# Patient Record
Sex: FEMALE | Race: WHITE | NOT HISPANIC OR LATINO | Employment: OTHER | URBAN - METROPOLITAN AREA
[De-identification: names, ages, dates, MRNs, and addresses within clinical notes are randomized per-mention and may not be internally consistent; named-entity substitution may affect disease eponyms.]

---

## 2017-01-18 ENCOUNTER — MEDICAL CORRESPONDENCE (OUTPATIENT)
Dept: FAMILY MEDICINE | Facility: CLINIC | Age: 77
End: 2017-01-18

## 2017-01-26 ENCOUNTER — TELEPHONE (OUTPATIENT)
Dept: FAMILY MEDICINE | Facility: CLINIC | Age: 77
End: 2017-01-26

## 2017-01-26 NOTE — TELEPHONE ENCOUNTER
Continued from below--Per Dr Baltazar WIGGINS for letter stating it is recommended patient have PCA care for 2 x a week. Mailed to daughter Kay [address] [place] [address].

## 2017-01-26 NOTE — TELEPHONE ENCOUNTER
Daughter Kay called and asked if Dr Ledesma could do a letter stating patient needs PCA services.  The [facility] wants to discontinue them.  Daughter is her PCA.  She goes twice a week for 6-8 hours daily and helps her mother with bath, meals,  shopping and any other personal care.

## 2017-01-26 NOTE — Clinical Note
[facility] [address] [place] [address] [phone]      January 26, 2017      Yoanna Phillips  [address] [facility] [address]              To Whom it May Concern:         It is recommended that this patient have PCA services.    At this time she has a PCA that comes twice a week for   6-8 hours a day. Patient needs help with personal care-bathing,  meals, shopping and overall safety review. Dx: Focal epilepsy.    Memory impaired. Difficulties with high risk decisions.            Sincerely,                  Marin Ledesma M.D.

## 2017-01-30 DIAGNOSIS — Z76.0 ENCOUNTER FOR MEDICATION REFILL: Primary | ICD-10-CM

## 2017-01-30 RX ORDER — LOSARTAN POTASSIUM 100 MG/1
TABLET ORAL
Qty: 30 TABLET | Refills: 5 | Status: SHIPPED | OUTPATIENT
Start: 2017-01-30 | End: 2017-03-17

## 2017-02-02 ENCOUNTER — TRANSFERRED RECORDS (OUTPATIENT)
Dept: FAMILY MEDICINE | Facility: CLINIC | Age: 77
End: 2017-02-02

## 2017-03-06 ENCOUNTER — CARE COORDINATION (OUTPATIENT)
Dept: CASE MANAGEMENT | Facility: CLINIC | Age: 77
End: 2017-03-06

## 2017-03-06 ENCOUNTER — OFFICE VISIT (OUTPATIENT)
Dept: FAMILY MEDICINE | Facility: CLINIC | Age: 77
End: 2017-03-06

## 2017-03-06 VITALS
SYSTOLIC BLOOD PRESSURE: 120 MMHG | BODY MASS INDEX: 29.76 KG/M2 | HEART RATE: 69 BPM | WEIGHT: 168 LBS | OXYGEN SATURATION: 97 % | DIASTOLIC BLOOD PRESSURE: 80 MMHG

## 2017-03-06 DIAGNOSIS — M16.11 PRIMARY OSTEOARTHRITIS OF RIGHT HIP: ICD-10-CM

## 2017-03-06 DIAGNOSIS — I10 ESSENTIAL HYPERTENSION, BENIGN: ICD-10-CM

## 2017-03-06 DIAGNOSIS — J44.9 CHRONIC OBSTRUCTIVE PULMONARY DISEASE, UNSPECIFIED COPD TYPE (H): ICD-10-CM

## 2017-03-06 DIAGNOSIS — Z01.818 PREOP GENERAL PHYSICAL EXAM: Primary | ICD-10-CM

## 2017-03-06 DIAGNOSIS — R41.3 POOR SHORT TERM MEMORY: ICD-10-CM

## 2017-03-06 DIAGNOSIS — G40.909 SEIZURE DISORDER (H): ICD-10-CM

## 2017-03-06 LAB
% GRANULOCYTES: 43.8 % (ref 42.2–75.2)
HCT VFR BLD AUTO: 38.1 % (ref 35–46)
HEMOGLOBIN: 12.9 G/DL (ref 11.8–15.5)
LYMPHOCYTES NFR BLD AUTO: 48.5 % (ref 20.5–51.1)
MCH RBC QN AUTO: 33.1 PG (ref 27–31)
MCHC RBC AUTO-ENTMCNC: 33.8 G/DL (ref 33–37)
MCV RBC AUTO: 97.8 FL (ref 80–100)
MONOCYTES NFR BLD AUTO: 7.7 % (ref 1.7–9.3)
PLATELET # BLD AUTO: 311 K/UL (ref 140–450)
RBC # BLD AUTO: 3.89 X10/CMM (ref 3.7–5.2)
WBC # BLD AUTO: 7.6 X10/CMM (ref 3.8–11)

## 2017-03-06 PROCEDURE — 85025 COMPLETE CBC W/AUTO DIFF WBC: CPT | Performed by: FAMILY MEDICINE

## 2017-03-06 PROCEDURE — 36415 COLL VENOUS BLD VENIPUNCTURE: CPT | Performed by: FAMILY MEDICINE

## 2017-03-06 PROCEDURE — 93000 ELECTROCARDIOGRAM COMPLETE: CPT | Performed by: FAMILY MEDICINE

## 2017-03-06 PROCEDURE — 99215 OFFICE O/P EST HI 40 MIN: CPT | Performed by: FAMILY MEDICINE

## 2017-03-06 PROCEDURE — 84132 ASSAY OF SERUM POTASSIUM: CPT | Mod: 90 | Performed by: FAMILY MEDICINE

## 2017-03-06 NOTE — MR AVS SNAPSHOT
After Visit Summary   3/6/2017    Yoanna Phillips    MRN: [id number]           Patient Information     Date Of Birth          1940        Visit Information        Provider Department      3/6/2017 2:00 PM Marin Ledesma MD [facility]        Today's Diagnoses     Preop general physical exam    -  1    Essential hypertension, benign        Poor short term memory        Seizure disorder (H)        Primary osteoarthritis of right hip        Chronic obstructive pulmonary disease, unspecified COPD type (H)          Care Instructions      Before Your Surgery      Call your surgeon if there is any change in your health. This includes signs of a cold or flu (such as a sore throat, runny nose, cough, rash or fever).    Do not smoke, drink alcohol or take over the counter medicine (unless your surgeon or primary care doctor tells you to) for the 24 hours before and after surgery.    If you take prescribed drugs: Follow your doctor s orders about which medicines to take and which to stop until after surgery.    Eating and drinking prior to surgery: follow the instructions from your surgeon    Take a shower or bath the night before surgery. Use the soap your surgeon gave you to gently clean your skin. If you do not have soap from your surgeon, use your regular soap. Do not shave or scrub the surgery site.  Wear clean pajamas and have clean sheets on your bed.         Follow-ups after your visit        Your next 10 appointments already scheduled     Mar 20, 2017   Procedure with Mingo Fernando MD   [facility] (--)    [address]  [facility] [id number] [phone]              Who to contact     If you have questions or need follow up information about today's clinic visit or your schedule please contact [facility] directly at [phone].  Normal or non-critical lab and imaging results will be communicated to you by Alfonso  letter or phone within 4 business days after the clinic has received the results. If you do not hear from us within 7 days, please contact the clinic through Tead[employer] or phone. If you have a critical or abnormal lab result, we will notify you by phone as soon as possible.  Submit refill requests through [employer]s or call your pharmacy and they will forward the refill request to us. Please allow 3 business days for your refill to be completed.          Additional Information About Your Visit        [employer] Information     [employer] gives you secure access to your electronic health record. If you see a primary care provider, you can also send messages to your care team and make appointments. If you have questions, please call your primary care clinic.  If you do not have a primary care provider, please call [phone] and they will assist you.        Care EveryWhere ID     This is your Care EveryWhere ID. This could be used by other organizations to access your [place] medical records  [id number]        Your Vitals Were     Pulse Pulse Oximetry BMI (Body Mass Index)             69 97% 29.76 kg/m2          Blood Pressure from Last 3 Encounters:   03/06/17 120/80   06/16/16 96/62   06/01/16 123/71    Weight from Last 3 Encounters:   03/06/17 76.2 kg (168 lb)   06/16/16 69.9 kg (154 lb)   06/01/16 73 kg (161 lb)              We Performed the Following     CBC with Diff/Plt ([facility])     EKG 12-lead complete w/read - Clinics     Potassium  Serum ([facility])        Primary Care Provider Office Phone # Fax #    Marin Ledesma [phone]       [facility] [address]  [facility] [address]        Thank you!     Thank you for choosing [facility]  for your care. Our goal is always to provide you with excellent care. Hearing back from our patients is one way we can continue to improve our services. Please take a few minutes to complete the written survey that you may receive in  the mail after your visit with us. Thank you!             Your Updated Medication List - Protect others around you: Learn how to safely use, store and throw away your medicines at www.disposemymeds.org.          This list is accurate as of: 3/6/17 11:59 PM.  Always use your most recent med list.                   Brand Name Dispense Instructions for use    acetaminophen 325 MG tablet    TYLENOL    100 tablet    Take 2 tablets (650 mg) by mouth every 4 hours as needed for mild pain       alendronate 70 MG tablet    FOSAMAX    12 tablet    Take 1 tablet (70 mg) by mouth every 7 days Take with over 8 ounces water and stay upright for at least 30 minutes after dose.  Take at least 60 minutes before breakfast       CALCIUM 500 + D 500-200 MG-IU Tabs     34    1 po qd       CARBATROL 300 MG 12 hr capsule   Generic drug:  carBAMazepine     60    1 CAPSULE TWICE DAILY       CENTRUM Tabs     34    1 po qd       cyanocobalamin 1000 MCG/ML injection    VITAMIN B12    1 mL    INJECT 1 ML AS DIRECTED EVERY 30 DAYS.       folic acid 1 MG tablet    FOLVITE    30 Tab    1 TABLET DAILY       furosemide 20 MG tablet    LASIX    60 tablet    TAKE ONE TABLET BY MOUTH TWO TIMES A DAY       gabapentin 600 MG tablet    NEURONTIN     Take 1,200 mg by mouth 3 times daily. 1200mg TID verified with pharmacy       losartan 100 MG tablet    COZAAR    30 tablet    Take 1 tablet by mouth daily       potassium chloride SA 20 MEQ CR tablet    potassium chloride    60 tablet    TAKE ONE TABLET BY MOUTH TWO TIMES A DAY       SPIRIVA HANDIHALER 18 MCG capsule   Generic drug:  tiotropium     30 capsule    INHALE 1 CAPSULE (18 MCG) BY INHALATION ROUTE ONCE DAILY       thiamine 100 MG tablet      Take 100 mg by mouth daily.       VITAMIN C PO      Take 1,000 mg by mouth daily
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
## 2017-03-06 NOTE — LETTER
[facility]  [address] [place]  [address]  Phone: [phone]    March 6, 2017    Yoanna Phillips                                                                                                                                [address] [facility] [address]   Member ID: [id number]      Reason for Appeal: Belief that patient did not accurately describe ADL/IADL's due to cognitive impairment and lack of insight into general functioning. Reccommend that family be present during interview to help create accurate representation of capabilities and need for assistance.     Cognitive impairment- SLUMS 15/30 signifying dementia/ severe impairment    Tinetti Gait and Balance- 23/28 moderate risk of falls     Diagnosis include:   Advance Care Planning      Hip joint replacement status     Status post total replacement of right hip     Primary osteoarthritis of hip     Dislocation of internal right hip prosthesis, subsequent encounter     Physical deconditioning     Anemia due to blood loss, acute     Dyspnea     Dislocated hip (H)     Essential hypertension, benign     B-complex deficiency     CARDIOVASCULAR SCREENING; LDL GOAL LESS THAN 130     Poor short term memory     Shoulder joint replacement status     Cognitive impairment     DJD (degenerative joint disease)     Health Care Home     Seizure disorder (H)           Sincerely,    Marin Ledesma M.D.

## 2017-03-07 ENCOUNTER — TELEPHONE (OUTPATIENT)
Dept: FAMILY MEDICINE | Facility: CLINIC | Age: 77
End: 2017-03-07

## 2017-03-07 LAB — POTASSIUM SERPL-SCNC: 4.5 MMOL/L (ref 3.5–5.2)

## 2017-03-07 RX ORDER — GABAPENTIN 600 MG/1
1200 TABLET ORAL 2 TIMES DAILY
Qty: 120 TABLET | Refills: 0 | COMMUNITY
Start: 2017-03-07 | End: 2017-03-17

## 2017-03-07 NOTE — PROGRESS NOTES
Clinic Care Coordination Contact  OUTREACH    Referral Information:  Referral Source: PCP  Reason for Contact: Concerns about decrease in hours for PCA  Care Conference: No     Universal Utilization:   ED Visits in last year: 3  Hospital visits in last year: 2  Last PCP appointment: 03/06/17  Missed Appointments: 2  Concerns: Frequent Hospitalizations related to orthopedics/falls  Multiple Providers or Specialists: Ortho    Clinical Concerns:  Current Medical Concerns:   Patient Active Problem List   Diagnosis     Essential hypertension, benign     B-complex deficiency     CARDIOVASCULAR SCREENING; LDL GOAL LESS THAN 130     Poor short term memory     Advance Care Planning     Shoulder joint replacement status     Cognitive impairment     DJD (degenerative joint disease)     Health Care Home     Seizure disorder (H)     Hip joint replacement status     Status post total replacement of right hip     Primary osteoarthritis of hip     Dislocation of internal right hip prosthesis, subsequent encounter     Physical deconditioning     Anemia due to blood loss, acute     Dyspnea     Dislocated hip (H)       Current Behavioral Concerns: Memory Loss, Lack of insight into care needs    Education Provided to patient: Care Coordination   Clinical Pathway: None    Medication Management:  Patient understands and is adherent, Family assists     Functional Status:  Mobility Status: Independent  Equipment Currently Used at Home: walker, rolling  Transportation: Family provides     Psychosocial:  Current living arrangement:: I live alone  Financial/Insurance: [facility], No concerns     Resources and Interventions:  Current Resources: PCA; Housekeeping/[employer]  PAS Number:  (NA)  Senior Linkage Line Referral Placed:  (NA)  Advanced Care Plans/Directives on file:: Yes  Referrals Placed:  (NA)    Patient/Caregiver understanding: PCP asked that the EMMA CC meet with pt and pt's grand daughter to discuss concerns about a decrease in  "pt's PCA hours and the appeal process. Pt's grand daughter reports that she believes that the patient did not accurately depict the amount of care needs she has due to lack of insight into her deficits stating, \"my grandma thinks she is more independent then she really is.\" Pt plans to have surgery on 3/20 discussed discharge planning in the TCU and that a county assessment may be able to be redone while in the facility as well since they will have information from PT and OT. Discussed Care Coordination role and provided materials about health care home.   Frequency of Care Coordination: as needed  Upcoming appointment:  (NA)     Plan: EMMA CC will discuss needs with PCP and send the letter for the appeal process and remain available if needed.    Dalia Wood, [facility]  Clinic Care Coordinator  [facility]  [phone]        "
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
## 2017-03-17 RX ORDER — MULTIVIT WITH MINERALS/LUTEIN
1 TABLET ORAL DAILY
COMMUNITY
End: 2020-02-16

## 2017-03-17 RX ORDER — TIOTROPIUM BROMIDE 18 UG/1
18 CAPSULE ORAL; RESPIRATORY (INHALATION) DAILY
COMMUNITY
End: 2024-03-06

## 2017-03-19 ENCOUNTER — ANESTHESIA EVENT (OUTPATIENT)
Dept: SURGERY | Facility: CLINIC | Age: 77
DRG: 470 | End: 2017-03-19
Payer: COMMERCIAL

## 2017-03-20 ENCOUNTER — ANESTHESIA (OUTPATIENT)
Dept: SURGERY | Facility: CLINIC | Age: 77
DRG: 470 | End: 2017-03-20
Payer: COMMERCIAL

## 2017-03-20 ENCOUNTER — HOSPITAL ENCOUNTER (INPATIENT)
Facility: CLINIC | Age: 77
LOS: 3 days | Discharge: NURSING, REHAB OR LONG-TERM CARE | DRG: 470 | End: 2017-03-23
Attending: ORTHOPAEDIC SURGERY | Admitting: ORTHOPAEDIC SURGERY
Payer: COMMERCIAL

## 2017-03-20 ENCOUNTER — APPOINTMENT (OUTPATIENT)
Dept: GENERAL RADIOLOGY | Facility: CLINIC | Age: 77
DRG: 470 | End: 2017-03-20
Attending: ORTHOPAEDIC SURGERY
Payer: COMMERCIAL

## 2017-03-20 ENCOUNTER — APPOINTMENT (OUTPATIENT)
Dept: PHYSICAL THERAPY | Facility: CLINIC | Age: 77
DRG: 470 | End: 2017-03-20
Attending: ORTHOPAEDIC SURGERY
Payer: COMMERCIAL

## 2017-03-20 ENCOUNTER — SURGERY (OUTPATIENT)
Age: 77
End: 2017-03-20

## 2017-03-20 DIAGNOSIS — Z96.649 HIP JOINT REPLACEMENT STATUS: Primary | ICD-10-CM

## 2017-03-20 LAB
ABO + RH BLD: NORMAL
ABO + RH BLD: NORMAL
BLD GP AB SCN SERPL QL: NORMAL
BLOOD BANK CMNT PATIENT-IMP: NORMAL
CREAT SERPL-MCNC: 0.6 MG/DL (ref 0.52–1.04)
GFR SERPL CREATININE-BSD FRML MDRD: NORMAL ML/MIN/1.7M2
PLATELET # BLD AUTO: 292 10E9/L (ref 150–450)
POTASSIUM SERPL-SCNC: 4.3 MMOL/L (ref 3.4–5.3)
SPECIMEN EXP DATE BLD: NORMAL

## 2017-03-20 PROCEDURE — 40000169 ZZH STATISTIC PRE-PROCEDURE ASSESSMENT I: Performed by: ORTHOPAEDIC SURGERY

## 2017-03-20 PROCEDURE — 25800025 ZZH RX 258: Performed by: ANESTHESIOLOGY

## 2017-03-20 PROCEDURE — 25000128 H RX IP 250 OP 636: Performed by: ANESTHESIOLOGY

## 2017-03-20 PROCEDURE — 12000007 ZZH R&B INTERMEDIATE

## 2017-03-20 PROCEDURE — 37000009 ZZH ANESTHESIA TECHNICAL FEE, EACH ADDTL 15 MIN: Performed by: ORTHOPAEDIC SURGERY

## 2017-03-20 PROCEDURE — 82565 ASSAY OF CREATININE: CPT | Performed by: ORTHOPAEDIC SURGERY

## 2017-03-20 PROCEDURE — 36415 COLL VENOUS BLD VENIPUNCTURE: CPT | Performed by: ORTHOPAEDIC SURGERY

## 2017-03-20 PROCEDURE — 25000128 H RX IP 250 OP 636: Performed by: ORTHOPAEDIC SURGERY

## 2017-03-20 PROCEDURE — 25800025 ZZH RX 258: Performed by: ORTHOPAEDIC SURGERY

## 2017-03-20 PROCEDURE — 71000012 ZZH RECOVERY PHASE 1 LEVEL 1 FIRST HR: Performed by: ORTHOPAEDIC SURGERY

## 2017-03-20 PROCEDURE — 85049 AUTOMATED PLATELET COUNT: CPT | Performed by: ORTHOPAEDIC SURGERY

## 2017-03-20 PROCEDURE — 86850 RBC ANTIBODY SCREEN: CPT | Performed by: ANESTHESIOLOGY

## 2017-03-20 PROCEDURE — 36000063 ZZH SURGERY LEVEL 4 EA 15 ADDTL MIN: Performed by: ORTHOPAEDIC SURGERY

## 2017-03-20 PROCEDURE — 40000193 ZZH STATISTIC PT WARD VISIT: Performed by: PHYSICAL THERAPIST

## 2017-03-20 PROCEDURE — 25000128 H RX IP 250 OP 636

## 2017-03-20 PROCEDURE — 97530 THERAPEUTIC ACTIVITIES: CPT | Mod: GP | Performed by: PHYSICAL THERAPIST

## 2017-03-20 PROCEDURE — 97162 PT EVAL MOD COMPLEX 30 MIN: CPT | Mod: GP | Performed by: PHYSICAL THERAPIST

## 2017-03-20 PROCEDURE — 97110 THERAPEUTIC EXERCISES: CPT | Mod: GP | Performed by: PHYSICAL THERAPIST

## 2017-03-20 PROCEDURE — 86901 BLOOD TYPING SEROLOGIC RH(D): CPT | Performed by: ANESTHESIOLOGY

## 2017-03-20 PROCEDURE — 71000013 ZZH RECOVERY PHASE 1 LEVEL 1 EA ADDTL HR: Performed by: ORTHOPAEDIC SURGERY

## 2017-03-20 PROCEDURE — 25000125 ZZHC RX 250: Performed by: ANESTHESIOLOGY

## 2017-03-20 PROCEDURE — 25000132 ZZH RX MED GY IP 250 OP 250 PS 637: Performed by: ORTHOPAEDIC SURGERY

## 2017-03-20 PROCEDURE — 36000093 ZZH SURGERY LEVEL 4 1ST 30 MIN: Performed by: ORTHOPAEDIC SURGERY

## 2017-03-20 PROCEDURE — 84132 ASSAY OF SERUM POTASSIUM: CPT | Performed by: ANESTHESIOLOGY

## 2017-03-20 PROCEDURE — 36415 COLL VENOUS BLD VENIPUNCTURE: CPT | Performed by: ANESTHESIOLOGY

## 2017-03-20 PROCEDURE — C1776 JOINT DEVICE (IMPLANTABLE): HCPCS | Performed by: ORTHOPAEDIC SURGERY

## 2017-03-20 PROCEDURE — 27210794 ZZH OR GENERAL SUPPLY STERILE: Performed by: ORTHOPAEDIC SURGERY

## 2017-03-20 PROCEDURE — 27210995 ZZH RX 272: Performed by: ORTHOPAEDIC SURGERY

## 2017-03-20 PROCEDURE — 86900 BLOOD TYPING SEROLOGIC ABO: CPT | Performed by: ANESTHESIOLOGY

## 2017-03-20 PROCEDURE — 0SRB02A REPLACEMENT OF LEFT HIP JOINT WITH METAL ON POLYETHYLENE SYNTHETIC SUBSTITUTE, UNCEMENTED, OPEN APPROACH: ICD-10-PCS | Performed by: ORTHOPAEDIC SURGERY

## 2017-03-20 PROCEDURE — 25000128 H RX IP 250 OP 636: Performed by: NURSE ANESTHETIST, CERTIFIED REGISTERED

## 2017-03-20 PROCEDURE — 37000008 ZZH ANESTHESIA TECHNICAL FEE, 1ST 30 MIN: Performed by: ORTHOPAEDIC SURGERY

## 2017-03-20 PROCEDURE — 25000125 ZZHC RX 250: Performed by: NURSE ANESTHETIST, CERTIFIED REGISTERED

## 2017-03-20 PROCEDURE — 40000940 XR PELVIS AND HIP PORTABLE LEFT 1 VIEW

## 2017-03-20 PROCEDURE — 25000125 ZZHC RX 250: Performed by: ORTHOPAEDIC SURGERY

## 2017-03-20 PROCEDURE — 25000566 ZZH SEVOFLURANE, EA 15 MIN: Performed by: ORTHOPAEDIC SURGERY

## 2017-03-20 PROCEDURE — C1713 ANCHOR/SCREW BN/BN,TIS/BN: HCPCS | Performed by: ORTHOPAEDIC SURGERY

## 2017-03-20 DEVICE — IMP SCR ZIM 6.5X25MM ACET CUP SELF TAP 00-6250-065-25: Type: IMPLANTABLE DEVICE | Site: HIP | Status: FUNCTIONAL

## 2017-03-20 DEVICE — IMPLANTABLE DEVICE: Type: IMPLANTABLE DEVICE | Site: HIP | Status: FUNCTIONAL

## 2017-03-20 DEVICE — IMP SHELL ACETABULUM ZIM 52MM 00-6200-052-22: Type: IMPLANTABLE DEVICE | Site: HIP | Status: FUNCTIONAL

## 2017-03-20 DEVICE — IMP LINER ACETABULUM ZIM XLPE 50X36MM 00-6305-050-36: Type: IMPLANTABLE DEVICE | Site: HIP | Status: FUNCTIONAL

## 2017-03-20 DEVICE — IMP HEAD FEMORAL BIOM MOD 36MM +3MM 11-363663: Type: IMPLANTABLE DEVICE | Site: HIP | Status: FUNCTIONAL

## 2017-03-20 RX ORDER — ONDANSETRON 2 MG/ML
INJECTION INTRAMUSCULAR; INTRAVENOUS PRN
Status: DISCONTINUED | OUTPATIENT
Start: 2017-03-20 | End: 2017-03-20

## 2017-03-20 RX ORDER — ONDANSETRON 4 MG/1
4 TABLET, ORALLY DISINTEGRATING ORAL EVERY 30 MIN PRN
Status: DISCONTINUED | OUTPATIENT
Start: 2017-03-20 | End: 2017-03-20 | Stop reason: HOSPADM

## 2017-03-20 RX ORDER — HYDROMORPHONE HYDROCHLORIDE 1 MG/ML
.3-.5 INJECTION, SOLUTION INTRAMUSCULAR; INTRAVENOUS; SUBCUTANEOUS EVERY 5 MIN PRN
Status: DISCONTINUED | OUTPATIENT
Start: 2017-03-20 | End: 2017-03-20 | Stop reason: HOSPADM

## 2017-03-20 RX ORDER — CEFAZOLIN SODIUM 1 G/3ML
1 INJECTION, POWDER, FOR SOLUTION INTRAMUSCULAR; INTRAVENOUS SEE ADMIN INSTRUCTIONS
Status: DISCONTINUED | OUTPATIENT
Start: 2017-03-20 | End: 2017-03-20 | Stop reason: HOSPADM

## 2017-03-20 RX ORDER — FENTANYL CITRATE 50 UG/ML
25-50 INJECTION, SOLUTION INTRAMUSCULAR; INTRAVENOUS
Status: DISCONTINUED | OUTPATIENT
Start: 2017-03-20 | End: 2017-03-20 | Stop reason: HOSPADM

## 2017-03-20 RX ORDER — PROPOFOL 10 MG/ML
INJECTION, EMULSION INTRAVENOUS PRN
Status: DISCONTINUED | OUTPATIENT
Start: 2017-03-20 | End: 2017-03-20

## 2017-03-20 RX ORDER — METOCLOPRAMIDE 5 MG/1
5 TABLET ORAL EVERY 6 HOURS PRN
Status: DISCONTINUED | OUTPATIENT
Start: 2017-03-20 | End: 2017-03-23 | Stop reason: HOSPADM

## 2017-03-20 RX ORDER — ONDANSETRON 4 MG/1
4 TABLET, ORALLY DISINTEGRATING ORAL EVERY 6 HOURS PRN
Status: DISCONTINUED | OUTPATIENT
Start: 2017-03-20 | End: 2017-03-23 | Stop reason: HOSPADM

## 2017-03-20 RX ORDER — SODIUM CHLORIDE, SODIUM LACTATE, POTASSIUM CHLORIDE, CALCIUM CHLORIDE 600; 310; 30; 20 MG/100ML; MG/100ML; MG/100ML; MG/100ML
INJECTION, SOLUTION INTRAVENOUS CONTINUOUS
Status: DISCONTINUED | OUTPATIENT
Start: 2017-03-20 | End: 2017-03-20 | Stop reason: HOSPADM

## 2017-03-20 RX ORDER — GLYCOPYRROLATE 0.2 MG/ML
INJECTION, SOLUTION INTRAMUSCULAR; INTRAVENOUS PRN
Status: DISCONTINUED | OUTPATIENT
Start: 2017-03-20 | End: 2017-03-20

## 2017-03-20 RX ORDER — ONDANSETRON 2 MG/ML
4 INJECTION INTRAMUSCULAR; INTRAVENOUS EVERY 6 HOURS PRN
Status: DISCONTINUED | OUTPATIENT
Start: 2017-03-20 | End: 2017-03-23 | Stop reason: HOSPADM

## 2017-03-20 RX ORDER — HYDROMORPHONE HYDROCHLORIDE 1 MG/ML
INJECTION, SOLUTION INTRAMUSCULAR; INTRAVENOUS; SUBCUTANEOUS
Status: COMPLETED
Start: 2017-03-20 | End: 2017-03-20

## 2017-03-20 RX ORDER — NALOXONE HYDROCHLORIDE 0.4 MG/ML
.1-.4 INJECTION, SOLUTION INTRAMUSCULAR; INTRAVENOUS; SUBCUTANEOUS
Status: DISCONTINUED | OUTPATIENT
Start: 2017-03-20 | End: 2017-03-23 | Stop reason: HOSPADM

## 2017-03-20 RX ORDER — HYDROMORPHONE HYDROCHLORIDE 2 MG/1
2 TABLET ORAL EVERY 4 HOURS PRN
Status: DISCONTINUED | OUTPATIENT
Start: 2017-03-20 | End: 2017-03-23 | Stop reason: HOSPADM

## 2017-03-20 RX ORDER — ACETAMINOPHEN 325 MG/1
975 TABLET ORAL EVERY 8 HOURS
Status: DISCONTINUED | OUTPATIENT
Start: 2017-03-20 | End: 2017-03-23 | Stop reason: HOSPADM

## 2017-03-20 RX ORDER — TIOTROPIUM BROMIDE 18 UG/1
18 CAPSULE ORAL; RESPIRATORY (INHALATION) DAILY
Status: DISCONTINUED | OUTPATIENT
Start: 2017-03-21 | End: 2017-03-23 | Stop reason: HOSPADM

## 2017-03-20 RX ORDER — NEOSTIGMINE METHYLSULFATE 1 MG/ML
VIAL (ML) INJECTION PRN
Status: DISCONTINUED | OUTPATIENT
Start: 2017-03-20 | End: 2017-03-20

## 2017-03-20 RX ORDER — ACETAMINOPHEN 500 MG
1000 TABLET ORAL ONCE
Status: COMPLETED | OUTPATIENT
Start: 2017-03-20 | End: 2017-03-20

## 2017-03-20 RX ORDER — ONDANSETRON 2 MG/ML
4 INJECTION INTRAMUSCULAR; INTRAVENOUS EVERY 30 MIN PRN
Status: DISCONTINUED | OUTPATIENT
Start: 2017-03-20 | End: 2017-03-20 | Stop reason: HOSPADM

## 2017-03-20 RX ORDER — PROCHLORPERAZINE MALEATE 5 MG
5 TABLET ORAL EVERY 6 HOURS PRN
Status: DISCONTINUED | OUTPATIENT
Start: 2017-03-20 | End: 2017-03-23 | Stop reason: HOSPADM

## 2017-03-20 RX ORDER — METOCLOPRAMIDE HYDROCHLORIDE 5 MG/ML
5 INJECTION INTRAMUSCULAR; INTRAVENOUS EVERY 6 HOURS PRN
Status: DISCONTINUED | OUTPATIENT
Start: 2017-03-20 | End: 2017-03-23 | Stop reason: HOSPADM

## 2017-03-20 RX ORDER — AMOXICILLIN 250 MG
1-2 CAPSULE ORAL 2 TIMES DAILY
Status: DISCONTINUED | OUTPATIENT
Start: 2017-03-20 | End: 2017-03-23 | Stop reason: HOSPADM

## 2017-03-20 RX ORDER — CEFAZOLIN SODIUM 2 G/100ML
2 INJECTION, SOLUTION INTRAVENOUS
Status: COMPLETED | OUTPATIENT
Start: 2017-03-20 | End: 2017-03-20

## 2017-03-20 RX ORDER — LIDOCAINE HYDROCHLORIDE 20 MG/ML
INJECTION, SOLUTION INFILTRATION; PERINEURAL PRN
Status: DISCONTINUED | OUTPATIENT
Start: 2017-03-20 | End: 2017-03-20

## 2017-03-20 RX ORDER — POTASSIUM CHLORIDE 750 MG/1
10 TABLET, EXTENDED RELEASE ORAL 2 TIMES DAILY
Status: DISCONTINUED | OUTPATIENT
Start: 2017-03-20 | End: 2017-03-23 | Stop reason: HOSPADM

## 2017-03-20 RX ORDER — ACETAMINOPHEN 325 MG/1
650 TABLET ORAL EVERY 4 HOURS PRN
Status: DISCONTINUED | OUTPATIENT
Start: 2017-03-23 | End: 2017-03-23 | Stop reason: HOSPADM

## 2017-03-20 RX ORDER — HYDROMORPHONE HYDROCHLORIDE 1 MG/ML
.3-.5 INJECTION, SOLUTION INTRAMUSCULAR; INTRAVENOUS; SUBCUTANEOUS
Status: DISCONTINUED | OUTPATIENT
Start: 2017-03-20 | End: 2017-03-23 | Stop reason: HOSPADM

## 2017-03-20 RX ORDER — CYANOCOBALAMIN 1000 UG/ML
1000 INJECTION, SOLUTION INTRAMUSCULAR; SUBCUTANEOUS
COMMUNITY
End: 2022-04-25

## 2017-03-20 RX ORDER — LOSARTAN POTASSIUM 100 MG/1
100 TABLET ORAL DAILY
Status: DISCONTINUED | OUTPATIENT
Start: 2017-03-20 | End: 2017-03-23 | Stop reason: HOSPADM

## 2017-03-20 RX ORDER — GABAPENTIN 600 MG/1
1200 TABLET ORAL 3 TIMES DAILY
Status: DISCONTINUED | OUTPATIENT
Start: 2017-03-20 | End: 2017-03-23 | Stop reason: HOSPADM

## 2017-03-20 RX ORDER — LIDOCAINE 40 MG/G
CREAM TOPICAL
Status: DISCONTINUED | OUTPATIENT
Start: 2017-03-20 | End: 2017-03-23 | Stop reason: HOSPADM

## 2017-03-20 RX ORDER — DEXTROSE MONOHYDRATE, SODIUM CHLORIDE, AND POTASSIUM CHLORIDE 50; 1.49; 4.5 G/1000ML; G/1000ML; G/1000ML
INJECTION, SOLUTION INTRAVENOUS CONTINUOUS
Status: DISCONTINUED | OUTPATIENT
Start: 2017-03-20 | End: 2017-03-21 | Stop reason: CLARIF

## 2017-03-20 RX ORDER — CEFAZOLIN SODIUM 1 G/3ML
1 INJECTION, POWDER, FOR SOLUTION INTRAMUSCULAR; INTRAVENOUS EVERY 8 HOURS
Status: COMPLETED | OUTPATIENT
Start: 2017-03-20 | End: 2017-03-21

## 2017-03-20 RX ORDER — FENTANYL CITRATE 50 UG/ML
INJECTION, SOLUTION INTRAMUSCULAR; INTRAVENOUS PRN
Status: DISCONTINUED | OUTPATIENT
Start: 2017-03-20 | End: 2017-03-20

## 2017-03-20 RX ADMIN — ONDANSETRON 4 MG: 2 INJECTION INTRAMUSCULAR; INTRAVENOUS at 08:50

## 2017-03-20 RX ADMIN — HYDROMORPHONE HYDROCHLORIDE 0.5 MG: 1 INJECTION, SOLUTION INTRAMUSCULAR; INTRAVENOUS; SUBCUTANEOUS at 11:57

## 2017-03-20 RX ADMIN — ROCURONIUM BROMIDE 30 MG: 10 INJECTION INTRAVENOUS at 07:38

## 2017-03-20 RX ADMIN — ACETAMINOPHEN 975 MG: 325 TABLET, FILM COATED ORAL at 22:34

## 2017-03-20 RX ADMIN — FENTANYL CITRATE 50 MCG: 50 INJECTION, SOLUTION INTRAMUSCULAR; INTRAVENOUS at 09:27

## 2017-03-20 RX ADMIN — FENTANYL CITRATE 50 MCG: 50 INJECTION, SOLUTION INTRAMUSCULAR; INTRAVENOUS at 09:44

## 2017-03-20 RX ADMIN — PROPOFOL 30 MG: 10 INJECTION, EMULSION INTRAVENOUS at 08:27

## 2017-03-20 RX ADMIN — GABAPENTIN 1200 MG: 600 TABLET, FILM COATED ORAL at 16:35

## 2017-03-20 RX ADMIN — HYDROMORPHONE HYDROCHLORIDE 0.5 MG: 1 INJECTION, SOLUTION INTRAMUSCULAR; INTRAVENOUS; SUBCUTANEOUS at 09:18

## 2017-03-20 RX ADMIN — WATER 1000 ML: 100 IRRIGANT IRRIGATION at 08:35

## 2017-03-20 RX ADMIN — GLYCOPYRROLATE 0.4 MG: 0.2 INJECTION, SOLUTION INTRAMUSCULAR; INTRAVENOUS at 08:42

## 2017-03-20 RX ADMIN — LIDOCAINE HYDROCHLORIDE 30 MG: 20 INJECTION, SOLUTION INFILTRATION; PERINEURAL at 07:38

## 2017-03-20 RX ADMIN — POTASSIUM CHLORIDE, DEXTROSE MONOHYDRATE AND SODIUM CHLORIDE: 150; 5; 450 INJECTION, SOLUTION INTRAVENOUS at 20:16

## 2017-03-20 RX ADMIN — SODIUM CHLORIDE, POTASSIUM CHLORIDE, SODIUM LACTATE AND CALCIUM CHLORIDE: 600; 310; 30; 20 INJECTION, SOLUTION INTRAVENOUS at 07:28

## 2017-03-20 RX ADMIN — CARBAMAZEPINE 300 MG: 100 TABLET, EXTENDED RELEASE ORAL at 20:19

## 2017-03-20 RX ADMIN — HYDROMORPHONE HYDROCHLORIDE 0.3 MG: 1 INJECTION, SOLUTION INTRAMUSCULAR; INTRAVENOUS; SUBCUTANEOUS at 10:12

## 2017-03-20 RX ADMIN — GABAPENTIN 1200 MG: 600 TABLET, FILM COATED ORAL at 22:35

## 2017-03-20 RX ADMIN — TRANEXAMIC ACID 1 G: 100 INJECTION, SOLUTION INTRAVENOUS at 07:45

## 2017-03-20 RX ADMIN — FENTANYL CITRATE 50 MCG: 50 INJECTION, SOLUTION INTRAMUSCULAR; INTRAVENOUS at 07:28

## 2017-03-20 RX ADMIN — SODIUM CHLORIDE, POTASSIUM CHLORIDE, SODIUM LACTATE AND CALCIUM CHLORIDE: 600; 310; 30; 20 INJECTION, SOLUTION INTRAVENOUS at 06:58

## 2017-03-20 RX ADMIN — POTASSIUM CHLORIDE 10 MEQ: 750 TABLET, EXTENDED RELEASE ORAL at 20:19

## 2017-03-20 RX ADMIN — HYDROMORPHONE HYDROCHLORIDE 0.3 MG: 1 INJECTION, SOLUTION INTRAMUSCULAR; INTRAVENOUS; SUBCUTANEOUS at 16:35

## 2017-03-20 RX ADMIN — SENNOSIDES AND DOCUSATE SODIUM 1 TABLET: 8.6; 5 TABLET ORAL at 20:19

## 2017-03-20 RX ADMIN — POTASSIUM CHLORIDE, DEXTROSE MONOHYDRATE AND SODIUM CHLORIDE: 150; 5; 450 INJECTION, SOLUTION INTRAVENOUS at 11:35

## 2017-03-20 RX ADMIN — CEFAZOLIN SODIUM 1 G: 1 INJECTION, POWDER, FOR SOLUTION INTRAMUSCULAR; INTRAVENOUS at 16:37

## 2017-03-20 RX ADMIN — FENTANYL CITRATE 50 MCG: 50 INJECTION, SOLUTION INTRAMUSCULAR; INTRAVENOUS at 07:38

## 2017-03-20 RX ADMIN — FENTANYL CITRATE 50 MCG: 50 INJECTION, SOLUTION INTRAMUSCULAR; INTRAVENOUS at 09:06

## 2017-03-20 RX ADMIN — NEOSTIGMINE METHYLSULFATE 2 MG: 1 INJECTION INTRAMUSCULAR; INTRAVENOUS; SUBCUTANEOUS at 08:42

## 2017-03-20 RX ADMIN — PROPOFOL 90 MG: 10 INJECTION, EMULSION INTRAVENOUS at 07:38

## 2017-03-20 RX ADMIN — Medication 1 LOZENGE: at 22:35

## 2017-03-20 RX ADMIN — ACETAMINOPHEN 975 MG: 325 TABLET, FILM COATED ORAL at 14:16

## 2017-03-20 RX ADMIN — GENTAMICIN SULFATE 1000 ML: 40 INJECTION, SOLUTION INTRAMUSCULAR; INTRAVENOUS at 08:08

## 2017-03-20 RX ADMIN — FENTANYL CITRATE 50 MCG: 50 INJECTION, SOLUTION INTRAMUSCULAR; INTRAVENOUS at 08:04

## 2017-03-20 RX ADMIN — HYDROMORPHONE HYDROCHLORIDE 0.5 MG: 1 INJECTION, SOLUTION INTRAMUSCULAR; INTRAVENOUS; SUBCUTANEOUS at 14:16

## 2017-03-20 RX ADMIN — CEFAZOLIN SODIUM 2 G: 2 INJECTION, SOLUTION INTRAVENOUS at 07:50

## 2017-03-20 RX ADMIN — FENTANYL CITRATE 50 MCG: 50 INJECTION, SOLUTION INTRAMUSCULAR; INTRAVENOUS at 09:02

## 2017-03-20 RX ADMIN — ACETAMINOPHEN 500 MG: 500 TABLET ORAL at 06:52

## 2017-03-20 ASSESSMENT — COPD QUESTIONNAIRES: COPD: 1

## 2017-03-20 ASSESSMENT — LIFESTYLE VARIABLES: TOBACCO_USE: 1

## 2017-03-20 ASSESSMENT — ENCOUNTER SYMPTOMS: SEIZURES: 1

## 2017-03-20 NOTE — IP AVS SNAPSHOT
"[place] [address] [facility]: [phone]                                              INTERAGENCY TRANSFER FORM - PHYSICIAN ORDERS   3/20/2017                    Hospital Admission Date: 3/20/2017  JOHN BEAN   : 1940  Sex: Female        Attending Provider: Mingo Fernando MD     Allergies:  Aspirin    Infection:  None   Service:  ORTHOPEDICS    Ht:  1.6 m (5' 3\")   Wt:  77.1 kg (170 lb)   Admission Wt:  77.1 kg (170 lb)    BMI:  30.11 kg/m 2   BSA:  1.85 m 2            Patient PCP Information     Provider PCP Type    Marin Ledesma MD General      ED Clinical Impression     Diagnosis Description Comment Added By Time Added    Hip joint replacement status [Z96.649] Hip joint replacement status [Z96.649]  Mingo Fernando MD 3/22/2017  9:02 AM      Hospital Problems as of 3/23/2017              Priority Class Noted POA    Hip joint replacement status Medium  2016 Yes      Non-Hospital Problems as of 3/23/2017              Priority Class Noted    Essential hypertension, benign   2005    B-complex deficiency   2006    CARDIOVASCULAR SCREENING; LDL GOAL LESS THAN 130   10/31/2010    Poor short term memory   2011    Advance Care Planning   3/6/2012    Shoulder joint replacement status   2012    Cognitive impairment   7/3/2012    DJD (degenerative joint disease)   7/3/2012    Health Care Home   2014    Seizure disorder (H)   2016    Status post total replacement of right hip Medium  2016    Primary osteoarthritis of hip Medium  5/3/2016    Dislocation of internal right hip prosthesis, subsequent encounter Medium  5/3/2016    Physical deconditioning Medium  5/3/2016    Anemia due to blood loss, acute Medium  5/3/2016    Dyspnea Medium  5/3/2016    Dislocated hip (H) Medium  2016      Code Status History     Date Active Date Inactive Code Status Order ID Comments User Context    3/22/2017  9:05 AM  Full Code [id number]  " Mingo Fernando MD Outpatient    3/20/2017 11:33 AM 3/22/2017  9:05 AM Full Code [id number]  Mingo Fernando MD Inpatient    5/16/2016  1:10 PM 3/20/2017 11:33 AM Full Code [id number]  Mingo Fernando MD Outpatient    5/14/2016 10:49 AM 5/16/2016  1:10 PM Full Code [id number]  Mingo Fernando MD Inpatient    5/12/2016  3:09 AM 5/14/2016 10:49 AM Full Code [id number]  Pablo Palomo MD Inpatient    4/13/2016 10:24 AM 5/12/2016  3:09 AM Full Code [id number]  Mingo Fernando MD Outpatient    4/11/2016  2:10 PM 4/13/2016 10:24 AM Full Code [id number]  Mingo Fernando MD Inpatient    6/28/2012  1:40 PM 4/11/2016  2:10 PM Full Code [id number]  Mingo Fernando MD Outpatient    6/25/2012  7:15 PM 6/28/2012  1:40 PM Full Code [id number]  Saloni Kerr, RN Inpatient    3/7/2012  9:34 PM 3/9/2012  7:01 PM Full Code [id number]  Sandra Beltran, GENNARO Inpatient    3/6/2012  2:51 PM 3/7/2012  9:34 PM Full Code [id number]  Hernandez Noyola DO Outpatient    3/3/2012  6:10 PM 3/6/2012  2:51 PM Full Code [id number]  Tisha Cantu, RN Inpatient         Medication Review      START taking        Dose / Directions Comments    enoxaparin 40 MG/0.4ML injection   Commonly known as:  LOVENOX   Used for:  Hip joint replacement status        Dose:  40 mg   Inject 0.4 mLs (40 mg) Subcutaneous every 24 hours for 10 days   Quantity:  4 mL   Refills:  0        senna-docusate 8.6-50 MG per tablet   Commonly known as:  SENOKOT-S;PERICOLACE   Used for:  Hip joint replacement status        Dose:  1-2 tablet   Take 1-2 tablets by mouth 2 times daily   Quantity:  100 tablet   Refills:  0          CONTINUE these medications which have NOT CHANGED        Dose / Directions Comments    CARBAMAZEPINE ER PO        Dose:  300 mg   Take 300 mg by mouth 2 times daily   Refills:  0        CENTRUM SILVER per tablet        Dose:  1 tablet   Take 1 tablet by mouth daily    Refills:  0        cyanocobalamin 1000 MCG/ML injection   Commonly known as:  VITAMIN B12        Dose:  1 mL   Inject 1 mL into the muscle every 30 days   Refills:  0        FOLIC ACID PO        Dose:  1 mg   Take 1 mg by mouth daily   Refills:  0        GABAPENTIN PO        Dose:  1200 mg   Take 1,200 mg by mouth 3 times daily (Takes 2 x 600mg tablet = 1200mg dose)   Refills:  0        K-DUR PO        Dose:  10 mEq   Take 10 mEq by mouth 2 times daily (takes 0.5 x 20mEq tablet = 10mEq dose) Patient taking differently than prescribed; Prescribed as 20mEQ twice daily   Refills:  0        LOSARTAN POTASSIUM PO        Dose:  100 mg   Take 100 mg by mouth daily   Refills:  0        tiotropium 18 MCG capsule   Commonly known as:  SPIRIVA        Dose:  18 mcg   Inhale 18 mcg into the lungs daily   Refills:  0        TYLENOL PO        Dose:  325-650 mg   Take 325-650 mg by mouth every 6 hours as needed for mild pain or fever   Refills:  0        VITAMIN B-1 PO        Dose:  100 mg   Take 100 mg by mouth daily   Refills:  0                  Further instructions from your care team       [facility] have been [facility] to [facility]  Phone Number is [phone]  Fax Number is [phone]    Summary of Visit     Reason for your hospital stay       Left hip replacement             After Care     Activity - Up with nursing assistance           Advance Diet as Tolerated       Follow this diet upon discharge: Orders Placed This Encounter      Advance Diet as Tolerated: Regular Diet Adult       General info for SNF       Length of Stay Estimate: Short Term Care: Estimated # of Days <30  Condition at Discharge: Improving  Level of care:skilled   Rehabilitation Potential: Excellent  Admission H&P remains valid and up-to-date: Yes  Recent Chemotherapy: N/A  Use Nursing Home Standing Orders: Yes       Mantoux instructions       Give two-step Mantoux (PPD) Per Facility Policy Yes       Weight bearing status       Full  weight bearing       Wound care (specify)       Site:   Left hip   Instructions:  Daily dressing change             Referrals     Occupational Therapy Adult Consult       Evaluate and treat as clinically indicated.    Reason:  Hip replacement       Physical Therapy Adult Consult       Evaluate and treat as clinically indicated.    Reason:  Hip replacement             Follow-Up Appointment Instructions     Future Labs/Procedures    Follow Up and recommended labs and tests     Comments:    Follow up with me in 2 weeks.  No follow up labs or test are needed.      Follow-Up Appointment Instructions     Follow Up and recommended labs and tests       Follow up with me in 2 weeks.  No follow up labs or test are needed.             Statement of Approval     Ordered          03/22/17 0906  I have reviewed and agree with all the recommendations and orders detailed in this document.  EFFECTIVE NOW     Approved and electronically signed by:  Mingo Fernando MD

## 2017-03-20 NOTE — IP AVS SNAPSHOT
[place] [address] [facility]: [phone]            Medication Administration Report for Yoanna Phillips as of 03/23/17 1101   Legend:    Given Hold Not Given Due Canceled Entry Other Actions    Time Time (Time) Time  Time-Action       Inactive    Active    Linked        Medications 03/17/17 03/18/17 03/19/17 03/20/17 03/21/17 03/22/17 03/23/17    acetaminophen (TYLENOL) tablet 650 mg  Dose: 650 mg Freq: EVERY 4 HOURS PRN Route: PO  PRN Reason: other  PRN Comment: surgical pain  Start: 03/23/17 0000   Admin Instructions: May give first dose 4 hours after last dose scheduled of acetaminophen.  Maximum acetaminophen dose from all sources = 75 mg/kg/day not to exceed 4 grams/day.               acetaminophen (TYLENOL) tablet 975 mg  Dose: 975 mg Freq: EVERY 8 HOURS Route: PO  Start: 03/20/17 1400   End: 03/23/17 1359   Admin Instructions: Do not use if patient has an active opioid/acetaminophen analgesic order for pain  Maximum acetaminophen dose from all sources = 75 mg/kg/day not to exceed 4 grams/day.        1416 (975 mg)-Given       2234 (975 mg)-Given        0659 (975 mg)-Given       (1400)-Not Given       2134 (975 mg)-Given        0600 (975 mg)-Given       1548 (975 mg)-Given       2141 (975 mg)-Given        0554 (975 mg)-Given       1359-Med Discontinued       benzocaine-menthol (CHLORASEPTIC) 6-10 MG lozenge 1-2 lozenge  Dose: 1-2 lozenge Freq: EVERY 1 HOUR PRN Route: BU  PRN Reason: sore throat  PRN Comment: sore throat without fever  Start: 03/20/17 1133       2235 (1 lozenge)-Given              carBAMazepine (TEGretol XR) 12 hr tablet 300 mg  Dose: 300 mg Freq: 2 TIMES DAILY Route: PO  Start: 03/20/17 2100       2019 (300 mg)-Given        0950 (300 mg)-Given       2040 (300 mg)-Given        0855 (300 mg)-Given       2141 (300 mg)-Given        1028 (300 mg)-Given       [ ] 2100           enoxaparin (LOVENOX) injection 40 mg  Dose: 40 mg Freq: EVERY 24 HOURS Route: SC  Start:  "03/21/17 0800   Admin Instructions: Check to make sure start date/time is 12-24 hours post op unless documented complication, AND no sooner than 22 hours post op if spinal anesthesia used.   Continue until discharge to home. HOLD if platelet count falls below 50% of baseline or less than 100,000/ L and notify provider.         0950 (40 mg)-Given        0855 (40 mg)-Given        1029 (40 mg)-Given           gabapentin (NEURONTIN) tablet 1,200 mg  Dose: 1,200 mg Freq: 3 TIMES DAILY Route: PO  Start: 03/20/17 1600       1635 (1,200 mg)-Given       2235 (1,200 mg)-Given        0950 (1,200 mg)-Given       1552 (1,200 mg)-Given       2134 (1,200 mg)-Given        0855 (1,200 mg)-Given       1548 (1,200 mg)-Given       2142 (1,200 mg)-Given        1025 (1,200 mg)-Given       [ ] 1600       [ ] 2200           HYDROmorphone (DILAUDID) tablet 2 mg  Dose: 2 mg Freq: EVERY 4 HOURS PRN Route: PO  PRN Reason: moderate to severe pain  Start: 03/20/17 1133   Admin Instructions: Hold while on PCA or with regular IV opioid dosing.  IF CrCl is UNKNOWN start at lowest end of dosing range.         1038 (2 mg)-Given             HYDROmorphone (PF) (DILAUDID) injection 0.3-0.5 mg  Dose: 0.3-0.5 mg Freq: EVERY 2 HOURS PRN Route: IV  PRN Reason: severe pain  PRN Comment: or if patient unable to take PO  Start: 03/20/17 1133   Admin Instructions: Hold while on PCA.        1157 (0.5 mg)-Given       1416 (0.5 mg)-Given       1635 (0.3 mg)-Given        0104 (0.5 mg)-Given             lidocaine (LMX4) cream  Freq: EVERY 1 HOUR PRN Route: Top  PRN Reason: pain  PRN Comment: with VAD insertion or accessing implanted port.  Start: 03/20/17 1133   Admin Instructions: Do NOT give if patient has a history of allergy to any local anesthetic or any \"cody\" product.   Apply 30 minutes prior to VAD insertion or port access.  MAX Dose:  2.5 g (  of 5 g tube)               lidocaine 1 % 1 mL  Dose: 1 mL Freq: EVERY 1 HOUR PRN Route: OTHER  PRN Comment: mild " "pain with VAD insertion or accessing implanted port  Start: 03/20/17 1133   Admin Instructions: Do NOT give if patient has a history of allergy to any local anesthetic or any \"cody\" product. MAX dose 1 mL subcutaneous OR intradermal in divided doses.               losartan (COZAAR) tablet 100 mg  Dose: 100 mg Freq: DAILY Route: PO  Start: 03/20/17 1145       (1310)-Not Given [C]        0951 (100 mg)-Given        0855 (100 mg)-Given        1026 (100 mg)-Given [C]           metoclopramide (REGLAN) tablet 5 mg  Dose: 5 mg Freq: EVERY 6 HOURS PRN Route: PO  PRN Reasons: nausea,vomiting  Start: 03/20/17 1133   Admin Instructions: This is Step 3 of nausea and vomiting management.  Give if nausea not resolved 15 minutes after giving prochlorperazine (COMPAZINE).  If nausea not resolved in 15-30 minutes, Notify provider.              Or  metoclopramide (REGLAN) injection 5 mg  Dose: 5 mg Freq: EVERY 6 HOURS PRN Route: IV  PRN Reasons: nausea,vomiting  Start: 03/20/17 1133   Admin Instructions: This is Step 3 of nausea and vomiting management.  Give if nausea not resolved 15 minutes after giving prochlorperazine (COMPAZINE).  If nausea not resolved in 15-30 minutes, Notify provider.  Irritant.               naloxone (NARCAN) injection 0.1-0.4 mg  Dose: 0.1-0.4 mg Freq: EVERY 2 MIN PRN Route: IV  PRN Reason: opioid reversal  Start: 03/20/17 1133   Admin Instructions: For respiratory rate LESS than or EQUAL to 8.  Partial reversal dose:  0.1 mg titrated q 2 minutes for Analgesia Side Effects Monitoring Sedation Level of 3 (frequently drowsy, arousable, drifts to sleep during conversation).Full reversal dose:  0.4 mg bolus for Analgesia Side Effects Monitoring Sedation Level of 4 (somnolent, minimal or no response to stimulation).               ondansetron (ZOFRAN-ODT) ODT tab 4 mg  Dose: 4 mg Freq: EVERY 6 HOURS PRN Route: PO  PRN Reason: nausea  Start: 03/20/17 1133   Admin Instructions: This is Step 1 of nausea and vomiting " management.  If nausea not resolved in 15 minutes, go to Step 2 prochlorperazine (COMPAZINE). Do not push through foil backing. Peel back foil and gently remove. Place on tongue immediately. Administration with liquid unnecessary              Or  ondansetron (ZOFRAN) injection 4 mg  Dose: 4 mg Freq: EVERY 6 HOURS PRN Route: IV  PRN Reasons: nausea,vomiting  Start: 03/20/17 1133   Admin Instructions: This is Step 1 of nausea and vomiting management.  If nausea not resolved in 15 minutes, go to Step 2 prochlorperazine (COMPAZINE).  Irritant.               potassium chloride SA (K-DUR/KLOR-CON M) CR tablet 10 mEq  Dose: 10 mEq Freq: 2 TIMES DAILY Route: PO  Start: 03/20/17 2100   Admin Instructions: DO NOT CRUSH.        2019 (10 mEq)-Given        0951 (10 mEq)-Given       2040 (10 mEq)-Given        0855 (10 mEq)-Given       2142 (10 mEq)-Given        1024 (10 mEq)-Given       [ ] 2100           prochlorperazine (COMPAZINE) injection 5 mg  Dose: 5 mg Freq: EVERY 6 HOURS PRN Route: IV  PRN Reasons: nausea,vomiting  Start: 03/20/17 1133   Admin Instructions: This is Step 2 of nausea and vomiting management.   If nausea not resolved in 15 minutes, give metoclopramide (REGLAN) if ordered (step 3 of nausea and vomiting management)              Or  prochlorperazine (COMPAZINE) tablet 5 mg  Dose: 5 mg Freq: EVERY 6 HOURS PRN Route: PO  PRN Reasons: nausea,vomiting  Start: 03/20/17 1133   Admin Instructions: This is Step 2 of nausea and vomiting management.   If nausea not resolved in 15 minutes, give metoclopramide (REGLAN) if ordered (step 3 of nausea and vomiting management)               senna-docusate (SENOKOT-S;PERICOLACE) 8.6-50 MG per tablet 1-2 tablet  Dose: 1-2 tablet Freq: 2 TIMES DAILY Route: PO  Start: 03/20/17 1145   Admin Instructions: Start with 1 tablet PO BID, If no bowel movement in 24 hours, increase to 2 tablets PO BID.  Hold for loose stools.        (1310)-Not Given [C]       2019 (1 tablet)-Given         0951 (2 tablet)-Given       2040 (2 tablet)-Given        0855 (2 tablet)-Given       2142 (2 tablet)-Given        1026 (1 tablet)-Given       [ ] 2100           sodium chloride (PF) 0.9% PF flush 3 mL  Dose: 3 mL Freq: EVERY 8 HOURS Route: IK  Start: 03/20/17 1400   Admin Instructions: And Q1H PRN, to lock peripheral IV dormant line.        (1310)-Not Given       (2235)-Not Given        (0659)-Not Given       (1400)-Not Given       2134 (3 mL)-Given        0625 (3 mL)-Given       1548 (3 mL)-Given       2145 (3 mL)-Given        0555 (3 mL)-Given       [ ] 1400       [ ] 2200           sodium chloride (PF) 0.9% PF flush 3 mL  Dose: 3 mL Freq: EVERY 1 HOUR PRN Route: IK  PRN Reason: line flush  PRN Comment: for peripheral IV flush post IV meds  Start: 03/20/17 1133              tiotropium (SPIRIVA) capsule 18 mcg  Dose: 18 mcg Freq: DAILY Route: IN  Start: 03/21/17 0900   Admin Instructions: USE PATIENT'S OWN SUPPLY<br>Use only ONE capsule. To ensure the full dose is administered, TWO inhalations should be performed at a rate sufficient to hear or feel the capsule vibrate.         (0900)-Not Given        (0856)-Not Given        (1044)-Not Given [C]          Completed Medications  Medications 03/17/17 03/18/17 03/19/17 03/20/17 03/21/17 03/22/17 03/23/17         Dose: 1 g Freq: EVERY 8 HOURS Route: IV  Indications of Use: SURGICAL PROPHYLAXIS  Start: 03/20/17 1600   End: 03/21/17 0135   Admin Instructions: First post-op dose due 8 hours after intra-op dose, see eMAR.        1637 (1 g)-New Bag        0105 (1 g)-New Bag            Discontinued Medications  Medications 03/17/17 03/18/17 03/19/17 03/20/17 03/21/17 03/22/17 03/23/17         Freq: PRN  Start: 03/20/17 0808   End: 03/20/17 1120       0808 (1,000 mL)-Given [C]       1120-Med Discontinued            Rate: 125 mL/hr Freq: CONTINUOUS Route: IV  Start: 03/20/17 1145   End: 03/21/17 1509   Admin Instructions: Change to saline lock when PO well tolerated.         1135 ( )-New Bag       2016 ( )-New Bag        0658 ( )-New Bag       0951 ( )-Rate/Dose Verify       1509-Med Discontinued           Dose: 25-50 mcg Freq: EVERY 2 MIN PRN Route: IV  PRN Reason: other  PRN Comment: acute pain  Start: 03/20/17 0915   End: 03/20/17 1120   Admin Instructions: MAX cumulative dose = 250 mcg.    Use Fentanyl initially, as a short acting agent for acute pain control.  If insufficient, or a longer acting agent is needed, begin Morphine or Hydromorphone if ordered.        0927 (50 mcg)-Given       0944 (50 mcg)-Given       1120-Med Discontinued            Dose: 0.3-0.5 mg Freq: EVERY 5 MIN PRN Route: IV  PRN Reason: other  PRN Comment: acute pain.  May administer if Respiratory Rate is greater than 10  Start: 03/20/17 0915   End: 03/20/17 1120   Admin Instructions: If fentanyl is also ordered, use HYDROmorphone if pain control insufficient with fentanyl or a longer acting agent is needed.   Max cumulative dose = 2 mg        0918 (0.5 mg)-Given       1012 (0.3 mg)-Given       1120-Med Discontinued            Rate: 100 mL/hr Freq: CONTINUOUS Route: IV  Start: 03/20/17 0930   End: 03/20/17 1120   Admin Instructions: Continue until IV catheter is weaned               1120-Med Discontinued            Dose: 4 mg Freq: EVERY 30 MIN PRN Route: PO  PRN Reason: nausea  Start: 03/20/17 0915   End: 03/20/17 1120   Admin Instructions: MAX total dose = 8 mg, including OR dosing. If not resolved in 15 minutes, then go to step 2 (Prochlorperazine if ordered).        1120-Med Discontinued         Or    Dose: 4 mg Freq: EVERY 30 MIN PRN Route: IV  PRN Reason: nausea  Start: 03/20/17 0915   End: 03/20/17 1120   Admin Instructions: MAX total dose = 8 mg, including OR dosing. If not resolved in 15 minutes, then go to step 2 (Prochlorperazine if ordered).  Irritant.        1120-Med Discontinued            Dose: 5 mg Freq: EVERY 6 HOURS PRN Route: IV  PRN Reasons: nausea,vomiting  Start: 03/20/17 0915   End:  03/20/17 1120   Admin Instructions: This is Step 2 of the nausea and vomiting protocol.   If nausea not resolved in 15 minutes, give Metoclopramide if ordered (step 3 of nausea and vomiting protocol)          1120-Med Discontinued            Freq: PRN  Start: 03/20/17 0835   End: 03/20/17 1120       0835 (1,000 mL)-Given [C]       1120-Med Discontinued       Medications 03/17/17 03/18/17 03/19/17 03/20/17 03/21/17 03/22/17 03/23/17

## 2017-03-20 NOTE — IP AVS SNAPSHOT
[address] [facility]    [address] [place] [address]    Phone:  [phone]                                       After Visit Summary   3/20/2017    Yoanna Phillips    MRN: [id number]           After Visit Summary Signature Page     I have received my discharge instructions, and my questions have been answered. I have discussed any challenges I see with this plan with the nurse or doctor.    ..........................................................................................................................................  Patient/Patient Representative Signature      ..........................................................................................................................................  Patient Representative Print Name and Relationship to Patient    ..................................................               ................................................  Date                                            Time    ..........................................................................................................................................  Reviewed by Signature/Title    ...................................................              ..............................................  Date                                                            Time

## 2017-03-20 NOTE — IP AVS SNAPSHOT
"` `     [place] [address] [facility]: [phone]                                              INTERAGENCY TRANSFER FORM - NURSING   3/20/2017                    Hospital Admission Date: 3/20/2017  JOHN BEAN   : 1940  Sex: Female        Attending Provider: Mingo Fernando MD     Allergies:  Aspirin    Infection:  None   Service:  ORTHOPEDICS    Ht:  1.6 m (5' 3\")   Wt:  77.1 kg (170 lb)   Admission Wt:  77.1 kg (170 lb)    BMI:  30.11 kg/m 2   BSA:  1.85 m 2            Patient PCP Information     Provider PCP Type    Marin Ledesma MD General      Current Code Status     Date Active Code Status Order ID Comments User Context       Prior      Code Status History     Date Active Date Inactive Code Status Order ID Comments User Context    3/22/2017  9:05 AM  Full Code [id number]  Mingo Fernando MD Outpatient    3/20/2017 11:33 AM 3/22/2017  9:05 AM Full Code [id number]  Mingo Fernando MD Inpatient    2016  1:10 PM 3/20/2017 11:33 AM Full Code [id number]  Mingo Fernando MD Outpatient    2016 10:49 AM 2016  1:10 PM Full Code [id number]  Mingo Fernando MD Inpatient    2016  3:09 AM 2016 10:49 AM Full Code [id number]  Pablo Palomo MD Inpatient    2016 10:24 AM 2016  3:09 AM Full Code [id number]  Mingo Fernando MD Outpatient    2016  2:10 PM 2016 10:24 AM Full Code [id number]  Mingo Fernando MD Inpatient    2012  1:40 PM 2016  2:10 PM Full Code [id number]  Mingo Fernando MD Outpatient    2012  7:15 PM 2012  1:40 PM Full Code [id number]  Saloni Kerr RN Inpatient    3/7/2012  9:34 PM 3/9/2012  7:01 PM Full Code [id number]  Sandra Beltran, GENNARO Inpatient    3/6/2012  2:51 PM 3/7/2012  9:34 PM Full Code [id number]  Hernandez Noyola,  Outpatient    3/3/2012  6:10 PM 3/6/2012  2:51 PM Full Code [id number]  Alondra, " Tisha LOVING RN Inpatient      Advance Directives        Does patient have a scanned Advance Directive/ACP document in EPIC?           No        Hospital Problems as of 3/23/2017              Priority Class Noted POA    Hip joint replacement status Medium  4/11/2016 Yes      Non-Hospital Problems as of 3/23/2017              Priority Class Noted    Essential hypertension, benign   2/1/2005    B-complex deficiency   4/11/2006    CARDIOVASCULAR SCREENING; LDL GOAL LESS THAN 130   10/31/2010    Poor short term memory   5/20/2011    Advance Care Planning   3/6/2012    Shoulder joint replacement status   6/25/2012    Cognitive impairment   7/3/2012    DJD (degenerative joint disease)   7/3/2012    Health Care Home   2/25/2014    Seizure disorder (H)   4/5/2016    Status post total replacement of right hip Medium  4/14/2016    Primary osteoarthritis of hip Medium  5/3/2016    Dislocation of internal right hip prosthesis, subsequent encounter Medium  5/3/2016    Physical deconditioning Medium  5/3/2016    Anemia due to blood loss, acute Medium  5/3/2016    Dyspnea Medium  5/3/2016    Dislocated hip (H) Medium  5/12/2016      Immunizations     Name Date      Influenza (High Dose) 3 valent vaccine 11/14/16     Influenza (High Dose) 3 valent vaccine 08/27/15     Pneumococcal (PCV 13) 11/10/09     Pneumococcal 23 valent 03/05/12     Pneumococcal 23 valent 03/20/97     Pneumococcal 23 valent 05/04/89     TD (ADULT, 7+) 06/18/07     TD (ADULT, 7+) 03/20/97          END      ASSESSMENT     Discharge Profile Flowsheet     EXPECTED DISCHARGE     COMMUNICATION ASSESSMENT      Expected Discharge Date  03/23/17 ([facility] at 1100) 03/22/17 1449   Patient's communication style  spoken language (English or Bilingual) 05/12/16 0540    DISCHARGE NEEDS ASSESSMENT     FINAL RESOURCES      Concerns To Be Addressed  discharge planning concerns 03/21/17 1138   Resources List  Transitional Care 03/21/17 1522    Patient/family  "verbalizes understanding of discharge plan recommendations?  Yes 03/23/17 0944   Other Resources  Housekeeping/[employer] 03/09/17 1551    Medical Team notified of plan?  yes 03/23/17 0944   Transitional Care  -- ([facility]) 03/21/17 1522    Equipment Currently Used at Home  walker, rolling 03/20/17 1625   PAS Number  -- (NA) 03/09/17 1551    Transportation Available  van, wheelchair accessible ([facility] at [facility]) 03/22/17 1449   Senior Linkage Line Referral Placed  -- (NA) 03/09/17 1551    # of Referrals Placed by CTS  Transportation 03/22/17 1449   F/U Appointment Brochure Provided  -- (NA) 03/09/17 1551    Equipment Used at Home  bath bench;grab bar;other (see comments) (Per pt. she has cane/walker but does not use) 06/26/12 1048   Referrals Placed  Transportation 03/22/17 1449    ASSESSMENT OF FUNCTIONAL STATUS     SKIN      Assesssment of Functional Status  Needs placement in a SNF/TCF for rehabilitation 03/21/17 1138   Inspection  Full 03/22/17 2139    GASTROINTESTINAL (ADULT,PEDIATRIC,OB)     Procedural focused assessment (identify areas inspected)   -- (operative, back, buttocks, coccyx) 03/20/17 0906    GI WDL  WDL 03/23/17 0158   Skin WDL  ex 03/23/17 0158    All Quadrants Bowel Sounds  audible and normoactive 03/22/17 2139   Skin Integrity  incision(s) 03/23/17 0158    Last Bowel Movement  03/22/17 (small BM) 03/22/17 1655   SAFETY      GI Signs/Symptoms  constipation 03/20/17 0647   Safety WDL  WDL 03/23/17 0158    Passing flatus  yes 03/22/17 2139   Safety Factors  other (see comments) (pt confused ) 03/22/17 0710                 Assessment WDL (Within Defined Limits) Definitions           Safety WDL     Effective: 09/28/15    Row Information: <b>WDL Definition:</b> Bed in low position, wheels locked; call light in reach; upper side rails up x 2; ID band on<br> <font color=\"gray\"><i>Item=AS safety wdl>>List=AS safety wdl>>Version=F14</i></font>      Skin WDL     Effective: " "09/28/15    Row Information: <b>WDL Definition:</b> Warm; dry; intact; elastic; without discoloration; pressure points without redness<br> <font color=\"gray\"><i>Item=AS skin wdl>>List=AS skin wdl>>Version=F14</i></font>      Vitals     Vital Signs Flowsheet     VITAL SIGNS     Pain Descriptors  Aching 03/21/17 0212    Temp  98.7  F (37.1  C) 03/23/17 0851   Pain Intervention(s)  Medication (See eMAR) 03/23/17 0656    Temp src  Oral 03/23/17 0851   Response to Interventions  Absence of nonverbal indicators of pain 03/23/17 0656    Resp  18 03/23/17 0851   ANALGESIA SIDE EFFECTS MONITORING      Pulse  64 03/20/17 0623   Side Effects Monitoring: Respiratory Quality  R 03/23/17 0656    Heart Rate  77 03/23/17 0851   Side Effects Monitoring: Respiratory Depth  N 03/23/17 0656    Pulse/Heart Rate Source  Monitor 03/23/17 0435   Side Effects Monitoring: Sedation Level  S 03/23/17 0656    BP  111/61 03/23/17 0851   HEIGHT AND WEIGHT      BP Location  Left arm 03/23/17 0539   Height  1.6 m (5' 3\") 03/20/17 0623    Patient Position  Lying 03/20/17 0623   Weight  77.1 kg (170 lb) 03/20/17 0623    OXYGEN THERAPY     BSA (Calculated - sq m)  1.85 03/20/17 0623    SpO2  94 % 03/23/17 0851   BMI (Calculated)  30.18 03/20/17 0623    O2 Device  None (Room air) 03/23/17 0851   POSITIONING      Oxygen Delivery  2 LPM 03/21/17 1410   Body Position  supine, head elevated 03/23/17 0557    PAIN/COMFORT     Head of Bed (HOB)  HOB at 20-30 degrees 03/23/17 0557    Patient Currently in Pain  sleeping: patient not able to self report 03/23/17 0656   ECG      Preferred Pain Scale  number (Numeric Rating Pain Scale) 03/23/17 0656   ECG Rhythm  Normal sinus rhythm 03/20/17 1019    0-10 Pain Scale  5 03/23/17 0555   DAILY CARE      Word Pain Scale  4 03/20/17 1019   Activity Type  ambulated in room;ambulated to bathroom 03/23/17 0557    Pain Location  Hip 03/23/17 0656   Activity Level of Assistance  assistance, 1 person 03/23/17 0557    Pain " Orientation  Left 03/23/17 0656   Activity Assistive Device  gait belt;walker 03/23/17 0557            Patient Lines/Drains/Airways Status    Active LINES/DRAINS/AIRWAYS     Name: Placement date: Placement time: Site: Days: Last dressing change:    Peripheral IV 03/20/17 Left Hand 03/20/17   0726   Hand   3     Incision/Surgical Site 06/25/12 Left Shoulder 06/25/12   1654    1731     Incision/Surgical Site 04/11/16 Right Hip 04/11/16   1104    345     Incision/Surgical Site 03/20/17 Left Hip 03/20/17   0839    3             Patient Lines/Drains/Airways Status    Active PICC/CVC     None            Intake/Output Detail Report     Date Intake     Output     Net    Shift P.O. I.V. IV Piggyback Total Urine Drains Blood Total       Noc 03/21/17 2300 - 03/22/17 0659 -- -- -- -- -- -- -- -- 0    Day 03/22/17 0700 - 03/22/17 1459 240 -- -- 240 -- -- -- -- 240    Yudelka 03/22/17 1500 - 03/22/17 2259 420 -- -- 420 -- -- -- -- 420    Noc 03/22/17 2300 - 03/23/17 0659 120 -- -- 120 300 -- -- 300 -180    Day 03/23/17 0700 - 03/23/17 1459 -- -- -- -- -- -- -- -- 0      Last Void/BM       Most Recent Value    Urine Occurrence 1 at 03/23/2017 1100    Stool Occurrence 1 at 03/23/2017 1100      Case Management/Discharge Planning     Case Management/Discharge Planning Flowsheet     REFERRAL INFORMATION     Patient Personal Strengths  expressive of needs;motivated;positive attitude;strong support system 03/21/17 1138    Did the Initial Social Work Assessment result in a Social Work Case?  Yes 03/21/17 1138   Sources Of Support  adult child(garrett);friend(s);other family members 03/21/17 1138    Admission Type  inpatient 03/21/17 1138   Reaction To Health Status  accepting 03/21/17 1138    Arrived From  home or self-care 03/21/17 1138   Understanding Of Condition And Treatment  adequate understanding of medical condition;adequate understanding of treatment 03/21/17 1138    Referral Source  interdisciplinary rounds 03/21/17 1138   EXPECTED  DISCHARGE      # of Referrals Placed by CTS  Transportation 03/22/17 1449   Expected Discharge Date  03/23/17 ([facility] at [facility]) 03/22/17 1449    Reason For Consult  discharge planning 03/21/17 1138   ASSESSMENT/CONCERNS TO BE ADDRESSED      Record Reviewed  history and physical;medical record 03/21/17 1138   Concerns To Be Addressed  discharge planning concerns 03/21/17 1138     Assigned to Case  DANIELLE Denny 03/23/17 0944   DISCHARGE PLANNING      Primary Care Clinic Name  [facility] 05/12/16 0834   Patient/family verbalizes understanding of discharge plan recommendations?  Yes 03/23/17 0944    Primary Care MD Name  Marin Ledesma MD 03/21/17 1138   Medical Team notified of plan?  yes 03/23/17 0944    LIVING ENVIRONMENT     Transportation Available  van, wheelchair accessible ([facility] at [facility]) 03/22/17 1449    Lives With  alone 03/21/17 1138   Equipment Used at Home  bath bench;grab bar;other (see comments) (Per pt. she has cane/walker but does not use) 06/26/12 1048    Living Arrangements  house 03/21/17 1138   FINAL NOTE      Provides Primary Care For  no one 03/21/17 1138   Final Note  D/C to [facility] [place] on 3-23-17 via w/c at 1100 03/23/17 0944    Quality Of Family Relationships  supportive;involved 03/21/17 1138   FINAL RESOURCES      Able to Return to Prior Living Arrangements  no 03/21/17 1138   Equipment Currently Used at Home  walker, rolling 03/20/17 1625    HOME SAFETY     Resources List  Transitional Care 03/21/17 1522    Patient Feels Safe Living in Home?  yes 03/21/17 1138   Other Resources  Housekeeping/[employer] 03/09/17 1551    ASSESSMENT OF FAMILY/SOCIAL SUPPORT     Transitional Care  -- ([facility]) 03/21/17 1522    Marital Status   03/21/17 1138   PAS Number  -- (NA) 03/09/17 1551    Who is your support system?  Children 03/21/17 1138   Senior Linkage Line Referral Placed  -- (NA) 03/09/17 1551    Description of  Support System  Supportive;Involved 03/21/17 1138   F/U Appointment Brochure Provided  -- (NA) 03/09/17 1551    Support Assessment  Adequate family and caregiver support;Adequate social supports 03/21/17 1138   Referrals Placed  Transportation 03/22/17 1449    Quality of Family Relationships  supportive;involved 03/21/17 1138   ABUSE RISK SCREEN      ASSESSMENT OF FUNCTIONAL STATUS     QUESTION TO PATIENT:  Has a member of your family or a partner(now or in the past) intimidated, hurt, manipulated, or controlled you in any way?  no 03/20/17 0618    Assesssment of Functional Status  Needs placement in a SNF/TCF for rehabilitation 03/21/17 1138   QUESTION TO PATIENT: Do you feel safe going back to the place where you are living?  yes 03/20/17 0618    EMPLOYMENT     OBSERVATION: Is there reason to believe there has been maltreatment of a vulnerable adult (ie. Physical/Sexual/Emotional abuse, self neglect, lack of adequate food, shelter, medical care, or financial exploitation)?  no 03/20/17 0618    Do you work full or part-time?  no 03/21/17 1138   (R) MENTAL HEALTH SUICIDE RISK      COPING/STRESS     Are you depressed or being treated for depression?  No 03/20/17 0706    Major Change/Loss/Stressor  hospitalization;surgery/procedure 03/21/17 1138

## 2017-03-20 NOTE — PROGRESS NOTES
" 03/20/17 1538   Quick Adds   Type of Visit Initial PT Evaluation       Present no   Living Environment   Lives With alone   Living Arrangements house   Home Accessibility stairs to enter home;tub/shower is not walk in   Number of Stairs to Enter Home 10   Number of Stairs Within Home (has full flight to basement and upstairs but doesn't amb.)   Transportation Available van, wheelchair accessible   Self-Care   Dominant Hand right   Usual Activity Tolerance fair   Current Activity Tolerance poor   Regular Exercise no   Equipment Currently Used at Home walker, rolling   Functional Level Prior   Ambulation 0-->independent   Transferring 0-->independent   Toileting 0-->independent   Bathing 0-->independent  (sponge bathes)   Dressing 0-->independent   Eating 0-->independent   Communication 0-->understands/communicates without difficulty   Swallowing 0-->swallows foods/liquids without difficulty   Cognition 1 - attention or memory deficits   Fall history within last six months no   Which of the above functional risks had a recent onset or change? ambulation;transferring;toileting;bathing;dressing   Prior Functional Level Comment daughter has been assisting with laundry, taking things upstairs, and taking patient to the store.  Patient reports she has been making her own \"TV dinners and ice cream\"   General Information   Onset of Illness/Injury or Date of Surgery - Date 03/20/17   Referring Physician Mingo Fernando   Patient/Family Goals Statement Pt. did not state   Pertinent History of Current Problem (include personal factors and/or comorbidities that impact the POC) Left MARIELLA 3/20/17. 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    Precautions/Limitations fall precautions;left hip precautions;other (see comments);oxygen therapy device and L/min  (post. to post./lateral hip " precautions)   Weight-Bearing Status - LLE weight-bearing as tolerated   Heart Disease Risk Factors Age;High blood pressure   General Observations Pt. is groggy and c/o sore throat   General Info Comments Amb. with assist   Cognitive Status Examination   Orientation person  (April 1st, give wrong name of hospital)   Level of Consciousness confused;lethargic/somnolent   Follows Commands and Answers Questions 75% of the time;able to follow single-step instructions   Personal Safety and Judgment impaired;at risk behaviors demonstrated   Memory impaired  (per medical chart)   Cognitive Comment Pt. was groggy and delayed response to questions noted. Did not get full history of home environment due to this.   Pain Assessment   Patient Currently in Pain Yes, see Vital Sign flowsheet   Integumentary/Edema   Integumentary/Edema Comments left hip with gauze and tape   Posture    Posture Forward head position;Protracted shoulders;Kyphosis   Range of Motion (ROM)   ROM Comment Right hip with functional ROM. Left hip limited to approx. 30 degrees flex., full knee ext., ankles WFLs.   Strength   Strength Comments Left LE: assist required for heelslides, indep. SAQs.   Bed Mobility   Bed Mobility Comments MOD A 2 supine<>sit   Transfer Skills   Transfer Comments Sit<>stand with MOD A 2 using FWW, WBAT left.   Gait   Gait Comments Pt. took 2 small steps up towards HOB using FWW, WBAT left.   Balance   Balance Comments Good seated and standing balance using FWW.   Sensory Examination   Sensory Perception no deficits were identified   Coordination   Coordination no deficits were identified   Muscle Tone   Muscle Tone no deficits were identified   Modality Interventions   Planned Modality Interventions Cryotherapy   General Therapy Interventions   Planned Therapy Interventions bed mobility training;gait training;ROM;strengthening;transfer training   Clinical Impression   Criteria for Skilled Therapeutic Intervention yes, treatment  "indicated   PT Diagnosis Impaired mobility, painful mobility, decreased strength and ROM left LE   Influenced by the following impairments cognition, left MARIELLA   Functional limitations due to impairments Assist required with all mobility, falls risk, assist for LE ex.   Clinical Presentation Stable/Uncomplicated   Clinical Presentation Rationale Pt. lives alone, lives indep. in home environment with assist of daughter for IADLs, baseline short term memory loss, stairs to enter house, assist for bed mobility, transfers and gt.   Clinical Decision Making (Complexity) Moderate complexity   Therapy Frequency` 2 times/day   Predicted Duration of Therapy Intervention (days/wks) 3-4 days   Anticipated Discharge Disposition Transitional Care Facility   Risk & Benefits of therapy have been explained Yes   Patient, Family & other staff in agreement with plan of care Yes   Clinical Impression Comments Pt. will benefit from skilled PT to promote indep. with mobility and ADLs during hospital stay.   [facility] [employer]PAC TM \"6 Clicks\"   2016, Trustees of [facility], under license to [employer].  All rights reserved.   6 Clicks Short Forms Basic Mobility Inpatient Short Form   [facility] AM-PAC  \"6 Clicks\" V.2 Basic Mobility Inpatient Short Form   1. Turning from your back to your side while in a flat bed without using bedrails? 2 - A Lot   2. Moving from lying on your back to sitting on the side of a flat bed without using bedrails? 2 - A Lot   3. Moving to and from a bed to a chair (including a wheelchair)? 2 - A Lot   4. Standing up from a chair using your arms (e.g., wheelchair, or bedside chair)? 2 - A Lot   5. To walk in hospital room? 2 - A Lot   6. Climbing 3-5 steps with a railing? 1 - Total   Basic Mobility Raw Score (Score out of 24.Lower scores equate to lower levels of function) 11   Total Evaluation Time   Total Evaluation Time (Minutes) 25     "

## 2017-03-20 NOTE — IP AVS SNAPSHOT
"          [place] [address] [facility]: [phone]                                              INTERAGENCY TRANSFER FORM - LAB / IMAGING / EKG / EMG RESULTS   3/20/2017                    Hospital Admission Date: 3/20/2017  JOHN BEAN   : 1940  Sex: Female        Attending Provider: Mingo Fernando MD     Allergies:  Aspirin    Infection:  None   Service:  ORTHOPEDICS    Ht:  1.6 m (5' 3\")   Wt:  77.1 kg (170 lb)   Admission Wt:  77.1 kg (170 lb)    BMI:  30.11 kg/m 2   BSA:  1.85 m 2            Patient PCP Information     Provider PCP Type    Marin Ledesma MD General         Lab Results - 3 Days      Glucose [[id number]] (Abnormal)  Resulted: 17 0749, Result status: Final result    Ordering provider: Mingo Fernando MD  17 0000 Resulting lab: [facility]    Specimen Information    Type Source Collected On   Blood  17 0658          Components       Value Reference Range Flag Lab   Glucose 108 70 - 99 mg/dL H FrStHsLb            Hemoglobin [[id number]] (Abnormal)  Resulted: 17 0712, Result status: Final result    Ordering provider: Mingo Fernando MD  17 0000 Resulting lab: [facility]    Specimen Information    Type Source Collected On   Blood  17 0658          Components       Value Reference Range Flag Lab   Hemoglobin 11.4 11.7 - 15.7 g/dL L FrStHsLb            Glucose [[id number]] (Abnormal)  Resulted: 17 0702, Result status: Final result    Ordering provider: Mingo Fernando MD  17 0635 Resulting lab: [facility]    Specimen Information    Type Source Collected On     17 0635          Components       Value Reference Range Flag Lab   Glucose 102 70 - 99 mg/dL H FrStHsLb            Hemoglobin [[id number]]  Resulted: 17 0650, Result status: Final result    Ordering provider: Mingo Fernando MD  17 0000 Resulting " lab: [facility]    Specimen Information    Type Source Collected On   Blood  03/21/17 0635          Components       Value Reference Range Flag Lab   Hemoglobin 12.1 11.7 - 15.7 g/dL  FrStHsLb            Creatinine [[id number]]  Resulted: 03/20/17 1352, Result status: Final result    Ordering provider: Mingo Fernando MD  03/20/17 1133 Resulting lab: [facility]    Specimen Information    Type Source Collected On   Blood  03/20/17 1258          Components       Value Reference Range Flag Lab   Creatinine 0.60 0.52 - 1.04 mg/dL  FrStHsLb   GFR Estimate -- >60 mL/min/1.7m2  FrStHsLb   Result:         >90  Non  GFR Calc     GFR Estimate If Black -- >60 mL/min/1.7m2  FrStHsLb   Result:         >90   GFR Calc              Platelet count [[id number]]  Resulted: 03/20/17 1333, Result status: Final result    Ordering provider: Mingo Fernando MD  03/20/17 1133 Resulting lab: [facility]    Specimen Information    Type Source Collected On   Blood  03/20/17 1258          Components       Value Reference Range Flag Lab   Platelet Count 292 150 - 450 10e9/L  FrStHsLb            Potassium [[id number]]  Resulted: 03/20/17 0723, Result status: Final result    Ordering provider: Van Barrow MD  03/20/17 0616 Resulting lab: [facility]    Specimen Information    Type Source Collected On   Blood  03/20/17 0640          Components       Value Reference Range Flag Lab   Potassium 4.3 3.4 - 5.3 mmol/L  FrStHsLb            Testing Performed By     Lab - Abbreviation Name Director Address Valid Date Range    14 - FrStHsLb [facility] Unknown [address] [place] [address] 05/08/15 1057 - Present            Unresulted Labs (24h ago through future)    Start       Ordered    03/23/17 0600  Platelet count  (enoxaparin (LOVENOX) (Weight  kg with CrCl greater than 30 mL/min is prechecked))   EVERY THREE DAYS,   Routine     Comments:  Repeat every 3 days while on VTE prophylaxis. If no result is listed, this lab has not been done the past 365 days. LATEST LAB RESULT: Platelet Count       Date                     Value                 03/06/2017               311 K/uL              05/02/2016               492 10^9/L (A)   ----------      03/20/17 1133    03/23/17 0600  Creatinine  EVERY THREE DAYS,   Routine     Comments:  Last Lab Result: Creatinine (mg/dL)       Date                     Value                 05/24/2016               0.64             ----------    03/20/17 1252    Unscheduled  Hemoglobin  CONDITIONAL X 2,   Routine     Comments:  Release on POD 1 and POD 2 if the morning Hgb is less than 8.0    03/20/17 1133         Imaging Results - 3 Days      XR Pelvis w Hip Port Left 1 View [[id number]]  Resulted: 03/20/17 1031, Result status: Final result    Ordering provider: Mingo Fernando MD  03/20/17 0916 Resulted by: Milind Coleman MD    Performed: 03/20/17 0940 - 03/20/17 1026 Resulting lab: RADIOLOGY RESULTS    Narrative:       PORTABLE ONE VIEW PELVIS AND ONE VIEW LEFT HIP 3/20/2017 10:26 AM     HISTORY: Postoperative evaluation of the left hip.    COMPARISON: 5/14/2016    FINDINGS: There are interval surgical changes of a left total hip  arthroplasty. The hardware is intact with no fracture or other  complication seen. Lateral skin staples are seen. A soft tissue drain  is present. No other abnormality is demonstrated.      Impression:       IMPRESSION: Unremarkable postoperative appearance of the left hip.    MILIND COLEMAN MD      Testing Performed By     Lab - Abbreviation Name Director Address Valid Date Range    104 - Rad Rslts RADIOLOGY RESULTS Unknown Unknown 02/16/05 1553 - Present            Encounter-Level Documents:     There are no encounter-level documents.      Order-Level Documents:     There are no order-level documents.

## 2017-03-20 NOTE — PROGRESS NOTES
Admission medication history interview status for the 3/20/2017  admission is complete. See EPIC admission navigator for prior to admission medications     Medication history source reliability:Good    Medication history interview source(s):Patient    Medication history resources (including written lists, pill bottles, clinic record):Mail    Primary pharmacy.[facility]    Additional medication history information not noted on PTA med list :  Patient brought home med: Spiriva    Time spent in this activity: 45  minutes    Prior to Admission medications    Medication Sig Last Dose Taking? Auth Provider   Acetaminophen (TYLENOL PO) Take 325-650 mg by mouth every 6 hours as needed for mild pain or fever 3/19/2017 at prn Yes Reported, Patient   CARBAMAZEPINE ER PO Take 300 mg by mouth 2 times daily 3/20/2017 at 0500 Yes Reported, Patient   cyanocobalamin (VITAMIN B12) 1000 MCG/ML injection Inject 1 mL into the muscle every 30 days more than 2 weeks Yes Reported, Patient   GABAPENTIN PO Take 1,200 mg by mouth 3 times daily (Takes 2 x 600mg tablet = 1200mg dose) 3/20/2017 at 0500 Yes Reported, Patient   Potassium Chloride Zonia CR (K-DUR PO) Take 10 mEq by mouth 2 times daily (takes 0.5 x 20mEq tablet = 10mEq dose)  Patient taking differently than prescribed; Prescribed as 20mEQ twice daily 3/19/2017 at pm Yes Reported, Patient   tiotropium (SPIRIVA) 18 MCG capsule Inhale 18 mcg into the lungs daily 3/20/2017 at 0500 Yes Reported, Patient   FOLIC ACID PO Take 1 mg by mouth daily 3/19/2017 at am Yes Reported, Patient   Thiamine HCl (VITAMIN B-1 PO) Take 100 mg by mouth daily 3/19/2017 at am Yes Reported, Patient   LOSARTAN POTASSIUM PO Take 100 mg by mouth daily 3/19/2017 at am Yes Reported, Patient   Multiple Vitamins-Minerals (CENTRUM SILVER) per tablet Take 1 tablet by mouth daily 3/19/2017 at am Yes Reported, Patient

## 2017-03-20 NOTE — PLAN OF CARE
Problem: Goal Outcome Summary  Goal: Goal Outcome Summary  Outcome: Therapy, progress toward functional goals is gradual  Surgeon Discharge Plan: Did not note discharge plan in medical chart     Current Functional Status: Amb. 5 steps up towards HOB with FWW, WBAT left; transfers and bed mobility with MOD A 2. Pt. Lethargic and has difficulty giving home environment history. Sat down abruptly post. Amb. With left LE elevated slightly in front of her. O2 sats stable on 2LNC with one episode of O2 sat dropping from mid-90's to 85% post. Sitting for 1.5 min. Within 30 secs. Of cues for deep breaths, able to increase to 95%.     Barriers to Plan/Home: Pt. Lives alone, pt. Has known short term memory deficits, pt. Is a falls risk, pt. Had right hip dislocation with revision one month following her right MARIELLA in 2016, pt. Sat down abruptly onto bed with stand>sit this afternoon.

## 2017-03-20 NOTE — PLAN OF CARE
Problem: Goal Outcome Summary  Goal: Goal Outcome Summary  Outcome: No Change  Pt arrived from PACU around 1130, IV infusing and VSS on 2L O2. Bae is patent with adequate output. Dressing is C/D/I and CMS intact. Hemovac is patent. Pain treated with IV dilaudid and scheduled tylenol. Pt had general anesthesia with 200cc EBL. First PT is at 1530 today. Pt is progressing well per plan of care.

## 2017-03-20 NOTE — PLAN OF CARE
Problem: Goal Outcome Summary  Goal: Goal Outcome Summary  Outcome: No Change  Pt lethargic this shift; falls asleep during conversation. Pt still c/o pain 7/10. Repositioned on right side and ice applied. Pt encouraged PO intake. IV infusing.
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
## 2017-03-20 NOTE — IP AVS SNAPSHOT
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 and her daughter are aware that patient will need to go to Rehab prior to returning home.  Per the MD note today, \"Discharge plan: Skilled Nursing Facility.\"  Patient and her daughter expressed interest in [facility].  SW made a referral to the facility via [facility] the [facility] to have them assess patient for a possible admission for Thursday March 23.  SW will follow up regarding transportation once the discharge plan has been finalized.       PLAN  Financial costs for the patient includes: Discussed TCU coverage under insurance.  Patient given options and choices for discharge: TCU facility list offered.  Patient/family is agreeable to the plan?  Yes  Patient Goals and Preferences: TCU at discharge.  Patient anticipates discharging to:  TCU " at discharge.    DANIELLE Denny  [SB1.1]    EMMA  D: SW following for discharge planning needs.  EMMA received a message from Joaquina in Admissions at [facility] stating that they will have a bed available for patient on Thursday.  I: EMMA updated patient and her daughter, Kay, on the above and will follow up tomorrow regarding transportation to get patient to Rehab on Thursday.  EMMA spoke to Joaquina in Admissions at [facility] to confirm the bed and will update her on the transport time once it has been arranged.  A: Patient is alert and oriented X3.  P: EMMA will continue to follow and assist with finalizing the discharge plan as appropriate.    DANIELLE Denny  [SB1.2]       Revision History        User Key Date/Time User Provider Type Action    > SB1.2 3/21/2017  3:25 PM Sheryl Reeves LSW  Addend     SB1.1 3/21/2017 11:50 AM Sheryl Reeves LSW  Sign            Progress Notes by Mingo Fernando MD at 3/21/2017  7:16 AM     Author:  Mingo Fernando MD Service:  Orthopedics Author Type:  Physician    Filed:  3/21/2017  7:16 AM Date of Service:  3/21/2017  7:16 AM Note Created:  3/21/2017  7:16 AM    Status:  Signed :  Mingo Fernando MD (Physician)         Yoanna Phillips  3/21/2017  POD #1    Doing well.  Pain well-controlled.  Temperatures:  Current - Temp: 100  F (37.8  C); Max - Temp  Av.6  F (36.4  C)  Min: 96.8  F (36  C)  Max: 100  F (37.8  C)  Pulse range: No Data Recorded  Blood pressure range: Systolic (24hrs), Av , Min:115 , Max:156   ; Diastolic (24hrs), Av, Min:68, Max:93    CMS: intazct  Labs:  Hemoglobin   Date Value Ref Range Status   2017 12.1 11.7 - 15.7 g/dL Final   ]  X-rays look great.    PLAN:  Continue physical therapy  Discharge plan: Skilled Nursing Facility[ES1.1]     Revision History        User Key Date/Time User Provider Type Action    > ES1.1  "3/21/2017  7:16 AM Mingo Fernando MD Physician Sign            Progress Notes by Shanae Kerr PT at 3/20/2017  4:44 PM     Author:  Shanae Kerr PT Service:  Physical Medicine and Rehabilitation Author Type:  Physical Therapist    Filed:  3/20/2017  4:44 PM Date of Service:  3/20/2017  4:44 PM Note Created:  3/20/2017  4:44 PM    Status:  Signed :  Shanae Kerr PT (Physical Therapist)          03/20/17 1538   Quick Adds   Type of Visit Initial PT Evaluation       Present no   Living Environment   Lives With alone   Living Arrangements house   Home Accessibility stairs to enter home;tub/shower is not walk in   Number of Stairs to Enter Home 10   Number of Stairs Within Home (has full flight to basement and upstairs but doesn't amb.)   Transportation Available van, wheelchair accessible   Self-Care   Dominant Hand right   Usual Activity Tolerance fair   Current Activity Tolerance poor   Regular Exercise no   Equipment Currently Used at Home walker, rolling   Functional Level Prior   Ambulation 0-->independent   Transferring 0-->independent   Toileting 0-->independent   Bathing 0-->independent  (sponge bathes)   Dressing 0-->independent   Eating 0-->independent   Communication 0-->understands/communicates without difficulty   Swallowing 0-->swallows foods/liquids without difficulty   Cognition 1 - attention or memory deficits   Fall history within last six months no   Which of the above functional risks had a recent onset or change? ambulation;transferring;toileting;bathing;dressing   Prior Functional Level Comment daughter has been assisting with laundry, taking things upstairs, and taking patient to the store.  Patient reports she has been making her own \"TV dinners and ice cream\"   General Information   Onset of Illness/Injury or Date of Surgery - Date 03/20/17   Referring Physician Mingo Fernando   Patient/Family Goals Statement Pt. did not state   Pertinent History " of Current Problem (include personal factors and/or comorbidities that impact the POC) Left MARIELLA 3/20/17. 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    Precautions/Limitations fall precautions;left hip precautions;other (see comments);oxygen therapy device and L/min  (post. to post./lateral hip precautions)   Weight-Bearing Status - LLE weight-bearing as tolerated   Heart Disease Risk Factors Age;High blood pressure   General Observations Pt. is groggy and c/o sore throat   General Info Comments Amb. with assist   Cognitive Status Examination   Orientation person  (April 1st, give wrong name of hospital)   Level of Consciousness confused;lethargic/somnolent   Follows Commands and Answers Questions 75% of the time;able to follow single-step instructions   Personal Safety and Judgment impaired;at risk behaviors demonstrated   Memory impaired  (per medical chart)   Cognitive Comment Pt. was groggy and delayed response to questions noted. Did not get full history of home environment due to this.   Pain Assessment   Patient Currently in Pain Yes, see Vital Sign flowsheet   Integumentary/Edema   Integumentary/Edema Comments left hip with gauze and tape   Posture    Posture Forward head position;Protracted shoulders;Kyphosis   Range of Motion (ROM)   ROM Comment Right hip with functional ROM. Left hip limited to approx. 30 degrees flex., full knee ext., ankles WFLs.   Strength   Strength Comments Left LE: assist required for heelslides, indep. SAQs.   Bed Mobility   Bed Mobility Comments MOD A 2 supine<>sit   Transfer Skills   Transfer Comments Sit<>stand with MOD A 2 using FWW, WBAT left.   Gait   Gait Comments Pt. took 2 small steps up towards HOB using FWW, WBAT left.   Balance   Balance Comments Good seated and standing balance using FWW.   Sensory Examination   Sensory Perception no deficits were  "identified   Coordination   Coordination no deficits were identified   Muscle Tone   Muscle Tone no deficits were identified   Modality Interventions   Planned Modality Interventions Cryotherapy   General Therapy Interventions   Planned Therapy Interventions bed mobility training;gait training;ROM;strengthening;transfer training   Clinical Impression   Criteria for Skilled Therapeutic Intervention yes, treatment indicated   PT Diagnosis Impaired mobility, painful mobility, decreased strength and ROM left LE   Influenced by the following impairments cognition, left MARIELLA   Functional limitations due to impairments Assist required with all mobility, falls risk, assist for LE ex.   Clinical Presentation Stable/Uncomplicated   Clinical Presentation Rationale Pt. lives alone, lives indep. in home environment with assist of daughter for IADLs, baseline short term memory loss, stairs to enter house, assist for bed mobility, transfers and gt.   Clinical Decision Making (Complexity) Moderate complexity   Therapy Frequency` 2 times/day   Predicted Duration of Therapy Intervention (days/wks) 3-4 days   Anticipated Discharge Disposition Transitional Care Facility   Risk & Benefits of therapy have been explained Yes   Patient, Family & other staff in agreement with plan of care Yes   Clinical Impression Comments Pt. will benefit from skilled PT to promote indep. with mobility and ADLs during hospital stay.   [facility] [employer] TM \"6 Clicks\"   2016, Trustees of [facility], under license to [employer].  All rights reserved.   6 Clicks Short Forms Basic Mobility Inpatient Short Form   [facility] [employer]  \"6 Clicks\" V.2 Basic Mobility Inpatient Short Form   1. Turning from your back to your side while in a flat bed without using bedrails? 2 - A Lot   2. Moving from lying on your back to sitting on the side of a flat bed without using bedrails? 2 - A Lot   3. Moving to and from a bed to a chair (including a " wheelchair)? 2 - A Lot   4. Standing up from a chair using your arms (e.g., wheelchair, or bedside chair)? 2 - A Lot   5. To walk in hospital room? 2 - A Lot   6. Climbing 3-5 steps with a railing? 1 - Total   Basic Mobility Raw Score (Score out of 24.Lower scores equate to lower levels of function) 11   Total Evaluation Time   Total Evaluation Time (Minutes) 25[AE1.1]        Revision History        User Key Date/Time User Provider Type Action    > AE1.1 3/20/2017  4:44 PM Shanae Kerr, PT Physical Therapist Sign            Progress Notes by Coleen Rothman RN at 3/20/2017 11:02 AM     Author:  Coleen Rothman RN Service:  Surgery Author Type:  Registered Nurse    Filed:  3/20/2017 11:02 AM Date of Service:  3/20/2017 11:02 AM Note Created:  3/20/2017 11:02 AM    Status:  Signed :  Coleen Rothman RN (Registered Nurse)         Glasses returned to pt[ES1.1]        Revision History        User Key Date/Time User Provider Type Action    > ES1.1 3/20/2017 11:02 AM Coleen Rothman, RN Registered Nurse Sign            Progress Notes by Alecia Ruby at 3/20/2017  5:57 AM     Author:  Alecia Ruby Service:  (none) Author Type:  (none)    Filed:  3/20/2017  5:58 AM Date of Service:  3/20/2017  5:57 AM Note Created:  3/20/2017  5:57 AM    Status:  Signed :  Alecia Ruby         Admission medication history interview status for the 3/20/2017  admission is complete. See EPIC admission navigator for prior to admission medications     Medication history source reliability:Good    Medication history interview source(s):Patient    Medication history resources (including written lists, pill bottles, clinic record):Mail    Primary pharmacy[facility]    Additional medication history information not noted on PTA med list :  Patient brought home med: Spiriva    Time spent in this activity: 45  minutes    Prior to Admission medications    Medication Sig Last Dose Taking? Auth  Provider   Acetaminophen (TYLENOL PO) Take 325-650 mg by mouth every 6 hours as needed for mild pain or fever 3/19/2017 at prn Yes Reported, Patient   CARBAMAZEPINE ER PO Take 300 mg by mouth 2 times daily 3/20/2017 at 0500 Yes Reported, Patient   cyanocobalamin (VITAMIN B12) 1000 MCG/ML injection Inject 1 mL into the muscle every 30 days more than 2 weeks Yes Reported, Patient   GABAPENTIN PO Take 1,200 mg by mouth 3 times daily (Takes 2 x 600mg tablet = 1200mg dose) 3/20/2017 at 0500 Yes Reported, Patient   Potassium Chloride Zonia CR (K-DUR PO) Take 10 mEq by mouth 2 times daily (takes 0.5 x 20mEq tablet = 10mEq dose)  Patient taking differently than prescribed; Prescribed as 20mEQ twice daily 3/19/2017 at pm Yes Reported, Patient   tiotropium (SPIRIVA) 18 MCG capsule Inhale 18 mcg into the lungs daily 3/20/2017 at 0500 Yes Reported, Patient   FOLIC ACID PO Take 1 mg by mouth daily 3/19/2017 at am Yes Reported, Patient   Thiamine HCl (VITAMIN B-1 PO) Take 100 mg by mouth daily 3/19/2017 at am Yes Reported, Patient   LOSARTAN POTASSIUM PO Take 100 mg by mouth daily 3/19/2017 at am Yes Reported, Patient   Multiple Vitamins-Minerals (CENTRUM SILVER) per tablet Take 1 tablet by mouth daily 3/19/2017 at am Yes Reported, Patient[DS1.1]            Revision History        User Key Date/Time User Provider Type Action    > DS1.1 3/20/2017  5:58 AM Alecia Ruby (none) Sign                  Procedure Notes     No notes of this type exist for this encounter.         Progress Notes - Therapies (Notes from 03/19/17 through 03/22/17)      Progress Notes by Shanae Kerr, PT at 3/20/2017  4:44 PM     Author:  Shanae Kerr, PT Service:  Physical Medicine and Rehabilitation Author Type:  Physical Therapist    Filed:  3/20/2017  4:44 PM Date of Service:  3/20/2017  4:44 PM Note Created:  3/20/2017  4:44 PM    Status:  Signed :  Shanae Kerr PT (Physical Therapist)          03/20/17 1538   Quick Adds   Type of Visit  "Initial PT Evaluation       Present no   Living Environment   Lives With alone   Living Arrangements house   Home Accessibility stairs to enter home;tub/shower is not walk in   Number of Stairs to Enter Home 10   Number of Stairs Within Home (has full flight to basement and upstairs but doesn't amb.)   Transportation Available van, wheelchair accessible   Self-Care   Dominant Hand right   Usual Activity Tolerance fair   Current Activity Tolerance poor   Regular Exercise no   Equipment Currently Used at Home walker, rolling   Functional Level Prior   Ambulation 0-->independent   Transferring 0-->independent   Toileting 0-->independent   Bathing 0-->independent  (sponge bathes)   Dressing 0-->independent   Eating 0-->independent   Communication 0-->understands/communicates without difficulty   Swallowing 0-->swallows foods/liquids without difficulty   Cognition 1 - attention or memory deficits   Fall history within last six months no   Which of the above functional risks had a recent onset or change? ambulation;transferring;toileting;bathing;dressing   Prior Functional Level Comment daughter has been assisting with laundry, taking things upstairs, and taking patient to the store.  Patient reports she has been making her own \"TV dinners and ice cream\"   General Information   Onset of Illness/Injury or Date of Surgery - Date 03/20/17   Referring Physician Mingo Fernando   Patient/Family Goals Statement Pt. did not state   Pertinent History of Current Problem (include personal factors and/or comorbidities that impact the POC) Left MARIELLA 3/20/17. 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    Precautions/Limitations fall precautions;left hip precautions;other (see comments);oxygen therapy device and L/min  (post. to post./lateral hip precautions)   Weight-Bearing Status - LLE " weight-bearing as tolerated   Heart Disease Risk Factors Age;High blood pressure   General Observations Pt. is groggy and c/o sore throat   General Info Comments Amb. with assist   Cognitive Status Examination   Orientation person  (April 1st, give wrong name of hospital)   Level of Consciousness confused;lethargic/somnolent   Follows Commands and Answers Questions 75% of the time;able to follow single-step instructions   Personal Safety and Judgment impaired;at risk behaviors demonstrated   Memory impaired  (per medical chart)   Cognitive Comment Pt. was groggy and delayed response to questions noted. Did not get full history of home environment due to this.   Pain Assessment   Patient Currently in Pain Yes, see Vital Sign flowsheet   Integumentary/Edema   Integumentary/Edema Comments left hip with gauze and tape   Posture    Posture Forward head position;Protracted shoulders;Kyphosis   Range of Motion (ROM)   ROM Comment Right hip with functional ROM. Left hip limited to approx. 30 degrees flex., full knee ext., ankles WFLs.   Strength   Strength Comments Left LE: assist required for heelslides, indep. SAQs.   Bed Mobility   Bed Mobility Comments MOD A 2 supine<>sit   Transfer Skills   Transfer Comments Sit<>stand with MOD A 2 using FWW, WBAT left.   Gait   Gait Comments Pt. took 2 small steps up towards HOB using FWW, WBAT left.   Balance   Balance Comments Good seated and standing balance using FWW.   Sensory Examination   Sensory Perception no deficits were identified   Coordination   Coordination no deficits were identified   Muscle Tone   Muscle Tone no deficits were identified   Modality Interventions   Planned Modality Interventions Cryotherapy   General Therapy Interventions   Planned Therapy Interventions bed mobility training;gait training;ROM;strengthening;transfer training   Clinical Impression   Criteria for Skilled Therapeutic Intervention yes, treatment indicated   PT Diagnosis Impaired mobility,  "painful mobility, decreased strength and ROM left LE   Influenced by the following impairments cognition, left MARIELLA   Functional limitations due to impairments Assist required with all mobility, falls risk, assist for LE ex.   Clinical Presentation Stable/Uncomplicated   Clinical Presentation Rationale Pt. lives alone, lives indep. in home environment with assist of daughter for IADLs, baseline short term memory loss, stairs to enter house, assist for bed mobility, transfers and gt.   Clinical Decision Making (Complexity) Moderate complexity   Therapy Frequency` 2 times/day   Predicted Duration of Therapy Intervention (days/wks) 3-4 days   Anticipated Discharge Disposition Transitional Care Facility   Risk & Benefits of therapy have been explained Yes   Patient, Family & other staff in agreement with plan of care Yes   Clinical Impression Comments Pt. will benefit from skilled PT to promote indep. with mobility and ADLs during hospital stay.   [facility] [employer] TM \"6 Clicks\"   2016, Trustees of [facility], under license to [employer].  All rights reserved.   6 Clicks Short Forms Basic Mobility Inpatient Short Form   [facility] [employer]  \"6 Clicks\" V.2 Basic Mobility Inpatient Short Form   1. Turning from your back to your side while in a flat bed without using bedrails? 2 - A Lot   2. Moving from lying on your back to sitting on the side of a flat bed without using bedrails? 2 - A Lot   3. Moving to and from a bed to a chair (including a wheelchair)? 2 - A Lot   4. Standing up from a chair using your arms (e.g., wheelchair, or bedside chair)? 2 - A Lot   5. To walk in hospital room? 2 - A Lot   6. Climbing 3-5 steps with a railing? 1 - Total   Basic Mobility Raw Score (Score out of 24.Lower scores equate to lower levels of function) 11   Total Evaluation Time   Total Evaluation Time (Minutes) 25[AE1.1]        Revision History        User Key Date/Time User Provider Type Action    > AE1.1 " 3/20/2017  4:44 PM Shanae Kerr, PT Physical Therapist Sign            Progress Notes by Alecia Ruby at 3/20/2017  5:57 AM     Author:  Alecia Ruby Service:  (none) Author Type:  (none)    Filed:  3/20/2017  5:58 AM Date of Service:  3/20/2017  5:57 AM Note Created:  3/20/2017  5:57 AM    Status:  Signed :  Alecia Ruby         Admission medication history interview status for the 3/20/2017  admission is complete. See EPIC admission navigator for prior to admission medications     Medication history source reliability:Good    Medication history interview source(s):Patient    Medication history resources (including written lists, pill bottles, clinic record):Mail    Primary pharmacy[facility]    Additional medication history information not noted on PTA med list :  Patient brought home med: Spiriva    Time spent in this activity: 45  minutes    Prior to Admission medications    Medication Sig Last Dose Taking? Auth Provider   Acetaminophen (TYLENOL PO) Take 325-650 mg by mouth every 6 hours as needed for mild pain or fever 3/19/2017 at prn Yes Reported, Patient   CARBAMAZEPINE ER PO Take 300 mg by mouth 2 times daily 3/20/2017 at 0500 Yes Reported, Patient   cyanocobalamin (VITAMIN B12) 1000 MCG/ML injection Inject 1 mL into the muscle every 30 days more than 2 weeks Yes Reported, Patient   GABAPENTIN PO Take 1,200 mg by mouth 3 times daily (Takes 2 x 600mg tablet = 1200mg dose) 3/20/2017 at 0500 Yes Reported, Patient   Potassium Chloride Zonia CR (K-DUR PO) Take 10 mEq by mouth 2 times daily (takes 0.5 x 20mEq tablet = 10mEq dose)  Patient taking differently than prescribed; Prescribed as 20mEQ twice daily 3/19/2017 at pm Yes Reported, Patient   tiotropium (SPIRIVA) 18 MCG capsule Inhale 18 mcg into the lungs daily 3/20/2017 at 0500 Yes Reported, Patient   FOLIC ACID PO Take 1 mg by mouth daily 3/19/2017 at am Yes Reported, Patient   Thiamine HCl (VITAMIN B-1 PO) Take 100 mg by mouth daily  3/19/2017 at am Yes Reported, Patient   LOSARTAN POTASSIUM PO Take 100 mg by mouth daily 3/19/2017 at am Yes Reported, Patient   Multiple Vitamins-Minerals (CENTRUM SILVER) per tablet Take 1 tablet by mouth daily 3/19/2017 at am Yes Reported, Patient[DS1.1]            Revision History        User Key Date/Time User Provider Type Action    > DS1.1 3/20/2017  5:58 AM Alecia Ruby (none) Sign

## 2017-03-20 NOTE — OP NOTE
Yoanna Phillips    3/20/2017    PRE-OP DIAGNOSIS: end stage arthritis left hip    POST-OP DIAGNOSIS:  Same    PROCEDURE:  left total hip replacement    ANESTHESIA:  General    SURGEON: Francisco Fernando Jr., M.D.    ASSISTANT: ISIS Ledesma    DDESCRIPTION OF PROCEDURE:  The patient was brought to the operating room. Prophylactic IV antibiotics were started. General anesthesia was induced. A dowell catheter was placed. The patient was placed in the right lateral decubitus position. A pelvic positioner and axillary roll were utilized. The down extremities were carefully padded. The left lower extremity, buttock, groin, and flank were prepped and draped in customary fashion.    A posterior approach to the hip was chosen, and the standard skin incision was made. Dissection was carried down to the fascia, which was divided in line with the skin incision. The gluteus javier was divided in line with its fibers. The bursa was divided. The Charnley retractor was placed. The short external rotators were divided from bone, and the posterior capsule was excised. The femoral head was dislocated and amputated at an appropriate level and inclination. Anterior capsulotomy was performed, and an anterior cobra retractor was placed. The acetabular notch and labrum were debrided.    Serial spherical concentric reaming was performed up to 51 mm. A size 51 mm Richie Trilogy acetabular shell was impacted into place in appropriate orientation. Three 25 mm by 6.5 mm screws were placed. A trial liner was placed.     Attention was turned to the femur. The superior lateral femoral neck was removed with the box chisel. Serial reaming with the Bi[employer] echo-bimetric reamers was performed up to size 11. Broaching was performed up to size 11 reduced profile. An excellent fit was obtained. Based on trial reductions, the standard offset, reduce profile, size 11 echo-bimetric stem was impacted into place.    Based on trial reductions, a +3 femoral 36 mm  diameter head and standard size 52 liner with a 36 mm internal diameter were chosen.  After antibiotic irrigation, these were impacted  into place. Final reduction was performed. At 90 degrees flexion, the hip would internally rotate 70 degrees. It was stable in extension and external rotation. Leg lengths appeared equal.    The wound was copiously irrigated with antibiotic solution. Two medium hemovacs were brought out via separate stab incisions. The fascia was closed with #0 vicryl. The subcutaneous tissues were closed with #2-0 vicryl. The skin was re-approximated with skin clips. A bulky sterile dressing and abduction pillow were applied. The patient was taken to the PACU in satisfactory position. Final counts were correct.    All CMS PQRI guidelines will be followed. Antibiotics will be discontinued within 24 hours. Lovenox will be utilized for anticoagulation.

## 2017-03-20 NOTE — BRIEF OP NOTE
[facility] Brief Operative Note    Pre-operative diagnosis: DJD LEFT HIP   Post-operative diagnosis osteoarthritis left hip      Procedure: Procedure(s):  LEFT TOTAL HIP ARTHROPLASTY - Wound Class: I-Clean   Surgeon(s): Surgeon(s) and Role:     * Mingo Fernando MD - Primary     * Chalino Ledesma Jr. - Assisting   Estimated blood loss: 200 mL    Specimens: * No specimens in log *   Findings: Please see the full operative report.

## 2017-03-20 NOTE — ANESTHESIA CARE TRANSFER NOTE
Patient: Yoanna Phillips    Procedure(s):  LEFT TOTAL HIP ARTHROPLASTY - Wound Class: I-Clean    Diagnosis: DJD LEFT HIP  Diagnosis Additional Information: No value filed.    Anesthesia Type:   General, ETT     Note:  Airway :Face Mask  Patient transferred to:PACU  Comments: Suctioned, spont resp ,lifts head  > 5 sec. Extubated with immediate exchange, to PACU, report to RN.      Vitals: (Last set prior to Anesthesia Care Transfer)    CRNA VITALS  3/20/2017 0836 - 3/20/2017 0910      3/20/2017             Pulse: 93    SpO2: 99 %    Resp Rate (set): 10    EKG: Sinus bradycardia                Electronically Signed By: TANIYA Coleman CRNA  March 20, 2017  9:10 AM

## 2017-03-20 NOTE — ANESTHESIA POSTPROCEDURE EVALUATION
Patient: Yoanna Phillips    Procedure(s):  LEFT TOTAL HIP ARTHROPLASTY - Wound Class: I-Clean    Diagnosis:DJD LEFT HIP  Diagnosis Additional Information: No value filed.    Anesthesia Type:  General, ETT    Note:  Anesthesia Post Evaluation    Patient location during evaluation: PACU  Patient participation: Able to fully participate in evaluation  Level of consciousness: awake  Pain management: adequate  Airway patency: patent  Cardiovascular status: acceptable  Respiratory status: acceptable  Hydration status: acceptable  PONV: controlled     Anesthetic complications: None          Last vitals:  Vitals:    03/20/17 1623 03/20/17 1635 03/20/17 1700   BP: 138/85     Pulse:      Resp: 15 16    Temp: 36.1  C (97  F)     SpO2: 98%  99%         Electronically Signed By: Doreen Hanna MD  March 20, 2017  6:35 PM

## 2017-03-21 ENCOUNTER — APPOINTMENT (OUTPATIENT)
Dept: PHYSICAL THERAPY | Facility: CLINIC | Age: 77
DRG: 470 | End: 2017-03-21
Attending: ORTHOPAEDIC SURGERY
Payer: COMMERCIAL

## 2017-03-21 LAB
GLUCOSE SERPL-MCNC: 102 MG/DL (ref 70–99)
HGB BLD-MCNC: 12.1 G/DL (ref 11.7–15.7)

## 2017-03-21 PROCEDURE — 97530 THERAPEUTIC ACTIVITIES: CPT | Mod: GP | Performed by: PHYSICAL THERAPY ASSISTANT

## 2017-03-21 PROCEDURE — 85018 HEMOGLOBIN: CPT | Performed by: ORTHOPAEDIC SURGERY

## 2017-03-21 PROCEDURE — 97110 THERAPEUTIC EXERCISES: CPT | Mod: GP | Performed by: PHYSICAL THERAPY ASSISTANT

## 2017-03-21 PROCEDURE — 12000007 ZZH R&B INTERMEDIATE

## 2017-03-21 PROCEDURE — 40000193 ZZH STATISTIC PT WARD VISIT: Performed by: PHYSICAL THERAPY ASSISTANT

## 2017-03-21 PROCEDURE — 25800025 ZZH RX 258: Performed by: ORTHOPAEDIC SURGERY

## 2017-03-21 PROCEDURE — 36415 COLL VENOUS BLD VENIPUNCTURE: CPT | Performed by: ORTHOPAEDIC SURGERY

## 2017-03-21 PROCEDURE — 25000128 H RX IP 250 OP 636: Performed by: ORTHOPAEDIC SURGERY

## 2017-03-21 PROCEDURE — 25000132 ZZH RX MED GY IP 250 OP 250 PS 637: Performed by: ORTHOPAEDIC SURGERY

## 2017-03-21 PROCEDURE — 82947 ASSAY GLUCOSE BLOOD QUANT: CPT | Performed by: ORTHOPAEDIC SURGERY

## 2017-03-21 PROCEDURE — 97116 GAIT TRAINING THERAPY: CPT | Mod: GP | Performed by: PHYSICAL THERAPY ASSISTANT

## 2017-03-21 RX ADMIN — CARBAMAZEPINE 300 MG: 100 TABLET, EXTENDED RELEASE ORAL at 20:40

## 2017-03-21 RX ADMIN — HYDROMORPHONE HYDROCHLORIDE 0.5 MG: 1 INJECTION, SOLUTION INTRAMUSCULAR; INTRAVENOUS; SUBCUTANEOUS at 01:04

## 2017-03-21 RX ADMIN — POTASSIUM CHLORIDE, DEXTROSE MONOHYDRATE AND SODIUM CHLORIDE: 150; 5; 450 INJECTION, SOLUTION INTRAVENOUS at 06:58

## 2017-03-21 RX ADMIN — SENNOSIDES AND DOCUSATE SODIUM 2 TABLET: 8.6; 5 TABLET ORAL at 09:51

## 2017-03-21 RX ADMIN — POTASSIUM CHLORIDE 10 MEQ: 750 TABLET, EXTENDED RELEASE ORAL at 20:40

## 2017-03-21 RX ADMIN — HYDROMORPHONE HYDROCHLORIDE 2 MG: 2 TABLET ORAL at 10:38

## 2017-03-21 RX ADMIN — CEFAZOLIN SODIUM 1 G: 1 INJECTION, POWDER, FOR SOLUTION INTRAMUSCULAR; INTRAVENOUS at 01:05

## 2017-03-21 RX ADMIN — GABAPENTIN 1200 MG: 600 TABLET, FILM COATED ORAL at 09:50

## 2017-03-21 RX ADMIN — POTASSIUM CHLORIDE 10 MEQ: 750 TABLET, EXTENDED RELEASE ORAL at 09:51

## 2017-03-21 RX ADMIN — LOSARTAN POTASSIUM 100 MG: 100 TABLET, FILM COATED ORAL at 09:51

## 2017-03-21 RX ADMIN — ACETAMINOPHEN 975 MG: 325 TABLET, FILM COATED ORAL at 21:34

## 2017-03-21 RX ADMIN — CARBAMAZEPINE 300 MG: 100 TABLET, EXTENDED RELEASE ORAL at 09:50

## 2017-03-21 RX ADMIN — GABAPENTIN 1200 MG: 600 TABLET, FILM COATED ORAL at 15:52

## 2017-03-21 RX ADMIN — GABAPENTIN 1200 MG: 600 TABLET, FILM COATED ORAL at 21:34

## 2017-03-21 RX ADMIN — SENNOSIDES AND DOCUSATE SODIUM 2 TABLET: 8.6; 5 TABLET ORAL at 20:40

## 2017-03-21 RX ADMIN — ENOXAPARIN SODIUM 40 MG: 40 INJECTION SUBCUTANEOUS at 09:50

## 2017-03-21 RX ADMIN — ACETAMINOPHEN 975 MG: 325 TABLET, FILM COATED ORAL at 06:59

## 2017-03-21 NOTE — PLAN OF CARE
Problem: Goal Outcome Summary  Goal: Goal Outcome Summary  Outcome: No Change  Pt more confused this shift, pt continues to need reorientation. Dressing reinforced because she was picking it off. Up to BSC with assist of 1-2.

## 2017-03-21 NOTE — PLAN OF CARE
Problem: Goal Outcome Summary  Goal: Goal Outcome Summary  Outcome: No Change  Pt is A & O with some slight confusion. VSS. IV infusing, 2L O2. Dressing is C/D/I and CMS intact. Hemovac in place and intact. Pain controlled by IV dilaudid and scheduled Tylenol. Bae catheter Dc 'd and is due to void. Will continue to monitor.

## 2017-03-21 NOTE — PLAN OF CARE
Problem: Goal Outcome Summary  Goal: Goal Outcome Summary  Surgeon Discharge Plan: SNF     Current Functional Status: Pt agreeable to bed exercises. Pt lethargic and has slurred speech, can understand at times, but difficult. Pt agreed that she would participate in OOB activity with afternoon.      Barriers to Plan/Home: Pt. Lives alone, pt. Has known short term memory deficits, pt. Is a falls risk, pt. Had right hip dislocation with revision one month following her right MARIELLA in 2016

## 2017-03-21 NOTE — PROGRESS NOTES
"Care Transition Initial Assessment - EMMA  Reason For Consult: discharge planning  Met with: Patient and daughter, Kay.  Active Problems:    Hip joint replacement status         DATA  Lives With: alone  Living Arrangements: house  Description of Support System: Supportive, Involved  Who is your support system?: Children  Support Assessment: Adequate family and caregiver support, Adequate social supports.   Identified issues/concerns regarding health management:   Patient feels that they have adequate support @ home?  Yes           Quality Of Family Relationships: supportive, involved  Transportation Available: TBD      ASSESSMENT  Cognitive Status:  Awake, alert and oriented X3.  Concerns to be addressed:     Per automatic referral, EMMA met with patient and her daughter, Kay, to discuss discharge planning needs.  Patient is a 76-year-old female who was admitted to the hospital on 3-20-17 for an elective left MARIELLA.  Prior to hospitalization, patient was living alone in her own home where she was managing well.  Patient and her daughter are aware that patient will need to go to Rehab prior to returning home.  Per the MD note today, \"Discharge plan: Skilled Nursing Facility.\"  Patient and her daughter expressed interest in [facility].  SW made a referral to the facility via [facility] the [facility] to have them assess patient for a possible admission for Thursday March 23.  EMMA will follow up regarding transportation once the discharge plan has been finalized.       PLAN  Financial costs for the patient includes: Discussed TCU coverage under insurance.  Patient given options and choices for discharge: TCU facility list offered.  Patient/family is agreeable to the plan?  Yes  Patient Goals and Preferences: TCU at discharge.  Patient anticipates discharging to:  TCU at discharge.    DANIELLE Denny      EMMA  D: EMMA following for discharge planning needs.  EMMA received a message from Joaquina in " Admissions at [facility] stating that they will have a bed available for patient on Thursday.  I: SW updated patient and her daughter, Kay, on the above and will follow up tomorrow regarding transportation to get patient to Rehab on Thursday.  EMMA spoke to Joaquina in Admissions at [facility] to confirm the bed and will update her on the transport time once it has been arranged.  A: Patient is alert and oriented X3.  P: SW will continue to follow and assist with finalizing the discharge plan as appropriate.    DANIELLE Denny

## 2017-03-21 NOTE — PROGRESS NOTES
Yoanna Phillips  3/21/2017  POD #1    Doing well.  Pain well-controlled.  Temperatures:  Current - Temp: 100  F (37.8  C); Max - Temp  Av.6  F (36.4  C)  Min: 96.8  F (36  C)  Max: 100  F (37.8  C)  Pulse range: No Data Recorded  Blood pressure range: Systolic (24hrs), Av , Min:115 , Max:156   ; Diastolic (24hrs), Av, Min:68, Max:93    CMS: intazct  Labs:  Hemoglobin   Date Value Ref Range Status   2017 12.1 11.7 - 15.7 g/dL Final   ]  X-rays look great.    PLAN:  Continue physical therapy  Discharge plan: Skilled Nursing Facility

## 2017-03-21 NOTE — PROGRESS NOTES
Up with one -two. Steady when up. Voiding on commode. CMS apears intact. Denies numbness and/or tingling. Did sit in wheelchair x 90 minutes for lunch. Pulled hemovac out, picking at dressing. Sterile dressing on, hemovac out. Did have dilaudid 2 mg orally x 1- more confused with med. Taking fluids well. SL. Monitor.

## 2017-03-22 ENCOUNTER — APPOINTMENT (OUTPATIENT)
Dept: PHYSICAL THERAPY | Facility: CLINIC | Age: 77
DRG: 470 | End: 2017-03-22
Attending: ORTHOPAEDIC SURGERY
Payer: COMMERCIAL

## 2017-03-22 LAB
GLUCOSE SERPL-MCNC: 108 MG/DL (ref 70–99)
HGB BLD-MCNC: 11.4 G/DL (ref 11.7–15.7)

## 2017-03-22 PROCEDURE — 82947 ASSAY GLUCOSE BLOOD QUANT: CPT | Performed by: ORTHOPAEDIC SURGERY

## 2017-03-22 PROCEDURE — 40000894 ZZH STATISTIC OT IP EVAL DEFER

## 2017-03-22 PROCEDURE — 97530 THERAPEUTIC ACTIVITIES: CPT | Mod: GP | Performed by: PHYSICAL THERAPY ASSISTANT

## 2017-03-22 PROCEDURE — 36415 COLL VENOUS BLD VENIPUNCTURE: CPT | Performed by: ORTHOPAEDIC SURGERY

## 2017-03-22 PROCEDURE — 85018 HEMOGLOBIN: CPT | Performed by: ORTHOPAEDIC SURGERY

## 2017-03-22 PROCEDURE — 12000007 ZZH R&B INTERMEDIATE

## 2017-03-22 PROCEDURE — 25000128 H RX IP 250 OP 636: Performed by: ORTHOPAEDIC SURGERY

## 2017-03-22 PROCEDURE — 40000193 ZZH STATISTIC PT WARD VISIT: Performed by: PHYSICAL THERAPY ASSISTANT

## 2017-03-22 PROCEDURE — 25000132 ZZH RX MED GY IP 250 OP 250 PS 637: Performed by: ORTHOPAEDIC SURGERY

## 2017-03-22 PROCEDURE — 97110 THERAPEUTIC EXERCISES: CPT | Mod: GP | Performed by: PHYSICAL THERAPY ASSISTANT

## 2017-03-22 RX ORDER — AMOXICILLIN 250 MG
1-2 CAPSULE ORAL 2 TIMES DAILY
Qty: 100 TABLET | Refills: 0 | Status: SHIPPED | OUTPATIENT
Start: 2017-03-22 | End: 2017-04-18

## 2017-03-22 RX ADMIN — GABAPENTIN 1200 MG: 600 TABLET, FILM COATED ORAL at 08:55

## 2017-03-22 RX ADMIN — CARBAMAZEPINE 300 MG: 100 TABLET, EXTENDED RELEASE ORAL at 21:41

## 2017-03-22 RX ADMIN — GABAPENTIN 1200 MG: 600 TABLET, FILM COATED ORAL at 15:48

## 2017-03-22 RX ADMIN — CARBAMAZEPINE 300 MG: 100 TABLET, EXTENDED RELEASE ORAL at 08:55

## 2017-03-22 RX ADMIN — ACETAMINOPHEN 975 MG: 325 TABLET, FILM COATED ORAL at 15:48

## 2017-03-22 RX ADMIN — ENOXAPARIN SODIUM 40 MG: 40 INJECTION SUBCUTANEOUS at 08:55

## 2017-03-22 RX ADMIN — GABAPENTIN 1200 MG: 600 TABLET, FILM COATED ORAL at 21:42

## 2017-03-22 RX ADMIN — LOSARTAN POTASSIUM 100 MG: 100 TABLET, FILM COATED ORAL at 08:55

## 2017-03-22 RX ADMIN — POTASSIUM CHLORIDE 10 MEQ: 750 TABLET, EXTENDED RELEASE ORAL at 21:42

## 2017-03-22 RX ADMIN — SENNOSIDES AND DOCUSATE SODIUM 2 TABLET: 8.6; 5 TABLET ORAL at 08:55

## 2017-03-22 RX ADMIN — POTASSIUM CHLORIDE 10 MEQ: 750 TABLET, EXTENDED RELEASE ORAL at 08:55

## 2017-03-22 RX ADMIN — ACETAMINOPHEN 975 MG: 325 TABLET, FILM COATED ORAL at 06:00

## 2017-03-22 RX ADMIN — SENNOSIDES AND DOCUSATE SODIUM 2 TABLET: 8.6; 5 TABLET ORAL at 21:42

## 2017-03-22 RX ADMIN — ACETAMINOPHEN 975 MG: 325 TABLET, FILM COATED ORAL at 21:41

## 2017-03-22 NOTE — PROGRESS NOTES
Yoanna GARCÍA Husamfan  3/22/2017  POD #2    Doing well.  Pain well-controlled.  Tolerating physical therapy and rehabilitation well.  Temperatures:  Current - Temp: 97.9  F (36.6  C); Max - Temp  Av.3  F (36.8  C)  Min: 97.9  F (36.6  C)  Max: 99  F (37.2  C)  Pulse range: No Data Recorded  Blood pressure range: Systolic (24hrs), Av , Min:108 , Max:154   ; Diastolic (24hrs), Av, Min:62, Max:88    CMS: intact  Labs:  Hemoglobin   Date Value Ref Range Status   2017 11.4 (L) 11.7 - 15.7 g/dL Final   ]      PLAN:   Continue physical therapy  Discharge plan: Skilled Nursing Facility tomorrow

## 2017-03-22 NOTE — PLAN OF CARE
Problem: Goal Outcome Summary  Goal: Goal Outcome Summary  OT: Orders rec'd. Noted patient discharging to TCU tomorrow. OT deferring eval to TCU. Patient in agreement.

## 2017-03-22 NOTE — PROGRESS NOTES
EMMA  D: SW following for discharge planning needs.  Patients daughter requested that SW arrange a w/c ride for discharge and is aware that the cost of transportation will be billed to the patients Medical Assistance.  I: EMMA spoke to Sheryl at [facility] and scheduled transportation for tomorrow at 1100.  EMMA faxed the facesheet to [facility].  EMMA spoke to Joaquina in Admissions at [facility] to update her on the transport time for tomorrow.  A: Patient is alert and oriented with some confusion noted.  P: SW will continue to follow and assist with finalizing the discharge plan as appropriate.    DANIELLE Denny      PAS-RR    D: Per DHS regulation, EMMA completed and submitted PAS-RR to [place] [employer] Direct Connect via the Senior LinkAge Line.  PAS-RR confirmation # is : [id number].    I: EMMA spoke with patient and family and they are aware a PAS-RR has been submitted.  EMMA reviewed with patient and family that they may be contacted for a follow up appointment within 10 days of hospital discharge if their SNF stay is < 30 days.  Contact information for [facility] LinkAge Line was also provided.    A: Patient and family verbalized understanding.    P: Further questions may be directed to [facility] LinkAge Line at #[phone], option #4 for PAS-RR staff.    DANIELLE Denny

## 2017-03-22 NOTE — DISCHARGE SUMMARY
Discharge Summary    Yoanna Phillips MRN# [id number]   YOB: 1940 Age: 76 year old     Date of Admission:  3/20/2017  Date of Discharge:  3/23/2017  Admitting Physician:  Mingo Fernando MD  Discharge Physician:  Mingo Fernando MD     Primary Provider: Marin Ledesma          Admission Diagnoses:   Osteoarthritis left hip          Discharge Diagnosis:   Same           Surgical Procedure:   left hip replacement           Secondary Diagnosis:     Patient Active Problem List   Diagnosis     Essential hypertension, benign     B-complex deficiency     CARDIOVASCULAR SCREENING; LDL GOAL LESS THAN 130     Poor short term memory     Advance Care Planning     Shoulder joint replacement status     Cognitive impairment     DJD (degenerative joint disease)     Health Care Home     Seizure disorder (H)     Hip joint replacement status     Status post total replacement of right hip     Primary osteoarthritis of hip     Dislocation of internal right hip prosthesis, subsequent encounter     Physical deconditioning     Anemia due to blood loss, acute     Dyspnea     Dislocated hip (H)              Discharge Disposition:   Discharged to rehabilitation facility           Medications Prior to Admission:     Prescriptions Prior to Admission   Medication Sig Dispense Refill Last Dose     Acetaminophen (TYLENOL PO) Take 325-650 mg by mouth every 6 hours as needed for mild pain or fever   3/19/2017 at prn     CARBAMAZEPINE ER PO Take 300 mg by mouth 2 times daily   3/20/2017 at 0500     cyanocobalamin (VITAMIN B12) 1000 MCG/ML injection Inject 1 mL into the muscle every 30 days   more than 2 weeks     GABAPENTIN PO Take 1,200 mg by mouth 3 times daily (Takes 2 x 600mg tablet = 1200mg dose)   3/20/2017 at 0500     Potassium Chloride Zonia CR (K-DUR PO) Take 10 mEq by mouth 2 times daily (takes 0.5 x 20mEq tablet = 10mEq dose)  Patient taking differently than prescribed; Prescribed as 20mEQ twice  daily   3/19/2017 at pm     tiotropium (SPIRIVA) 18 MCG capsule Inhale 18 mcg into the lungs daily   3/20/2017 at 0500     FOLIC ACID PO Take 1 mg by mouth daily   3/19/2017 at am     Thiamine HCl (VITAMIN B-1 PO) Take 100 mg by mouth daily   3/19/2017 at am     LOSARTAN POTASSIUM PO Take 100 mg by mouth daily   3/19/2017 at am     Multiple Vitamins-Minerals (CENTRUM SILVER) per tablet Take 1 tablet by mouth daily   3/19/2017 at am             Discharge Medications:     Current Discharge Medication List      START taking these medications    Details   enoxaparin (LOVENOX) 40 MG/0.4ML injection Inject 0.4 mLs (40 mg) Subcutaneous every 24 hours for 10 days  Qty: 4 mL, Refills: 0    Associated Diagnoses: Hip joint replacement status      senna-docusate (SENOKOT-S;PERICOLACE) 8.6-50 MG per tablet Take 1-2 tablets by mouth 2 times daily  Qty: 100 tablet, Refills: 0    Associated Diagnoses: Hip joint replacement status         CONTINUE these medications which have NOT CHANGED    Details   Acetaminophen (TYLENOL PO) Take 325-650 mg by mouth every 6 hours as needed for mild pain or fever      CARBAMAZEPINE ER PO Take 300 mg by mouth 2 times daily      cyanocobalamin (VITAMIN B12) 1000 MCG/ML injection Inject 1 mL into the muscle every 30 days      GABAPENTIN PO Take 1,200 mg by mouth 3 times daily (Takes 2 x 600mg tablet = 1200mg dose)      Potassium Chloride Zonia CR (K-DUR PO) Take 10 mEq by mouth 2 times daily (takes 0.5 x 20mEq tablet = 10mEq dose)  Patient taking differently than prescribed; Prescribed as 20mEQ twice daily      tiotropium (SPIRIVA) 18 MCG capsule Inhale 18 mcg into the lungs daily      FOLIC ACID PO Take 1 mg by mouth daily      Thiamine HCl (VITAMIN B-1 PO) Take 100 mg by mouth daily      LOSARTAN POTASSIUM PO Take 100 mg by mouth daily      Multiple Vitamins-Minerals (CENTRUM SILVER) per tablet Take 1 tablet by mouth daily                   Consultations:   No consultations were requested during this  admission           Hospital Course:   The patient was admitted after the surgical procedure. The patient underwent an uneventfulleft hip replacement. Postoperatively, anticoagulation  with Lovenox was started. No transfusion was required. The patient will be discharged to a rehabilitation facility/transitional care unit. Home medications have been reconciled. Tylenol was prescribed for pain. Lovenox  will be prescribed.             Pending Results:   None           Discharge Instructions and Follow-Up:        Discharge activity: use a walker   Discharge follow-up: Follow up with me in 2 weeks   Outpatient therapy: None    Home Care agency: None    Supplies and equipment: None        Wound care: dry dressing daily and as needed   Other instructions: None

## 2017-03-22 NOTE — PLAN OF CARE
Problem: Goal Outcome Summary  Goal: Goal Outcome Summary  Outcome: Improving  Pt is A & O with some confusion. VSS. CMS intact. Dressing d/c/I. Pain managed by scheduled Tylenol. Up with AX1. Voiding adequately in the bathroom. Will continue to monitor.

## 2017-03-22 NOTE — PLAN OF CARE
Problem: Goal Outcome Summary  Goal: Goal Outcome Summary  Surgeon Discharge Plan: SNF     Current Functional Status: Pt still confused, but agreeable to session. Pt completed supine bed exercises. Pt completed med mobility with Kezia. Pt completed sit <> stand from bed to WW with CGA. Pt ambulated from bed to wheel chair with WW and CGA. Pt in wheel at end of session, with chair alarm on.     Barriers to Plan/Home: Pt. Lives alone, pt. Has known short term memory deficits, pt. Is a falls risk, pt. Had right hip dislocation with revision one month following her right MARIELLA in 2016

## 2017-03-22 NOTE — PLAN OF CARE
Problem: Goal Outcome Summary  Goal: Goal Outcome Summary  Outcome: Improving  Pt confused this shift, oriented to self only. Up with 1 to BR. Dressing changed today. Taking tylenol with good pain relief. Plan TCU tomorrow.

## 2017-03-23 ENCOUNTER — APPOINTMENT (OUTPATIENT)
Dept: PHYSICAL THERAPY | Facility: CLINIC | Age: 77
DRG: 470 | End: 2017-03-23
Attending: ORTHOPAEDIC SURGERY
Payer: COMMERCIAL

## 2017-03-23 VITALS
BODY MASS INDEX: 30.12 KG/M2 | OXYGEN SATURATION: 94 % | WEIGHT: 170 LBS | TEMPERATURE: 98.7 F | RESPIRATION RATE: 18 BRPM | HEART RATE: 64 BPM | DIASTOLIC BLOOD PRESSURE: 61 MMHG | HEIGHT: 63 IN | SYSTOLIC BLOOD PRESSURE: 111 MMHG

## 2017-03-23 LAB
CREAT SERPL-MCNC: 0.64 MG/DL (ref 0.52–1.04)
GFR SERPL CREATININE-BSD FRML MDRD: 90 ML/MIN/1.7M2
PLATELET # BLD AUTO: 257 10E9/L (ref 150–450)

## 2017-03-23 PROCEDURE — 85049 AUTOMATED PLATELET COUNT: CPT | Performed by: ORTHOPAEDIC SURGERY

## 2017-03-23 PROCEDURE — 97110 THERAPEUTIC EXERCISES: CPT | Mod: GP | Performed by: PHYSICAL THERAPY ASSISTANT

## 2017-03-23 PROCEDURE — 36415 COLL VENOUS BLD VENIPUNCTURE: CPT | Performed by: ORTHOPAEDIC SURGERY

## 2017-03-23 PROCEDURE — 82565 ASSAY OF CREATININE: CPT | Performed by: ORTHOPAEDIC SURGERY

## 2017-03-23 PROCEDURE — 25000132 ZZH RX MED GY IP 250 OP 250 PS 637: Performed by: ORTHOPAEDIC SURGERY

## 2017-03-23 PROCEDURE — 40000193 ZZH STATISTIC PT WARD VISIT: Performed by: PHYSICAL THERAPY ASSISTANT

## 2017-03-23 PROCEDURE — 25000128 H RX IP 250 OP 636: Performed by: ORTHOPAEDIC SURGERY

## 2017-03-23 RX ADMIN — POTASSIUM CHLORIDE 10 MEQ: 750 TABLET, EXTENDED RELEASE ORAL at 10:24

## 2017-03-23 RX ADMIN — LOSARTAN POTASSIUM 100 MG: 100 TABLET, FILM COATED ORAL at 10:26

## 2017-03-23 RX ADMIN — ACETAMINOPHEN 975 MG: 325 TABLET, FILM COATED ORAL at 05:54

## 2017-03-23 RX ADMIN — ENOXAPARIN SODIUM 40 MG: 40 INJECTION SUBCUTANEOUS at 10:29

## 2017-03-23 RX ADMIN — CARBAMAZEPINE 300 MG: 100 TABLET, EXTENDED RELEASE ORAL at 10:28

## 2017-03-23 RX ADMIN — SENNOSIDES AND DOCUSATE SODIUM 1 TABLET: 8.6; 5 TABLET ORAL at 10:26

## 2017-03-23 RX ADMIN — GABAPENTIN 1200 MG: 600 TABLET, FILM COATED ORAL at 10:25

## 2017-03-23 NOTE — PROGRESS NOTES
Yoanna Phillips  3/23/2017  POD #3    Doing well.  Pain well-controlled.  Tolerating physical therapy and rehabilitation well.  Temperatures:  Current - Temp: 98.7  F (37.1  C); Max - Temp  Av.7  F (37.1  C)  Min: 97.8  F (36.6  C)  Max: 100  F (37.8  C)  Pulse range: No Data Recorded  Blood pressure range: Systolic (24hrs), Av , Min:97 , Max:127   ; Diastolic (24hrs), Av, Min:50, Max:77    CMS: intact   Labs:none    PLAN:  Continue physical therapy  Discharge plan: Skilled Nursing Facility today

## 2017-03-23 NOTE — PLAN OF CARE
Problem: Goal Outcome Summary  Goal: Goal Outcome Summary  Surgeon Discharge Plan: SNF     Current Functional Status: Pt sleeping upon arrival. Pt not enthused about session, but agreed to bed exercises. Pt stated she is leaving today, but does not remember her children visiting her yesterday.      Barriers to Plan/Home: None for SNF. Pt. Lives alone, pt. Has known short term memory deficits, pt. Is a falls risk.     Pt discharge today 3/23 to SNF. Pt goals not met.

## 2017-03-23 NOTE — PLAN OF CARE
Problem: Goal Outcome Summary  Goal: Goal Outcome Summary  Outcome: Adequate for Discharge Date Met:  03/23/17  Pt discharged to TCU.  Orders received, paperwork sent.

## 2017-03-23 NOTE — PROGRESS NOTES
EMMA  D: Per MD order, patient okay to discharge today to Rehab.  I: EMMA faxed the discharge orders and confirmed that transportation is arranged for today at 1100.  EMMA spoke to Joaquina in Admissions at [facility] to update her that patient is ready for discharge today and the time of transportation.  Patients nurse updated patients daughter, Kay, on the discharge plan for today.  A: Patient is alert and oriented with some intermittent confusion noted.  Patients daughter is aware and in agreement with the plan for patient to discharge today to Rehab.  P: Patient to discharge today to [facility].  EMMA will be available to assist as needed.    Sheryl Reeves, DANIELLE

## 2017-03-23 NOTE — PLAN OF CARE
Problem: Goal Outcome Summary  Goal: Goal Outcome Summary  Pt confused this shift, oriented to self only. VSS on RA. Up with A1 and walker to BSC. Pain managed with Tylenol. CMS intact. Dressing CDI. Voiding adequately. Large BM x1. Plan for TCU today.

## 2017-03-23 NOTE — DISCHARGE INSTRUCTIONS
You have been discharged to [facility]  Phone Number is [phone]  Fax Number is [phone]

## 2017-04-06 ENCOUNTER — TRANSFERRED RECORDS (OUTPATIENT)
Dept: FAMILY MEDICINE | Facility: CLINIC | Age: 77
End: 2017-04-06

## 2017-04-13 PROCEDURE — G0180 MD CERTIFICATION HHA PATIENT: HCPCS | Performed by: FAMILY MEDICINE

## 2017-04-18 ENCOUNTER — OFFICE VISIT (OUTPATIENT)
Dept: FAMILY MEDICINE | Facility: CLINIC | Age: 77
End: 2017-04-18

## 2017-04-18 VITALS
BODY MASS INDEX: 29.05 KG/M2 | HEART RATE: 65 BPM | OXYGEN SATURATION: 97 % | WEIGHT: 164 LBS | SYSTOLIC BLOOD PRESSURE: 118 MMHG | DIASTOLIC BLOOD PRESSURE: 70 MMHG

## 2017-04-18 DIAGNOSIS — R53.81 PHYSICAL DECONDITIONING: Primary | ICD-10-CM

## 2017-04-18 DIAGNOSIS — R41.3 POOR SHORT TERM MEMORY: ICD-10-CM

## 2017-04-18 DIAGNOSIS — Z96.649 HIP JOINT REPLACEMENT STATUS: ICD-10-CM

## 2017-04-18 PROCEDURE — 99495 TRANSJ CARE MGMT MOD F2F 14D: CPT | Performed by: FAMILY MEDICINE

## 2017-04-18 NOTE — MR AVS SNAPSHOT
After Visit Summary   4/18/2017    Yoanna Phillips    MRN: [id number]           Patient Information     Date Of Birth          1940        Visit Information        Provider Department      4/18/2017 2:45 PM Marin Ledesma MD [facility]        Today's Diagnoses     Physical deconditioning    -  1    Poor short term memory        Hip joint replacement status           Follow-ups after your visit        Who to contact     If you have questions or need follow up information about today's clinic visit or your schedule please contact [facility] directly at [phone].  Normal or non-critical lab and imaging results will be communicated to you by [email or web address], letter or phone within 4 business days after the clinic has received the results. If you do not hear from us within 7 days, please contact the clinic through [email or web address] or phone. If you have a critical or abnormal lab result, we will notify you by phone as soon as possible.  Submit refill requests through [employer] or call your pharmacy and they will forward the refill request to us. Please allow 3 business days for your refill to be completed.          Additional Information About Your Visit        MyChart Information     [employer] gives you secure access to your electronic health record. If you see a primary care provider, you can also send messages to your care team and make appointments. If you have questions, please call your primary care clinic.  If you do not have a primary care provider, please call [phone] and they will assist you.        Care EveryWhere ID     This is your Care EveryWhere ID. This could be used by other organizations to access your [facility] medical records  [id number]        Your Vitals Were     Pulse Pulse Oximetry BMI (Body Mass Index)             65 97% 29.05 kg/m2          Blood Pressure from Last 3 Encounters:   04/18/17 118/70   03/23/17 111/61   03/06/17 120/80    Weight from Last 3  Encounters:   04/18/17 74.4 kg (164 lb)   03/20/17 77.1 kg (170 lb)   03/06/17 76.2 kg (168 lb)              Today, you had the following     No orders found for display       Primary Care Provider Office Phone # Fax #    Marin Ledesma [phone]       [facility] [address]  [facility] [address]        Thank you!     Thank you for choosing [facility]  for your care. Our goal is always to provide you with excellent care. Hearing back from our patients is one way we can continue to improve our services. Please take a few minutes to complete the written survey that you may receive in the mail after your visit with us. Thank you!             Your Updated Medication List - Protect others around you: Learn how to safely use, store and throw away your medicines at www.disposemymeds.org.          This list is accurate as of: 4/18/17  3:29 PM.  Always use your most recent med list.                   Brand Name Dispense Instructions for use    CARBAMAZEPINE ER PO      Take 300 mg by mouth 2 times daily       CENTRUM SILVER per tablet      Take 1 tablet by mouth daily       cyanocobalamin 1000 MCG/ML injection    VITAMIN B12     Inject 1 mL into the muscle every 30 days       FOLIC ACID PO      Take 1 mg by mouth daily       GABAPENTIN PO      Take 1,200 mg by mouth 3 times daily (Takes 2 x 600mg tablet = 1200mg dose)       K-DUR PO      Take 10 mEq by mouth 2 times daily (takes 0.5 x 20mEq tablet = 10mEq dose) Patient taking differently than prescribed; Prescribed as 20mEQ twice daily       LOSARTAN POTASSIUM PO      Take 100 mg by mouth daily       tiotropium 18 MCG capsule    SPIRIVA     Inhale 18 mcg into the lungs daily       TYLENOL PO      Take 325-650 mg by mouth every 6 hours as needed for mild pain or fever       VITAMIN B-1 PO      Take 100 mg by mouth daily

## 2017-04-18 NOTE — PROGRESS NOTES
SUBJECTIVE:                                                      Patient presents for Hospital Followup Visit:    Hospital:  [facility]    And then to [facility]   Date of Admission & Date of Discharge: 3/20/17-3/23/17 and then 3/23/17-4/12/17  Reason(s) for Admission: Left hip replacement             Problems taking medications regularly:  None, but taking too many        Medication changes since discharge: None       Problems adhering to non-medication therapy:  None    Summary of hospitalization:  [facility] discharge summary reviewed  Diagnostic Tests/Treatments reviewed.  Follow up needed: none  Other Healthcare Providers Involved in Patient s Care:         None  Update since discharge: improved.     ROS:  Constitutional, neuro, ENT, endocrine, pulmonary, cardiac, gastrointestinal, genitourinary, musculoskeletal, integument and psychiatric systems are negative, except as otherwise noted.    OBJECTIVE:                                                      APPEARANCE: = Relaxed and in no distress  Conj/Eyelids = noninjected and lids and lashes are without inflammation  PERRLA/Irises = Pupils Round Reactive to Light and Irisis without inflammation  Neck = No anterior or posterior adenopathy appreciated.  Thyroid = Not enlarged and no masses felt  Resp effort = Calm regular breathing  Breath Sounds = Good air movement with no rales or rhonchi in any lung fields  Heart Rate, Rythym, & sounds (no Murm)  = Regular rate and rythym with no S3, S4, or murmer appreciated.  Carotid Art's = Pulses full and equal and no bruits appreciated  Abdomen = Soft, nontender, no masses, & bowel sounds in all quadrants  Liver/Spleen = Normal span and no splenomegaly noted  Digits and Nails = FROM in all finger joints, no nail dystrophy  Ext (edema) = No pretibial edema noted or elsewhere  Musculsktl =  Strength and ROM of major joints are within normal limits  SKIN = absent significant rashes or lesions    Recent/Remote Memory = Alert and Oriented x 3  Mood/Affect = Cooperative and interested        ASSESSMENT/PLAN:                                                      There are no diagnoses linked to this encounter.     Post Discharge Medication Reconciliation: discharge medications reconciled and changed, per note/orders (see AVS).patientIssues to address: No issues identified.  Plan of care communicated with patient      Type of Medical Decision Making Face-to-Face Visit within 7 Days Face-to-Face Visit within 14 days   Moderate Complexity [id number]   High Complexity [id number]       Assessment/Plan:  Yoanna was seen today for hospital f/u and recheck medication.    Diagnoses and all orders for this visit:    Physical deconditioning    Poor short term memory    Hip joint replacement status    >25 min spent with patient, greater than 50% spent on discussion/education/planning, etc. About The primary encounter diagnosis was Physical deconditioning. Diagnoses of Poor short term memory and Hip joint replacement status were also pertinent to this visit.      Marin Ledesma MD  [facility]  [phone]

## 2017-04-27 ENCOUNTER — MEDICAL CORRESPONDENCE (OUTPATIENT)
Dept: FAMILY MEDICINE | Facility: CLINIC | Age: 77
End: 2017-04-27

## 2017-05-04 ENCOUNTER — TRANSFERRED RECORDS (OUTPATIENT)
Dept: FAMILY MEDICINE | Facility: CLINIC | Age: 77
End: 2017-05-04

## 2017-05-18 ENCOUNTER — MEDICAL CORRESPONDENCE (OUTPATIENT)
Dept: FAMILY MEDICINE | Facility: CLINIC | Age: 77
End: 2017-05-18

## 2017-05-22 ENCOUNTER — APPOINTMENT (OUTPATIENT)
Dept: CT IMAGING | Facility: CLINIC | Age: 77
DRG: 560 | End: 2017-05-22
Attending: EMERGENCY MEDICINE
Payer: COMMERCIAL

## 2017-05-22 ENCOUNTER — ANESTHESIA EVENT (OUTPATIENT)
Dept: SURGERY | Facility: CLINIC | Age: 77
DRG: 560 | End: 2017-05-22
Payer: COMMERCIAL

## 2017-05-22 ENCOUNTER — ANESTHESIA (OUTPATIENT)
Dept: SURGERY | Facility: CLINIC | Age: 77
DRG: 560 | End: 2017-05-22
Payer: COMMERCIAL

## 2017-05-22 ENCOUNTER — APPOINTMENT (OUTPATIENT)
Dept: GENERAL RADIOLOGY | Facility: CLINIC | Age: 77
DRG: 560 | End: 2017-05-22
Attending: EMERGENCY MEDICINE
Payer: COMMERCIAL

## 2017-05-22 ENCOUNTER — APPOINTMENT (OUTPATIENT)
Dept: GENERAL RADIOLOGY | Facility: CLINIC | Age: 77
DRG: 560 | End: 2017-05-22
Attending: ORTHOPAEDIC SURGERY
Payer: COMMERCIAL

## 2017-05-22 ENCOUNTER — HOSPITAL ENCOUNTER (INPATIENT)
Facility: CLINIC | Age: 77
LOS: 3 days | Discharge: NURSING, REHAB OR LONG-TERM CARE | DRG: 560 | End: 2017-05-25
Attending: EMERGENCY MEDICINE | Admitting: INTERNAL MEDICINE
Payer: COMMERCIAL

## 2017-05-22 DIAGNOSIS — S73.005A DISLOCATED HIP, LEFT, INITIAL ENCOUNTER (H): ICD-10-CM
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
## 2017-05-22 DIAGNOSIS — T79.6XXA TRAUMATIC RHABDOMYOLYSIS, INITIAL ENCOUNTER (H): ICD-10-CM

## 2017-05-22 LAB
ABO + RH BLD: NORMAL
ABO + RH BLD: NORMAL
ALBUMIN SERPL-MCNC: 3.3 G/DL (ref 3.4–5)
ALBUMIN UR-MCNC: 30 MG/DL
ALP SERPL-CCNC: 126 U/L (ref 40–150)
ALT SERPL W P-5'-P-CCNC: 61 U/L (ref 0–50)
ANION GAP SERPL CALCULATED.3IONS-SCNC: 9 MMOL/L (ref 3–14)
APPEARANCE UR: CLEAR
AST SERPL W P-5'-P-CCNC: 125 U/L (ref 0–45)
BASOPHILS # BLD AUTO: 0 10E9/L (ref 0–0.2)
BASOPHILS NFR BLD AUTO: 0.1 %
BILIRUB SERPL-MCNC: 0.6 MG/DL (ref 0.2–1.3)
BILIRUB UR QL STRIP: NEGATIVE
BLD GP AB SCN SERPL QL: NORMAL
BLOOD BANK CMNT PATIENT-IMP: NORMAL
BUN SERPL-MCNC: 30 MG/DL (ref 7–30)
CALCIUM SERPL-MCNC: 10.1 MG/DL (ref 8.5–10.1)
CHLORIDE SERPL-SCNC: 99 MMOL/L (ref 94–109)
CK SERPL-CCNC: 2680 U/L (ref 30–225)
CO2 SERPL-SCNC: 29 MMOL/L (ref 20–32)
COLOR UR AUTO: YELLOW
CREAT SERPL-MCNC: 0.69 MG/DL (ref 0.52–1.04)
DIFFERENTIAL METHOD BLD: ABNORMAL
EOSINOPHIL # BLD AUTO: 0 10E9/L (ref 0–0.7)
EOSINOPHIL NFR BLD AUTO: 0 %
ERYTHROCYTE [DISTWIDTH] IN BLOOD BY AUTOMATED COUNT: 13.5 % (ref 10–15)
GFR SERPL CREATININE-BSD FRML MDRD: 82 ML/MIN/1.7M2
GLUCOSE SERPL-MCNC: 123 MG/DL (ref 70–99)
GLUCOSE UR STRIP-MCNC: NEGATIVE MG/DL
HCT VFR BLD AUTO: 46.7 % (ref 35–47)
HEMOCCULT STL QL: NEGATIVE
HGB BLD-MCNC: 16 G/DL (ref 11.7–15.7)
HGB UR QL STRIP: NEGATIVE
IMM GRANULOCYTES # BLD: 0.1 10E9/L (ref 0–0.4)
IMM GRANULOCYTES NFR BLD: 0.4 %
INTERPRETATION ECG - MUSE: NORMAL
KETONES UR STRIP-MCNC: 10 MG/DL
LACTATE BLD-SCNC: 1.7 MMOL/L (ref 0.7–2.1)
LEUKOCYTE ESTERASE UR QL STRIP: NEGATIVE
LYMPHOCYTES # BLD AUTO: 1.5 10E9/L (ref 0.8–5.3)
LYMPHOCYTES NFR BLD AUTO: 8.8 %
MCH RBC QN AUTO: 33.5 PG (ref 26.5–33)
MCHC RBC AUTO-ENTMCNC: 34.3 G/DL (ref 31.5–36.5)
MCV RBC AUTO: 98 FL (ref 78–100)
MONOCYTES # BLD AUTO: 1.2 10E9/L (ref 0–1.3)
MONOCYTES NFR BLD AUTO: 7.2 %
MUCOUS THREADS #/AREA URNS LPF: PRESENT /LPF
NEUTROPHILS # BLD AUTO: 14 10E9/L (ref 1.6–8.3)
NEUTROPHILS NFR BLD AUTO: 83.5 %
NITRATE UR QL: NEGATIVE
PH UR STRIP: 6.5 PH (ref 5–7)
PLATELET # BLD AUTO: 333 10E9/L (ref 150–450)
POTASSIUM SERPL-SCNC: 4 MMOL/L (ref 3.4–5.3)
PROT SERPL-MCNC: 7.9 G/DL (ref 6.8–8.8)
RBC # BLD AUTO: 4.77 10E12/L (ref 3.8–5.2)
RBC #/AREA URNS AUTO: 3 /HPF (ref 0–2)
SODIUM SERPL-SCNC: 137 MMOL/L (ref 133–144)
SP GR UR STRIP: 1.02 (ref 1–1.03)
SPECIMEN EXP DATE BLD: NORMAL
TROPONIN I SERPL-MCNC: 0.02 UG/L (ref 0–0.04)
URN SPEC COLLECT METH UR: ABNORMAL
UROBILINOGEN UR STRIP-MCNC: NORMAL MG/DL (ref 0–2)
WBC # BLD AUTO: 16.7 10E9/L (ref 4–11)
WBC #/AREA URNS AUTO: <1 /HPF (ref 0–2)

## 2017-05-22 PROCEDURE — 25000132 ZZH RX MED GY IP 250 OP 250 PS 637: Performed by: PHYSICIAN ASSISTANT

## 2017-05-22 PROCEDURE — 40000275 ZZH STATISTIC RCP TIME EA 10 MIN

## 2017-05-22 PROCEDURE — 99152 MOD SED SAME PHYS/QHP 5/>YRS: CPT

## 2017-05-22 PROCEDURE — 96374 THER/PROPH/DIAG INJ IV PUSH: CPT | Mod: 59

## 2017-05-22 PROCEDURE — 25000125 ZZHC RX 250: Performed by: PHYSICIAN ASSISTANT

## 2017-05-22 PROCEDURE — 99223 1ST HOSP IP/OBS HIGH 75: CPT | Mod: AI | Performed by: PHYSICIAN ASSISTANT

## 2017-05-22 PROCEDURE — 12000007 ZZH R&B INTERMEDIATE

## 2017-05-22 PROCEDURE — 25000128 H RX IP 250 OP 636: Performed by: EMERGENCY MEDICINE

## 2017-05-22 PROCEDURE — 71010 XR CHEST 1 VW: CPT

## 2017-05-22 PROCEDURE — 82272 OCCULT BLD FECES 1-3 TESTS: CPT | Performed by: EMERGENCY MEDICINE

## 2017-05-22 PROCEDURE — 84484 ASSAY OF TROPONIN QUANT: CPT | Performed by: EMERGENCY MEDICINE

## 2017-05-22 PROCEDURE — 37000009 ZZH ANESTHESIA TECHNICAL FEE, EACH ADDTL 15 MIN: Performed by: ORTHOPAEDIC SURGERY

## 2017-05-22 PROCEDURE — 36415 COLL VENOUS BLD VENIPUNCTURE: CPT | Performed by: EMERGENCY MEDICINE

## 2017-05-22 PROCEDURE — 25000125 ZZHC RX 250

## 2017-05-22 PROCEDURE — 73502 X-RAY EXAM HIP UNI 2-3 VIEWS: CPT

## 2017-05-22 PROCEDURE — 82550 ASSAY OF CK (CPK): CPT | Performed by: EMERGENCY MEDICINE

## 2017-05-22 PROCEDURE — 99291 CRITICAL CARE FIRST HOUR: CPT | Mod: 25

## 2017-05-22 PROCEDURE — 25000125 ZZHC RX 250: Performed by: NURSE ANESTHETIST, CERTIFIED REGISTERED

## 2017-05-22 PROCEDURE — 36000054 ZZH SURGERY LEVEL 2 W FLUORO 1ST 30 MIN: Performed by: ORTHOPAEDIC SURGERY

## 2017-05-22 PROCEDURE — 81001 URINALYSIS AUTO W/SCOPE: CPT | Performed by: EMERGENCY MEDICINE

## 2017-05-22 PROCEDURE — 51702 INSERT TEMP BLADDER CATH: CPT

## 2017-05-22 PROCEDURE — 80053 COMPREHEN METABOLIC PANEL: CPT | Performed by: EMERGENCY MEDICINE

## 2017-05-22 PROCEDURE — 86850 RBC ANTIBODY SCREEN: CPT | Performed by: EMERGENCY MEDICINE

## 2017-05-22 PROCEDURE — 36000052 ZZH SURGERY LEVEL 2 EA 15 ADDTL MIN: Performed by: ORTHOPAEDIC SURGERY

## 2017-05-22 PROCEDURE — 86900 BLOOD TYPING SEROLOGIC ABO: CPT | Performed by: EMERGENCY MEDICINE

## 2017-05-22 PROCEDURE — 40000986 XR HIP PORT LT 1 VW

## 2017-05-22 PROCEDURE — 37000008 ZZH ANESTHESIA TECHNICAL FEE, 1ST 30 MIN: Performed by: ORTHOPAEDIC SURGERY

## 2017-05-22 PROCEDURE — 93005 ELECTROCARDIOGRAM TRACING: CPT

## 2017-05-22 PROCEDURE — 86901 BLOOD TYPING SEROLOGIC RH(D): CPT | Performed by: EMERGENCY MEDICINE

## 2017-05-22 PROCEDURE — 25000128 H RX IP 250 OP 636: Performed by: PHYSICIAN ASSISTANT

## 2017-05-22 PROCEDURE — 27250 TREAT HIP DISLOCATION: CPT | Mod: LT

## 2017-05-22 PROCEDURE — 74177 CT ABD & PELVIS W/CONTRAST: CPT

## 2017-05-22 PROCEDURE — 70450 CT HEAD/BRAIN W/O DYE: CPT

## 2017-05-22 PROCEDURE — 99153 MOD SED SAME PHYS/QHP EA: CPT

## 2017-05-22 PROCEDURE — 40000169 ZZH STATISTIC PRE-PROCEDURE ASSESSMENT I: Performed by: ORTHOPAEDIC SURGERY

## 2017-05-22 PROCEDURE — 40000985 XR HIP PORT LT 1 VW

## 2017-05-22 PROCEDURE — 83605 ASSAY OF LACTIC ACID: CPT | Performed by: EMERGENCY MEDICINE

## 2017-05-22 PROCEDURE — 25000566 ZZH SEVOFLURANE, EA 15 MIN: Performed by: ORTHOPAEDIC SURGERY

## 2017-05-22 PROCEDURE — 25000125 ZZHC RX 250: Performed by: EMERGENCY MEDICINE

## 2017-05-22 PROCEDURE — 0SWBXJZ REVISION OF SYNTHETIC SUBSTITUTE IN LEFT HIP JOINT, EXTERNAL APPROACH: ICD-10-PCS | Performed by: ORTHOPAEDIC SURGERY

## 2017-05-22 PROCEDURE — 96361 HYDRATE IV INFUSION ADD-ON: CPT

## 2017-05-22 PROCEDURE — 25000128 H RX IP 250 OP 636: Performed by: NURSE ANESTHETIST, CERTIFIED REGISTERED

## 2017-05-22 PROCEDURE — 71000012 ZZH RECOVERY PHASE 1 LEVEL 1 FIRST HR: Performed by: ORTHOPAEDIC SURGERY

## 2017-05-22 PROCEDURE — 85025 COMPLETE CBC W/AUTO DIFF WBC: CPT | Performed by: EMERGENCY MEDICINE

## 2017-05-22 RX ORDER — HYDROMORPHONE HYDROCHLORIDE 1 MG/ML
.3-.5 INJECTION, SOLUTION INTRAMUSCULAR; INTRAVENOUS; SUBCUTANEOUS
Status: DISCONTINUED | OUTPATIENT
Start: 2017-05-22 | End: 2017-05-25 | Stop reason: HOSPADM

## 2017-05-22 RX ORDER — SODIUM CHLORIDE 9 MG/ML
INJECTION, SOLUTION INTRAVENOUS CONTINUOUS
Status: DISCONTINUED | OUTPATIENT
Start: 2017-05-22 | End: 2017-05-25

## 2017-05-22 RX ORDER — ACETAMINOPHEN 325 MG/1
975 TABLET ORAL EVERY 8 HOURS
Status: COMPLETED | OUTPATIENT
Start: 2017-05-22 | End: 2017-05-25

## 2017-05-22 RX ORDER — AMOXICILLIN 250 MG
1-2 CAPSULE ORAL 2 TIMES DAILY PRN
Status: DISCONTINUED | OUTPATIENT
Start: 2017-05-22 | End: 2017-05-25 | Stop reason: HOSPADM

## 2017-05-22 RX ORDER — KETAMINE HYDROCHLORIDE 10 MG/ML
INJECTION, SOLUTION INTRAMUSCULAR; INTRAVENOUS
Status: DISCONTINUED
Start: 2017-05-22 | End: 2017-05-22 | Stop reason: HOSPADM

## 2017-05-22 RX ORDER — LIDOCAINE 40 MG/G
CREAM TOPICAL
Status: DISCONTINUED | OUTPATIENT
Start: 2017-05-22 | End: 2017-05-25 | Stop reason: HOSPADM

## 2017-05-22 RX ORDER — FOLIC ACID 1 MG/1
1 TABLET ORAL DAILY
Status: DISCONTINUED | OUTPATIENT
Start: 2017-05-23 | End: 2017-05-25 | Stop reason: HOSPADM

## 2017-05-22 RX ORDER — PROCHLORPERAZINE 25 MG
12.5 SUPPOSITORY, RECTAL RECTAL EVERY 12 HOURS PRN
Status: DISCONTINUED | OUTPATIENT
Start: 2017-05-22 | End: 2017-05-25 | Stop reason: HOSPADM

## 2017-05-22 RX ORDER — OXYCODONE HYDROCHLORIDE 5 MG/1
5-10 TABLET ORAL EVERY 4 HOURS PRN
Status: DISCONTINUED | OUTPATIENT
Start: 2017-05-22 | End: 2017-05-25 | Stop reason: HOSPADM

## 2017-05-22 RX ORDER — IOPAMIDOL 755 MG/ML
82 INJECTION, SOLUTION INTRAVASCULAR ONCE
Status: COMPLETED | OUTPATIENT
Start: 2017-05-22 | End: 2017-05-22

## 2017-05-22 RX ORDER — ONDANSETRON 2 MG/ML
4 INJECTION INTRAMUSCULAR; INTRAVENOUS EVERY 6 HOURS PRN
Status: DISCONTINUED | OUTPATIENT
Start: 2017-05-22 | End: 2017-05-22

## 2017-05-22 RX ORDER — PROCHLORPERAZINE MALEATE 5 MG
5 TABLET ORAL EVERY 6 HOURS PRN
Status: DISCONTINUED | OUTPATIENT
Start: 2017-05-22 | End: 2017-05-25 | Stop reason: HOSPADM

## 2017-05-22 RX ORDER — TIOTROPIUM BROMIDE 18 UG/1
18 CAPSULE ORAL; RESPIRATORY (INHALATION) DAILY
Status: DISCONTINUED | OUTPATIENT
Start: 2017-05-23 | End: 2017-05-22

## 2017-05-22 RX ORDER — HYDRALAZINE HYDROCHLORIDE 20 MG/ML
10 INJECTION INTRAMUSCULAR; INTRAVENOUS EVERY 4 HOURS PRN
Status: DISCONTINUED | OUTPATIENT
Start: 2017-05-22 | End: 2017-05-25 | Stop reason: HOSPADM

## 2017-05-22 RX ORDER — LIDOCAINE HYDROCHLORIDE 20 MG/ML
INJECTION, SOLUTION INFILTRATION; PERINEURAL PRN
Status: DISCONTINUED | OUTPATIENT
Start: 2017-05-22 | End: 2017-05-22

## 2017-05-22 RX ORDER — LANOLIN ALCOHOL/MO/W.PET/CERES
100 CREAM (GRAM) TOPICAL DAILY
Status: DISCONTINUED | OUTPATIENT
Start: 2017-05-23 | End: 2017-05-25 | Stop reason: HOSPADM

## 2017-05-22 RX ORDER — POLYETHYLENE GLYCOL 3350 17 G/17G
17 POWDER, FOR SOLUTION ORAL DAILY PRN
Status: DISCONTINUED | OUTPATIENT
Start: 2017-05-22 | End: 2017-05-25 | Stop reason: HOSPADM

## 2017-05-22 RX ORDER — NALOXONE HYDROCHLORIDE 0.4 MG/ML
.1-.4 INJECTION, SOLUTION INTRAMUSCULAR; INTRAVENOUS; SUBCUTANEOUS
Status: DISCONTINUED | OUTPATIENT
Start: 2017-05-22 | End: 2017-05-25 | Stop reason: HOSPADM

## 2017-05-22 RX ORDER — GLYCOPYRROLATE 0.2 MG/ML
INJECTION, SOLUTION INTRAMUSCULAR; INTRAVENOUS PRN
Status: DISCONTINUED | OUTPATIENT
Start: 2017-05-22 | End: 2017-05-22

## 2017-05-22 RX ORDER — GABAPENTIN 600 MG/1
1200 TABLET ORAL 3 TIMES DAILY
Status: DISCONTINUED | OUTPATIENT
Start: 2017-05-22 | End: 2017-05-25 | Stop reason: HOSPADM

## 2017-05-22 RX ORDER — ONDANSETRON 4 MG/1
4 TABLET, ORALLY DISINTEGRATING ORAL EVERY 6 HOURS PRN
Status: DISCONTINUED | OUTPATIENT
Start: 2017-05-22 | End: 2017-05-22

## 2017-05-22 RX ORDER — MORPHINE SULFATE 4 MG/ML
4 INJECTION, SOLUTION INTRAMUSCULAR; INTRAVENOUS ONCE
Status: COMPLETED | OUTPATIENT
Start: 2017-05-22 | End: 2017-05-22

## 2017-05-22 RX ORDER — ONDANSETRON 2 MG/ML
4 INJECTION INTRAMUSCULAR; INTRAVENOUS EVERY 6 HOURS PRN
Status: DISCONTINUED | OUTPATIENT
Start: 2017-05-22 | End: 2017-05-25 | Stop reason: HOSPADM

## 2017-05-22 RX ORDER — NALOXONE HYDROCHLORIDE 0.4 MG/ML
.1-.4 INJECTION, SOLUTION INTRAMUSCULAR; INTRAVENOUS; SUBCUTANEOUS
Status: DISCONTINUED | OUTPATIENT
Start: 2017-05-22 | End: 2017-05-22

## 2017-05-22 RX ORDER — KETAMINE HCL IN 0.9 % NACL 20 MG/2 ML
80 SYRINGE (ML) INTRAVENOUS ONCE
Status: COMPLETED | OUTPATIENT
Start: 2017-05-22 | End: 2017-05-22

## 2017-05-22 RX ORDER — BISACODYL 10 MG
10 SUPPOSITORY, RECTAL RECTAL DAILY PRN
Status: DISCONTINUED | OUTPATIENT
Start: 2017-05-22 | End: 2017-05-25 | Stop reason: HOSPADM

## 2017-05-22 RX ORDER — NEOSTIGMINE METHYLSULFATE 1 MG/ML
VIAL (ML) INJECTION PRN
Status: DISCONTINUED | OUTPATIENT
Start: 2017-05-22 | End: 2017-05-22

## 2017-05-22 RX ORDER — LIDOCAINE 40 MG/G
CREAM TOPICAL
Status: DISCONTINUED | OUTPATIENT
Start: 2017-05-22 | End: 2017-05-22

## 2017-05-22 RX ORDER — PROPOFOL 10 MG/ML
INJECTION, EMULSION INTRAVENOUS
Status: COMPLETED
Start: 2017-05-22 | End: 2017-05-22

## 2017-05-22 RX ORDER — SODIUM CHLORIDE, SODIUM LACTATE, POTASSIUM CHLORIDE, CALCIUM CHLORIDE 600; 310; 30; 20 MG/100ML; MG/100ML; MG/100ML; MG/100ML
INJECTION, SOLUTION INTRAVENOUS CONTINUOUS PRN
Status: DISCONTINUED | OUTPATIENT
Start: 2017-05-22 | End: 2017-05-22

## 2017-05-22 RX ORDER — PROPOFOL 10 MG/ML
80 INJECTION, EMULSION INTRAVENOUS ONCE
Status: COMPLETED | OUTPATIENT
Start: 2017-05-22 | End: 2017-05-22

## 2017-05-22 RX ORDER — ONDANSETRON 2 MG/ML
INJECTION INTRAMUSCULAR; INTRAVENOUS PRN
Status: DISCONTINUED | OUTPATIENT
Start: 2017-05-22 | End: 2017-05-22

## 2017-05-22 RX ORDER — FENTANYL CITRATE 50 UG/ML
INJECTION, SOLUTION INTRAMUSCULAR; INTRAVENOUS PRN
Status: DISCONTINUED | OUTPATIENT
Start: 2017-05-22 | End: 2017-05-22

## 2017-05-22 RX ORDER — ONDANSETRON 4 MG/1
4 TABLET, ORALLY DISINTEGRATING ORAL EVERY 6 HOURS PRN
Status: DISCONTINUED | OUTPATIENT
Start: 2017-05-22 | End: 2017-05-25 | Stop reason: HOSPADM

## 2017-05-22 RX ORDER — PROPOFOL 10 MG/ML
INJECTION, EMULSION INTRAVENOUS PRN
Status: DISCONTINUED | OUTPATIENT
Start: 2017-05-22 | End: 2017-05-22

## 2017-05-22 RX ORDER — ACETAMINOPHEN 325 MG/1
650 TABLET ORAL EVERY 4 HOURS PRN
Status: DISCONTINUED | OUTPATIENT
Start: 2017-05-25 | End: 2017-05-25 | Stop reason: HOSPADM

## 2017-05-22 RX ADMIN — PHENYLEPHRINE HYDROCHLORIDE 100 MCG: 10 INJECTION, SOLUTION INTRAMUSCULAR; INTRAVENOUS; SUBCUTANEOUS at 17:21

## 2017-05-22 RX ADMIN — LIDOCAINE HYDROCHLORIDE 80 MG: 20 INJECTION, SOLUTION INFILTRATION; PERINEURAL at 17:21

## 2017-05-22 RX ADMIN — IOPAMIDOL 82 ML: 755 INJECTION, SOLUTION INTRAVENOUS at 11:02

## 2017-05-22 RX ADMIN — PROPOFOL 80 MG: 10 INJECTION, EMULSION INTRAVENOUS at 13:33

## 2017-05-22 RX ADMIN — ONDANSETRON 4 MG: 2 INJECTION INTRAMUSCULAR; INTRAVENOUS at 17:28

## 2017-05-22 RX ADMIN — SODIUM CHLORIDE 65 ML: 9 INJECTION, SOLUTION INTRAVENOUS at 11:03

## 2017-05-22 RX ADMIN — ROCURONIUM BROMIDE 10 MG: 10 INJECTION INTRAVENOUS at 17:32

## 2017-05-22 RX ADMIN — SODIUM CHLORIDE 500 ML: 9 INJECTION, SOLUTION INTRAVENOUS at 10:07

## 2017-05-22 RX ADMIN — CARBAMAZEPINE 300 MG: 100 TABLET, EXTENDED RELEASE ORAL at 22:26

## 2017-05-22 RX ADMIN — GLYCOPYRROLATE 0.5 MG: 0.2 INJECTION, SOLUTION INTRAMUSCULAR; INTRAVENOUS at 17:40

## 2017-05-22 RX ADMIN — PROPOFOL 140 MG: 10 INJECTION, EMULSION INTRAVENOUS at 17:21

## 2017-05-22 RX ADMIN — KETAMINE HYDROCHLORIDE 80 MG: 10 INJECTION, SOLUTION INTRAMUSCULAR; INTRAVENOUS at 13:33

## 2017-05-22 RX ADMIN — SODIUM CHLORIDE, POTASSIUM CHLORIDE, SODIUM LACTATE AND CALCIUM CHLORIDE: 600; 310; 30; 20 INJECTION, SOLUTION INTRAVENOUS at 17:18

## 2017-05-22 RX ADMIN — FENTANYL CITRATE 50 MCG: 50 INJECTION, SOLUTION INTRAMUSCULAR; INTRAVENOUS at 17:21

## 2017-05-22 RX ADMIN — SODIUM CHLORIDE 500 ML: 9 INJECTION, SOLUTION INTRAVENOUS at 20:25

## 2017-05-22 RX ADMIN — Medication 80 MG: at 13:33

## 2017-05-22 RX ADMIN — PHENYLEPHRINE HYDROCHLORIDE 200 MCG: 10 INJECTION, SOLUTION INTRAMUSCULAR; INTRAVENOUS; SUBCUTANEOUS at 17:28

## 2017-05-22 RX ADMIN — ACETAMINOPHEN 975 MG: 325 TABLET, FILM COATED ORAL at 22:27

## 2017-05-22 RX ADMIN — NEOSTIGMINE METHYLSULFATE 3 MG: 1 INJECTION INTRAMUSCULAR; INTRAVENOUS; SUBCUTANEOUS at 17:40

## 2017-05-22 RX ADMIN — GABAPENTIN 1200 MG: 600 TABLET, FILM COATED ORAL at 22:27

## 2017-05-22 RX ADMIN — PHENYLEPHRINE HYDROCHLORIDE 200 MCG: 10 INJECTION, SOLUTION INTRAMUSCULAR; INTRAVENOUS; SUBCUTANEOUS at 17:26

## 2017-05-22 RX ADMIN — MORPHINE SULFATE 4 MG: 4 INJECTION, SOLUTION INTRAMUSCULAR; INTRAVENOUS at 14:27

## 2017-05-22 RX ADMIN — PANTOPRAZOLE SODIUM 40 MG: 40 INJECTION, POWDER, FOR SOLUTION INTRAVENOUS at 20:26

## 2017-05-22 ASSESSMENT — COPD QUESTIONNAIRES
CAT_SEVERITY: MILD
COPD: 1

## 2017-05-22 ASSESSMENT — ENCOUNTER SYMPTOMS: SEIZURES: 1

## 2017-05-22 ASSESSMENT — LIFESTYLE VARIABLES: TOBACCO_USE: 1
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
## 2017-05-22 NOTE — BRIEF OP NOTE
[facility] Brief Operative Note    Pre-operative diagnosis: Dislocated Left Hip   Post-operative diagnosis Left hip dislocation   Procedure: Procedure(s):  CLOSED REDUCTION LEFT HIP - Wound Class: I-Clean   Surgeon(s): Surgeon(s) and Role:     * Augustus Haskins MD - Primary     * Desiree Ledesma PA-C - Assisting   Estimated blood loss: * No values recorded between 5/22/2017  5:24 PM and 5/22/2017  5:45 PM *    Specimens: * No specimens in log *   Findings: See operative note

## 2017-05-22 NOTE — IP AVS SNAPSHOT
` [place] [address] [facility]: [phone]            Medication Administration Report for Yoanna Phillips as of 05/25/17 1711   Legend:    Given Hold Not Given Due Canceled Entry Other Actions    Time Time (Time) Time  Time-Action       Inactive    Active    Linked        Medications 05/19/17 05/20/17 05/21/17 05/22/17 05/23/17 05/24/17 05/25/17    benzocaine-menthol (CHLORASEPTIC) 6-10 MG lozenge 1-2 lozenge  Dose: 1-2 lozenge Freq: EVERY 1 HOUR PRN Route: BU  PRN Reason: sore throat  PRN Comment: sore throat without fever  Start: 05/22/17 1937              bisacodyl (DULCOLAX) Suppository 10 mg  Dose: 10 mg Freq: DAILY PRN Route: RE  PRN Reason: constipation  Start: 05/22/17 1938   Admin Instructions: Hold for loose stools.  This is the third step of a three step constipation treatment protocol.               carBAMazepine (TEGretol XR) 12 hr tablet 300 mg  Dose: 300 mg Freq: 2 TIMES DAILY Route: PO  Start: 05/22/17 2100       2226 (300 mg)-Given        1050 (300 mg)-Given [C]       2116 (300 mg)-Given        0913 (300 mg)-Given       2218 (300 mg)-Given        0857 (300 mg)-Given       [ ] 2100           folic acid (FOLVITE) tablet 1 mg  Dose: 1 mg Freq: DAILY Route: PO  Start: 05/23/17 0900        1050 (1 mg)-Given [C]        0913 (1 mg)-Given        0857 (1 mg)-Given           gabapentin (NEURONTIN) tablet 1,200 mg  Dose: 1,200 mg Freq: 3 TIMES DAILY Route: PO  Start: 05/22/17 2200       2227 (1,200 mg)-Given        1049 (1,200 mg)-Given [C]       1656 (1,200 mg)-Given       2116 (1,200 mg)-Given        0913 (1,200 mg)-Given       1842 (1,200 mg)-Given        0014 (1,200 mg)-Given       0857 (1,200 mg)-Given       1653 (1,200 mg)-Given       [ ] 2300           hydrALAZINE (APRESOLINE) injection 10 mg  Dose: 10 mg Freq: EVERY 4 HOURS PRN Route: IV  PRN Reason: high blood pressure  PRN Comment: give for SBP > 180  Start: 05/22/17 1938              HYDROmorphone (PF) (DILAUDID) injection  "0.3-0.5 mg  Dose: 0.3-0.5 mg Freq: EVERY 2 HOURS PRN Route: IV  PRN Reason: severe pain  PRN Comment: or if patient unable to take PO  Start: 05/22/17 1937   Admin Instructions: Hold while on PCA.               lidocaine (LMX4) cream  Freq: EVERY 1 HOUR PRN Route: Top  PRN Reason: pain  PRN Comment: with VAD insertion or accessing implanted port.  Start: 05/22/17 1938   Admin Instructions: Do NOT give if patient has a history of allergy to any local anesthetic or any \"cody\" product.   Apply 30 minutes prior to VAD insertion or port access.  MAX Dose:  2.5 g (  of 5 g tube)               lidocaine 1 % 1 mL  Dose: 1 mL Freq: EVERY 1 HOUR PRN Route: OTHER  PRN Comment: mild pain with VAD insertion or accessing implanted port  Start: 05/22/17 1938   Admin Instructions: Do NOT give if patient has a history of allergy to any local anesthetic or any \"cody\" product. MAX dose 1 mL subcutaneous OR intradermal in divided doses.               losartan (COZAAR) tablet 100 mg  Dose: 100 mg Freq: DAILY Route: PO  Start: 05/25/17 1130          1337 (100 mg)-Given           naloxone (NARCAN) injection 0.1-0.4 mg  Dose: 0.1-0.4 mg Freq: EVERY 2 MIN PRN Route: IV  PRN Reason: opioid reversal  Start: 05/22/17 1938   Admin Instructions: For respiratory rate LESS than or EQUAL to 8.  Partial reversal dose:  0.1 mg titrated q 2 minutes for Analgesia Side Effects Monitoring Sedation Level of 3 (frequently drowsy, arousable, drifts to sleep during conversation).Full reversal dose:  0.4 mg bolus for Analgesia Side Effects Monitoring Sedation Level of 4 (somnolent, minimal or no response to stimulation).               ondansetron (ZOFRAN-ODT) ODT tab 4 mg  Dose: 4 mg Freq: EVERY 6 HOURS PRN Route: PO  PRN Reason: nausea  Start: 05/22/17 1938   Admin Instructions: This is Step 1 of nausea and vomiting management.  If nausea not resolved in 15 minutes, go to Step 2 prochlorperazine (COMPAZINE). Do not push through foil backing. Peel back foil " and gently remove. Place on tongue immediately. Administration with liquid unnecessary              Or  ondansetron (ZOFRAN) injection 4 mg  Dose: 4 mg Freq: EVERY 6 HOURS PRN Route: IV  PRN Reasons: nausea,vomiting  Start: 05/22/17 1938   Admin Instructions: This is Step 1 of nausea and vomiting management.  If nausea not resolved in 15 minutes, go to Step 2 prochlorperazine (COMPAZINE).  Irritant.               oxyCODONE (ROXICODONE) IR tablet 5-10 mg  Dose: 5-10 mg Freq: EVERY 4 HOURS PRN Route: PO  PRN Reason: moderate to severe pain  Start: 05/22/17 1937   Admin Instructions: Hold while on PCA or with regular IV opioid dosing.  IF CrCl UNKNOWN start at lowest end of dosing range.               pantoprazole (PROTONIX) EC tablet 40 mg  Dose: 40 mg Freq: EVERY MORNING Route: PO  Start: 05/24/17 0900   Admin Instructions: DO NOT CRUSH.    Route changed IV to PO per pharmacy protocol          0913 (40 mg)-Given        0857 (40 mg)-Given           polyethylene glycol (MIRALAX/GLYCOLAX) Packet 17 g  Dose: 17 g Freq: DAILY PRN Route: PO  PRN Reason: constipation  Start: 05/22/17 1938   Admin Instructions: Give in 8oz of  water, juice, or soda. Hold for loose stools.  This is the second step of a three step constipation treatment protocol.  1 Packet = 17 grams. Mixed prescribed dose in 8 ounces of water. Follow with 8 oz. of water.               prochlorperazine (COMPAZINE) injection 5 mg  Dose: 5 mg Freq: EVERY 6 HOURS PRN Route: IV  PRN Reasons: nausea,vomiting  Start: 05/22/17 1938   Admin Instructions: This is Step 2 of nausea and vomiting management.   If nausea not resolved in 15 minutes, give metoclopramide (REGLAN) if ordered (step 3 of nausea and vomiting management)              Or  prochlorperazine (COMPAZINE) tablet 5 mg  Dose: 5 mg Freq: EVERY 6 HOURS PRN Route: PO  PRN Reason: vomiting  Start: 05/22/17 1938   Admin Instructions: This is Step 2 of nausea and vomiting management.   If nausea not resolved in  15 minutes, give metoclopramide (REGLAN) if ordered (step 3 of nausea and vomiting management)              Or  prochlorperazine (COMPAZINE) Suppository 12.5 mg  Dose: 12.5 mg Freq: EVERY 12 HOURS PRN Route: RE  PRN Reasons: nausea,vomiting  Start: 05/22/17 1938   Admin Instructions: This is Step 2 of nausea and vomiting management.   If nausea not resolved in 15 minutes, give metoclopramide (REGLAN) if ordered (step 3 of nausea and vomiting management)               senna-docusate (SENOKOT-S;PERICOLACE) 8.6-50 MG per tablet 1-2 tablet  Dose: 1-2 tablet Freq: 2 TIMES DAILY PRN Route: PO  PRN Comment: constipation   Start: 05/22/17 1938   Admin Instructions: If no bowel movement in 24 hours, increase to 2 tablets PO BID.  Hold for loose stools.   This is the first step of a three step constipation treatment protocol.               sodium chloride (PF) 0.9% PF flush 3 mL  Dose: 3 mL Freq: EVERY 8 HOURS Route: IK  Start: 05/22/17 2200   Admin Instructions: And Q1H PRN, to lock peripheral IV dormant line.        2232 (3 mL)-Given        0649 (3 mL)-Given       1403 (3 mL)-Given       (2117)-Not Given        (0734)-Not Given       (1532)-Not Given       2217 (3 mL)-Given        (0532)-Not Given       (1646)-Not Given       [ ] 2200           sodium chloride (PF) 0.9% PF flush 3 mL  Dose: 3 mL Freq: EVERY 1 HOUR PRN Route: IK  PRN Reason: line flush  PRN Comment: for peripheral IV flush post IV meds  Start: 05/22/17 1938              thiamine tablet 100 mg  Dose: 100 mg Freq: DAILY Route: PO  Start: 05/23/17 0900        1049 (100 mg)-Given [C]        0913 (100 mg)-Given        0858 (100 mg)-Given           umeclidinium (INCRUSE ELLIPTA) 62.5 MCG/INH oral inhaler 1 puff  Dose: 1 puff Freq: DAILY Route: IN  Start: 05/23/17 0900   Admin Instructions: Therapeutic interchange for Spiriva.         1100 (1 puff)-Given        0913 (1 puff)-Given        0901 (1 puff)-Given          Future Medications  Medications 05/19/17 05/20/17  05/21/17 05/22/17 05/23/17 05/24/17 05/25/17       acetaminophen (TYLENOL) tablet 650 mg  Dose: 650 mg Freq: EVERY 4 HOURS PRN Route: PO  PRN Reason: other  PRN Comment: surgical pain  Start: 05/25/17 2200   Admin Instructions: May give first dose 4 hours after last scheduled dose of acetaminophen.  Maximum acetaminophen dose from all sources = 75 mg/kg/day not to exceed 4 grams/day.              Completed Medications  Medications 05/19/17 05/20/17 05/21/17 05/22/17 05/23/17 05/24/17 05/25/17         Dose: 500 mL Freq: ONCE Route: IV  Last Dose: 500 mL (05/22/17 2025)  Start: 05/22/17 1945   End: 05/22/17 2125 2025 (500 mL)-New Bag                Dose: 975 mg Freq: EVERY 8 HOURS Route: PO  Start: 05/22/17 2200   End: 05/25/17 1334   Admin Instructions: Do not use if patient has an active opioid/acetaminophen analgesic order for pain  Maximum acetaminophen dose from all sources = 75 mg/kg/day not to exceed 4 grams/day.        2227 (975 mg)-Given        0647 (975 mg)-Given       1401 (975 mg)-Given       2116 (975 mg)-Given        0637 (975 mg)-Given       1453 (975 mg)-Given       2219 (975 mg)-Given        0629 (975 mg)-Given       1334 (975 mg)-Given             Dose: 20 mg Freq: ONCE Route: IV  Start: 05/23/17 2300   End: 05/23/17 2323        2323 (20 mg)-Given            Discontinued Medications  Medications 05/19/17 05/20/17 05/21/17 05/22/17 05/23/17 05/24/17 05/25/17         Rate: 75 mL/hr Freq: CONTINUOUS Route: IV  Start: 05/22/17 1945   End: 05/25/17 1128 2126 ( )-Restarted        0210 ( )-New Bag       0747 ( )-New Bag       0900 ( )-Rate/Dose Verify       1401 ( )-New Bag       2123 ( )-New Bag        0400 ( )-New Bag       1132 ( )-Rate/Dose Change        0014 ( )-New Bag       1128-Med Discontinued         Freq: EVERY 1 HOUR PRN Route: Top  PRN Reason: pain  PRN Comment: with VAD insertion or accessing implanted port.  Start: 05/22/17 1937   End: 05/22/17 2012   Admin Instructions: Do NOT  "give if patient has a history of allergy to any local anesthetic or any \"cody\" product.   Apply 30 minutes prior to VAD insertion or port access.  MAX Dose:  2.5 g (  of 5 g tube)        2012-Med Discontinued            Dose: 1 mL Freq: EVERY 1 HOUR PRN Route: OTHER  PRN Comment: mild pain with VAD insertion or accessing implanted port  Start: 05/22/17 1937   End: 05/22/17 2012   Admin Instructions: Do NOT give if patient has a history of allergy to any local anesthetic or any \"cody\" product. MAX dose 1 mL subcutaneous OR intradermal in divided doses.        2012-Med Discontinued            Dose: 0.1-0.4 mg Freq: EVERY 2 MIN PRN Route: IV  PRN Reason: opioid reversal  Start: 05/22/17 1937   End: 05/22/17 2012   Admin Instructions: For respiratory rate LESS than or EQUAL to 8.  Partial reversal dose:  0.1 mg titrated q 2 minutes for Analgesia Side Effects Monitoring Sedation Level of 3 (frequently drowsy, arousable, drifts to sleep during conversation).Full reversal dose:  0.4 mg bolus for Analgesia Side Effects Monitoring Sedation Level of 4 (somnolent, minimal or no response to stimulation).        2012-Med Discontinued            Freq: CONTINUOUS Route: AS INSTRUCTE  Start: 05/22/17 1715   End: 05/22/17 1751              1751-Med Discontinued            Dose: 4 mg Freq: EVERY 6 HOURS PRN Route: PO  PRN Reason: nausea  Start: 05/22/17 1937   End: 05/22/17 2012   Admin Instructions: This is Step 1 of nausea and vomiting management.  If nausea not resolved in 15 minutes, go to Step 2 prochlorperazine (COMPAZINE). Do not push through foil backing. Peel back foil and gently remove. Place on tongue immediately. Administration with liquid unnecessary        2012-Med Discontinued         Or    Dose: 4 mg Freq: EVERY 6 HOURS PRN Route: IV  PRN Reasons: nausea,vomiting  Start: 05/22/17 1937   End: 05/22/17 2012   Admin Instructions: This is Step 1 of nausea and vomiting management.  If nausea not resolved in 15 minutes, " go to Step 2 prochlorperazine (COMPAZINE).  Irritant.        2012-Med Discontinued            Dose: 40 mg Freq: EVERY MORNING BEFORE BREAKFAST Route: IV  Start: 05/22/17 1945   End: 05/23/17 1012   Admin Instructions: Reconstitute vial with 10mLs Saline and administer IV Push  Irritant.        2026 (40 mg)-Given        0644 (40 mg)-Given       1012-Med Discontinued           Dose: 3 mL Freq: EVERY 8 HOURS Route: IK  Start: 05/22/17 1945   End: 05/22/17 2012   Admin Instructions: And Q1H PRN, to lock peripheral IV dormant line.               2012-Med Discontinued            Dose: 3 mL Freq: EVERY 1 HOUR PRN Route: IK  PRN Reason: line flush  PRN Comment: for peripheral IV flush post IV meds  Start: 05/22/17 1937   End: 05/22/17 2012 2012-Med Discontinued            Dose: 18 mcg Freq: DAILY Route: IN  Start: 05/23/17 0900   End: 05/22/17 1948   Admin Instructions: Use only ONE capsule. To ensure the full dose is administered, TWO inhalations should be performed at a rate sufficient to hear or feel the capsule vibrate.        1948-Med Discontinued       Medications 05/19/17 05/20/17 05/21/17 05/22/17 05/23/17 05/24/17 05/25/17

## 2017-05-22 NOTE — H&P
DATE OF SERVICE:   05/22/2017      PRIMARY CARE PHYSICIAN: Marin Ledesma MD.      CHIEF COMPLAINT:  Fall.      HISTORY OBTAINED:  Primarily from the granddaughter as the patient is in a great deal of pain and has some baseline memory trouble.      HISTORY OF PRESENT ILLNESS:  Yoanna Phillips is a 76-year-old female with a past medical history significant for TBI with seizure disorder, cognitive impairment, hypertension, history of pernicious anemia, and recent left total hip arthroplasty in 03/2017 who presented to the Emergency Department via EMS after being found down in her home.  The patient's granddaughter reports that the patient was last heard from by her family 2 days ago on Saturday 05/20.  She did not answer her phone for the remainder of the weekend so her granddaughter went to check on her this morning.  She found the patient lying on the floor wedged between her bed and her nightstand.  The granddaughter notes that she seemed to be covered in dark-colored blood and the granddaughter witnessed her vomit with dark bloody appearing emesis.  There are several family members present at bedside who report that patient has been seemingly in her usual state of health since her total hip arthroplasty in March that she has had a mild persistent cough since that time.  They also report that the patient has a history of falls at home.      The patient was evaluated in the Emergency Department by Dr. Figueroa.  X-rays obtained of her pelvis and left hip which showed that the left hip was dislocated without evidence of fracture.  CT was obtained of the patient's head which was negative for acute pathology.  CT was also obtained of the abdomen and pelvis which did show a mildly dilated loop of small mid bowel without any convincing evidence for small-bowel obstruction.  Laboratory evaluation was remarkable for leukocytosis of 16.7, hemoglobin of 16, ALT of 61, AST of 125, and an elevated CK of 2680.  Urinalysis and  "chest x-ray were unremarkable for evidence of infection.  Attempts were made to reduce the patient's hip in the Emergency Department without success.  Hospitalist Service was requested for admission for reduction in the operating room this evening.  The patient is presently evaluated in the Emergency Department by me.  She continues to report significant pain in her left hip.  At the time of my visit, she just received a dose of IV morphine.  She tells me that she does remember falling and reports that she felt \"out a sorts\" prior to her fall.  She is unable to characterize this further.  She does not specifically remember any chest pain, palpitations, shortness of breath or dizziness prior to her fall.  She does have history of seizures, but says it has been several years since she last had an episode.  Family reports that she has poor p.o. intake at baseline.  The patient tells me she has had diarrhea for the past couple of days and that there maybe has been a little blood in it but cannot characterize this further.  She is not sure how many episodes of emesis she had, does not recall when this began.  Her main complaint at the time of my exam is left hip pain.  She otherwise denies any chest pain, shortness of breath, abdominal pain, nausea, visual changes, palpitations.  She denies any recent urinary symptoms.      REVIEW OF SYSTEMS:  A 10-point review of systems was conducted and is negative aside from the information in the HPI; this is somewhat limited by the patient's mental status.      PAST MEDICAL HISTORY:   1.  Cognitive impairment.  Per discussion with family, at baseline patient has poor short-term memory and can be rather repetitive.  She typically would be able to tell you the date and is oriented to herself and family.   2.  Seizure disorder.  This occurred following a traumatic brain injury in 1965.  Per patient and family, it has been many years since she last had an episode.   3.  Hypertension. "   4.  Recurrent right hip dislocation, status post revision surgery in 05/2016.   5.  History of pernicious anemia.   6.  Possible COPD.  I do note that patient is on Spiriva.  Family is not sure whether she carries this diagnosis.   7.  History of traumatic brain injury.      PAST SURGICAL HISTORY:   1.  Splenectomy.   2.  Craniotomy.   3.  Cholecystectomy.   4.  Left total shoulder arthroplasty.   5.  Right total hip arthroplasty with subsequent revision in 2016.   6.  Left total hip arthroplasty.   7.  Appendectomy.      ALLERGIES:  Aspirin, reaction unknown.      PRIOR TO ADMISSION MEDICATIONS:    Prescriptions Prior to Admission   Medication Sig Dispense Refill Last Dose     Acetaminophen (TYLENOL PO) Take 325-650 mg by mouth every 6 hours as needed for mild pain or fever   Past Week at Unknown time     CARBAMAZEPINE ER PO Take 300 mg by mouth 2 times daily   Past Week at Unknown time     cyanocobalamin (VITAMIN B12) 1000 MCG/ML injection Inject 1 mL into the muscle every 30 days   Past Week at Unknown time     GABAPENTIN PO Take 1,200 mg by mouth 3 times daily (Takes 2 x 600mg tablet = 1200mg dose)   Past Week at Unknown time     Potassium Chloride Zonia CR (K-DUR PO) Take 10 mEq by mouth 2 times daily    Past Week at Unknown time     tiotropium (SPIRIVA) 18 MCG capsule Inhale 18 mcg into the lungs daily   Past Week at Unknown time     FOLIC ACID PO Take 1 mg by mouth daily   Past Week at Unknown time     Thiamine HCl (VITAMIN B-1 PO) Take 100 mg by mouth daily   Past Week at Unknown time     LOSARTAN POTASSIUM PO Take 100 mg by mouth daily   Past Week at Unknown time     Multiple Vitamins-Minerals (CENTRUM SILVER) per tablet Take 1 tablet by mouth daily   Past Week at Unknown time          SOCIAL HISTORY:  No history of significant alcohol use.  The patient is a former smoker who quit 35 years ago after smoking a half pack a day for 10 years.  The patient currently lives alone in a house.  She manages her own  medications.  She ambulates with a walker since her recent hip surgery in March.  Family has some concerns with her living on her own.  She is currently her own medical decision maker and does not have a distinguished power of .  She has 4 children.      FAMILY HISTORY:  Her mother had a history of Parkinson's disease, hypertension and heart disease.      LABORATORY DATA:  Sodium 137, potassium 4.0, chloride 99, CO2 of 29, BUN 30, creatinine 0.69, GFR is 82.  Total bilirubin is 0.6, alkaline phosphatase 126, ALT 61, .  CK 2680.  Troponin 0.019.  Blood sugar 123.  White blood cell count 16.7, hemoglobin 16, hematocrit 46.7, platelets 333.  Neutrophils are 14.  UA is largely  unremarkable.      IMAGING:  X-ray of the pelvis and left hip shows bilateral total hip arthroplasties with left hip dislocated, no evidence of fracture.  Chest x-ray shows left total shoulder arthroplasty with mildly enlarged cardiac silhouette with clear lungs.  CT of abdomen and pelvis shows a mildly dilated loop of mid small bowel in the pelvis on the left, otherwise no convincing evidence for small-bowel obstruction.  Superior dislocation of the left hip arthroplasty and small hiatal hernia.  CT of the head with contrast shows advanced atrophy and mild chronic white matter disease, no acute abnormality.        EKG shows sinus rhythm.  Occult blood testing, the stool is negative.  Lactic acid is 1.7.      PHYSICAL EXAMINATION:   VITAL SIGNS:  Temperature 97.8, heart rate 95, blood pressure 136/74, respiratory rate 19, oxygen saturation 96% on room air.   GENERAL:  Elderly female lying down in bed.  She is alert, cries out in pain frequently and appears generally uncomfortable.  She is able to answer most questions appropriately.   EYES:  Pupils are equal and reactive to light, EOMI.     ENT:  I do note some dried emesis around her mouth which is dark brown.  I do not see any  abrasions in the area.  She also has some dried  "blood noted around the ears bilaterally.   CARDIOVASCULAR:  Regular rate and rhythm, no murmurs appreciated.   RESPIRATORY:  Lungs are clear in anterior fields, no increased work of breathing or wheezing.   GASTROINTESTINAL:  Positive bowel sounds.  Abdomen is soft and nondistended.  She has mild diffuse tenderness.  No rebound tenderness or guarding.   MUSCULOSKELETAL:  Left lower extremity is shortened.  The patient has discomfort with any movement of the left leg.  She is able to wiggle her toes.  She moves both bilateral upper extremities spontaneously.   strength is +5/5.   NEUROLOGIC:  Cranial nerves 2-12 are grossly intact.  Speech is clear.  Tongue is midline.  Face is symmetric.  The patient is oriented to self, family, and location; does not know the date.   SKIN:  Warm and dry.  Some small areas of ecchymosis on right forearm.  I do not note any scalp abrasions.      ASSESSMENT AND PLAN:  Yoanna Phillips is a 76-year-old female with a past medical history significant for cognitive impairment, seizure disorder, history of traumatic brain injury, hypertension, chronic obstructive pulmonary disease, history of pernicious anemia and osteoarthritis who presented to [facility] via EMS after being found down at home by her granddaughter.  Workup thus far is notable for left hip dislocation and rhabdomyolysis.  She is being admitted under inpatient status as I anticipate greater than 2-midnight stay.   1.  Left hip dislocation, status post fall.  The patient was last heard from on 05/20/2017 and was found on the floor wedged between her bed and table by her granddaughter on 05/22.    The patient does state that she recalls falling and felt \"out of sorts\" prior to her fall.  She cannot elaborate on this at this time.  Possible etiologies of the fall include orthostasis as patient has historically poor p.o. intake, seizure, arrhythmia, or mechanical.  X-ray in the Emergency Department shows " dislocation of left hip without evidence of fracture.  The patient recently underwent left total hip arthroplasty 03/20/2017.  Staff was unable to reduce the patient's hip in the ED; plan is for surgical intervention.   -- Orthopedics consulted.  Plan for closed reduction under general anesthesia this evening. Appreciate their collaboration.  We will defer postop deep venous thrombosis prophylaxis and pain control to Orthopedic Service.   -- No NSAIDs.   -- We will watch the patient on telemetry for 24 hours postop to evaluate for arrhythmia.   --  She will need PT and OT ordered postop for disposition.     2.  Rhabdomyolysis.  The patient was down for unknown period of time, possibly as long as 3 days.  CK is elevated on admission at 2680.  LFTs are mildly elevated.  Remainder of CMP is unremarkable.   -- Hydrate with normal saline at 150 mL per hour.  Most recent echo in 2012 shows preserved EF.   -- Follow CK in the morning.   -- We will limit nephrotoxic medications.   3.  Possible coffeeground emesis.  The patient's granddaughter reported that she observed  the patient to be covered in what appeared to be dried blood and observed her vomiting prior to presentation to the hospital.  Emesis appeared dark brown.  Per granddaughter, the patient has been taking a lot of Tylenol and possible ibuprofen at home.  Her hemoglobin in the ER is 16, suspect this is concentrated as the patient appears dry.  She has had no further episodes of hematemesis since arrival.  She has no personal history of GI bleeding per family.   -- Follow hemoglobin q.8 hours.   -- IV Protonix daily for now.   -- If patient has further episodes of emesis concerning for GI bleed, we will consult GI at that time.   -- No NSAIDs.   4.  Leukocytosis.  White blood cell count is elevated at 16.7 on admission.  Suspect this is likely reactive.  No evidence of infection on chest x-ray or UA.  Patient is afebrile.   -- Repeat CBC in the a.m.   -- Monitor  for fever.   5.  Seizure disorder with history of traumatic brain injury.  Possible etiology of fall includes episode of seizure.  Per patient and family, she has not had an episode for several years and no recent medication changes.   -- Continue prior to admission carbamazepine.   6.  Elevated transaminases.  Liver function tests are mildly elevated on admission with ALT of 61 and AST of 125.  This is possibly secondary to rhabdomyolysis.  Family also reports a lot of Tylenol use.  CT abdomen and pelvis is negative for any abnormality.   --  Follow CK as above.   -- Repeat LFTs in the a.m.   7.  Hypertension.  We will hold prior to admission losartan for now to prevent renal injury and order p.r.n. hydralazine as needed.   8.  Chronic obstructive pulmonary disease.  Continue prior to admission Spiriva.   9.  CODE STATUS:  The patient is full code.  This was discussed with her on admission today.  Patient is her own decision maker as discussed previously.  She has no established power of  and has 4 children.     10.  Deep venous thrombosis prophylaxis:  We will defer to Orthopedics postop.   Recommend avoiding NSAIDs in light of concern for possible GI bleed.           The patient was seen and examined with Dr. Perrin who agrees with the above plan.         JOHN PERRIN MD       As dictated by QAMAR DINERO PA-C            D: 2017 15:55   T: 2017 17:45   MT: GARY      Name:     JOHN BEAN   MRN:      [id number]        Account:      [id number]   :      1940           Admitted:     [id number]      Document: [id number]       cc: Marin Ledesma MD

## 2017-05-22 NOTE — ANESTHESIA CARE TRANSFER NOTE
Patient: Yoanna Phillips    Procedure(s):  CLOSED REDUCTION LEFT HIP - Wound Class: I-Clean    Diagnosis: Dislocated Left Hip  Diagnosis Additional Information: No value filed.    Anesthesia Type:   General, Other, LMA     Note:  Airway :Face Mask  Patient transferred to:PACU  Comments: Pt exhibits spontaneous respirations, follows commands, suctioned, LMA removed, exchanging well, transferred to pacu with O2 @ 10L via mask, all monitors and alarms on, VSS, patent IV, report and transfer of care to RN.        Vitals: (Last set prior to Anesthesia Care Transfer)    CRNA VITALS  5/22/2017 1723 - 5/22/2017 1753      5/22/2017             Resp Rate (set): 10                Electronically Signed By: TANIYA Grigsby CRNA  May 22, 2017  5:53 PM

## 2017-05-22 NOTE — IP AVS SNAPSHOT
[address] [facility]    [address] [place] [address]    Phone:  [phone]                                       After Visit Summary   5/22/2017    Yoanna Phillips    MRN: [id number]           After Visit Summary Signature Page     I have received my discharge instructions, and my questions have been answered. I have discussed any challenges I see with this plan with the nurse or doctor.    ..........................................................................................................................................  Patient/Patient Representative Signature      ..........................................................................................................................................  Patient Representative Print Name and Relationship to Patient    ..................................................               ................................................  Date                                            Time    ..........................................................................................................................................  Reviewed by Signature/Title    ...................................................              ..............................................  Date                                                            Time

## 2017-05-22 NOTE — PROGRESS NOTES
Ortho  Full consult to follow    75 yo female with left hip dislocation.  S/p left MARIELLA 3/20/17.  Irreducible in ED    Plan:  - admit to hospitalist  - to OR tonight for closed reduction under general - likely around 5:00 pm  - NPO  - hold all anticoags    Augustus Haskins MD

## 2017-05-22 NOTE — PHARMACY-ADMISSION MEDICATION HISTORY
Admission medication history interview status for the 5/22/2017  admission is complete. See EPIC admission navigator for prior to admission medications     Medication history source reliability:Good    Actions taken by pharmacist (provider contacted, etc):None     Additional medication history information not noted on PTA med list :patient was found down at home and unknown how long, therefore unable to determine when last doses were taken.    Medication reconciliation/reorder completed by provider prior to medication history? No    Time spent in this activity: 15    Prior to Admission medications    Medication Sig Last Dose Taking? Auth Provider   Acetaminophen (TYLENOL PO) Take 325-650 mg by mouth every 6 hours as needed for mild pain or fever Past Week at Unknown time Yes Reported, Patient   CARBAMAZEPINE ER PO Take 300 mg by mouth 2 times daily Past Week at Unknown time Yes Reported, Patient   cyanocobalamin (VITAMIN B12) 1000 MCG/ML injection Inject 1 mL into the muscle every 30 days Past Week at Unknown time Yes Reported, Patient   GABAPENTIN PO Take 1,200 mg by mouth 3 times daily (Takes 2 x 600mg tablet = 1200mg dose) Past Week at Unknown time Yes Reported, Patient   Potassium Chloride Zonia CR (K-DUR PO) Take 10 mEq by mouth 2 times daily  Past Week at Unknown time Yes Reported, Patient   tiotropium (SPIRIVA) 18 MCG capsule Inhale 18 mcg into the lungs daily Past Week at Unknown time Yes Reported, Patient   FOLIC ACID PO Take 1 mg by mouth daily Past Week at Unknown time Yes Reported, Patient   Thiamine HCl (VITAMIN B-1 PO) Take 100 mg by mouth daily Past Week at Unknown time Yes Reported, Patient   LOSARTAN POTASSIUM PO Take 100 mg by mouth daily Past Week at Unknown time Yes Reported, Patient   Multiple Vitamins-Minerals (CENTRUM SILVER) per tablet Take 1 tablet by mouth daily Past Week at Unknown time Yes Reported, Patient

## 2017-05-22 NOTE — ED NOTES
Bed: ST  Expected date: 5/22/17  Expected time: 9:17 AM  Means of arrival: Ambulance  Comments:  [id number] found down  GIB

## 2017-05-22 NOTE — IP AVS SNAPSHOT
"          [place] [address] [facility]: [phone]                                              INTERAGENCY TRANSFER FORM - LAB / IMAGING / EKG / EMG RESULTS   2017                    Hospital Admission Date: 2017  JOHN BEAN   : 1940  Sex: Female        Attending Provider: Haylee Perrin MD     Allergies:  Aspirin    Infection:  None   Service:  HOSPITALIST    Ht:  1.6 m (5' 3\")   Wt:  74.4 kg (164 lb)   Admission Wt:  --    BMI:  29.05 kg/m 2   BSA:  1.82 m 2            Patient PCP Information     Provider PCP Type    Marin Ledesma MD General         Lab Results - 3 Days      CK total [[id number]] (Abnormal)  Resulted: 17, Result status: Final result    Ordering provider: Haylee Perrin MD  17 0000 Resulting lab: [facility]    Specimen Information    Type Source Collected On   Blood  17          Components       Value Reference Range Flag Lab   CK Total 430 30 - 225 U/L H FrStHsLb            Hepatic panel [[id number]] (Abnormal)  Resulted: 17, Result status: Final result    Ordering provider: Haylee Perrin MD  17 0000 Resulting lab: [facility]    Specimen Information    Type Source Collected On   Blood  17          Components       Value Reference Range Flag Lab   Bilirubin Direct <0.1 0.0 - 0.2 mg/dL  FrStHsLb   Bilirubin Total 0.2 0.2 - 1.3 mg/dL  FrStHsLb   Albumin 2.1 3.4 - 5.0 g/dL L FrStHsLb   Protein Total 5.1 6.8 - 8.8 g/dL L FrStHsLb   Alkaline Phosphatase 74 40 - 150 U/L  FrStHsLb   ALT 37 0 - 50 U/L  FrStHsLb   AST 43 0 - 45 U/L  FrStHsLb            Glucose [[id number]]  Resulted: 17, Result status: Final result    Ordering provider: Haylee Perrin MD  17 0023 Resulting lab: [facility]    Specimen Information    Type Source Collected On   Blood  17          Components       Value Reference Range Flag Lab   Glucose 90 70 - 99 " mg/dL  FrStHsLb            Hemoglobin [[id number]]  Resulted: 05/23/17 1705, Result status: Final result    Ordering provider: Dorita Mojica PA-C  05/23/17 1100 Resulting lab: [facility]    Specimen Information    Type Source Collected On   Blood  05/23/17 1635          Components       Value Reference Range Flag Lab   Hemoglobin 13.1 11.7 - 15.7 g/dL  FrStHsLb            Hemoglobin [[id number]] (Abnormal)  Resulted: 05/23/17 1026, Result status: Final result    Ordering provider: Dorita Mojica PA-C  05/23/17 0300 Resulting lab: [facility]    Specimen Information    Type Source Collected On   Blood  05/23/17 0955          Components       Value Reference Range Flag Lab   Hemoglobin 11.4 11.7 - 15.7 g/dL L FrStHsLb            Glucose by meter [[id number]]  Resulted: 05/23/17 0731, Result status: Final result    Ordering provider: Haylee Perrin MD  05/23/17 0726 Resulting lab: POINT OF CARE TEST, GLUCOSE    Specimen Information    Type Source Collected On     05/23/17 0726          Components       Value Reference Range Flag Lab   Glucose 92 70 - 99 mg/dL  170            Comprehensive metabolic panel [[id number]] (Abnormal)  Resulted: 05/23/17 0717, Result status: Final result    Ordering provider: Dorita Mojica PA-C  05/23/17 0001 Resulting lab: [facility]    Specimen Information    Type Source Collected On   Blood  05/23/17 0625          Components       Value Reference Range Flag Lab   Sodium 143 133 - 144 mmol/L  FrStHsLb   Potassium 3.9 3.4 - 5.3 mmol/L  FrStHsLb   Chloride 112 94 - 109 mmol/L H FrStHsLb   Carbon Dioxide 22 20 - 32 mmol/L  FrStHsLb   Anion Gap 9 3 - 14 mmol/L  FrStHsLb   Glucose 91 70 - 99 mg/dL  FrStHsLb   Urea Nitrogen 20 7 - 30 mg/dL  FrStHsLb   Creatinine 0.55 0.52 - 1.04 mg/dL  FrStHsLb   GFR Estimate -- >60 mL/min/1.7m2  FrStHsLb   Result:         >90  Non  GFR Calc     GFR Estimate If Black -- >60  mL/min/1.7m2  FrStHsLb   Result:         >90   GFR Calc     Calcium 8.3 8.5 - 10.1 mg/dL L FrStHsLb   Result:     Bilirubin Total 0.3 0.2 - 1.3 mg/dL  FrStHsLb   Albumin 2.3 3.4 - 5.0 g/dL L FrStHsLb   Protein Total 5.2 6.8 - 8.8 g/dL L FrStHsLb   Alkaline Phosphatase 78 40 - 150 U/L  FrStHsLb   ALT 38 0 - 50 U/L  FrStHsLb   AST 57 0 - 45 U/L H FrStHsLb            CK total [[id number]] (Abnormal)  Resulted: 05/23/17 0717, Result status: Final result    Ordering provider: Dorita Mojica PA-C  05/23/17 0001 Resulting lab: [facility]    Specimen Information    Type Source Collected On   Blood  05/23/17 0625          Components       Value Reference Range Flag Lab   CK Total 752 30 - 225 U/L H FrStHsLb            CBC with platelets [[id number]] (Abnormal)  Resulted: 05/23/17 0653, Result status: Final result    Ordering provider: Dorita Mojica PA-C  05/23/17 0001 Resulting lab: [facility]    Specimen Information    Type Source Collected On   Blood  05/23/17 0625          Components       Value Reference Range Flag Lab   WBC 11.5 4.0 - 11.0 10e9/L H FrStHsLb   RBC Count 3.41 3.8 - 5.2 10e12/L L FrStHsLb   Hemoglobin 11.7 11.7 - 15.7 g/dL  FrStHsLb   Hematocrit 34.9 35.0 - 47.0 % L FrStHsLb    78 - 100 fl H FrStHsLb   MCH 34.3 26.5 - 33.0 pg H FrStHsLb   MCHC 33.5 31.5 - 36.5 g/dL  FrStHsLb   RDW 13.8 10.0 - 15.0 %  FrStHsLb   Platelet Count 239 150 - 450 10e9/L  FrStHsLb            Hemoglobin [[id number]]  Resulted: 05/23/17 0104, Result status: Final result    Ordering provider: Dorita Mojica PA-C  05/22/17 1938 Resulting lab: [facility]    Specimen Information    Type Source Collected On   Blood  05/23/17 0040          Components       Value Reference Range Flag Lab   Hemoglobin 12.3 11.7 - 15.7 g/dL  FrStHsLb            Occult blood stool [[id number]]  Resulted: 05/22/17 1352, Result status: Final result    Ordering provider:  Juan Figueroa MD  05/22/17 0932 Resulting lab: [facility]    Specimen Information    Type Source Collected On   Stool Stool 05/22/17 1344          Components       Value Reference Range Flag Lab   Occult Blood Negative NEG  FrStHsLb            Lactic acid [[id number]]  Resulted: 05/22/17 1312, Result status: Final result    Ordering provider: Juan Figueroa MD  05/22/17 1032 Resulting lab: [facility]    Specimen Information    Type Source Collected On   Blood  05/22/17 1245          Components       Value Reference Range Flag Lab   Lactic Acid 1.7 0.7 - 2.1 mmol/L  FrStHsLb            CK total [[id number]] (Abnormal)  Resulted: 05/22/17 1038, Result status: Final result    Ordering provider: Juan Figueroa MD  05/22/17 0932 Resulting lab: [facility]    Specimen Information    Type Source Collected On   Blood  05/22/17 0940          Components       Value Reference Range Flag Lab   CK Total 2680 30 - 225 U/L HH FrStHsLb   Comment:  Critical Value called to and read back by  LORENA STANLEY ER 10:37 ML              Comprehensive metabolic panel [[id number]] (Abnormal)  Resulted: 05/22/17 1023, Result status: Final result    Ordering provider: Juan Figueroa MD  05/22/17 0932 Resulting lab: [facility]    Specimen Information    Type Source Collected On   Blood  05/22/17 0940          Components       Value Reference Range Flag Lab   Sodium 137 133 - 144 mmol/L  FrStHsLb   Potassium 4.0 3.4 - 5.3 mmol/L  FrStHsLb   Chloride 99 94 - 109 mmol/L  FrStHsLb   Carbon Dioxide 29 20 - 32 mmol/L  FrStHsLb   Anion Gap 9 3 - 14 mmol/L  FrStHsLb   Glucose 123 70 - 99 mg/dL H FrStHsLb   Urea Nitrogen 30 7 - 30 mg/dL  FrStHsLb   Creatinine 0.69 0.52 - 1.04 mg/dL  FrStHsLb   GFR Estimate 82 >60 mL/min/1.7m2  FrStHsLb   Comment:  Non  GFR Calc   GFR Estimate If Black -- >60 mL/min/1.7m2  FrStHsLb   Result:         >90    GFR Calc     Calcium 10.1 8.5 - 10.1 mg/dL  FrStHsLb   Result:     Bilirubin Total 0.6 0.2 - 1.3 mg/dL  FrStHsLb   Albumin 3.3 3.4 - 5.0 g/dL L FrStHsLb   Protein Total 7.9 6.8 - 8.8 g/dL  FrStHsLb   Alkaline Phosphatase 126 40 - 150 U/L  FrStHsLb   ALT 61 0 - 50 U/L H FrStHsLb    0 - 45 U/L H FrStHsLb            Troponin I (now) [[id number]]  Resulted: 05/22/17 1023, Result status: Final result    Ordering provider: Juan Figueroa MD  05/22/17 0934 Resulting lab: [facility]    Specimen Information    Type Source Collected On   Blood  05/22/17 0940          Components       Value Reference Range Flag Lab   Troponin I ES 0.019 0.000 - 0.045 ug/L  FrStHsLb   Comment:         The 99th percentile for upper reference range is 0.045 ug/L.  Troponin values   in   the range of 0.045 - 0.120 ug/L may be associated with risks of adverse   clinical events.              UA with Microscopic [[id number]] (Abnormal)  Resulted: 05/22/17 1022, Result status: Final result    Ordering provider: Juan Figueroa MD  05/22/17 0932 Resulting lab: [facility]    Specimen Information    Type Source Collected On   Urine Urine catheter 05/22/17 1008          Components       Value Reference Range Flag Lab   Color Urine Yellow   FrStHsLb   Appearance Urine Clear   FrStHsLb   Glucose Urine Negative NEG mg/dL  FrStHsLb   Bilirubin Urine Negative NEG  FrStHsLb   Ketones Urine 10 NEG mg/dL A FrStHsLb   Specific [place] Urine 1.022 1.003 - 1.035  FrStHsLb   Blood Urine Negative NEG  FrStHsLb   pH Urine 6.5 5.0 - 7.0 pH  FrStHsLb   Protein Albumin Urine 30 NEG mg/dL A FrStHsLb   Urobilinogen mg/dL Normal 0.0 - 2.0 mg/dL  FrStHsLb   Nitrite Urine Negative NEG  FrStHsLb   Leukocyte Esterase Urine Negative NEG  FrStHsLb   Source Catheterized Urine   FrStHsLb   WBC Urine <1 0 - 2 /HPF  FrStHsLb   RBC Urine 3 0 - 2 /HPF H FrStHsLb   Mucous Urine Present NEG /LPF A FrStHsLb            CBC with platelets +  differential [[id number]] (Abnormal)  Resulted: 05/22/17 1001, Result status: Final result    Ordering provider: Juan Figueroa MD  05/22/17 0932 Resulting lab: [facility]    Specimen Information    Type Source Collected On   Blood  05/22/17 0940          Components       Value Reference Range Flag Lab   WBC 16.7 4.0 - 11.0 10e9/L H FrStHsLb   RBC Count 4.77 3.8 - 5.2 10e12/L  FrStHsLb   Hemoglobin 16.0 11.7 - 15.7 g/dL H FrStHsLb   Hematocrit 46.7 35.0 - 47.0 %  FrStHsLb   MCV 98 78 - 100 fl  FrStHsLb   MCH 33.5 26.5 - 33.0 pg H FrStHsLb   MCHC 34.3 31.5 - 36.5 g/dL  FrStHsLb   RDW 13.5 10.0 - 15.0 %  FrStHsLb   Platelet Count 333 150 - 450 10e9/L  FrStHsLb   Diff Method Automated Method   FrStHsLb   % Neutrophils 83.5 %  FrStHsLb   % Lymphocytes 8.8 %  FrStHsLb   % Monocytes 7.2 %  FrStHsLb   % Eosinophils 0.0 %  FrStHsLb   % Basophils 0.1 %  FrStHsLb   % Immature Granulocytes 0.4 %  FrStHsLb   Absolute Neutrophil 14.0 1.6 - 8.3 10e9/L H FrStHsLb   Absolute Lymphocytes 1.5 0.8 - 5.3 10e9/L  FrStHsLb   Absolute Monocytes 1.2 0.0 - 1.3 10e9/L  FrStHsLb   Absolute Eosinophils 0.0 0.0 - 0.7 10e9/L  FrStHsLb   Absolute Basophils 0.0 0.0 - 0.2 10e9/L  FrStHsLb   Abs Immature Granulocytes 0.1 0 - 0.4 10e9/L  FrStHsLb            Testing Performed By     Lab - Abbreviation Name Director Address Valid Date Range    14 - FrStHsLb [facility] Unknown [address] [place] [address] 05/08/15 1057 - Present    170 - Unknown POINT OF CARE TEST, GLUCOSE Unknown Unknown 10/31/11 1114 - Present            Unresulted Labs (24h ago through future)    Start       Ordered    05/25/17 0600  CK total  AM DRAW,   Routine      05/24/17 1119         Imaging Results - 3 Days      XR Hip Port Left 1 View [[id number]]  Resulted: 05/23/17 1543, Result status: Final result    Ordering provider: Augustus Haskins MD  05/22/17 1755 Resulted by: Sergei Ortiz MD    Performed: 05/22/17 1730 - 05/22/17  1745 Resulting lab: RADIOLOGY RESULTS    Narrative:       XR HIP PORT LT 1 VW  5/22/2017 5:45 PM     HISTORY:  post reduction left hip    COMPARISON: Earlier films.    FINDINGS: 1 View. The dislocation has been reduced. There is now  normal alignment.    ADRIANNA ORTIZ MD      XR Hip Port Left 1 View [[id number]]  Resulted: 05/23/17 1543, Result status: Final result    Ordering provider: Augustus Haskins MD  05/22/17 1748 Resulted by: Sergei Ortiz MD    Performed: 05/22/17 1720 - 05/22/17 1730 Resulting lab: RADIOLOGY RESULTS    Narrative:       XR HIP PORT LT 1 VW  5/22/2017 5:30 PM     HISTORY:  closed reduction left hip.    COMPARISON: Earlier film.    FINDINGS:  There continues to be superior dislocation of the femoral  component with respect to the acetabular component.    ADRIANNA ORTIZ MD      CT Abdomen Pelvis w Contrast [[id number]]  Resulted: 05/22/17 1610, Result status: Final result    Ordering provider: Juan Figueroa MD  05/22/17 0932 Resulted by: Jonathan Beasley MD    Performed: 05/22/17 1101 - 05/22/17 1109 Resulting lab: RADIOLOGY RESULTS    Narrative:       CT ABDOMEN AND PELVIS WITH CONTRAST   5/22/2017 11:09 AM     HISTORY: Abdominal pain. Dark vomit.    TECHNIQUE:  82 mL Isovue-370 IV were administered. After contrast  administration, volumetric helical sections were acquired from the  lung bases to the ischial tuberosities. Coronal images were also  reconstructed. Radiation dose for this scan was reduced using  automated exposure control, adjustment of the mA and/or kV according  to patient size, or iterative reconstruction technique.    COMPARISON: None.     FINDINGS: Multiple images through the pelvis are degraded related to  artifact from bilateral hip arthroplasties. There is dislocation of  the left hip arthroplasty, with the femoral component displaced  superiorly. There is a mildly dilated loop of mid small bowel noted in  the pelvis just left of midline (series 2 image 50);  however, there is  no other convincing evidence for bowel obstruction. The remainder of  the visualized small bowel and colon are of normal caliber. No free  fluid is identified in the pelvis. No free intraperitoneal air.  Unremarkable appendix. Mild atherosclerotic aortoiliac calcification.  Few bilateral renal cysts, with the largest in the lower pole on the  left measuring 4.4 cm. Gallbladder is not seen and may be surgically  absent. The liver, adrenal glands, pancreas, and kidneys are otherwise  unremarkable. No hydronephrosis. 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 Small hiatal hernia. Mild scarring  and/or atelectasis at both lung bases. Mild degenerative changes in  the visualized thoracolumbar spine and within the pubic symphysis.      Impression:       IMPRESSION:   1. Mildly dilated loop of mid small bowel in the pelvis on the left,  but no other convincing evidence for small bowel obstruction.  2. There is superior dislocation of the left hip arthroplasty.    3. Small hiatal hernia.      SARAH GILMORE MD      XR Hip Port Left 1 View [[id number]]  Resulted: 05/22/17 1342, Result status: Final result    Ordering provider: Juan Figueroa MD  05/22/17 1326 Resulted by: Radha Hodgson MD    Performed: 05/22/17 1321 - 05/22/17 1331 Resulting lab: RADIOLOGY RESULTS    Narrative:       XR HIP PORT LT 1 VW 5/22/2017 1:31 PM    COMPARISON: Radiograph earlier on the same day.    HISTORY: Attempted reduction      Impression:       IMPRESSION: Persistent left total hip arthroplasty dislocation. No  fractures are seen.    RADHA HODGSON      Head CT w/o contrast [[id number]]  Resulted: 05/22/17 1121, Result status: Final result    Ordering provider: Juan Figueroa MD  05/22/17 0932 Resulted by: Marin Naranjo MD    Performed: 05/22/17 1045 - 05/22/17 1112 Resulting lab: RADIOLOGY RESULTS     Narrative:       CT HEAD W/O CONTRAST  5/22/2017 11:12 AM    HISTORY: Altered mental status.    TECHNIQUE: Scans were obtained through the head without IV contrast.   Radiation dose for this scan was reduced using automated exposure  control, adjustment of the mA and/or kV according to patient size, or  iterative reconstruction technique.    COMPARISON: 3/3/2012.    FINDINGS: There is moderately advanced generalized cerebral atrophy.  Mild chronic white matter disease likely small vessel ischemic change.   No hemorrhage, mass lesion, or focal area of acute infarction  identified. Paranasal sinuses are normal. No bony abnormality. No  appreciable interval change.      Impression:       IMPRESSION:   1. Advanced atrophy and mild chronic white matter disease.  2. Nothing acute.  3. No interval change.    CYNTHIA GONZALEZ MD      XR Chest 1 View [[id number]]  Resulted: 05/22/17 1056, Result status: Final result    Ordering provider: Juan Figueroa MD  05/22/17 1010 Resulted by: Jeffery Hodgson MD    Performed: 05/22/17 1049 - 05/22/17 1053 Resulting lab: RADIOLOGY RESULTS    Narrative:       XR CHEST 1 VW 5/22/2017 10:53 AM    COMPARISON: 3/7/2012    HISTORY: Fall      Impression:       IMPRESSION: Left total shoulder arthroplasty is seen. Mildly enlarged  cardiac silhouette again seen and unchanged. Lungs are clear. No  pleural effusion or pneumothorax.    JEFFERY HODGSON      XR Pelvis and Hip Left 1 View [[id number]]  Resulted: 05/22/17 1055, Result status: Final result    Ordering provider: Juan Figueroa MD  05/22/17 1010 Resulted by: Jeffery Hodgson MD    Performed: 05/22/17 1049 - 05/22/17 1052 Resulting lab: RADIOLOGY RESULTS    Narrative:       XR PELVIS AND HIP LEFT 1 VIEW 5/22/2017 10:52 AM    COMPARISON: 3/20/2017    HISTORY: Fall      Impression:       IMPRESSION: Bilateral total hip arthroplasties. The left is dislocated  laterally at this time. Right arthroplasty appears in  unchanged  position. No osseous fractures are seen.    RADHA HODGSON      POC US ABDOMEN LIMITED [[id number]]  Resulted: 05/22/17 0935, Result status: In process    Ordering provider: Juan Figueroa MD  05/22/17 0935 Performed: 05/22/17 0935 - 05/22/17 0935    Resulting lab: RADIANT POCUS             Testing Performed By     Lab - Abbreviation Name Director Address Valid Date Range    104 - Rad Rslts RADIOLOGY RESULTS Unknown Unknown 02/16/05 1553 - Present    1735 - RADIANT POCUS [facility] Unknown Unknown 05/12/15 0832 - Present            Encounter-Level Documents:     There are no encounter-level documents.      Order-Level Documents:     There are no order-level documents.

## 2017-05-22 NOTE — IP AVS SNAPSHOT
MRN:[id number]                      After Visit Summary   5/22/2017    Yoanna Phillips    MRN: [id number]           Thank you!     Thank you for choosing [facility] for your care. Our goal is always to provide you with excellent care. Hearing back from our patients is one way we can continue to improve our services. Please take a few minutes to complete the written survey that you may receive in the mail after you visit with us. Thank you!        Patient Information     Date Of Birth          1940        About your hospital stay     You were admitted on:  May 22, 2017 You last received care in the:  [facility] [address] [facility]    You were discharged on:  May 25, 2017       Who to Call     For medical emergencies, please call 911.  For non-urgent questions about your medical care, please call your primary care provider or clinic, [phone]  For questions related to your surgery, please call your surgery clinic        Attending Provider     Provider Specialty    Juan Figueroa MD --    Haylee Perrin MD Internal Medicine       Primary Care Provider Office Phone # Fax #    Marin Ledesma [phone]       [facility] [address] [facility] [id number]        After Care Instructions     Activity - Up with assistive device           Advance Diet as Tolerated       Follow this diet upon discharge:  Regular Diet Adult            General info for SNF       Length of Stay Estimate: Short Term Care: Estimated # of Days <30  Condition at Discharge: Stable  Level of care:skilled   Rehabilitation Potential: Good  Admission H&P remains valid and up-to-date: Yes  Recent Chemotherapy: N/A  Use Nursing Home Standing Orders: Yes            Mantoux instructions       Give two-step Mantoux (PPD) Per Facility Policy Yes            Weight bearing status       Weight bearing as tolerated left lower extremity. Use walker for ambulation assistance.  Use  hip abduction pillow while laying in bed and sleeping.   Use knee immobilizer while ambulating.  Avoid hip flexion greater than 70 degrees.  NO hip IR or ER.  Avoid hip adduction.                  Follow-up Appointments     Follow Up and recommended labs and tests       Patient to follow up with Dr. Fernando within 1 week post-op. [facility] Address is [address] [place] [address].    Please call [phone] to schedule an appointment.                  Additional Services     Occupational Therapy Adult Consult       Evaluate and treat as clinically indicated.    Reason:  Left hip dislocation            Physical Therapy Adult Consult       Evaluate and treat as clinically indicated.    Reason:  Left hip dislocation                  Future tests that were ordered for you     AntiEmbolism Stockings       Bilateral below knee length.On in the morning, off at night                  Further instructions from your care team       You have been discharged to [facility]  Phone Number is [phone]  Fax Number is [phone]    Pending Results     Date and Time Order Name Status Description    5/22/2017 0935 POC US ABDOMEN LIMITED In process             Statement of Approval     Ordered          05/25/17 1154  I have reviewed and agree with all the recommendations and orders detailed in this document.  EFFECTIVE NOW     Approved and electronically signed by:  Haylee Perrin MD             Admission Information     Date & Time Provider Department Dept. Phone    5/22/2017 Haylee Perrin MD [place] [facility] [address] [facility] [phone]      Your Vitals Were     Blood Pressure Pulse Temperature Respirations Pulse Oximetry       149/78 (BP Location: Left arm) 65 98.7  F (37.1  C) (Oral) 16 95%       [employer] Information     [employer] gives you secure access to your electronic health record. If you see a primary care provider, you can also send messages  to your care team and make appointments. If you have questions, please call your primary care clinic.  If you do not have a primary care provider, please call [phone] and they will assist you.        Care EveryWhere ID     This is your Care EveryWhere ID. This could be used by other organizations to access your [facility] medical records  [id number]           Review of your medicines      CONTINUE these medicines which may have CHANGED, or have new prescriptions. If we are uncertain of the size of tablets/capsules you have at home, strength may be listed as something that might have changed.        Dose / Directions    acetaminophen 325 MG tablet   Commonly known as:  TYLENOL   This may have changed:    - medication strength  - how much to take  - when to take this  - reasons to take this   Used for:  Dislocated hip, left, initial encounter (H)        Dose:  975 mg   Take 3 tablets (975 mg) by mouth every 8 hours   Quantity:  60 tablet   Refills:  0         CONTINUE these medicines which have NOT CHANGED        Dose / Directions    CARBAMAZEPINE ER PO        Dose:  300 mg   Take 300 mg by mouth 2 times daily   Refills:  0       CENTRUM SILVER per tablet   Notes to Patient:  Not given in hospital. Resume home schedule.        Dose:  1 tablet   Take 1 tablet by mouth daily   Refills:  0       cyanocobalamin 1000 MCG/ML injection   Commonly known as:  VITAMIN B12        Dose:  1 mL   Inject 1 mL into the muscle every 30 days   Refills:  0       FOLIC ACID PO        Dose:  1 mg   Take 1 mg by mouth daily   Refills:  0       GABAPENTIN PO        Dose:  1200 mg   Take 1,200 mg by mouth 3 times daily (Takes 2 x 600mg tablet = 1200mg dose)   Refills:  0       K-DUR PO        Dose:  10 mEq   Take 10 mEq by mouth 2 times daily   Refills:  0       LOSARTAN POTASSIUM PO   Notes to Patient:  Not given in hospital. Resume home schedule.        Dose:  100 mg   Take 100 mg by mouth daily   Refills:  0       tiotropium 18 MCG  capsule   Commonly known as:  SPIRIVA   Notes to Patient:  Not given in hospital. Resume home schedule.        Dose:  18 mcg   Inhale 18 mcg into the lungs daily   Refills:  0       VITAMIN B-1 PO        Dose:  100 mg   Take 100 mg by mouth daily   Refills:  0            Where to get your medicines      These medications were sent to [id number] - [place], [place] - [address]  [address], [place] [address]     Phone:  [phone]     acetaminophen 325 MG tablet                Protect others around you: Learn how to safely use, store and throw away your medicines at www.disposemymeds.org.             Medication List: This is a list of all your medications and when to take them. Check marks below indicate your daily home schedule. Keep this list as a reference.      Medications           Morning Afternoon Evening Bedtime As Needed    acetaminophen 325 MG tablet   Commonly known as:  TYLENOL   Take 3 tablets (975 mg) by mouth every 8 hours   Last time this was given:  975 mg on 5/25/2017  1:34 PM                                         CARBAMAZEPINE ER PO   Take 300 mg by mouth 2 times daily   Last time this was given:  300 mg on 5/25/2017  8:57 AM   Next Dose Due:  Today, 5/25 at bedtime.                                      CENTRUM SILVER per tablet   Take 1 tablet by mouth daily   Notes to Patient:  Not given in hospital. Resume home schedule.                                cyanocobalamin 1000 MCG/ML injection   Commonly known as:  VITAMIN B12   Inject 1 mL into the muscle every 30 days                                FOLIC ACID PO   Take 1 mg by mouth daily   Last time this was given:  1 mg on 5/25/2017  8:57 AM   Next Dose Due:  Tomorrow, 5/26 in the morning.                                   GABAPENTIN PO   Take 1,200 mg by mouth 3 times daily (Takes 2 x 600mg tablet = 1200mg dose)   Last time this was given:  1,200 mg on 5/25/2017  4:53 PM                                          K-DUR PO   Take 10 mEq by mouth 2 times daily   Next Dose Due:  Not given in hospital. Resume home schedule.                                LOSARTAN POTASSIUM PO   Take 100 mg by mouth daily   Last time this was given:  100 mg on 5/25/2017  1:37 PM   Notes to Patient:  Not given in hospital. Resume home schedule.                                tiotropium 18 MCG capsule   Commonly known as:  SPIRIVA   Inhale 18 mcg into the lungs daily   Notes to Patient:  Not given in hospital. Resume home schedule.                                VITAMIN B-1 PO   Take 100 mg by mouth daily   Last time this was given:  100 mg on 5/25/2017  8:58 AM   Next Dose Due:  Tomorrow, 5/26 in the morning.

## 2017-05-22 NOTE — ED NOTES
[facility]  ED Nurse Handoff Report    ED Chief complaint: Generalized Weakness (found down, possibly 3 days, confused. )      ED Diagnosis:   Final diagnoses:   None       Code Status: Full    Allergies:   Allergies   Allergen Reactions     Aspirin Unknown       Activity level - Baseline/Home:  Independent    Activity Level - Current:   Total Care     Needed?: No    Isolation: No  Infection: Not Applicable    Bariatric?: No    Vital Signs:   Vitals:    05/22/17 1015 05/22/17 1017 05/22/17 1112 05/22/17 1214   BP: (!) 128/117 (!) 148/97 143/76 (!) 148/114   Resp: 18 27 16    Temp:       TempSrc:       SpO2:  91% 96% 96%       Cardiac Rhythm: ,   Cardiac  Cardiac Rhythm: Normal sinus rhythm    Pain level:      Is this patient confused?: No    Patient Report: Initial Complaint: Pt brought in by EMS after being found down at home, family estimates the pt was on the floor for up to 3 days.  Focused Assessment: Pt alert and oriented x3. Pt abdomen distended and tender to palpation Pt complains of nausea and vomiting, bowel sounds present in all 4 quadrants.  Pt in NSR, lung sounds diminished.    Tests Performed: UA/UC, X-rays of chest and pelvis, head CT, BMP, CBC, troponin, CK total, Type and cross.  Abnormal Results: Ck total 2680, X-ray shows dislocation of left hip.   Treatments provided: Pt given 500 mls NS, Pt to go to stabe room for hip reduction.    Family Comments: Family at bedside involved in cares.    OBS brochure/video discussed/provided to patient: N/A    ED Medications:   Medications   0.9% sodium chloride BOLUS (500 mLs Intravenous New Bag 5/22/17 1007)   sodium chloride 0.9 % for CT scan flush dose 65 mL (65 mLs Intravenous Given 5/22/17 1103)   iopamidol (ISOVUE-370) solution 82 mL (82 mLs Intravenous Given 5/22/17 1102)       Drips infusing?:  No      ED NURSE PHONE NUMBER: [phone]

## 2017-05-22 NOTE — IP AVS SNAPSHOT
"` [place] [address] [facility]: [phone]                                              INTERAGENCY TRANSFER FORM - NURSING   2017                    Hospital Admission Date: 2017  JOHN BEAN   : 1940  Sex: Female        Attending Provider: Haylee Perrin MD     Allergies:  Aspirin    Infection:  None   Service:  HOSPITALIST    Ht:  1.6 m (5' 3\")   Wt:  74.4 kg (164 lb)   Admission Wt:  --    BMI:  29.05 kg/m 2   BSA:  1.82 m 2            Patient PCP Information     Provider PCP Type    Marin Ledesma MD General      Current Code Status     Date Active Code Status Order ID Comments User Context       Prior      Code Status History     Date Active Date Inactive Code Status Order ID Comments User Context    2017 11:54 AM  Full Code [id number]  Haylee Perrin MD Outpatient    2017  7:38 PM 2017 11:54 AM Full Code [id number]  Dorita Mojica PA-C Inpatient    3/22/2017  9:05 AM 2017  7:38 PM Full Code [id number]  Mingo Fernando MD Outpatient    3/20/2017 11:33 AM 3/22/2017  9:05 AM Full Code [id number]  Mingo Fernando MD Inpatient    2016  1:10 PM 3/20/2017 11:33 AM Full Code [id number]  Mingo Fernando MD Outpatient    2016 10:49 AM 2016  1:10 PM Full Code [id number]  Mingo Fernando MD Inpatient    2016  3:09 AM 2016 10:49 AM Full Code [id number]  Pablo Palomo MD Inpatient    2016 10:24 AM 2016  3:09 AM Full Code [id number]  Mingo Fernando MD Outpatient    2016  2:10 PM 2016 10:24 AM Full Code [id number]  Mingo Fernando MD Inpatient    2012  1:40 PM 2016  2:10 PM Full Code [id number]  Mingo Fernando MD Outpatient    2012  7:15 PM 2012  1:40 PM Full Code [id number]  Saloni Kerr, RN Inpatient    3/7/2012  9:34 PM 3/9/2012  7:01 PM Full Code [id number]  Sandra Beltran, GENNARO Inpatient    " 3/6/2012  2:51 PM 3/7/2012  9:34 PM Full Code [id number]  Jazmín Hernandezsyl Arzate, DO Outpatient    3/3/2012  6:10 PM 3/6/2012  2:51 PM Full Code [id number]  Tisha Cantu, RN Inpatient      Advance Directives        Does patient have a scanned Advance Directive/ACP document in EPIC?           No        Hospital Problems as of 5/25/2017              Priority Class Noted POA    Hip dislocation, left (H) Medium  5/22/2017 Yes      Non-Hospital Problems as of 5/25/2017              Priority Class Noted    Essential hypertension, benign   2/1/2005    B-complex deficiency   4/11/2006    CARDIOVASCULAR SCREENING; LDL GOAL LESS THAN 130   10/31/2010    Poor short term memory   5/20/2011    Advance Care Planning   3/6/2012    Shoulder joint replacement status   6/25/2012    Cognitive impairment   7/3/2012    DJD (degenerative joint disease)   7/3/2012    Health Care Home   2/25/2014    Seizure disorder (H)   4/5/2016    Hip joint replacement status Medium  4/11/2016    Status post total replacement of right hip Medium  4/14/2016    Primary osteoarthritis of hip Medium  5/3/2016    Dislocation of internal right hip prosthesis, subsequent encounter Medium  5/3/2016    Physical deconditioning Medium  5/3/2016    Anemia due to blood loss, acute Medium  5/3/2016    Dyspnea Medium  5/3/2016    Dislocated hip (H) Medium  5/12/2016      Immunizations     Name Date      Influenza (High Dose) 3 valent vaccine 11/14/16     Influenza (High Dose) 3 valent vaccine 08/27/15     Pneumococcal (PCV 13) 11/10/09     Pneumococcal 23 valent 03/05/12     Pneumococcal 23 valent 03/20/97     Pneumococcal 23 valent 05/04/89     TD (ADULT, 7+) 06/18/07     TD (ADULT, 7+) 03/20/97          END      ASSESSMENT     Discharge Profile Flowsheet     EXPECTED DISCHARGE     Passing flatus  yes 05/24/17 2155    Expected Discharge Date  05/25/17 ([facility] at 1730) 05/25/17 1139   COMMUNICATION ASSESSMENT      DISCHARGE NEEDS ASSESSMENT      Patient's communication style  spoken language (English or Bilingual) 05/22/17 0925    Concerns To Be Addressed  discharge planning concerns 05/23/17 1006   FINAL RESOURCES      Patient/family verbalizes understanding of discharge plan recommendations?  Yes 05/25/17 1709   Resources List  Transitional Care 05/23/17 1102    Medical Team notified of plan?  yes 05/25/17 1709   Other Resources  Housekeeping/[employer] 03/09/17 1551    Equipment Currently Used at Home  walker, rolling 05/23/17 1254   Transitional Care  -- ([facility]) 05/23/17 1102    Transportation Available  van, wheelchair accessible ([facility] at 1730) 05/25/17 1139   PAS Number  [id number] 05/24/17 1413    # of Referrals Placed by CTS  Senior Linkage Line;Transportation 05/24/17 1413   Senior Linkage Line Referral Placed  05/24/17 05/24/17 1413    Equipment Used at Home  bath bench;grab bar;other (see comments) (Per pt. she has cane/walker but does not use) 06/26/12 1048   F/U Appointment Brochure Provided  -- (NA) 03/09/17 1551    ASSESSMENT OF FUNCTIONAL STATUS     Referrals Placed  Transportation;Senior Linkage Line 05/24/17 1413    Assesssment of Functional Status  Needs placement in a SNF/TCF for rehabilitation 05/23/17 1006   SKIN      GASTROINTESTINAL (ADULT,PEDIATRIC,OB)     Inspection  Full 05/25/17 0911    GI WDL  WDL 05/25/17 0911   Skin areas NOT inspected  Buttock, left;Buttock, right 05/23/17 0202    Abdominal Appearance  obese 05/24/17 0050   Skin WDL  ex 05/25/17 0911    All Quadrants Bowel Sounds  audible and active in all quadrants 05/24/17 0050   Skin Integrity  bruise(s);drain/device(s) 05/25/17 0911    All Quadrants Palpation  firm;tender 05/22/17 1020   SAFETY      Last Bowel Movement  03/22/17 (small BM) 03/22/17 1655   Safety WDL  WDL 05/25/17 0911    GI Signs/Symptoms  abdominal pain 05/22/17 1020                      Assessment WDL (Within Defined Limits) Definitions           Safety WDL     Effective:  "09/28/15    Row Information: <b>WDL Definition:</b> Bed in low position, wheels locked; call light in reach; upper side rails up x 2; ID band on<br> <font color=\"gray\"><i>Item=AS safety wdl>>List=AS safety wdl>>Version=F14</i></font>      Skin WDL     Effective: 09/28/15    Row Information: <b>WDL Definition:</b> Warm; dry; intact; elastic; without discoloration; pressure points without redness<br> <font color=\"gray\"><i>Item=AS skin wdl>>List=AS skin wdl>>Version=F14</i></font>      Vitals     Vital Signs Flowsheet     VITAL SIGNS     Side Effects Monitoring: Sedation Level  1 05/25/17 1659    Temp  98.7  F (37.1  C) 05/25/17 0837   POSITIONING      Temp src  Oral 05/25/17 0837   Body Position  supine, head elevated 05/25/17 0911    Resp  16 05/25/17 0837   Head of Bed (HOB)  HOB at 20-30 degrees 05/25/17 0911    Pulse  65 05/25/17 0837   Chair  Upright in chair 05/23/17 1742    Heart Rate  67 05/25/17 0837   Positioning/Transfer Devices  pillows 05/25/17 0355    Pulse/Heart Rate Source  Monitor 05/25/17 0837   ECG      BP  149/78 05/25/17 0837   ECG Rhythm  Normal sinus rhythm 05/22/17 1850    BP Location  Left arm 05/25/17 0837   RESPIRATORY MONITORING      Patient Position  Lying 05/22/17 1757   Respiratory Monitoring (EtCO2)  10 mmHg 05/22/17 1345    OXYGEN THERAPY     EKG MONITORING      SpO2  95 % 05/25/17 0837   Cardiac Regularity  Regular 05/22/17 1036    O2 Device  None (Room air) 05/25/17 0837   Cardiac Rhythm  NSR 05/22/17 1036    Oxygen Delivery  2 LPM 05/23/17 0758   MARIAMA COMA SCALE      PAIN/COMFORT     Best Eye Response  4-->(E4) spontaneous 05/25/17 0911    Patient Currently in Pain  denies 05/25/17 1659   Best Motor Response  6-->(M6) obeys commands 05/25/17 0911    0-10 Pain Scale  0 05/25/17 1338   Best Verbal Response  4-->(V4) confused 05/25/17 0911    Pain Location  Hip 05/24/17 1517   Mariama Coma Scale Score  14 05/25/17 0911    Pain Orientation  Left 05/24/17 1517   DAILY CARE      Pain " Intervention(s)  Medication (See eMAR) 05/24/17 1517   Activity Type  activity adjusted per tolerance 05/25/17 0911    ANALGESIA SIDE EFFECTS MONITORING     Activity Level of Assistance  assistance, 2 people 05/25/17 0911    Side Effects Monitoring: Respiratory Quality  R 05/25/17 1659   Activity Assistive Device  gait belt;walker 05/24/17 1531    Side Effects Monitoring: Respiratory Depth  N 05/25/17 1659   Additional Documentation  Activity Device Assistance (Row) 05/23/17 1323            Patient Lines/Drains/Airways Status    Active LINES/DRAINS/AIRWAYS     Name: Placement date: Placement time: Site: Days: Last dressing change:    Urethral Catheter Temperature probe 16 fr 05/22/17   1003   Temperature probe   3     Wound 05/22/17 Right Knee Abrasion(s);Other (comment) scabbed 05/22/17   1647   Knee   3     Wound Groin       Groin                Patient Lines/Drains/Airways Status    Active PICC/CVC     None            Intake/Output Detail Report     Date Intake     Output   Net    Shift P.O. I.V. IV Piggyback Total Urine Blood Total       Day 05/24/17 0700 - 05/24/17 1459 -- -- -- -- 2150 -- 2150 -2150    Yudelka 05/24/17 1500 - 05/24/17 2259 500 1200 -- 1700 450 -- 450 1250    Noc 05/24/17 2300 - 05/25/17 0659 -- -- -- -- 975 -- 975 -975    Day 05/25/17 0700 - 05/25/17 1459 200 650 -- 850 -- -- -- 850    Yudelka 05/25/17 1500 - 05/25/17 2259 -- -- -- -- -- -- -- 0      Case Management/Discharge Planning     Case Management/Discharge Planning Flowsheet     REFERRAL INFORMATION     COPING/STRESS CAREGIVER      Did the Initial Social Work Assessment result in a Social Work Case?  Yes 05/23/17 1006   Major Change/Loss/Stressor  none 05/23/17 1006    Admission Type  inpatient 05/23/17 1006   Primary Caregiver Strengths  expressive of needs;motivated;positive attitude;successful coping history 05/23/17 1006    Arrived From  home or self-care 05/23/17 1006   Sources Of Support  friend(s);other family members 05/23/17 1006     Referral Source  interdisciplinary rounds 05/23/17 1006   Reaction To Health Status  accepting 05/23/17 1006    # of Referrals Placed by CTS  Senior Linkage Line;Transportation 05/24/17 1413   Understanding Of Condition And Treatment  adequate understanding of medical condition;adequate understanding of treatment 05/23/17 1006    Reason For Consult  discharge planning 05/23/17 1006   EXPECTED DISCHARGE      Record Reviewed  history and physical;medical record 05/23/17 1006   Expected Discharge Date  05/25/17 ([facility] at 1730) 05/25/17 1139     Assigned to Case  DANIELLE Denny   05/25/17 1139   ASSESSMENT/CONCERNS TO BE ADDRESSED      Primary Care Clinic Name  [facility] 05/12/16 0834   Concerns To Be Addressed  discharge planning concerns 05/23/17 1006    Primary Care MD Name  Marin Ledesma MD 05/23/17 1006   DISCHARGE PLANNING      LIVING ENVIRONMENT     Patient/family verbalizes understanding of discharge plan recommendations?  Yes 05/25/17 1709    Lives With  alone 05/23/17 1254   Medical Team notified of plan?  yes 05/25/17 1709    Living Arrangements  house 05/23/17 1254   Transportation Available  van, wheelchair accessible ([facility] at 1730) 05/25/17 1139    Provides Primary Care For  no one 05/23/17 1006   Equipment Used at Home  bath bench;grab bar;other (see comments) (Per pt. she has cane/walker but does not use) 06/26/12 1048    Quality Of Family Relationships  supportive;involved 05/23/17 1006   FINAL NOTE      Able to Return to Prior Living Arrangements  no 05/23/17 1006   Final Note  D/C to [facility] on 5-25-17 via w/c at 1730 05/25/17 1139    HOME SAFETY     FINAL RESOURCES      Patient Feels Safe Living in Home?  yes 05/23/17 1006   Equipment Currently Used at Home  walker, rolling 05/23/17 1254    ASSESSMENT OF FAMILY/SOCIAL SUPPORT     Resources List  Transitional Care 05/23/17 1102    Marital Status   05/23/17 1006   Other Resources   Housekeeping/[employer] 03/09/17 1551    Who is your support system?  Children;Other (specify) (grandchild) 05/23/17 1006   Transitional Care  -- ([facility]) 05/23/17 1102    Description of Support System  Supportive;Involved 05/23/17 1006   PAS Number  [id number] 05/24/17 1413    Support Assessment  Adequate family and caregiver support;Adequate social supports 05/23/17 1006   Senior Linkage Line Referral Placed  05/24/17 05/24/17 1413    Quality of Family Relationships  supportive;involved 05/23/17 1006   F/U Appointment Brochure Provided  -- (NA) 03/09/17 1551    ASSESSMENT OF FUNCTIONAL STATUS     Referrals Placed  Transportation;Senior Linkage Line 05/24/17 1413    Assesssment of Functional Status  Needs placement in a SNF/TCF for rehabilitation 05/23/17 1006   ABUSE RISK SCREEN      EMPLOYMENT     QUESTION TO PATIENT:  Has a member of your family or a partner(now or in the past) intimidated, hurt, manipulated, or controlled you in any way?  patient declined to answer or is unable to answer 05/22/17 0928    Do you work full or part-time?  no 05/23/17 1006   QUESTION TO PATIENT: Do you feel safe going back to the place where you are living?  patient declined to answer or is unable to answer 05/22/17 0928    COPING/STRESS     OBSERVATION: Is there reason to believe there has been maltreatment of a vulnerable adult (ie. Physical/Sexual/Emotional abuse, self neglect, lack of adequate food, shelter, medical care, or financial exploitation)?  no 05/22/17 0928    Major Change/Loss/Stressor  hospitalization;surgery/procedure 05/23/17 1006   HOMICIDE RISK      Patient Personal Strengths  expressive of needs;motivated;positive attitude;strong support system 05/23/17 1006   Homicidal Ideation  no 05/22/17 0928    Sources Of Support  adult child(garrett);friend(s);other family members 05/23/17 1006   (R) MENTAL HEALTH SUICIDE RISK      Reaction To Health Status  accepting 05/23/17 1006   Are you depressed  or being treated for depression?  No 05/23/17 1745    Understanding Of Condition And Treatment  partial understanding of medical condition;partial understanding of treatment 05/23/17 1007

## 2017-05-22 NOTE — IP AVS SNAPSHOT
` ` Patient Information     Patient Name Sex     Yoanna Phillips ([id number]) Female 1940       Room Bed    5528 5528-01      Patient Demographics     Address Phone E-mail Address    [address] [address] [phone] (Home) *Preferred* [email or web address]      Patient Ethnicity & Race     Ethnic Group Patient Race    American White      Emergency Contact(s)     Name Relation Home Work Mobile    Kay Phillips Daughter [phone] none none    Pili Phillips Grandchild   [phone]      Documents on File        Status Date Received Description       Documents for the Patient    Privacy Notice - [place]  05     Insurance Card  05 -    Insurance Card  05 -    Face Sheet  05     Insurance Card  07 -    Face Sheet  07     Consent for Services - [facility] Received () 11     Privacy Notice - [facility] Received () 11 PRIVACY    Insurance Card Received 11 [facility]-MEDICARE    Insurance Card Received () 11 [employer]    Patient ID Received 14 -    External Medication Information Consent Accepted () 12 [facility]-EXTERNAL    Consent for Services - Hospital/Clinic  ()      Privacy Notice - [place] Received 12     Privacy Notice - [place] Received 12     Insurance Card Received () 12 [facility]    Privacy Notice - [facility] Received 12 [facility]-PRIVACY    Consent for Services - [facility] Received () 12 [facility]-CONSENT    Consent for Services - Hospital/Clinic Received () 12     HIM ANTHONY Authorization   12 MEDICAL RELEASE TO [facility]    HIM ANTHONY Authorization   12 MEDICAL RELEASE TO HOME HEALTH CARE [facility].    Consent for Services - Hospital/Clinic   13 ABN [facility]    Consent for EHR Access  13 Copied from existing Consent for services - IP/ED collected on 2012    Consent for Services - [facility] Received () 13  [facility]-CONSENT    External Medication Information Consent Accepted 13 [facility]-EXTERNAL    Insurance Card Received () 14 [facility]-MHCP    [facility] Specified Other       Consent for Services - Hospital/Clinic  () 14 CONSENT FOR SERVICE    Consent for Services - Hospital/Clinic  () 14 CONSENT FOR SERVICE    Insurance Card Received () 14 [facility]-UCARE    Patient ID       Insurance Card Received () 03/20/15 [facility]-UCARE    Consent for Services - [facility] Received () 03/20/15 R[facility]-CONSENT    Consent for Services - Hospital/Clinic Received () 04/23/15     Insurance Card Received () 16 [facility]-UCARE    Business/Insurance/Care Coordination/Health Form - Patient   [employer]    HIM ANTHONY Authorization - File Only   MYCHART AUTHORIZATION W/PROXY ACCESS    HIM ANTHONY Authorization - File Only   MYCHART PROXY ACCESS    Business/Insurance/Care Coordination/Health Form - Patient   ARE HEALTH ASSESMENT    Consent for Services - [facility] Received () 16 [facility]-CONSENT    Advance Directives and Living Will Received 16 Health Care Directives 14    Advance Directives and Living Will Not Received  Validation of AD 14    Consent for Services/Privacy Notice - Hospital/Clinic Received 17     Consent for Services/Privacy Notice - Hospital/Clinic Received () 16     Patient ID Received 16 -    Consent for Services/Privacy Notice - Hospital/Clinic       Consent for Services - Geriatrics Received 16     Advance Directives and Living Will Received 16 POLST  2016    HIM ANTHONY Authorization - File Only   16 MEDICAL RELEASE TO HOME HEALTH CARE [facility]    HIM ANTHONY Authorization - File Only   16 MEDICAL RELEASE TO [place] [facility]    Business/Insurance/Care Coordination/Health Form - Patient   UCARE DEINAL PCA LETTER 17    Insurance Card Received 17 [facility]-UCARE    Consent to Communicate   AUTH TO TALK TO  FAMILY 3/6/17    HIM ANTHONY Authorization  03/24/17     Consent for Services - [facility] Received 04/18/17 [facility]-CONSENT    Patient ID  (Deleted)      Consent for Services - [facility] Received (Deleted) 04/18/17 [facility]-CONSENT       Documents for the Encounter    EMS/Ambulance Record  05/22/17 [facility]    CMS IM for Patient Signature       EMS/Ambulance Record  05/22/17 [facility]      Admission Information     Attending Provider Admitting Provider Admission Type Admission Date/Time    Haylee Perrin MD Omar, Ahmed A, MD Emergency 05/22/17  0924    Discharge Date Hospital Service Auth/Cert Status Service Area     Hospitalist [facility]    Unit Room/Bed Admission Status       [address] UNIT 5528/5528-01 Admission (Confirmed)       Admission     Complaint    Hip dislocation, left (H), Hip dislocation, left (H)      Hospital Account     Name Acct ID Class Status Primary Coverage    Yoanna Phillips [id number] Inpatient Open [facility]            Guarantor Account (for Hospital Account #[id number])     Name Relation to Pt Service Area Active? Acct Type    Yoanna Phillips  FCS Yes Personal/Family    Address Phone          [address] [place] [address] [phone](H)  none(O)              Coverage Information (for Hospital Account #[id number])     F/O Payor/Plan Precert #    UCARE/[employer]/ PARTNERS     Subscriber Subscriber #    Yoanna Phillips [id number]    Address Phone    [address]  [place] [address] [phone]
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
## 2017-05-22 NOTE — ANESTHESIA POSTPROCEDURE EVALUATION
Patient: Yoanna Phillips    Procedure(s):  CLOSED REDUCTION LEFT HIP - Wound Class: I-Clean    Diagnosis:Dislocated Left Hip  Diagnosis Additional Information: No value filed.    Anesthesia Type:  General, Other, LMA    Note:  Anesthesia Post Evaluation    Patient location during evaluation: PACU  Patient participation: Able to fully participate in evaluation  Level of consciousness: awake and alert  Pain management: adequate  Airway patency: patent  Cardiovascular status: acceptable  Respiratory status: acceptable  Hydration status: acceptable  PONV: none     Anesthetic complications: None          Last vitals:  Vitals:    05/22/17 1800 05/22/17 1810 05/22/17 1820   BP: 126/73 117/64 104/69   Resp: 14 16 29   Temp:      SpO2: 100% 97% 97%         Electronically Signed By: Juan Ferrell MD  May 22, 2017  6:32 PM

## 2017-05-22 NOTE — IP AVS SNAPSHOT
"    [place] [address] [facility]: [phone]                                              INTERAGENCY TRANSFER FORM - PHYSICIAN ORDERS   2017                    Hospital Admission Date: 2017  JOHN BEAN   : 1940  Sex: Female        Attending Provider: Haylee Perrin MD     Allergies:  Aspirin    Infection:  None   Service:  HOSPITALIST    Ht:  1.6 m (5' 3\")   Wt:  74.4 kg (164 lb)   Admission Wt:  --    BMI:  29.05 kg/m 2   BSA:  1.82 m 2            Patient PCP Information     Provider PCP Type    Marin Ledesma MD General      ED Clinical Impression     Diagnosis Description Comment Added By Time Added    Dislocated hip, left, initial encounter (H) [S73.005A] Dislocated hip, left, initial encounter (H) [S73.005A]  Juan Figueroa MD 2017  2:09 PM    Fall from bed, initial encounter [W06.XXXA] Fall from bed, initial encounter [W06.XXXA]  Juan Figueroa MD 2017  2:09 PM    Traumatic rhabdomyolysis, initial encounter (H) [T79.6XXA] Traumatic rhabdomyolysis, initial encounter (H) [T79.6XXA]  Juan Figueroa MD 2017  2:09 PM      Hospital Problems as of 2017              Priority Class Noted POA    Hip dislocation, left (H) Medium  2017 Yes      Non-Hospital Problems as of 2017              Priority Class Noted    Essential hypertension, benign   2005    B-complex deficiency   2006    CARDIOVASCULAR SCREENING; LDL GOAL LESS THAN 130   10/31/2010    Poor short term memory   2011    Advance Care Planning   3/6/2012    Shoulder joint replacement status   2012    Cognitive impairment   7/3/2012    DJD (degenerative joint disease)   7/3/2012    Health Care Home   2014    Seizure disorder (H)   2016    Hip joint replacement status Medium  2016    Status post total replacement of right hip Medium  2016    Primary osteoarthritis of hip Medium  5/3/2016    Dislocation of internal right hip prosthesis, " subsequent encounter Medium  5/3/2016    Physical deconditioning Medium  5/3/2016    Anemia due to blood loss, acute Medium  5/3/2016    Dyspnea Medium  5/3/2016    Dislocated hip (H) Medium  5/12/2016      Code Status History     Date Active Date Inactive Code Status Order ID Comments User Context    5/25/2017 11:54 AM  Full Code [id number]  Haylee Perrin MD Outpatient    5/22/2017  7:38 PM 5/25/2017 11:54 AM Full Code [id number]  Dorita Mojica PA-C Inpatient    3/22/2017  9:05 AM 5/22/2017  7:38 PM Full Code [id number]  Mingo Fernando MD Outpatient    3/20/2017 11:33 AM 3/22/2017  9:05 AM Full Code [id number]  Mingo Fernando MD Inpatient    5/16/2016  1:10 PM 3/20/2017 11:33 AM Full Code [id number]  Mingo Fernando MD Outpatient    5/14/2016 10:49 AM 5/16/2016  1:10 PM Full Code [id number]  Mingo Fernando MD Inpatient    5/12/2016  3:09 AM 5/14/2016 10:49 AM Full Code [id number]  Pablo Palomo MD Inpatient    4/13/2016 10:24 AM 5/12/2016  3:09 AM Full Code [id number]  Mingo Fernando MD Outpatient    4/11/2016  2:10 PM 4/13/2016 10:24 AM Full Code [id number]  Mingo Fernando MD Inpatient    6/28/2012  1:40 PM 4/11/2016  2:10 PM Full Code [id number]  Mingo Fernando MD Outpatient    6/25/2012  7:15 PM 6/28/2012  1:40 PM Full Code [id number]  Saloni Kerr, RN Inpatient    3/7/2012  9:34 PM 3/9/2012  7:01 PM Full Code [id number]  Sandra Beltran RN Inpatient    3/6/2012  2:51 PM 3/7/2012  9:34 PM Full Code [id number]  Hernandez Noyola DO Outpatient    3/3/2012  6:10 PM 3/6/2012  2:51 PM Full Code [id number]  Tisha Cantu, RN Inpatient         Medication Review      CONTINUE these medications which may have CHANGED, or have new prescriptions. If we are uncertain of the size of tablets/capsules you have at home, strength may be listed as something that might have changed.        Dose / Directions Comments     acetaminophen 325 MG tablet   Commonly known as:  TYLENOL   This may have changed:    - medication strength  - how much to take  - when to take this  - reasons to take this   Used for:  Dislocated hip, left, initial encounter (H)        Dose:  975 mg   Take 3 tablets (975 mg) by mouth every 8 hours   Quantity:  60 tablet   Refills:  0          CONTINUE these medications which have NOT CHANGED        Dose / Directions Comments    CARBAMAZEPINE ER PO        Dose:  300 mg   Take 300 mg by mouth 2 times daily   Refills:  0        CENTRUM SILVER per tablet   Notes to Patient:  Not given in hospital. Resume home schedule.        Dose:  1 tablet   Take 1 tablet by mouth daily   Refills:  0        cyanocobalamin 1000 MCG/ML injection   Commonly known as:  VITAMIN B12        Dose:  1 mL   Inject 1 mL into the muscle every 30 days   Refills:  0        FOLIC ACID PO        Dose:  1 mg   Take 1 mg by mouth daily   Refills:  0        GABAPENTIN PO        Dose:  1200 mg   Take 1,200 mg by mouth 3 times daily (Takes 2 x 600mg tablet = 1200mg dose)   Refills:  0        K-DUR PO        Dose:  10 mEq   Take 10 mEq by mouth 2 times daily   Refills:  0        LOSARTAN POTASSIUM PO   Notes to Patient:  Not given in hospital. Resume home schedule.        Dose:  100 mg   Take 100 mg by mouth daily   Refills:  0        tiotropium 18 MCG capsule   Commonly known as:  SPIRIVA   Notes to Patient:  Not given in hospital. Resume home schedule.        Dose:  18 mcg   Inhale 18 mcg into the lungs daily   Refills:  0        VITAMIN B-1 PO        Dose:  100 mg   Take 100 mg by mouth daily   Refills:  0                  Further instructions from your care team       You have been discharged to [facility]  Phone Number is [phone]  Fax Number is [phone]    After Care     Activity - Up with assistive device           Advance Diet as Tolerated       Follow this diet upon discharge:  Regular Diet Adult       General info for  SNF       Length of Stay Estimate: Short Term Care: Estimated # of Days <30  Condition at Discharge: Stable  Level of care:skilled   Rehabilitation Potential: Good  Admission H&P remains valid and up-to-date: Yes  Recent Chemotherapy: N/A  Use Nursing Home Standing Orders: Yes       Mantoux instructions       Give two-step Mantoux (PPD) Per Facility Policy Yes       Weight bearing status       Weight bearing as tolerated left lower extremity. Use walker for ambulation assistance.  Use hip abduction pillow while laying in bed and sleeping.   Use knee immobilizer while ambulating.  Avoid hip flexion greater than 70 degrees.  NO hip IR or ER.  Avoid hip adduction.             Referrals     Occupational Therapy Adult Consult       Evaluate and treat as clinically indicated.    Reason:  Left hip dislocation       Physical Therapy Adult Consult       Evaluate and treat as clinically indicated.    Reason:  Left hip dislocation             Supplies     AntiEmbolism Stockings       Bilateral below knee length.On in the morning, off at night             Follow-Up Appointment Instructions     Future Labs/Procedures    Follow Up and recommended labs and tests     Comments:    Patient to follow up with Dr. Fernando within 1 week post-op. Follow up at [facility] location. Address is [address] [place] [address].    Please call [phone] to schedule an appointment.      Follow-Up Appointment Instructions     Follow Up and recommended labs and tests       Patient to follow up with Dr. Fernando within 1 week post-op. Follow up at [facility] location. Address is [address] [place] [address].    Please call [phone] to schedule an appointment.             Statement of Approval     Ordered          05/25/17 1154  I have reviewed and agree with all the recommendations and orders detailed in this document.  EFFECTIVE NOW     Approved and electronically signed by:  Haylee Perrin  MD VALERIANO

## 2017-05-22 NOTE — IP AVS SNAPSHOT
` `     [place] [address] [facility]: [phone]                 INTERAGENCY TRANSFER FORM - NOTES (H&P, Discharge Summary, Consults, Procedures, Therapies)   2017                    Hospital Admission Date: 2017  JOHN BEAN   : 1940  Sex: Female        Patient PCP Information     Provider PCP Type    Marin Ledesma MD General         History & Physicals      H&P signed by Dorita Mojica PA-C at 2017  7:59 AM      Author:  Dorita Mojica PA-C Service:  Hospitalist Author Type:  Physician Assistant - RICKY    Filed:  2017  7:59 AM Date of Service:  2017  3:55 PM Creation Time:  2017  5:54 PM    Status:  Cosign Needed :  Dorita Mojica PA-C (Physician Assistant Nelson GARCÍA)    Cosign Required:  Yes             DATE OF SERVICE:   2017      PRIMARY CARE PHYSICIAN: Marin Ledesma MD.      CHIEF COMPLAINT:  Fall.      HISTORY OBTAINED:  Primarily from the granddaughter as the patient is in a great deal of pain and has some baseline memory trouble.      HISTORY OF PRESENT ILLNESS:  John Bean is a 76-year-old female with a past medical history significant for TBI with seizure disorder, cognitive impairment, hypertension, history of pernicious anemia, and recent left total hip arthroplasty in 2017 who presented to the Emergency Department via EMS after being found down in her home.  The patient's granddaughter reports that the patient was last heard from by her family 2 days ago on .  She did not answer her phone for the remainder of the weekend so her granddaughter went to check on her this morning.  She found the patient lying on the floor wedged between her bed and her nightstand.  The granddaughter notes that she seemed to be covered in dark-colored blood and the granddaughter witnessed her vomit with dark bloody appearing emesis.  There are several family members present at bedside who report that patient has been  "seemingly in her usual state of health since her total hip arthroplasty in March that she has had a mild persistent cough since that time.  They also report that the patient has a history of falls at home.      The patient was evaluated in the Emergency Department by Dr. Figueroa.  X-rays obtained of her pelvis and left hip which showed that the left hip was dislocated without evidence of fracture.  CT was obtained of the patient's head which was negative for acute pathology.  CT was also obtained of the abdomen and pelvis which did show a mildly dilated loop of small mid bowel without any convincing evidence for small-bowel obstruction.  Laboratory evaluation was remarkable for leukocytosis of 16.7, hemoglobin of 16, ALT of 61, AST of 125, and an elevated CK of 2680.  Urinalysis and chest x-ray were unremarkable for evidence of infection.  Attempts were made to reduce the patient's hip in the Emergency Department without success.  Hospitalist Service was requested for admission for reduction in the operating room this evening.  The patient is presently evaluated in the Emergency Department by me.  She continues to report significant pain in her left hip.  At the time of my visit, she just received a dose of IV morphine.  She tells me that she does remember falling and reports that she felt \"out a sorts\" prior to her fall.  She is unable to characterize this further.  She does not specifically remember any chest pain, palpitations, shortness of breath or dizziness prior to her fall.  She does have history of seizures, but says it has been several years since she last had an episode.  Family reports that she has poor p.o. intake at baseline.  The patient tells me she has had diarrhea for the past couple of days and that there maybe has been a little blood in it but cannot characterize this further.  She is not sure how many episodes of emesis she had, does not recall when this began.  Her main complaint at the time " of my exam is left hip pain.  She otherwise denies any chest pain, shortness of breath, abdominal pain, nausea, visual changes, palpitations.  She denies any recent urinary symptoms.      REVIEW OF SYSTEMS:  A 10-point review of systems was conducted and is negative aside from the information in the HPI; this is somewhat limited by the patient's mental status.      PAST MEDICAL HISTORY:   1.  Cognitive impairment.  Per discussion with family, at baseline patient has poor short-term memory and can be rather repetitive.  She typically would be able to tell you the date and is oriented to herself and family.   2.  Seizure disorder.  This occurred following a traumatic brain injury in 1965.  Per patient and family, it has been many years since she last had an episode.   3.  Hypertension.   4.  Recurrent right hip dislocation, status post revision surgery in 05/2016.   5.  History of pernicious anemia.   6.  Possible COPD.  I do note that patient is on Spiriva.  Family is not sure whether she carries this diagnosis.   7.  History of traumatic brain injury.      PAST SURGICAL HISTORY:   1.  Splenectomy.   2.  Craniotomy.   3.  Cholecystectomy.   4.  Left total shoulder arthroplasty.   5.  Right total hip arthroplasty with subsequent revision in 2016.   6.  Left total hip arthroplasty.   7.  Appendectomy.      ALLERGIES:  Aspirin, reaction unknown.      PRIOR TO ADMISSION MEDICATIONS:[KG1.1]    Prescriptions Prior to Admission   Medication Sig Dispense Refill Last Dose     Acetaminophen (TYLENOL PO) Take 325-650 mg by mouth every 6 hours as needed for mild pain or fever   Past Week at Unknown time     CARBAMAZEPINE ER PO Take 300 mg by mouth 2 times daily   Past Week at Unknown time     cyanocobalamin (VITAMIN B12) 1000 MCG/ML injection Inject 1 mL into the muscle every 30 days   Past Week at Unknown time     GABAPENTIN PO Take 1,200 mg by mouth 3 times daily (Takes 2 x 600mg tablet = 1200mg dose)   Past Week at Unknown  time     Potassium Chloride Zonia CR (K-DUR PO) Take 10 mEq by mouth 2 times daily    Past Week at Unknown time     tiotropium (SPIRIVA) 18 MCG capsule Inhale 18 mcg into the lungs daily   Past Week at Unknown time     FOLIC ACID PO Take 1 mg by mouth daily   Past Week at Unknown time     Thiamine HCl (VITAMIN B-1 PO) Take 100 mg by mouth daily   Past Week at Unknown time     LOSARTAN POTASSIUM PO Take 100 mg by mouth daily   Past Week at Unknown time     Multiple Vitamins-Minerals (CENTRUM SILVER) per tablet Take 1 tablet by mouth daily   Past Week at Unknown time[KG1.2]          SOCIAL HISTORY:  No history of significant alcohol use.  The patient is a former smoker who quit 35 years ago after smoking a half pack a day for 10 years.  The patient currently lives alone in a house.  She manages her own medications.  She ambulates with a walker since her recent hip surgery in March.  Family has some concerns with her living on her own.  She is currently her own medical decision maker and does not have a distinguished power of .  She has 4 children.      FAMILY HISTORY:  Her mother had a history of Parkinson's disease, hypertension and heart disease.      LABORATORY DATA:  Sodium 137, potassium 4.0, chloride 99, CO2 of 29, BUN 30, creatinine 0.69, GFR is 82.  Total bilirubin is 0.6, alkaline phosphatase 126, ALT 61, .  CK 2680.  Troponin 0.019.  Blood sugar 123.  White blood cell count 16.7, hemoglobin 16, hematocrit 46.7, platelets 333.  Neutrophils are 14.  UA is largely  unremarkable.      IMAGING:  X-ray of the pelvis and left hip shows bilateral total hip arthroplasties with left hip dislocated, no evidence of fracture.  Chest x-ray shows left total shoulder arthroplasty with mildly enlarged cardiac silhouette with clear lungs.  CT of abdomen and pelvis shows a mildly dilated loop of mid small bowel in the pelvis on the left, otherwise no convincing evidence for small-bowel obstruction.  Superior  dislocation of the left hip arthroplasty and small hiatal hernia.  CT of the head with contrast shows advanced atrophy and mild chronic white matter disease, no acute abnormality.        EKG shows sinus rhythm.  Occult blood testing, the stool is negative.  Lactic acid is 1.7.      PHYSICAL EXAMINATION:   VITAL SIGNS:  Temperature 97.8, heart rate 95, blood pressure 136/74, respiratory rate 19, oxygen saturation 96% on room air.   GENERAL:  Elderly female lying down in bed.  She is alert, cries out in pain frequently and appears generally uncomfortable.  She is able to answer most questions appropriately.   EYES:  Pupils are equal and reactive to light, EOMI.     ENT:  I do note some dried emesis around her mouth which is dark brown.  I do not see any  abrasions in the area.  She also has some dried blood noted around the ears bilaterally.   CARDIOVASCULAR:  Regular rate and rhythm, no murmurs appreciated.   RESPIRATORY:  Lungs are clear in anterior fields, no increased work of breathing or wheezing.   GASTROINTESTINAL:  Positive bowel sounds.  Abdomen is soft and nondistended.  She has mild diffuse tenderness.  No rebound tenderness or guarding.   MUSCULOSKELETAL:  Left lower extremity is shortened.  The patient has discomfort with any movement of the left leg.  She is able to wiggle her toes.  She moves both bilateral upper extremities spontaneously.   strength is +5/5.   NEUROLOGIC:  Cranial nerves 2-12 are grossly intact.  Speech is clear.  Tongue is midline.  Face is symmetric.  The patient is oriented to self, family, and location; does not know the date.   SKIN:  Warm and dry.  Some small areas of ecchymosis on right forearm.  I do not note any scalp abrasions.      ASSESSMENT AND PLAN:  Yoanna Phillips is a 76-year-old female with a past medical history significant for cognitive impairment, seizure disorder, history of traumatic brain injury, hypertension, chronic obstructive pulmonary disease, history  "of pernicious anemia and osteoarthritis who presented to [facility] via EMS after being found down at home by her granddaughter.  Workup thus far is notable for left hip dislocation and rhabdomyolysis.  She is being admitted under inpatient status as I anticipate greater than 2-midnight stay.   1.  Left hip dislocation, status post fall.  The patient was last heard from on 05/20/2017 and was found on the floor wedged between her bed and table by her granddaughter on 05/22.    The patient does state that she recalls falling and felt \"out of sorts\" prior to her fall.  She cannot elaborate on this at this time.  Possible etiologies of the fall include orthostasis as patient has historically poor p.o. intake, seizure, arrhythmia, or mechanical.  X-ray in the Emergency Department shows dislocation of left hip without evidence of fracture.  The patient recently underwent left total hip arthroplasty 03/20/2017.  Staff was unable to reduce the patient's hip in the ED; plan is for surgical intervention.   -- Orthopedics consulted.  Plan for closed reduction under general anesthesia this evening. Appreciate their collaboration.  We will defer postop deep venous thrombosis prophylaxis and pain control to Orthopedic Service.   -- No NSAIDs.   -- We will watch the patient on telemetry for 24 hours postop to evaluate for arrhythmia.   --  She will need PT and OT ordered postop for disposition.     2.  Rhabdomyolysis.  The patient was down for unknown period of time, possibly as long as 3 days.  CK is elevated on admission at 2680.  LFTs are mildly elevated.  Remainder of CMP is unremarkable.   -- Hydrate with normal saline at 150 mL per hour.  Most recent echo in 2012 shows preserved EF.   -- Follow CK in the morning.   -- We will limit nephrotoxic medications.   3.  Possible coffeeground emesis.  The patient's granddaughter reported that she observed  the patient to be covered in what appeared to be dried blood " and observed her vomiting prior to presentation to the hospital.  Emesis appeared dark brown.  Per granddaughter, the patient has been taking a lot of Tylenol and possible ibuprofen at home.  Her hemoglobin in the ER is 16, suspect this is concentrated as the patient appears dry.  She has had no further episodes of hematemesis since arrival.  She has no personal history of GI bleeding per family.   -- Follow hemoglobin q.8 hours.   -- IV Protonix daily for now.   -- If patient has further episodes of emesis concerning for GI bleed, we will consult GI at that time.   -- No NSAIDs.   4.  Leukocytosis.  White blood cell count is elevated at 16.7 on admission.  Suspect this is likely reactive.  No evidence of infection on chest x-ray or UA.  Patient is afebrile.   -- Repeat CBC in the a.m.   -- Monitor for fever.   5.  Seizure disorder with history of traumatic brain injury.  Possible etiology of fall includes episode of seizure.  Per patient and family, she has not had an episode for several years and no recent medication changes.   -- Continue prior to admission carbamazepine.   6.  Elevated transaminases.  Liver function tests are mildly elevated on admission with ALT of 61 and AST of 125.  This is possibly secondary to rhabdomyolysis.  Family also reports a lot of Tylenol use.  CT abdomen and pelvis is negative for any abnormality.   --  Follow CK as above.   -- Repeat LFTs in the a.m.   7.  Hypertension.  We will hold prior to admission losartan for now to prevent renal injury and order p.r.n. hydralazine as needed.   8.  Chronic obstructive pulmonary disease.  Continue prior to admission Spiriva.   9.  CODE STATUS:  The patient is full code.  This was discussed with her on admission today.  Patient is her own decision maker as discussed previously.  She has no established power of  and has 4 children.     10.  Deep venous thrombosis prophylaxis:  We will defer to Orthopedics postop.   Recommend avoiding  NSAIDs in light of concern for possible GI bleed.           The patient was seen and examined with Dr. Perrin who agrees with the above plan.         JOHN PERRIN MD       As dictated by DORITA MOJICA PA-C            D: 2017 15:55   T: 2017 17:45   MT: GARY      Name:     JOHN BEAN   MRN:      [id number]        Account:      [id number]   :      1940           Admitted:     [id number]      Document: [id number]       cc: Marin Ledesma MD[KG1.1]        Revision History        User Key Date/Time User Provider Type Action    > KG1.1 2017  7:59 AM Dorita Mojica PA-C Physician Assistant - RICKY Sign     KG1.2 2017  7:58 AM Dorita Mojica PA-C Physician Assistant Nelson GARCÍA      [N/A] 2017  5:54 PM Dorita Mojica PA-C Physician Assistant - RICKY Edit                  Discharge Summaries     No notes of this type exist for this encounter.      Consult Notes     No notes of this type exist for this encounter.         Progress Notes - Physician (Notes from 17 through 17)      Progress Notes by Sheryl Reeves LSW at 2017  2:13 PM     Author:  Sheryl Reeves LSW Service:  Social Work Author Type:      Filed:  2017  2:15 PM Date of Service:  2017  2:13 PM Creation Time:  2017  2:13 PM    Status:  Signed :  Sheryl Reeves LSW ()         EMMA  D: EMMA following for discharge planning needs.  EMMA met with patients family and discussed transportation to get patient to Rehab tomorrow.  I: Family requested that SW arrange a w/c ride for discharge tomorrow.  Family is aware that the cost of transportation will be billed to the patients Medical Assistance.  EMMA spoke to Rajendra at [facility] and scheduled transportation for tomorrow at 1400.  EMMA faxed the facesheet to [facility].  EMMA left a message for Joaquina in Admissions at [facility] to update her on the transport time for tomorrow.  A: Patient  is alert and oriented with some confusion noted.  P: SW will continue to follow and assist with finalizing the discharge plan as appropriate.    DANIELLE Denny      PAS-RR    D: Per DHS regulation, SW completed and submitted PAS-RR to [place] [employer] Direct Connect via the [employer] Line.  PAS-RR confirmation # is : [id number].    I: SW spoke with patients family and they are aware a PAS-RR has been submitted.  SW reviewed with patients family that they may be contacted for a follow up appointment within 10 days of hospital discharge if their SNF stay is < 30 days.  Contact information for [facility] was also provided.    A: Patients family verbalized understanding.    P: Further questions may be directed to [facility] at #[phone], option #4 for PAS-RR staff.    DANIELLE Denny  [SB1.1]         Revision History        User Key Date/Time User Provider Type Action    > SB1.1 5/24/2017  2:15 PM Sheryl Reeves LSW  Sign            Progress Notes by Haylee Perrin MD at 5/24/2017 11:08 AM     Author:  Haylee Perrin MD Service:  Hospitalist Author Type:  Physician    Filed:  5/24/2017 11:19 AM Date of Service:  5/24/2017 11:08 AM Creation Time:  5/24/2017 11:08 AM    Status:  Signed :  Haylee Perrin MD (Physician)         [facility]    Hospitalist Progress Note    Date of Service (when I saw the patient): 05/24/2017    Assessment & Plan   Yoanna Phillips is a 76-year-old female with pmhx significant for cognitive impairment, seizure disorder, history of TBI, HTN, COPD, pernicious anemia and DJD.  [facility] after being found down at home by her granddaughter. Workup in the ED revealed left hip dislocation and rhabdomyolysis.      1.   Left hip dislocation:  Following a fall (most likely Mechanical ).  ED staff was unable to reduce it in the ED. She was taken to the OR on 5/22/17 afternoon and underwent  closed reduction left hip.  Further care per ortho.  PT/OT following.  Pain is well controlled w/ current meds.    ----   Keep dowell in for another day since pt needs an assist of two for transfer.    2.  DVT prophylaxis:   SC Ds.    3.   Rhabdomyolysis:  Fell and remained on the floor for some time (exact duration is unknown).  \Admit CK level was 2680 ---> IVF ----> 752 ---> 430 U/L.  Kidney function wnl.  LFTs are back to normal range.  Continue IVF hydration for one more day but decreased rate to 75 cc/hr.      4.   Possible coffeeground emesis:   The patient's granddaughter reported that she observed the patient covered in what appeared to be dried blood and observed her vomiting prior to presentation to the hospital.  Emesis appeared dark brown. Per granddaughter, the patient has been taking a lot of Tylenol and possible ibuprofen at home.  Admit Hgb was 16.0 grams at admit (hemoconcentration) ---> 12.3 --->11.7 ---> 11.4 ---> 13.1 grams this am.  No melena or hematmesis since admit. S/c Hgb q12 hours Monitoring.       5.   Leukocytosis:   Likely stress related.  No obvious source of infection.  WBC is trending down.    6.   Seizure disorder with history of TBI:   Possible etiology of fall includes episode of seizure. Per patient and family, she has not had an episode for several years and no recent medication changes. Continue prior to admission carbamazepine.     7.   Elevated traansaminase:  Due to rhabdo.  CT abd/pe neg at admit.  LFTs are back to normal range following IVF hydration.    8.   Hypertension:  Continue to hold PTA Losartan for one more day.  Continue prn hydralazine.      9.   COPD:  Lungs are CTA.  Continue prior to admission Spiriva.     9.   CODE STATUS:   Full.      Dispo:  Probable transfer to TCU in[AO1.1] am[AO1.2]    Interval History   Has no complaints.  On regular diet.    -Data reviewed today: I reviewed all new labs and imaging results over the last 24 hours.     Physical Exam    Temp: 98.1  F (36.7  C) Temp src: Oral BP: 117/62 Pulse: 60 Heart Rate: 65 Resp: 16 SpO2: 95 % O2 Device: None (Room air)    There were no vitals filed for this visit.  Vital Signs with Ranges  Temp:  [97.4  F (36.3  C)-98.1  F (36.7  C)] 98.1  F (36.7  C)  Pulse:  [60] 60  Heart Rate:  [58-70] 65  Resp:  [16-18] 16  BP: (114-142)/(61-75) 117/62  SpO2:  [94 %-95 %] 95 %  I/O last 3 completed shifts:  In: 5682 [P.O.:740; I.V.:4942]  Out: 525 [Urine:525]    General: awake, alert, follows command appropriately, NAD.  HEENT:  NC/AT, PERRL, EOMI, neck supple, no thyromegaly, op clear, mmm.  CVS:  NL s 1 and s2, no m/r/g.  Lungs:  Decreased BS, no wheezing.   Abd:  Soft, + bs, NT, no rebound or gaurding, no fluid shift.  Ext:  No c/c. Left knee immobilized.  Lymph:  No edema.  Neuro:  Nonfocal.  Musculoskeletal: No calf tenderness to palpation.    Skin:  No rash.  Psychiatry:  Mood and affect appropriate.    Medications     NaCl 150 mL/hr at 05/24/17 0400       pantoprazole  40 mg Oral QAM     folic acid (FOLVITE) tablet 1 mg  1 mg Oral Daily     gabapentin (NEURONTIN) tablet 1,200 mg  1,200 mg Oral TID     thiamine tablet 100 mg  100 mg Oral Daily     carBAMazepine  300 mg Oral BID     sodium chloride (PF)  3 mL Intracatheter Q8H     acetaminophen  975 mg Oral Q8H     umeclidinium  1 puff Inhalation Daily       Data     Recent Labs  Lab 05/24/17  0645 05/23/17  1635 05/23/17  0955 05/23/17  0625  05/22/17  0940   WBC  --   --   --  11.5*  --  16.7*   HGB  --  13.1 11.4* 11.7  < > 16.0*   MCV  --   --   --  102*  --  98   PLT  --   --   --  239  --  333   NA  --   --   --  143  --  137   POTASSIUM  --   --   --  3.9  --  4.0   CHLORIDE  --   --   --  112*  --  99   CO2  --   --   --  22  --  29   BUN  --   --   --  20  --  30   CR  --   --   --  0.55  --  0.69   ANIONGAP  --   --   --  9  --  9   FELICE  --   --   --  8.3*  --  10.1   GLC 90  --   --  91  --  123*   ALBUMIN 2.1*  --   --  2.3*  --  3.3*   PROTTOTAL 5.1*   --   --  5.2*  --  7.9   BILITOTAL 0.2  --   --  0.3  --  0.6   ALKPHOS 74  --   --  78  --  126   ALT 37  --   --  38  --  61*   AST 43  --   --  57*  --  125*   TROPI  --   --   --   --   --  0.019   < > = values in this interval not displayed.    No results found for this or any previous visit (from the past 24 hour(s)).[AO1.1]     Revision History        User Key Date/Time User Provider Type Action    > AO1.2 5/24/2017 11:19 AM Haylee Perrin MD Physician Sign     AO1.1 5/24/2017 11:08 AM Haylee Perrin MD Physician             Progress Notes by John Pederson MD at 5/23/2017 10:52 PM     Author:  John Pederson MD Service:  Hospitalist Author Type:  Physician    Filed:  5/23/2017 10:53 PM Date of Service:  5/23/2017 10:52 PM Creation Time:  5/23/2017 10:52 PM    Status:  Signed :  John Pederson MD (Physician)         X cover    Called about low urine output.  Noted volume up and getting IVF due to rhabdo.  Will give 20 mg iv lasix x1 dose.  Has dowell catheter in.    John Pederson MD[PD1.1]     Revision History        User Key Date/Time User Provider Type Action    > PD1.1 5/23/2017 10:53 PM John Pederson MD Physician Sign            Progress Notes by Desiree Ledesma PA-C at 5/23/2017  2:54 PM     Author:  Desiree Ledesma PA-C Service:  Orthopedics Author Type:  Physician Assistant - C    Filed:  5/23/2017  3:05 PM Date of Service:  5/23/2017  2:54 PM Creation Time:  5/23/2017  2:54 PM    Status:  Signed :  Desiree Ledesma PA-C (Physician Assistant - C)         Yoanna Phillips  s/p left hip closed reduction   Date of reduction 05/22/17    S: Patient doing well this afternoon. Patient reports pain is well controlled on current pain regimen. Patient reports physical therapy is going well. Patient denies fevers, chills or night sweats.      O: @[KN1.1]Temp: 98.1  F (36.7  C) Temp src: Oral BP: 114/62 Pulse: 70 Heart Rate: 61 Resp: 16 SpO2: 95 % O2  Device: None (Room air) Oxygen Delivery: 2 LPM[KN1.2]    Left hip with healed surgical incision[KN1.3]  Moves all toes  Good cap refill in all toes  No calf tenderness bilaterally  Soft compartments  Hip abduction pillow in place (knee immobilizer used for transfers)    Labs:[KN1.1]   Hemoglobin   Date Value Ref Range Status   05/23/2017 11.4 (L) 11.7 - 15.7 g/dL Final[KN1.2]   ]    A:  POD # 1 left hip closed reduction    P:  - PT/OT, WBAT, posterior hip precautions x6 weeks (NO hip IR or ER, avoid abduction, NO hip flexion greater than 70)  - hip abduction pillow in place while laying in bed or sleeping  - knee immobilizer on for transfers and walking  - falls precautions  - SCD for DVT prophylaxis   - disp: Likely TCU in 2-3 days   - Follow up with Dr. Fernando within 1-2 weeks from discharge      Jihan Ledesma PA-C  5/23/2017[KN1.1]         Revision History        User Key Date/Time User Provider Type Action    > KN1.3 5/23/2017  3:05 PM Desiree Ledesma PA-C Physician Assistant - C Sign     KN1.2 5/23/2017  2:55 PM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 further monitoring, evaluation, and treatment.[TK1.3]     Impression & Plan    Medical Decision Making:[TK1.1]  Yoanna Phillips is a 76 year old female who presents after apparently falling out of bed. She lives independently and after not answering the phone for a couple of days, the granddaughter to check on her and found her between the bed and the nightstand. The patient is unable to provide significant history. When I asked[AS1.1],[JB1.1] the patient denied any specific complaints, however on exam it was noted that her left leg was shortened and rotated with pain with any movement of the hip. Blood work was obtained which showed elevated CK, also showed elevation of white blood cell count and hemoglobin which I think is likely secondary to dehydration and rhabdomyolysis from being down for a period of time. She was given IV fluids for this. An xray did show a left hip dislocation. She had her hip replaced in March. I did attempt to reduce this under sedation, but unfortunately both myself and Dr. Ash were unable to reduce this and felt very little movement of the hip. It is quite possible that the hip may have been dislocated for 3 days[AS1.1],[JB1.1] making it very difficult to reduce. I did obtain a CT of the abdomen and head; these did not show any signs of acute process. The patient will be admitted to the hospitalist service with plan to go the operating room to attempt a closed reduction. She has remained stable and appropriate throughout her time here in the ED. She had some episodes of emesis at home that did look somewhat dark in color; I was concerned for possibility of GI bleed; this did not show any evidence of blood and her vital signs and hemoglobin were very appropriate during her time here in the ED. She had no further emesis here in the department.[AS1.1]     Diagnosis:[TK1.1]     ICD-10-CM    1. Dislocated hip, left, initial encounter (H) S73.005A    2. Fall from bed, initial encounter W06.XXXA    3. Traumatic rhabdomyolysis, initial encounter (H) T79.6XXA[AS1.3]      Familia MCCARTY, am serving as a scribe on 5/22/2017 at 9:32 AM to personally document services performed by Juan Figueroa MD based on my observations and the provider's statements to me.     5/22/2017    EMERGENCY DEPARTMENT[TK1.1]     Juan Figueroa MD  05/22/17 1454  [JB1.2]     Revision History        User Key Date/Time User Provider Type Action    > JB1.2 5/22/2017  2:54 PM Juan Figueroa MD Physician Sign     JB1.1 5/22/2017  2:47 PM Juan Figueroa MD Physician      AS1.2 5/22/2017  2:34 PM Tyler Sharpibe Share     AS1.3 5/22/2017  2:31 PM Tyler Sharp Scribe Share     TK1.6 5/22/2017  2:17 PM Familia Sales Scribe Share     AS1.1 5/22/2017  2:17 PM Tyler Sharp Scribe      TK1.5 5/22/2017  2:12 PM Familia Sales Scribe Papi     TK1.3 5/22/2017  1:31 PM Familia Salesibmirian      TK1.2 5/22/2017  1:18 PM Familia Sales Scribe Share     TK1.4 5/22/2017 11:01 AM Familia Sales Scribe Papi     TK1.7 5/22/2017 10:41 AM Familia Salesibmirian Turpin     [N/A] 5/22/2017  9:56 AM Familia Sales Scribe Share     TK1.8 5/22/2017  9:48 AM Familia Sales Scribe Papi     TK1.1 5/22/2017  9:32 AM Familia Sales             ED Notes signed by Cinthia Ahuja-Andreina at 5/22/2017  2:52 PM      Author:  Leigha Non-Provider Service:  (none) Author Type:  (none)    Filed:  5/22/2017  2:52 PM Date of Service:  5/22/2017  2:48 PM Creation Time:  5/22/2017  2:52 PM    Status:  Signed :  Scan, Non-Provider     Scan on 5/22/2017  2:52 PM by Scan, Non-Provider : [facility]          Revision History        User Key Date/Time User Provider Type Action    > [N/A] 5/22/2017  2:52 PM Scan, Non-Provider (none) Sign            Progress Notes by Augustus Haskins MD at 5/22/2017  1:59 PM     Author:   Augustus Haskins MD Service:  Orthopedics Author Type:  Physician    Filed:  5/22/2017  2:00 PM Date of Service:  5/22/2017  1:59 PM Creation Time:  5/22/2017  1:59 PM    Status:  Signed :  Augustus Haskins MD (Physician)         Ortho  Full consult to follow    75 yo female with left hip dislocation.  S/p left MARIELLA 3/20/17.  Irreducible in ED    Plan:  - admit to hospitalist  - to OR tonight for closed reduction under general - likely around 5:00 pm  - NPO  - hold all anticoags    Augustus Haskins MD[BB1.1]     Revision History        User Key Date/Time User Provider Type Action    > BB1.1 5/22/2017  2:00 PM Augustus Haskins MD Physician Sign            ED Notes by Rebecca Sifuentes at 5/22/2017  1:46 PM     Author:  Rebecca Sifuentes Service:  (none) Author Type:  Emergency Room Technician    Filed:  5/22/2017  1:46 PM Date of Service:  5/22/2017  1:46 PM Creation Time:  5/22/2017  1:46 PM    Status:  Signed :  Rebecca Sifuentes (Emergency Room Technician)         Bed: ED22  Expected date:   Expected time:   Means of arrival:   Comments:  In Stab 3     Revision History        User Key Date/Time User Provider Type Action    > SP1.1 5/22/2017  1:46 PM Rebecca Sifuentes Emergency Room Technician Sign            ED Notes by Tatum Patel RN at 5/22/2017  1:46 PM     Author:  Tatum Patel RN Service:  (none) Author Type:  Registered Nurse    Filed:  5/22/2017  1:46 PM Date of Service:  5/22/2017  1:46 PM Creation Time:  5/22/2017  1:46 PM    Status:  Signed :  Tatum Patel RN (Registered Nurse)         REPORT TO SARAH[BM1.1]     Revision History        User Key Date/Time User Provider Type Action    > BM1.1 5/22/2017  1:46 PM Tatum Patel RN Registered Nurse Sign            ED Notes by Tatum Patel RN at 5/22/2017  1:46 PM     Author:  Tatum Patel RN Service:  (none) Author Type:  Registered Nurse    Filed:  5/22/2017  1:46 PM Date of Service:  5/22/2017  1:46 PM  Creation Time:  5/22/2017  1:46 PM    Status:  Signed :  Tatum Patel RN (Registered Nurse)         Critical Care Time (RN) Mins:46[BM1.1]     Revision History        User Key Date/Time User Provider Type Action    > BM1.1 5/22/2017  1:46 PM Tatum Patel RN Registered Nurse Sign            ED Notes by Otto Stevens RN at 5/22/2017 12:50 PM     Author:  Otto Stevens RN Service:  (none) Author Type:  Registered Nurse    Filed:  5/22/2017 12:50 PM Date of Service:  5/22/2017 12:50 PM Creation Time:  5/22/2017 12:50 PM    Status:  Signed :  Otto Stevens RN (Registered Nurse)         Bed: ST03  Expected date:   Expected time:   Means of arrival:   Comments:  Hold for 22's hip       Revision History        User Key Date/Time User Provider Type Action    > JN1.1 5/22/2017 12:50 PM Otto Stevens RN Registered Nurse Sign            ED Notes by Jonathan Chavez RN at 5/22/2017 12:38 PM     Author:  Jonathan Chavez RN Service:  (none) Author Type:  Registered Nurse    Filed:  5/22/2017 12:48 PM Date of Service:  5/22/2017 12:38 PM Creation Time:  5/22/2017 12:38 PM    Status:  Signed :  Jonathan Chavez RN (Registered Nurse)         [facility]  ED Nurse Handoff Report    ED Chief complaint: Generalized Weakness (found down, possibly 3 days, confused. )      ED Diagnosis:   Final diagnoses:   None       Code Status: Full    Allergies:   Allergies   Allergen Reactions     Aspirin Unknown       Activity level - Baseline/Home:  Independent    Activity Level - Current:   Total Care     Needed?: No    Isolation: No  Infection: Not Applicable    Bariatric?: No    Vital Signs:   Vitals:    05/22/17 1015 05/22/17 1017 05/22/17 1112 05/22/17 1214   BP: (!) 128/117 (!) 148/97 143/76 (!) 148/114   Resp: 18 27 16    Temp:       TempSrc:       SpO2:  91% 96% 96%       Cardiac Rhythm: ,   Cardiac  Cardiac Rhythm: Normal sinus rhythm    Pain level:      Is this patient confused?:  No    Patient Report: Initial Complaint: Pt brought in by EMS after being found down at home, family estimates the pt was on the floor for up to 3 days.  Focused Assessment: Pt alert and oriented x3. Pt abdomen distended and tender to palpation Pt complains of nausea and vomiting, bowel sounds present in all 4 quadrants.  Pt in NSR, lung sounds diminished.    Tests Performed: UA/UC, X-rays of chest and pelvis, head CT, BMP, CBC, troponin, CK total, Type and cross.  Abnormal Results: Ck total 2680, X-ray shows dislocation of left hip.   Treatments provided: Pt given 500 mls NS, Pt to go to sta room for hip reduction.    Family Comments: Family at bedside involved in cares.    OBS brochure/video discussed/provided to patient: N/A    ED Medications:   Medications   0.9% sodium chloride BOLUS (500 mLs Intravenous New Bag 5/22/17 1007)   sodium chloride 0.9 % for CT scan flush dose 65 mL (65 mLs Intravenous Given 5/22/17 1103)   iopamidol (ISOVUE-370) solution 82 mL (82 mLs Intravenous Given 5/22/17 1102)       Drips infusing?:  No      ED NURSE PHONE NUMBER: [id number][MC1.1]            Revision History        User Key Date/Time User Provider Type Action    > MC1.1 5/22/2017 12:48 PM Jonathan Chavez RN Registered Nurse Sign            ED Notes by Andrea Wolfe RN at 5/22/2017 10:39 AM     Author:  Andrea Wolfe RN Service:  (none) Author Type:  Registered Nurse    Filed:  5/22/2017 10:39 AM Date of Service:  5/22/2017 10:39 AM Creation Time:  5/22/2017 10:39 AM    Status:  Signed :  Andrea Wolfe RN (Registered Nurse)         Bed: ED22  Expected date:   Expected time:   Means of arrival:   Comments:  Stab 1     Revision History        User Key Date/Time User Provider Type Action    > MS1.1 5/22/2017 10:39 AM Andrea Wolfe, RN Registered Nurse Sign            ED Notes signed by Leigha Non-Provider at 5/22/2017 10:29 AM      Author:  Leigha Non-Provider Service:  (none) Author Type:   (none)    Filed:  5/22/2017 10:29 AM Date of Service:  5/22/2017 10:23 AM Creation Time:  5/22/2017 10:29 AM    Status:  Signed :  Leigha Non-Provider     Scan on 5/22/2017 10:29 AM by Leigha Non-Provider : [facility] 1          Revision History        User Key Date/Time User Provider Type Action    > [N/A] 5/22/2017 10:29 AM Leigha, Non-Provider (none) Sign            ED Notes by Otto Stevens RN at 5/22/2017  9:24 AM     Author:  Otto Stevens RN Service:  (none) Author Type:  Registered Nurse    Filed:  5/22/2017  9:24 AM Date of Service:  5/22/2017  9:24 AM Creation Time:  5/22/2017  9:24 AM    Status:  Signed :  Otto Stevens RN (Registered Nurse)         Bed: [facility]  Expected date: 5/22/17  Expected time: 9:17 AM  Means of arrival: Ambulance  Comments:  [id number] found down  GIB     Revision History        User Key Date/Time User Provider Type Action    > JN1.1 5/22/2017  9:24 AM Otto Stevens RN Registered Nurse Sign                  Procedure Notes     No notes of this type exist for this encounter.         Progress Notes - Therapies (Notes from 05/21/17 through 05/24/17)      Progress Notes by Linda Perez PT at 5/23/2017  1:04 PM     Author:  Linda Perez PT Service:  (none) Author Type:  Physical Therapist    Filed:  5/23/2017  1:04 PM Date of Service:  5/23/2017  1:04 PM Creation Time:  5/23/2017  1:04 PM    Status:  Signed :  Linda Perez PT (Physical Therapist)          05/23/17 1112   Quick Adds   Type of Visit Initial PT Evaluation   Living Environment   Lives With alone   Living Arrangements house   Number of Stairs to Enter Home 4  (B railings. )   Number of Stairs Within Home 0   Transportation Available family or friend will provide   Living Environment Comment Pt's daughter provides PCA services including laundry and grocery shopping. Pt's family is supportive.    Self-Care   Usual Activity Tolerance moderate   Current Activity Tolerance moderate   Regular  Exercise no   Equipment Currently Used at Home walker, rolling   Activity/Exercise/Self-Care Comment Pt owns FWW and shower bench.    Functional Level Prior   Ambulation 1-->assistive equipment   Transferring 1-->assistive equipment   Fall history within last six months yes   Number of times patient has fallen within last six months 1   Prior Functional Level Comment Prior to current admission, pt was completing functional mobility with use of FWW. At baseline, pt completed functional mobility independently without use of an AD.    General Information   Onset of Illness/Injury or Date of Surgery - Date 05/22/17   Referring Physician Desiree Ledesma PA-C   Patient/Family Goals Statement None stated.    Pertinent History of Current Problem (include personal factors and/or comorbidities that impact the POC) 77 y/o female POD # 1 closed reduction of L hip dislocation. PMH including TBI with seizure disorder.    Precautions/Limitations fall precautions;left hip precautions  (Posterior-lateral precautions)   Weight-Bearing Status - LLE weight-bearing as tolerated   General Observations Pt in supine upon arrival of therapist.    General Info Comments KI on at all times.    Cognitive Status Examination   Orientation person;place   Level of Consciousness alert   Follows Commands and Answers Questions 75% of the time   Personal Safety and Judgment at risk behaviors demonstrated   Pain Assessment   Patient Currently in Pain No   Integumentary/Edema   Integumentary/Edema Comments No deficits noted.    Posture    Posture Comments Noted forward head posture upon sitting EOB.    Range of Motion (ROM)   ROM Comment Limited L hip ROM secondary to pain and hip precautions, otherwise B LEs WFL.    Strength   Strength Comments Not formally assessed. Pt demonstrated at least 3/5 grossly in B LEs with functional mobility.   Bed Mobility   Bed Mobility Comments Supine-sit, modA of 2.    Transfer Skills   Transfer Comments Sit  "<> stand with FWW and Kezia of 2.    Gait   Gait Comments Pt amb 10' with FWW and CGA, KI donned.    Balance   Balance Comments Noted good sitting balance and standing balance at FWW.    Sensory Examination   Sensory Perception Comments Pt denied numbness/tingling in B LEs.    Modality Interventions   Planned Modality Interventions Cryotherapy   Planned Modality Interventions Comments PRN.    General Therapy Interventions   Planned Therapy Interventions bed mobility training;gait training;ROM;strengthening;transfer training   Clinical Impression   Criteria for Skilled Therapeutic Intervention yes, treatment indicated   PT Diagnosis Difficulty with gait.    Influenced by the following impairments Pain, LE weakness, Decreased activity tolerance   Functional limitations due to impairments Limited functional mobility requiring assist of 1-2 and use of FWW.    Clinical Presentation Stable/Uncomplicated   Clinical Presentation Rationale Based on PMH, current presentation, and social support.    Clinical Decision Making (Complexity) Low complexity   Therapy Frequency` 2 times/day   Predicted Duration of Therapy Intervention (days/wks) 3 days   Anticipated Discharge Disposition Transitional Care Facility   Risk & Benefits of therapy have been explained Yes   Patient, Family & other staff in agreement with plan of care Yes   [facility] AM-PAC  \"6 Clicks\" V.2 Basic Mobility Inpatient Short Form   1. Turning from your back to your side while in a flat bed without using bedrails? 2 - A Lot   2. Moving from lying on your back to sitting on the side of a flat bed without using bedrails? 2 - A Lot   3. Moving to and from a bed to a chair (including a wheelchair)? 3 - A Little   4. Standing up from a chair using your arms (e.g., wheelchair, or bedside chair)? 2 - A Lot   5. To walk in hospital room? 3 - A Little   6. Climbing 3-5 steps with a railing? 2 - A Lot   Basic Mobility Raw Score (Score out of 24.Lower scores equate " to lower levels of function) 14   Total Evaluation Time   Total Evaluation Time (Minutes) 10[JH1.1]        Revision History        User Key Date/Time User Provider Type Action    > JH1.1 5/23/2017  1:04 PM Linda Perez, PT Physical Therapist Sign

## 2017-05-23 ENCOUNTER — APPOINTMENT (OUTPATIENT)
Dept: PHYSICAL THERAPY | Facility: CLINIC | Age: 77
DRG: 560 | End: 2017-05-23
Attending: PHYSICIAN ASSISTANT
Payer: COMMERCIAL

## 2017-05-23 LAB
ALBUMIN SERPL-MCNC: 2.3 G/DL (ref 3.4–5)
ALP SERPL-CCNC: 78 U/L (ref 40–150)
ALT SERPL W P-5'-P-CCNC: 38 U/L (ref 0–50)
ANION GAP SERPL CALCULATED.3IONS-SCNC: 9 MMOL/L (ref 3–14)
AST SERPL W P-5'-P-CCNC: 57 U/L (ref 0–45)
BILIRUB SERPL-MCNC: 0.3 MG/DL (ref 0.2–1.3)
BUN SERPL-MCNC: 20 MG/DL (ref 7–30)
CALCIUM SERPL-MCNC: 8.3 MG/DL (ref 8.5–10.1)
CHLORIDE SERPL-SCNC: 112 MMOL/L (ref 94–109)
CK SERPL-CCNC: 752 U/L (ref 30–225)
CO2 SERPL-SCNC: 22 MMOL/L (ref 20–32)
CREAT SERPL-MCNC: 0.55 MG/DL (ref 0.52–1.04)
ERYTHROCYTE [DISTWIDTH] IN BLOOD BY AUTOMATED COUNT: 13.8 % (ref 10–15)
GFR SERPL CREATININE-BSD FRML MDRD: ABNORMAL ML/MIN/1.7M2
GLUCOSE BLDC GLUCOMTR-MCNC: 92 MG/DL (ref 70–99)
GLUCOSE SERPL-MCNC: 91 MG/DL (ref 70–99)
HCT VFR BLD AUTO: 34.9 % (ref 35–47)
HGB BLD-MCNC: 11.4 G/DL (ref 11.7–15.7)
HGB BLD-MCNC: 11.7 G/DL (ref 11.7–15.7)
HGB BLD-MCNC: 12.3 G/DL (ref 11.7–15.7)
HGB BLD-MCNC: 13.1 G/DL (ref 11.7–15.7)
MCH RBC QN AUTO: 34.3 PG (ref 26.5–33)
MCHC RBC AUTO-ENTMCNC: 33.5 G/DL (ref 31.5–36.5)
MCV RBC AUTO: 102 FL (ref 78–100)
PLATELET # BLD AUTO: 239 10E9/L (ref 150–450)
POTASSIUM SERPL-SCNC: 3.9 MMOL/L (ref 3.4–5.3)
PROT SERPL-MCNC: 5.2 G/DL (ref 6.8–8.8)
RBC # BLD AUTO: 3.41 10E12/L (ref 3.8–5.2)
SODIUM SERPL-SCNC: 143 MMOL/L (ref 133–144)
WBC # BLD AUTO: 11.5 10E9/L (ref 4–11)

## 2017-05-23 PROCEDURE — 82550 ASSAY OF CK (CPK): CPT | Performed by: PHYSICIAN ASSISTANT

## 2017-05-23 PROCEDURE — 80053 COMPREHEN METABOLIC PANEL: CPT | Performed by: PHYSICIAN ASSISTANT

## 2017-05-23 PROCEDURE — 97161 PT EVAL LOW COMPLEX 20 MIN: CPT | Mod: GP | Performed by: PHYSICAL THERAPIST

## 2017-05-23 PROCEDURE — 97116 GAIT TRAINING THERAPY: CPT | Mod: GP | Performed by: PHYSICAL THERAPIST

## 2017-05-23 PROCEDURE — 85027 COMPLETE CBC AUTOMATED: CPT | Performed by: PHYSICIAN ASSISTANT

## 2017-05-23 PROCEDURE — 99232 SBSQ HOSP IP/OBS MODERATE 35: CPT | Performed by: INTERNAL MEDICINE

## 2017-05-23 PROCEDURE — 25000132 ZZH RX MED GY IP 250 OP 250 PS 637: Performed by: PHYSICIAN ASSISTANT

## 2017-05-23 PROCEDURE — 25000125 ZZHC RX 250: Performed by: PHYSICIAN ASSISTANT

## 2017-05-23 PROCEDURE — 97530 THERAPEUTIC ACTIVITIES: CPT | Mod: GP | Performed by: PHYSICAL THERAPIST

## 2017-05-23 PROCEDURE — 25000128 H RX IP 250 OP 636: Performed by: PHYSICIAN ASSISTANT

## 2017-05-23 PROCEDURE — 85018 HEMOGLOBIN: CPT | Performed by: PHYSICIAN ASSISTANT

## 2017-05-23 PROCEDURE — 25000128 H RX IP 250 OP 636: Performed by: INTERNAL MEDICINE

## 2017-05-23 PROCEDURE — 40000193 ZZH STATISTIC PT WARD VISIT: Performed by: PHYSICAL THERAPIST

## 2017-05-23 PROCEDURE — 00000146 ZZHCL STATISTIC GLUCOSE BY METER IP

## 2017-05-23 PROCEDURE — 36415 COLL VENOUS BLD VENIPUNCTURE: CPT | Performed by: PHYSICIAN ASSISTANT

## 2017-05-23 PROCEDURE — 25000132 ZZH RX MED GY IP 250 OP 250 PS 637: Performed by: INTERNAL MEDICINE

## 2017-05-23 PROCEDURE — 12000007 ZZH R&B INTERMEDIATE

## 2017-05-23 RX ORDER — FUROSEMIDE 10 MG/ML
20 INJECTION INTRAMUSCULAR; INTRAVENOUS ONCE
Status: COMPLETED | OUTPATIENT
Start: 2017-05-23 | End: 2017-05-23

## 2017-05-23 RX ORDER — PANTOPRAZOLE SODIUM 40 MG/1
40 TABLET, DELAYED RELEASE ORAL EVERY MORNING
Status: DISCONTINUED | OUTPATIENT
Start: 2017-05-24 | End: 2017-05-25 | Stop reason: HOSPADM

## 2017-05-23 RX ADMIN — ACETAMINOPHEN 975 MG: 325 TABLET, FILM COATED ORAL at 21:16

## 2017-05-23 RX ADMIN — SODIUM CHLORIDE: 9 INJECTION, SOLUTION INTRAVENOUS at 21:23

## 2017-05-23 RX ADMIN — FOLIC ACID 1 MG: 1 TABLET ORAL at 10:50

## 2017-05-23 RX ADMIN — SODIUM CHLORIDE: 9 INJECTION, SOLUTION INTRAVENOUS at 07:47

## 2017-05-23 RX ADMIN — GABAPENTIN 1200 MG: 600 TABLET, FILM COATED ORAL at 10:49

## 2017-05-23 RX ADMIN — ACETAMINOPHEN 975 MG: 325 TABLET, FILM COATED ORAL at 14:01

## 2017-05-23 RX ADMIN — Medication 100 MG: at 10:49

## 2017-05-23 RX ADMIN — GABAPENTIN 1200 MG: 600 TABLET, FILM COATED ORAL at 21:16

## 2017-05-23 RX ADMIN — CARBAMAZEPINE 300 MG: 100 TABLET, EXTENDED RELEASE ORAL at 10:50

## 2017-05-23 RX ADMIN — PANTOPRAZOLE SODIUM 40 MG: 40 INJECTION, POWDER, FOR SOLUTION INTRAVENOUS at 06:44

## 2017-05-23 RX ADMIN — CARBAMAZEPINE 300 MG: 100 TABLET, EXTENDED RELEASE ORAL at 21:16

## 2017-05-23 RX ADMIN — GABAPENTIN 1200 MG: 600 TABLET, FILM COATED ORAL at 16:56

## 2017-05-23 RX ADMIN — ACETAMINOPHEN 975 MG: 325 TABLET, FILM COATED ORAL at 06:47

## 2017-05-23 RX ADMIN — SODIUM CHLORIDE: 9 INJECTION, SOLUTION INTRAVENOUS at 02:10

## 2017-05-23 RX ADMIN — UMECLIDINIUM 1 PUFF: 62.5 AEROSOL, POWDER ORAL at 11:00

## 2017-05-23 RX ADMIN — FUROSEMIDE 20 MG: 10 INJECTION, SOLUTION INTRAVENOUS at 23:23

## 2017-05-23 RX ADMIN — SODIUM CHLORIDE: 9 INJECTION, SOLUTION INTRAVENOUS at 14:01

## 2017-05-23 NOTE — PLAN OF CARE
Problem: Goal Outcome Summary  Goal: Goal Outcome Summary  Outcome: No Change  Pt denies pain at this time. Sleeps in between cares. Intermittently confused. Scheduled Tylenol given. Tolerated clears, assisted w/ po intake. Bae patent. Tele: NSR. Repositioned per comfort.

## 2017-05-23 NOTE — PROVIDER NOTIFICATION
Pt with low urine output (150 cc for day shift).Dr. Perrin notified via web page. Evening RN made aware and will follow up.

## 2017-05-23 NOTE — PROGRESS NOTES
05/23/17 1112   Quick Adds   Type of Visit Initial PT Evaluation   Living Environment   Lives With alone   Living Arrangements house   Number of Stairs to Enter Home 4  (B railings. )   Number of Stairs Within Home 0   Transportation Available family or friend will provide   Living Environment Comment Pt's daughter provides PCA services including laundry and grocery shopping. Pt's family is supportive.    Self-Care   Usual Activity Tolerance moderate   Current Activity Tolerance moderate   Regular Exercise no   Equipment Currently Used at Home walker, rolling   Activity/Exercise/Self-Care Comment Pt owns FWW and shower bench.    Functional Level Prior   Ambulation 1-->assistive equipment   Transferring 1-->assistive equipment   Fall history within last six months yes   Number of times patient has fallen within last six months 1   Prior Functional Level Comment Prior to current admission, pt was completing functional mobility with use of FWW. At baseline, pt completed functional mobility independently without use of an AD.    General Information   Onset of Illness/Injury or Date of Surgery - Date 05/22/17   Referring Physician Desiree Ledesma PA-C   Patient/Family Goals Statement None stated.    Pertinent History of Current Problem (include personal factors and/or comorbidities that impact the POC) 75 y/o female POD # 1 closed reduction of L hip dislocation. PMH including TBI with seizure disorder.    Precautions/Limitations fall precautions;left hip precautions  (Posterior-lateral precautions)   Weight-Bearing Status - LLE weight-bearing as tolerated   General Observations Pt in supine upon arrival of therapist.    General Info Comments KI on at all times.    Cognitive Status Examination   Orientation person;place   Level of Consciousness alert   Follows Commands and Answers Questions 75% of the time   Personal Safety and Judgment at risk behaviors demonstrated   Pain Assessment   Patient Currently in  "Pain No   Integumentary/Edema   Integumentary/Edema Comments No deficits noted.    Posture    Posture Comments Noted forward head posture upon sitting EOB.    Range of Motion (ROM)   ROM Comment Limited L hip ROM secondary to pain and hip precautions, otherwise B LEs WFL.    Strength   Strength Comments Not formally assessed. Pt demonstrated at least 3/5 grossly in B LEs with functional mobility.   Bed Mobility   Bed Mobility Comments Supine-sit, modA of 2.    Transfer Skills   Transfer Comments Sit <> stand with FWW and Kezia of 2.    Gait   Gait Comments Pt amb 10' with FWW and CGA, KI donned.    Balance   Balance Comments Noted good sitting balance and standing balance at FWW.    Sensory Examination   Sensory Perception Comments Pt denied numbness/tingling in B LEs.    Modality Interventions   Planned Modality Interventions Cryotherapy   Planned Modality Interventions Comments PRN.    General Therapy Interventions   Planned Therapy Interventions bed mobility training;gait training;ROM;strengthening;transfer training   Clinical Impression   Criteria for Skilled Therapeutic Intervention yes, treatment indicated   PT Diagnosis Difficulty with gait.    Influenced by the following impairments Pain, LE weakness, Decreased activity tolerance   Functional limitations due to impairments Limited functional mobility requiring assist of 1-2 and use of FWW.    Clinical Presentation Stable/Uncomplicated   Clinical Presentation Rationale Based on PMH, current presentation, and social support.    Clinical Decision Making (Complexity) Low complexity   Therapy Frequency` 2 times/day   Predicted Duration of Therapy Intervention (days/wks) 3 days   Anticipated Discharge Disposition Transitional Care Facility   Risk & Benefits of therapy have been explained Yes   Patient, Family & other staff in agreement with plan of care Yes   [facility] AM-PAC  \"6 Clicks\" V.2 Basic Mobility Inpatient Short Form   1. Turning from your back to " your side while in a flat bed without using bedrails? 2 - A Lot   2. Moving from lying on your back to sitting on the side of a flat bed without using bedrails? 2 - A Lot   3. Moving to and from a bed to a chair (including a wheelchair)? 3 - A Little   4. Standing up from a chair using your arms (e.g., wheelchair, or bedside chair)? 2 - A Lot   5. To walk in hospital room? 3 - A Little   6. Climbing 3-5 steps with a railing? 2 - A Lot   Basic Mobility Raw Score (Score out of 24.Lower scores equate to lower levels of function) 14   Total Evaluation Time   Total Evaluation Time (Minutes) 10

## 2017-05-23 NOTE — PLAN OF CARE
Problem: Goal Outcome Summary  Goal: Goal Outcome Summary  Outcome: No Change  Patient A&O to self and place. VSS on 2L O2. Denies pain. CMS intact. IVF infusing. Has not dangled. Slept majority of the night. Clear diet. Will continue to monitor.     Tele: ANDER

## 2017-05-23 NOTE — PROGRESS NOTES
[facility]    Hospitalist Progress Note    Date of Service (when I saw the patient): 05/23/2017    Assessment & Plan   Yoanna Phillips is a 76-year-old female with pmhx significant for cognitive impairment, seizure disorder, history of TBI, HTN, COPD, pernicious anemia and DJD.  She presented to [facility] ED via EMS after being found down at home by her granddaughter. Workup in the ED revealed left hip dislocation and rhabdomyolysis.      1.   Left hip dislocation:  Following a fall (most likely Mechanical ).  Tele past 24 hours showed NSR.  ED staff was unable to reduce it in the ED. She was taken to the OR yesterday afternoon and underwent closed reduction, left hip.  Further care per ortho.  PT/OT following.  Pain is well controlled w/ current meds.    ----   D/c tele.  ----   Keep dowell in for one more day since pt needs an assist of two for transfer.  ----   D/c tele.    2.  DVT prophylaxis:   SC Ds.    3.   Rhabdomyolysis:  Fell and remained on the floor for some time (exact duration is unknown).  \Admit CK level was 2680 ---> IVF ----> 752 U/L.  Kidney function wnl.  LFTs are trending down.  Continue IVF hydration.      4.   Possible coffeeground emesis:   The patient's granddaughter reported that she observed the patient to be covered in what appeared to be dried blood and observed her vomiting prior to presentation to the hospital.  Emesis appeared dark brown. Per granddaughter, the patient has been taking a lot of Tylenol and possible ibuprofen at home.  Admit Hgb was 16.0 grams at admit (hemoconcentration) ---> 12.3 --->11.7 ---> 11.4 grams this am.  No melena or hematmesis since admit. Monitor.  Check Hgb q12 hours.       5.   Leukocytosis:   Likely stress related.  No obvious source of infection.  WBC is trending down.    6.   Seizure disorder with history of TBI:   Possible etiology of fall includes episode of seizure. Per patient and family, she has not had an episode for several years and  no recent medication changes. Continue prior to admission carbamazepine.     7.   Elevated traansaminase:  Due to rhabdo.  CT abd/pe was neg at admit.  LFTs are trending down.  Monitor.    8.   Hypertension:  Continue to hold PTA Losartan for one more day.  Continue prn hydralazine.      9.   COPD:  Lungs are CTA.  Continue prior to admission Spiriva.     9.   CODE STATUS:   Full.      Dispo:  Probable transfer to TCU in 1-2 days.      Interval History   Has no complaints.  On regular diet.    -Data reviewed today: I reviewed all new labs and imaging results over the last 24 hours. I personally reviewed no images or EKG's today.    Physical Exam   Temp: 98.5  F (36.9  C) Temp src: Oral BP: 97/53 Pulse: 70 Heart Rate: 56 Resp: 16 SpO2: 93 % O2 Device: None (Room air) Oxygen Delivery: 2 LPM  There were no vitals filed for this visit.  Vital Signs with Ranges  Temp:  [97.6  F (36.4  C)-98.8  F (37.1  C)] 98.5  F (36.9  C)  Pulse:  [70] 70  Heart Rate:  [] 56  Resp:  [12-50] 16  BP: ()/() 97/53  SpO2:  [92 %-100 %] 93 %  I/O last 3 completed shifts:  In: 1775 [P.O.:240; I.V.:1035; IV Piggyback:500]  Out: 1000 [Urine:1000]    General: awake, alert, follows command appropriately, NAD.  HEENT:  NC/AT, PERRL, EOMI, neck supple, no thyromegaly, op clear, mmm.  CVS:  NL s 1 and s2, no m/r/g.  Lungs:  Decreased BS, no wheezing.   Abd:  Soft, + bs, NT, no rebound or gaurding, no fluid shift.  Ext:  No c/c. Left knee immobilized.  Lymph:  No edema.  Neuro:  Nonfocal.  Musculoskeletal: No calf tenderness to palpation.    Skin:  No rash.  Psychiatry:  Mood and affect appropriate.    Medications     NaCl 150 mL/hr at 05/23/17 0900       [START ON 5/24/2017] pantoprazole  40 mg Oral QAM     folic acid (FOLVITE) tablet 1 mg  1 mg Oral Daily     gabapentin (NEURONTIN) tablet 1,200 mg  1,200 mg Oral TID     thiamine tablet 100 mg  100 mg Oral Daily     carBAMazepine  300 mg Oral BID     sodium chloride (PF)  3 mL  Intracatheter Q8H     acetaminophen  975 mg Oral Q8H     umeclidinium  1 puff Inhalation Daily       Data     Recent Labs  Lab 05/23/17  0955 05/23/17  0625 05/23/17  0040 05/22/17  0940   WBC  --  11.5*  --  16.7*   HGB 11.4* 11.7 12.3 16.0*   MCV  --  102*  --  98   PLT  --  239  --  333   NA  --  143  --  137   POTASSIUM  --  3.9  --  4.0   CHLORIDE  --  112*  --  99   CO2  --  22  --  29   BUN  --  20  --  30   CR  --  0.55  --  0.69   ANIONGAP  --  9  --  9   FELICE  --  8.3*  --  10.1   GLC  --  91  --  123*   ALBUMIN  --  2.3*  --  3.3*   PROTTOTAL  --  5.2*  --  7.9   BILITOTAL  --  0.3  --  0.6   ALKPHOS  --  78  --  126   ALT  --  38  --  61*   AST  --  57*  --  125*   TROPI  --   --   --  0.019       Recent Results (from the past 24 hour(s))   XR Hip Port Left 1 View    Narrative    XR HIP PORT LT 1 VW 5/22/2017 1:31 PM    COMPARISON: Radiograph earlier on the same day.    HISTORY: Attempted reduction      Impression    IMPRESSION: Persistent left total hip arthroplasty dislocation. No  fractures are seen.    RADHA HODGSON   XR Hip Port Left 1 View    Narrative    XR HIP PORT LT 1 VW  5/22/2017 5:30 PM     HISTORY:  closed reduction left hip.    COMPARISON: Earlier film.    FINDINGS:  There continues to be superior dislocation of the femoral  component with respect to the acetabular component.   XR Hip Port Left 1 View    Narrative    XR HIP PORT LT 1 VW  5/22/2017 5:45 PM     HISTORY:  post reduction left hip    COMPARISON: Earlier films.    FINDINGS: 1 View. The dislocation has been reduced. There is now  normal alignment.

## 2017-05-23 NOTE — CONSULTS
"ORTHOPEDIC SURGERY CONSULTATION NOTE        DATE OF CONSULTATION:  05/22/2017      CHIEF COMPLAINT:  Left hip pain.      REQUESTING PHYSICIAN:  Dr. Figueroa from [facility] Emergency Department.      HISTORY OF PRESENT ILLNESS:  Yoanna Phillips is a 76-year-old female who is a patient of my partner, Dr. Fernando, who has past medical history significant for TBI with seizure disorder with no current seizure activity over the past many years.  She was found down at her home by her granddaughter.  Last they heard from the family was at least 2 days ago.  She was found lying wedged in between her bed and the nightstand.  She is covered in dark-colored blood and the granddaughter witnessed her vomit with dark bloody appearance and emesis.  She was brought to the Emergency Department where x-rays were obtained which showed a left hip dislocation after she was complaining of left hip pain.  She also had multiple CT scans of her neck and abdomen for evaluation.  She is unable to fully report what had happened, but she felt \"out of sorts.\"  There is no clear reason for her injury or fall at this time.      In the Emergency Department, attempted closed reduction but were unsuccessful.  Multiple attempts were attempted.  Due to the inability to reduce it, orthopedics was asked to bring her to the operating room to undergo closed reduction under general anesthesia.      PAST MEDICAL HISTORY:   1.  Cognitive impairment.   2.  Seizure disorder.   3.  Hypertension.   4.  Anemia.   5.  Possible COPD.     6.  Traumatic brain injury.      PAST SURGICAL HISTORY:   1.  Right total hip arthroplasty.   2.  Left total hip arthroplasty.   3.  Left total shoulder arthroplasty.   4.  Appendectomy.   5.  Cholecystectomy.   6.  Craniotomy.   7.  Splenectomy.      ALLERGIES:  Aspirin.      MEDICATIONS:  Please see H&P for details.      SOCIAL AND FAMILY HISTORY:  The patient is a former smoker but does not currently smoke.  She " ambulates with a walker since her surgery.  She is living on her own.        IMAGING:  X-rays of her left hip shows a posterior dislocation of the left hip.  No fracture seen.      PHYSICAL EXAMINATION:     EXTREMITIES:  Shows a shortened and slightly internally rotated left lower extremity.  She has good pedal and posterior tibial pulses.   NEUROLOGIC:  Sensation is intact to light touch in all nerve distributions.  EHL, FHL intact.  She has pain with palpation over the entire body, but no bruising or crepitus noted in her extremities.      IMPRESSION:  Left hip posterior hip dislocation status post total hip arthroplasty 2 months ago, failed attempts in the Emergency Department for closed reduction.      RECOMMENDATION:  At this point, I recommend bringing her to the operating room for closed reduction under general anesthesia to try a closed reduction.  If this is successful, she will be placed into a hip abduction pillow and possible knee immobilizer to help prevent further dislocation.  If unsuccessful, then will ask Dr. Fernando for his care in regards to revisions.  Risks and benefits of closed reduction were discussed with the patient and her family who elected to proceed.      I did discuss possible risks including inability to relocate the hip as well as fracturing of the components or the femur, they understand these.  Also discussed the risks of anesthesia in general.         JESSICA KELLEY MD             D: 2017 18:01   T: 2017 21:45   MT: [id number]      Name:     JOHN BEAN   MRN:      [id number]        Account:       [id number]   :      1940           Consult Date:  2017      Document: [id number]

## 2017-05-23 NOTE — PLAN OF CARE
Problem: Goal Outcome Summary  Goal: Goal Outcome Summary  PT: Orders received, evaluation completed, and treatment initiated. Patient is a 77 y/o female POD # 1 closed reduction of hip dislocation. Patient lives alone in a house with 4 stairs to enter/exit and all needs are met on the main level. Patient reports using a walker for functional mobility prior to admission.      Surgeon Discharge Plan: TCU per  note.      Current Functional Status: Patient performed supine-sit requiring modA of 2. Sit <> stand with use of FWW and Kezia of 2. Patient ambulated 150 feet with FWW and KI donned, CGA. Patient sitting up in wheelchair at end of session. KI to be donned at all times.      Barriers to Plan/Home: None indicated.

## 2017-05-23 NOTE — PROGRESS NOTES
Yoanna Phillips  s/p left hip closed reduction   Date of reduction 05/22/17    S: Patient doing well this afternoon. Patient reports pain is well controlled on current pain regimen. Patient reports physical therapy is going well. Patient denies fevers, chills or night sweats.      O: @Temp: 98.1  F (36.7  C) Temp src: Oral BP: 114/62 Pulse: 70 Heart Rate: 61 Resp: 16 SpO2: 95 % O2 Device: None (Room air) Oxygen Delivery: 2 LPM    Left hip with healed surgical incision  Moves all toes  Good cap refill in all toes  No calf tenderness bilaterally  Soft compartments  Hip abduction pillow in place (knee immobilizer used for transfers)    Labs:   Hemoglobin   Date Value Ref Range Status   05/23/2017 11.4 (L) 11.7 - 15.7 g/dL Final   ]    A:  POD # 1 left hip closed reduction    P:  - PT/OT, WBAT, posterior hip precautions x6 weeks (NO hip IR or ER, avoid abduction, NO hip flexion greater than 70)  - hip abduction pillow in place while laying in bed or sleeping  - knee immobilizer on for transfers and walking  - falls precautions  - SCD for DVT prophylaxis   - disp: Likely TCU in 2-3 days   - Follow up with Dr. Fernando within 1-2 weeks from discharge      Jihan Ledesma PA-C  5/23/2017

## 2017-05-23 NOTE — PROGRESS NOTES
Pt arrived from Recovery room, A&Ox2, sleepy but easily awakened.  Oriented to room/unit.  Denies pain at his time.  Family at bedside, but left right away.

## 2017-05-23 NOTE — PLAN OF CARE
Problem: Goal Outcome Summary  Goal: Goal Outcome Summary  Outcome: No Change  1530: Pt pleasantly confused. Calm and cooperative. VSS; able to wean off O2/ sats remain stable. Tele dc'd. CMS intact. Up with assist of 2/gait belt and a walker. Ambulated in the rey with PT. Denies pain/ scheduled tylenol given as ordered. Tolerating diet. Will continue to monitor.

## 2017-05-23 NOTE — OP NOTE
PROCEDURE/SERVICE DATE:  05/22/2017.      PREOPERATIVE DIAGNOSIS:  Left hip dislocation, status post total hip arthroplasty.      POSTOPERATIVE DIAGNOSIS:  Left hip dislocation, status post total hip arthroplasty.      PROCEDURE PERFORMED:  Close reduction, left dislocated total hip arthroplasty under general anesthesia.      STAFF SURGEON:  Augustus Haskins MD.      ASSISTANT:  Desiree Ledesma PA-C.      ANESTHESIA:  General.      COMPLICATIONS:  None.      INDICATIONS FOR PROCEDURE:  Yoanna Phillips is a 76-year-old female who dislocated her left hip 1-2 days ago.  She was found down.  Her hip but unable to be relocated in the Emergency Department.  Risks and benefits of closed reduction under general anesthesia were discussed.  The patient and her family elected to proceed.      OPERATIVE REPORT:  The patient was identified in the preoperative holding area, and left hip surgical site was marked.  The patient was brought to the operating suite and under general anesthesia.  The pelvis was then stabilized by my assistant, Desiree Ledesma, which was critical for the procedure.  With gentle longitudinal traction and manipulation of the hip, the hip was then relocated.  It was significantly hard to relocate.  It took several attempts.  Intraoperative x-ray confirmed reduction of the dislocation as well as no fractures.  The patient was placed into a hip abduction pillow and brought to the Recovery Room in stable condition.      POSTOPERATIVE PLAN:  The patient will be admitted to the hospitalist for postoperative management due to the possibility of the patient being found down.  She will likely need significant discussion of safe discharge.      In regard to postoperative care, the patient should remain in the hip abduction pillow at night.  If she is unable to stay in that, we will transition her to a knee immobilizer to prevent her from flexing her hip and internally rotate it to prevent further dislocations.  She  should follow posterior hip precautions, which include no hip flexion past 70 degrees.  No internal rotation.  No adduction past neutral.  The patient can be weightbearing as tolerated with assistive device and assistance.         JESSICA KELLEY MD             D: 2017 18:05   T: 2017 10:03   MT: [id number]      Name:     JOHN BEAN   MRN:      [id number]        Account:        [id number]   :      1940           Procedure Date: 2017      Document: [id number]

## 2017-05-23 NOTE — PROGRESS NOTES
Care Transition Initial Assessment - EMMA  Reason For Consult: discharge planning  Met with: Patient  Active Problems:    Hip dislocation, left (H)         DATA  Lives With: alone  Living Arrangements: house  Description of Support System: Supportive, Involved  Who is your support system?: Children, Other (specify) (grandchild)  Support Assessment: Adequate family and caregiver support, Adequate social supports.   Identified issues/concerns regarding health management:           Quality Of Family Relationships: supportive, involved      ASSESSMENT  Cognitive Status: Alert and oriented with some intermittent confusion noted.  Concerns to be addressed:     Per automatic referral, EMMA met with patient and spoke to patients daughterKay, to discuss discharge planning needs.  Patient is a 76-year-old female who was admitted to the hospital on 5-22-17 with a left hip dislocation.  Prior to hospitalization, patient was living alone in her own home where she was managing well.  Patients daughter is aware that patient will need to go to Rehab prior to returning home and EMMA spoke to Ike with Dr. Haskins who was in agreement with TCU referrals being started.  Patients daughter expressed interest in [facility] as patient has been there in the past.  SW made a referral to the facility via Discharge on the Double to have them assess patient for a possible admission for Thursday May 25.  EMMA will follow up regarding transportation once the discharge plan has been finalized.         PLAN  Financial costs for the patient includes: Discussed coverage for TCU under the patients insurance.  Patient given options and choices for discharge: TCU facility list offered.  Patient/family is agreeable to the plan?  Yes  Patient Goals and Preferences: TCU at discharge.  Patient anticipates discharging to:  TCU at discharge.    DANIELLE Denny      EMMA  D: EMMA following for discharge planning needs.  EMMA received a  message from Joaquina in Admissions at [facility] stating that they will have a bed available for patient on Thursday.  I: SW left a message for patients daughter, Kay, to update her on the above and to discuss transportation to get patient to Rehab on Thursday.  SW will await a return call back.  A: Patient is alert and oriented with some intermittent confusion.  P: SW will continue to follow and assist with finalizing the discharge plan as appropriate.    DANIELLE Denny

## 2017-05-24 ENCOUNTER — APPOINTMENT (OUTPATIENT)
Dept: PHYSICAL THERAPY | Facility: CLINIC | Age: 77
DRG: 560 | End: 2017-05-24
Payer: COMMERCIAL

## 2017-05-24 LAB
ALBUMIN SERPL-MCNC: 2.1 G/DL (ref 3.4–5)
ALP SERPL-CCNC: 74 U/L (ref 40–150)
ALT SERPL W P-5'-P-CCNC: 37 U/L (ref 0–50)
AST SERPL W P-5'-P-CCNC: 43 U/L (ref 0–45)
BILIRUB DIRECT SERPL-MCNC: <0.1 MG/DL (ref 0–0.2)
BILIRUB SERPL-MCNC: 0.2 MG/DL (ref 0.2–1.3)
CK SERPL-CCNC: 430 U/L (ref 30–225)
GLUCOSE SERPL-MCNC: 90 MG/DL (ref 70–99)
PROT SERPL-MCNC: 5.1 G/DL (ref 6.8–8.8)

## 2017-05-24 PROCEDURE — 97110 THERAPEUTIC EXERCISES: CPT | Mod: GP

## 2017-05-24 PROCEDURE — 97530 THERAPEUTIC ACTIVITIES: CPT | Mod: GP

## 2017-05-24 PROCEDURE — 97116 GAIT TRAINING THERAPY: CPT | Mod: GP

## 2017-05-24 PROCEDURE — 82947 ASSAY GLUCOSE BLOOD QUANT: CPT | Performed by: INTERNAL MEDICINE

## 2017-05-24 PROCEDURE — 40000894 ZZH STATISTIC OT IP EVAL DEFER

## 2017-05-24 PROCEDURE — 25000132 ZZH RX MED GY IP 250 OP 250 PS 637: Performed by: PHYSICIAN ASSISTANT

## 2017-05-24 PROCEDURE — 82550 ASSAY OF CK (CPK): CPT | Performed by: INTERNAL MEDICINE

## 2017-05-24 PROCEDURE — 80076 HEPATIC FUNCTION PANEL: CPT | Performed by: INTERNAL MEDICINE

## 2017-05-24 PROCEDURE — 25000132 ZZH RX MED GY IP 250 OP 250 PS 637: Performed by: INTERNAL MEDICINE

## 2017-05-24 PROCEDURE — 36415 COLL VENOUS BLD VENIPUNCTURE: CPT | Performed by: INTERNAL MEDICINE

## 2017-05-24 PROCEDURE — 40000193 ZZH STATISTIC PT WARD VISIT

## 2017-05-24 PROCEDURE — 12000007 ZZH R&B INTERMEDIATE

## 2017-05-24 PROCEDURE — 99232 SBSQ HOSP IP/OBS MODERATE 35: CPT | Performed by: INTERNAL MEDICINE

## 2017-05-24 PROCEDURE — 25000128 H RX IP 250 OP 636: Performed by: PHYSICIAN ASSISTANT

## 2017-05-24 RX ORDER — ACETAMINOPHEN 325 MG/1
975 TABLET ORAL EVERY 8 HOURS
Qty: 60 TABLET | Refills: 0 | Status: SHIPPED | OUTPATIENT
Start: 2017-05-24 | End: 2017-06-13 | Stop reason: DRUGHIGH

## 2017-05-24 RX ADMIN — GABAPENTIN 1200 MG: 600 TABLET, FILM COATED ORAL at 18:42

## 2017-05-24 RX ADMIN — CARBAMAZEPINE 300 MG: 100 TABLET, EXTENDED RELEASE ORAL at 09:13

## 2017-05-24 RX ADMIN — SODIUM CHLORIDE: 9 INJECTION, SOLUTION INTRAVENOUS at 04:00

## 2017-05-24 RX ADMIN — ACETAMINOPHEN 975 MG: 325 TABLET, FILM COATED ORAL at 14:53

## 2017-05-24 RX ADMIN — Medication 100 MG: at 09:13

## 2017-05-24 RX ADMIN — UMECLIDINIUM 1 PUFF: 62.5 AEROSOL, POWDER ORAL at 09:13

## 2017-05-24 RX ADMIN — FOLIC ACID 1 MG: 1 TABLET ORAL at 09:13

## 2017-05-24 RX ADMIN — GABAPENTIN 1200 MG: 600 TABLET, FILM COATED ORAL at 09:13

## 2017-05-24 RX ADMIN — ACETAMINOPHEN 975 MG: 325 TABLET, FILM COATED ORAL at 06:37

## 2017-05-24 RX ADMIN — PANTOPRAZOLE SODIUM 40 MG: 40 TABLET, DELAYED RELEASE ORAL at 09:13

## 2017-05-24 RX ADMIN — ACETAMINOPHEN 975 MG: 325 TABLET, FILM COATED ORAL at 22:19

## 2017-05-24 RX ADMIN — CARBAMAZEPINE 300 MG: 100 TABLET, EXTENDED RELEASE ORAL at 22:18

## 2017-05-24 NOTE — PROGRESS NOTES
X cover    Called about low urine output.  Noted volume up and getting IVF due to rhabdo.  Will give 20 mg iv lasix x1 dose.  Has dowell catheter in.    John Pederson MD

## 2017-05-24 NOTE — PLAN OF CARE
Problem: Goal Outcome Summary  Goal: Goal Outcome Summary  OT: Order received, chart reviewed and per chart review, pt is discharging to TCU. Will defer skilled OT to next level of care. Pt educated on OT role and deferring to TCU. OT order completed.

## 2017-05-24 NOTE — PROGRESS NOTES
EMMA  D: SW following for discharge planning needs.  EMMA met with patients family and discussed transportation to get patient to Rehab tomorrow.  I: Family requested that SW arrange a w/c ride for discharge tomorrow.  Family is aware that the cost of transportation will be billed to the patients Medical Assistance.  EMMA spoke to Rajendra at [facility] and scheduled transportation for tomorrow at 1400.  EMMA faxed the facesheet to [facility].  EMMA left a message for Joaquina in Admissions at [facility] to update her on the transport time for tomorrow.  A: Patient is alert and oriented with some confusion noted.  P: SW will continue to follow and assist with finalizing the discharge plan as appropriate.    DANIELLE Denny      PAS-RR    D: Per DHS regulation, EMMA completed and submitted PAS-RR to [place] [employer] Direct [employer] via the Senior LinkAge Line.  PAS-RR confirmation # is : [id number].    I: SW spoke with patients family and they are aware a PAS-RR has been submitted.  EMMA reviewed with patients family that they may be contacted for a follow up appointment within 10 days of hospital discharge if their SNF stay is < 30 days.  Contact information for [facility] LinkAge Line was also provided.    A: Patients family verbalized understanding.    P: Further questions may be directed to [facility] LinkAge Line at #[phone], option #4 for PAS-RR staff.    DANIELLE Denny

## 2017-05-24 NOTE — PLAN OF CARE
Problem: Goal Outcome Summary  Goal: Goal Outcome Summary  Outcome: No Change  Pt alert to self. VSS on RA. IV infusing. Bae patent. Regular diet. Up with 2, walker, gait belt. CMS intact. Denies pain. Will continue to monitor.

## 2017-05-24 NOTE — PLAN OF CARE
Problem: Goal Outcome Summary  Goal: Goal Outcome Summary  Surgeon Discharge Plan: TCU per social work note.     Current Functional Status: Pt requires mod assist x 2 for bed mobility. Pt demonstrates fair sitting balance once EOB. Pt requires min assist for sit to/from stand transfer. Pt able to ambulate up to 250' with FWW and CGA, improved gait compared to previous therapy sessions. KI donned at all times, requires frequent cues for hip precautions.     Barriers to Plan/Home: None to TCU.

## 2017-05-24 NOTE — PLAN OF CARE
Problem: Goal Outcome Summary  Goal: Goal Outcome Summary  Outcome: Improving  Pt remains Alert and confused to situation. Pt is up with A2 walker and GB. IVF. Denies pain. Bae cath, output 175 this shift on-call MD paged about low output, see orders for lasix x1. Continue to monitor.

## 2017-05-24 NOTE — PROGRESS NOTES
[facility]    Hospitalist Progress Note    Date of Service (when I saw the patient): 05/24/2017    Assessment & Plan   Yoanna Phillips is a 76-year-old female with pmhx significant for cognitive impairment, seizure disorder, history of TBI, HTN, COPD, pernicious anemia and DJD.  She presented to [facility] ED via EMS after being found down at home by her granddaughter. Workup in the ED revealed left hip dislocation and rhabdomyolysis.      1.   Left hip dislocation:  Following a fall (most likely Mechanical ).  ED staff was unable to reduce it in the ED. She was taken to the OR on 5/22/17 afternoon and underwent closed reduction left hip.  Further care per ortho.  PT/OT following.  Pain is well controlled w/ current meds.    ----   Keep dowell in for another day since pt needs an assist of two for transfer.    2.  DVT prophylaxis:   SC Ds.    3.   Rhabdomyolysis:  Fell and remained on the floor for some time (exact duration is unknown).  \Admit CK level was 2680 ---> IVF ----> 752 ---> 430 U/L.  Kidney function wnl.  LFTs are back to normal range.  Continue IVF hydration for one more day but decreased rate to 75 cc/hr.      4.   Possible coffeeground emesis:   The patient's granddaughter reported that she observed the patient covered in what appeared to be dried blood and observed her vomiting prior to presentation to the hospital.  Emesis appeared dark brown. Per granddaughter, the patient has been taking a lot of Tylenol and possible ibuprofen at home.  Admit Hgb was 16.0 grams at admit (hemoconcentration) ---> 12.3 --->11.7 ---> 11.4 ---> 13.1 grams this am.  No melena or hematmesis since admit. S/c Hgb q12 hours Monitoring.       5.   Leukocytosis:   Likely stress related.  No obvious source of infection.  WBC is trending down.    6.   Seizure disorder with history of TBI:   Possible etiology of fall includes episode of seizure. Per patient and family, she has not had an episode for several years and  no recent medication changes. Continue prior to admission carbamazepine.     7.   Elevated traansaminase:  Due to rhabdo.  CT abd/pe neg at admit.  LFTs are back to normal range following IVF hydration.    8.   Hypertension:  Continue to hold PTA Losartan for one more day.  Continue prn hydralazine.      9.   COPD:  Lungs are CTA.  Continue prior to admission Spiriva.     9.   CODE STATUS:   Full.      Dispo:  Probable transfer to TCU in am    Interval History   Has no complaints.  On regular diet.    -Data reviewed today: I reviewed all new labs and imaging results over the last 24 hours.     Physical Exam   Temp: 98.1  F (36.7  C) Temp src: Oral BP: 117/62 Pulse: 60 Heart Rate: 65 Resp: 16 SpO2: 95 % O2 Device: None (Room air)    There were no vitals filed for this visit.  Vital Signs with Ranges  Temp:  [97.4  F (36.3  C)-98.1  F (36.7  C)] 98.1  F (36.7  C)  Pulse:  [60] 60  Heart Rate:  [58-70] 65  Resp:  [16-18] 16  BP: (114-142)/(61-75) 117/62  SpO2:  [94 %-95 %] 95 %  I/O last 3 completed shifts:  In: 5682 [P.O.:740; I.V.:4942]  Out: 525 [Urine:525]    General: awake, alert, follows command appropriately, NAD.  HEENT:  NC/AT, PERRL, EOMI, neck supple, no thyromegaly, op clear, mmm.  CVS:  NL s 1 and s2, no m/r/g.  Lungs:  Decreased BS, no wheezing.   Abd:  Soft, + bs, NT, no rebound or gaurding, no fluid shift.  Ext:  No c/c. Left knee immobilized.  Lymph:  No edema.  Neuro:  Nonfocal.  Musculoskeletal: No calf tenderness to palpation.    Skin:  No rash.  Psychiatry:  Mood and affect appropriate.    Medications     NaCl 150 mL/hr at 05/24/17 0400       pantoprazole  40 mg Oral QAM     folic acid (FOLVITE) tablet 1 mg  1 mg Oral Daily     gabapentin (NEURONTIN) tablet 1,200 mg  1,200 mg Oral TID     thiamine tablet 100 mg  100 mg Oral Daily     carBAMazepine  300 mg Oral BID     sodium chloride (PF)  3 mL Intracatheter Q8H     acetaminophen  975 mg Oral Q8H     umeclidinium  1 puff Inhalation Daily       Data      Recent Labs  Lab 05/24/17  0645 05/23/17  1635 05/23/17  0955 05/23/17  0625  05/22/17  0940   WBC  --   --   --  11.5*  --  16.7*   HGB  --  13.1 11.4* 11.7  < > 16.0*   MCV  --   --   --  102*  --  98   PLT  --   --   --  239  --  333   NA  --   --   --  143  --  137   POTASSIUM  --   --   --  3.9  --  4.0   CHLORIDE  --   --   --  112*  --  99   CO2  --   --   --  22  --  29   BUN  --   --   --  20  --  30   CR  --   --   --  0.55  --  0.69   ANIONGAP  --   --   --  9  --  9   FELICE  --   --   --  8.3*  --  10.1   GLC 90  --   --  91  --  123*   ALBUMIN 2.1*  --   --  2.3*  --  3.3*   PROTTOTAL 5.1*  --   --  5.2*  --  7.9   BILITOTAL 0.2  --   --  0.3  --  0.6   ALKPHOS 74  --   --  78  --  126   ALT 37  --   --  38  --  61*   AST 43  --   --  57*  --  125*   TROPI  --   --   --   --   --  0.019   < > = values in this interval not displayed.    No results found for this or any previous visit (from the past 24 hour(s)).

## 2017-05-25 ENCOUNTER — APPOINTMENT (OUTPATIENT)
Dept: PHYSICAL THERAPY | Facility: CLINIC | Age: 77
DRG: 560 | End: 2017-05-25
Payer: COMMERCIAL

## 2017-05-25 VITALS
OXYGEN SATURATION: 95 % | SYSTOLIC BLOOD PRESSURE: 149 MMHG | TEMPERATURE: 98.7 F | DIASTOLIC BLOOD PRESSURE: 78 MMHG | RESPIRATION RATE: 16 BRPM | HEART RATE: 65 BPM

## 2017-05-25 LAB — CK SERPL-CCNC: 286 U/L (ref 30–225)

## 2017-05-25 PROCEDURE — 25000132 ZZH RX MED GY IP 250 OP 250 PS 637: Performed by: PHYSICIAN ASSISTANT

## 2017-05-25 PROCEDURE — 25000132 ZZH RX MED GY IP 250 OP 250 PS 637: Performed by: INTERNAL MEDICINE

## 2017-05-25 PROCEDURE — 99239 HOSP IP/OBS DSCHRG MGMT >30: CPT | Performed by: INTERNAL MEDICINE

## 2017-05-25 PROCEDURE — 25000128 H RX IP 250 OP 636: Performed by: INTERNAL MEDICINE

## 2017-05-25 PROCEDURE — 97116 GAIT TRAINING THERAPY: CPT | Mod: GP

## 2017-05-25 PROCEDURE — 40000193 ZZH STATISTIC PT WARD VISIT

## 2017-05-25 PROCEDURE — 82550 ASSAY OF CK (CPK): CPT | Performed by: INTERNAL MEDICINE

## 2017-05-25 PROCEDURE — 97110 THERAPEUTIC EXERCISES: CPT | Mod: GP

## 2017-05-25 PROCEDURE — 36415 COLL VENOUS BLD VENIPUNCTURE: CPT | Performed by: INTERNAL MEDICINE

## 2017-05-25 RX ORDER — LOSARTAN POTASSIUM 100 MG/1
100 TABLET ORAL DAILY
Status: DISCONTINUED | OUTPATIENT
Start: 2017-05-25 | End: 2017-05-25 | Stop reason: HOSPADM

## 2017-05-25 RX ADMIN — SODIUM CHLORIDE: 9 INJECTION, SOLUTION INTRAVENOUS at 00:14

## 2017-05-25 RX ADMIN — UMECLIDINIUM 1 PUFF: 62.5 AEROSOL, POWDER ORAL at 09:01

## 2017-05-25 RX ADMIN — GABAPENTIN 1200 MG: 600 TABLET, FILM COATED ORAL at 16:53

## 2017-05-25 RX ADMIN — GABAPENTIN 1200 MG: 600 TABLET, FILM COATED ORAL at 00:14

## 2017-05-25 RX ADMIN — ACETAMINOPHEN 975 MG: 325 TABLET, FILM COATED ORAL at 06:29

## 2017-05-25 RX ADMIN — PANTOPRAZOLE SODIUM 40 MG: 40 TABLET, DELAYED RELEASE ORAL at 08:57

## 2017-05-25 RX ADMIN — Medication 100 MG: at 08:58

## 2017-05-25 RX ADMIN — FOLIC ACID 1 MG: 1 TABLET ORAL at 08:57

## 2017-05-25 RX ADMIN — ACETAMINOPHEN 975 MG: 325 TABLET, FILM COATED ORAL at 13:34

## 2017-05-25 RX ADMIN — CARBAMAZEPINE 300 MG: 100 TABLET, EXTENDED RELEASE ORAL at 08:57

## 2017-05-25 RX ADMIN — GABAPENTIN 1200 MG: 600 TABLET, FILM COATED ORAL at 08:57

## 2017-05-25 RX ADMIN — LOSARTAN POTASSIUM 100 MG: 100 TABLET, FILM COATED ORAL at 13:37

## 2017-05-25 NOTE — PLAN OF CARE
Problem: Goal Outcome Summary  Goal: Goal Outcome Summary  Outcome: No Change  Alert to self, vs stable, denies pain, taking scheduled Tylenol.  Cms intact, KI on, dowell intact, IVF infusing.  Will continue to monitor.

## 2017-05-25 NOTE — PLAN OF CARE
Problem: Goal Outcome Summary  Goal: Goal Outcome Summary  Outcome: Adequate for Discharge Date Met:  05/25/17  Pt was A & O to self. Lungs sound clear, bowel sounds active, cms intact. Received tylenol for pain. Voiding. Up with 2 and walker. Immobilizer on. Will continue to monitor. Was discharged to TCU. AVS given to granddaughter.

## 2017-05-25 NOTE — PROGRESS NOTES
EMMA  D: Per MD order, patient okay to discharge today to Rehab.  SW was informed that the 1400 transport time will need to be pushed back as they are taking her catheter out and will see whether she is able to go to the bathroom or not.  I: EMMA spoke to Cluadette at [facility] and rescheduled transportation for today at 1730.  EMMA spoke to Joaquina in Admissions at [facility] to update her on the change in the transport time.  EMMA faxed the discharge orders and updated family on the new transport time.  A: Patient is alert and oriented.  Patient and family are aware and in agreement with the plan for patient to discharge today to Rehab.  P: Patient to discharge today to [facility].  SW will be available to assist as needed.    DANIELLE Denny

## 2017-05-25 NOTE — DISCHARGE SUMMARY
[facility]    Discharge Summary  Hospitalist    Date of Admission:  5/22/2017  Date of Discharge:  5/25/2017  Discharging Provider: Haylee Perrin MD  Date of Service (when I saw the patient): 05/25/17    Discharge Diagnoses   1)  Left hip dislocation, h/o left MAREILLA.  S/p closed reduction under general anesthesia.  2)  Rhabdomyolysis, resolved.  3)  Transaminitis due rhabdo, resolved.    History of Present Illness   Yoanna Phillips is a 76-year-old female with pmhx significant for cognitive impairment, seizure disorder, history of TBI, HTN, COPD, pernicious anemia and DJD.  She presented to [facility] ED on 5/22 via EMS after being found down at home by her granddaughter.  Workup in the ED revealed left hip dislocation and rhabdomyolysis.      Hospital Course   Yoanna Phillips was admitted on 5/22/2017.  The following problems were addressed during her hospitalization:    1.   Left hip dislocation:  Pt has a h/o left MAIRELLA and recurrent left hip dislocation.  Her current left dislocation was probably following a mechanical fall.  ED staff was unable to reduce it in the ED.  She was taken to the OR on 5/22/17 afternoon and underwent closed reduction left hip under generalized anesthesia.  Further care per ortho.  PT/OT following.  Pain is well w/ just tylenol.  She is still requiring an assist of one for transfer or ambulation.    2.   Rhabdomyolysis:  Fell and remained on the floor for some time (exact duration is unknown).  Admit CK level was 2680 ---> IVF ----> 752 ---> 430 ---> 286 U/L this morning, back to normal range.  Kidney function wnl.  LFTs are back to normal range.  d/c IVF.  Resolved.        3.   Possible coffeeground emesis:   The patient's granddaughter reported that she observed the patient covered in what appeared to be dried blood and observed her vomiting prior to presentation to the hospital.  Emesis appeared dark brown. Per granddaughter, the patient has been taking a lot of Tylenol  and possible ibuprofen at home.  Admit Hgb was 16.0 grams at admit (hemoconcentration) ---> 12.3 --->11.7 ---> 11.4 ---> 13.1 grams.  No melena, hematochezia or hematmesis since admit.       4.   Leukocytosis:   Likely stress related.  No obvious source of infection.  Resolving.  Did not check WBC past 2 days.     5.   Seizure disorder with history of TBI:   Possible etiology of fall includes episode of seizure. Per patient and family, she has not had an episode for several years and no recent medication changes.  Continue PTA carbamazepine.      6.   Elevated traansaminase:  Due to rhabdo.  CT abd/pe neg at admit.  LFTs are back to normal range following IVF hydration.     7.   Hypertension:  Continue to hold PTA Losartan for one more day.        8.   COPD:  Lungs are CTA.  Continue prior to admission Spiriva.      9.   CODE STATUS:   Full.      Haylee Perrin MD.   Hospitalist.  [phone], pager.    Code Status   Full Code       Primary Care Physician   Marin Ledesma    Physical Exam   Temp: 98.7  F (37.1  C) Temp src: Oral BP: 149/78 Pulse: 65 Heart Rate: 67 Resp: 16 SpO2: 95 % O2 Device: None (Room air)    There were no vitals filed for this visit.  Vital Signs with Ranges  Temp:  [98  F (36.7  C)-98.8  F (37.1  C)] 98.7  F (37.1  C)  Pulse:  [65] 65  Heart Rate:  [60-74] 67  Resp:  [16] 16  BP: (125-151)/(65-78) 149/78  SpO2:  [95 %-100 %] 95 %  I/O last 3 completed shifts:  In: 1700 [P.O.:500; I.V.:1200]  Out: 3575 [Urine:3575]    General: aao x 3, NAD.  HEENT:  NC/AT, PERRL, EOMI, neck supple, no thyromegaly, op clear, mmm.  CVS:  NL s 1 and s2, no m/r/g.  Lungs:  CTA B/L.   Abd:  Soft, + bs, NT, no rebound or gaurding, no fluid shift.  Ext:  No c/c.  Lymph:  No edema.  Neuro:  Nonfocal.  Musculoskeletal: No calf tenderness to palpation.    Skin:  No rash.  Psychiatry:  Mood and affect appropriate.    Discharge Disposition   Discharged to home   TCU  Condition at discharge: Stable    Consultations This  Hospital Stay   OCCUPATIONAL THERAPY ADULT IP CONSULT  PHYSICAL THERAPY ADULT IP CONSULT  ORTHOPEDICS IP CONSULT  SOCIAL WORK IP CONSULT  PHYSICAL THERAPY ADULT IP CONSULT  OCCUPATIONAL THERAPY ADULT IP CONSULT    Time Spent on this Encounter   I, Haylee Perrin, personally saw the patient today and spent greater than 30 minutes discharging this patient.    Discharge Orders     AntiEmbolism Stockings   Bilateral below knee length.On in the morning, off at night     Follow Up and recommended labs and tests   Patient to follow up with Dr. Fernando within 1 week post-op. Follow up at [facility] location. Address is [address] [place] [address].    Please call [phone] to schedule an appointment.     Weight bearing status   Weight bearing as tolerated left lower extremity. Use walker for ambulation assistance.  Use hip abduction pillow while laying in bed and sleeping.   Use knee immobilizer while ambulating.  Avoid hip flexion greater than 70 degrees.  NO hip IR or ER.  Avoid hip adduction.     Activity - Up with assistive device     General info for SNF   Length of Stay Estimate: Short Term Care: Estimated # of Days <30  Condition at Discharge: Stable  Level of care:skilled   Rehabilitation Potential: Good  Admission H&P remains valid and up-to-date: Yes  Recent Chemotherapy: N/A  Use Nursing Home Standing Orders: Yes     Mantoux instructions   Give two-step Mantoux (PPD) Per Facility Policy Yes     Full Code     Physical Therapy Adult Consult   Evaluate and treat as clinically indicated.    Reason:  Left hip dislocation     Occupational Therapy Adult Consult   Evaluate and treat as clinically indicated.    Reason:  Left hip dislocation     Advance Diet as Tolerated   Follow this diet upon discharge:  Regular Diet Adult       Discharge Medications   Current Discharge Medication List      CONTINUE these medications which have CHANGED    Details   acetaminophen (TYLENOL) 325 MG tablet Take  3 tablets (975 mg) by mouth every 8 hours  Qty: 60 tablet, Refills: 0    Associated Diagnoses: Dislocated hip, left, initial encounter (H)         CONTINUE these medications which have NOT CHANGED    Details   CARBAMAZEPINE ER PO Take 300 mg by mouth 2 times daily      cyanocobalamin (VITAMIN B12) 1000 MCG/ML injection Inject 1 mL into the muscle every 30 days      GABAPENTIN PO Take 1,200 mg by mouth 3 times daily (Takes 2 x 600mg tablet = 1200mg dose)      Potassium Chloride Zonia CR (K-DUR PO) Take 10 mEq by mouth 2 times daily       tiotropium (SPIRIVA) 18 MCG capsule Inhale 18 mcg into the lungs daily      FOLIC ACID PO Take 1 mg by mouth daily      Thiamine HCl (VITAMIN B-1 PO) Take 100 mg by mouth daily      LOSARTAN POTASSIUM PO Take 100 mg by mouth daily      Multiple Vitamins-Minerals (CENTRUM SILVER) per tablet Take 1 tablet by mouth daily           Allergies   Allergies   Allergen Reactions     Aspirin Unknown

## 2017-05-25 NOTE — PLAN OF CARE
Problem: Goal Outcome Summary  Goal: Goal Outcome Summary  Surgeon Discharge Plan: TCU this afternoon.     Current Functional Status: Mod assist for bed mobility. Min assist for sit to/from stand. Ambulated 250' with FWW and KI donned with CGA. Participated in LE strengthening exercises without complaint.     Barriers to Plan/Home: None to TCU.     Physical Therapy Discharge Summary     Reason for therapy discharge:    Discharged to transitional care facility.     Progress towards therapy goal(s). See goals on Care Plan in [facility] electronic health record for goal details.  Goals partially met.  Barriers to achieving goals:   discharge from facility.     Therapy recommendation(s):    Continued therapy is recommended.  Rationale/Recommendations:  To further increase independence and mobility.

## 2017-05-25 NOTE — DISCHARGE INSTRUCTIONS
You have been discharged to [facility]  Phone Number is [phone]  Fax Number is [phone]

## 2017-06-07 ENCOUNTER — TRANSFERRED RECORDS (OUTPATIENT)
Dept: FAMILY MEDICINE | Facility: CLINIC | Age: 77
End: 2017-06-07

## 2017-06-13 ENCOUNTER — NURSING HOME VISIT (OUTPATIENT)
Dept: GERIATRICS | Facility: CLINIC | Age: 77
End: 2017-06-13
Payer: COMMERCIAL

## 2017-06-13 VITALS
HEART RATE: 82 BPM | OXYGEN SATURATION: 95 % | BODY MASS INDEX: 29.18 KG/M2 | DIASTOLIC BLOOD PRESSURE: 64 MMHG | WEIGHT: 164.7 LBS | TEMPERATURE: 97.8 F | RESPIRATION RATE: 18 BRPM | SYSTOLIC BLOOD PRESSURE: 117 MMHG

## 2017-06-13 DIAGNOSIS — R53.81 PHYSICAL DECONDITIONING: ICD-10-CM

## 2017-06-13 DIAGNOSIS — I10 ESSENTIAL HYPERTENSION, BENIGN: ICD-10-CM

## 2017-06-13 DIAGNOSIS — J44.9 CHRONIC OBSTRUCTIVE PULMONARY DISEASE, UNSPECIFIED COPD TYPE (H): ICD-10-CM

## 2017-06-13 DIAGNOSIS — G30.1 LATE ONSET ALZHEIMER'S DISEASE WITHOUT BEHAVIORAL DISTURBANCE (H): ICD-10-CM

## 2017-06-13 DIAGNOSIS — S73.005D HIP DISLOCATION, LEFT, SUBSEQUENT ENCOUNTER: Primary | ICD-10-CM

## 2017-06-13 DIAGNOSIS — G40.909 SEIZURE DISORDER (H): ICD-10-CM

## 2017-06-13 DIAGNOSIS — F02.80 LATE ONSET ALZHEIMER'S DISEASE WITHOUT BEHAVIORAL DISTURBANCE (H): ICD-10-CM

## 2017-06-13 DIAGNOSIS — L89.622: ICD-10-CM

## 2017-06-13 PROCEDURE — 99306 1ST NF CARE HIGH MDM 50: CPT | Performed by: INTERNAL MEDICINE

## 2017-06-13 PROCEDURE — 99207 ZZC CDG-CORRECTLY CODED, REVIEWED AND AGREE: CPT | Performed by: INTERNAL MEDICINE

## 2017-06-13 RX ORDER — ZINC OXIDE 216 MG/ML
LOTION TOPICAL 2 TIMES DAILY
COMMUNITY
End: 2020-02-16
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
## 2017-07-13 NOTE — PROGRESS NOTES
[place] [facility]  Chief Complaint   Patient presents with     Annual Comprehensive Nursing Home       HPI:    Yoanna Phillips is a 77 year old  (1940), who is being seen today for an annual comprehensive visit at [facility].  HPI information obtained from: facility chart records, facility staff and patient report.      Today's concerns are:  Hip dislocation, left, sequela  -Denies pain.   -s/p closed reduction.   -Has tylenol available for pain-scheduled.     Essential hypertension, benign  Based on JNC-8 goals,  patients age of 77 year old, no presence of diabetes or CKD, and goals of care goal BP is <150/90 mm Hg. patient is stable with current plan of care and routine assessment..    Cognitive impairment  -Last BIMS 11/15  -No behaviors noted by staff.     Seizure disorder (H)  -No seizure activity since admission    Primary osteoarthritis of right hip  -Scheduled tylenol for pain.     Screening for depression  Depression screen done: PHQ-9 Given screen score and clinical assessment patient is stable without any signs of depression and no futher interventions warrented at this time.  PHQ9 was done at nursing facility on 6/18/17 ;score 0/27.      The health plan new enrollment has happened. I have reviewed the  MDS, the preventative needs,  and facility care plan. The level of care is appropriate. I have reviewed the code status/advanced directives.       ALLERGIES: Aspirin  PROBLEM LIST:  Patient Active Problem List   Diagnosis     Essential hypertension, benign     B-complex deficiency     CARDIOVASCULAR SCREENING; LDL GOAL LESS THAN 130     Poor short term memory     Advance Care Planning     Shoulder joint replacement status     Cognitive impairment     DJD (degenerative joint disease)     Health Care Home     Seizure disorder (H)     Hip joint replacement status     Status post total replacement of right hip     Primary osteoarthritis of hip     Dislocation of internal  right hip prosthesis, subsequent encounter     Physical deconditioning     Anemia due to blood loss, acute     Dyspnea     Dislocated hip (H)     Hip dislocation, left (H)     PAST MEDICAL HISTORY:  has a past medical history of Arthritis; Cognitive impairment (7/3/2012); COPD (chronic obstructive pulmonary disease) (H); Dyspnea on exertion; Epilepsy (H); Hypertension; Numbness and tingling; Pernicious anemia; Poor short term memory (5/20/2011); and Seizures (H). She also has no past medical history of PONV (postoperative nausea and vomiting).  PAST SURGICAL HISTORY:  has a past surgical history that includes NONSPECIFIC PROCEDURE (1965); Craniotomy (1965); Cholecystectomy; Splenectomy; Arthroplasty shoulder (6/25/2012); appendectomy; Arthroplasty hip (Right, 4/11/2016); Arthroplasty revision hip (Right, 5/14/2016); Arthroplasty hip (Left, 3/20/2017); and Closed reduction hip (Left, 5/22/2017).  FAMILY HISTORY: family history includes Arthritis in her mother; HEART DISEASE in her mother; Hypertension in her mother; Neurologic Disorder in her mother.  SOCIAL HISTORY:  reports that she quit smoking about 55 years ago. Her smoking use included Cigarettes. She has a 5.00 pack-year smoking history. She has never used smokeless tobacco. She reports that she drinks alcohol. She reports that she does not use illicit drugs.  IMMUNIZATIONS:  Most Recent Immunizations   Administered Date(s) Administered     Influenza (High Dose) 3 valent vaccine 11/14/2016     Pneumococcal (PCV 13) 11/10/2009     Pneumococcal 23 valent 03/05/2012     TD (ADULT, 7+) 06/18/2007     Above immunizations pulled from [facility]. [facility] and facility records also reconciled. Outstanding information sent to  to update [facility] .  Future immunizations are not needed at this point as all recommended immunizations are up to date.   MEDICATIONS:  Current Outpatient Prescriptions   Medication Sig Dispense Refill     Acetaminophen  (TYLENOL PO) Take 1,000 mg by mouth 3 times daily       NUTRITIONAL SUPPLEMENT LIQD Take by mouth 2 times daily Give 120mL       CARBAMAZEPINE ER PO Take 300 mg by mouth 2 times daily       cyanocobalamin (VITAMIN B12) 1000 MCG/ML injection Inject 1 mL into the muscle every 30 days       GABAPENTIN PO Take 1,200 mg by mouth 3 times daily (Takes 2 x 600mg tablet = 1200mg dose)       Potassium Chloride Zonia CR (K-DUR PO) Take 10 mEq by mouth 2 times daily        tiotropium (SPIRIVA) 18 MCG capsule Inhale 18 mcg into the lungs daily       FOLIC ACID PO Take 1 mg by mouth daily       Thiamine HCl (VITAMIN B-1 PO) Take 100 mg by mouth daily       LOSARTAN POTASSIUM PO Take 100 mg by mouth daily       Multiple Vitamins-Minerals (CENTRUM SILVER) per tablet Take 1 tablet by mouth daily       Medications reviewed:  Medications reconciled to facility chart and changes were made to reflect current medications as identified as above med list. Below are the changes that were made:   Medications stopped since last EPIC medication reconciliation:   There are no discontinued medications.    Medications started since last [facility] medication reconciliation:  No orders of the defined types were placed in this encounter.    Case Management:  I have reviewed the Assisted Living care plan, current immunizations and preventive care/cancer screening..Future cancer screening is not clinically indicated secondary to age/goals of care Patient's desire to return to the community is not present. Current Level of Care is appropriate.    Advance Directive Discussion:    I reviewed the current advanced directives as reflected in EPIC, the POLST and the facility chart, and verified the congruency of orders. I contacted the first party Jeanie Phillips and left a message regarding the plan of Care.  I did not due to cognitive impairment review the advance directives with the resident.     Team Discussion:  I communicated with the appropriate disciplines  involved with the Plan of Care:   Nursing:  medication administration and safety, :  . and Dietitian:  Dietary monitoring    Patient Goal:  Patient's goal is pain control and comfort.    Information reviewed:  Medications, vital signs, orders, and nursing notes.    ROS:  4 point ROS including Respiratory, CV, GI and , other than that noted in the HPI,  is negative    Exam:  There were no vitals taken for this visit.  GENERAL APPEARANCE:  Alert  ENT:  Mouth and posterior oropharynx normal, moist mucous membranes  RESP:  respiratory effort and palpation of chest normal, lungs clear to auscultation   CV:  Palpation and auscultation of heart done , regular rate and rhythm, no murmur, rub, or gallop  M/S:   Gait and station normal  Digits and nails normal  SKIN:  Inspection of skin and subcutaneous tissue baseline, Palpation of skin and subcutaneous tissue baseline  NEURO:   Cranial nerves 2-12 are normal tested and grossly at patient's baseline  PSYCH:  memory impaired , affect and mood normal     Lab/Diagnostic data:      CBC RESULTS:   Recent Labs   Lab Test  05/23/17   1635  05/23/17   0955  05/23/17   0625 05/22/17   0940   WBC   --    --   11.5*   --   16.7*   RBC   --    --   3.41*   --   4.77   HGB  13.1  11.4*  11.7   < >  16.0*   HCT   --    --   34.9*   --   46.7   MCV   --    --   102*   --   98   MCH   --    --   34.3*   --   33.5*   MCHC   --    --   33.5   --   34.3   RDW   --    --   13.8   --   13.5   PLT   --    --   239   --   333    < > = values in this interval not displayed.       Last Basic Metabolic Panel:  Recent Labs   Lab Test  05/24/17   0645  05/23/17   0625 05/22/17   0940   NA   --   143  137   POTASSIUM   --   3.9  4.0   CHLORIDE   --   112*  99   FELICE   --   8.3*  10.1   CO2   --   22  29   BUN   --   20  30   CR   --   0.55  0.69   GLC  90  91  123*       Liver Function Studies -   Recent Labs   Lab Test  05/24/17   0645  05/23/17   0625   PROTTOTAL  5.1*  5.2*   ALBUMIN   2.1*  2.3*   BILITOTAL  0.2  0.3   ALKPHOS  74  78   AST  43  57*   ALT  37  38       TSH   Date Value Ref Range Status   03/08/2012 0.44 0.4 - 5.0 mU/L Final               ASSESSMENT/PLAN  (S73.005S) Hip dislocation, left, sequela  (primary encounter diagnosis)  Comment: s/p closed reduction  Plan: scheduled acetaminophen for pain    (I10) Essential hypertension, benign  Comment: good control  Plan: continue losartan.       (R41.89) Cognitive impairment  Comment: low cognitive scores and low functional status.     Plan: Board and Care support for meds, meals, activity, safety.    Chronic, progressive. HPI/ROS difficult to obtain with cognitive impairment. Expect further functional and cognitive decline. Expect weight loss.. Monitor weight. Monitor functional status. Monitor for behavioral disturbances.    (G40.909) Seizure disorder (H)  Comment: longstanding  Plan: continue carbamazepine, monitor labs    (M16.11) Primary osteoarthritis of right hip  Comment: longstanding  Plan: Continue scheduled tylenol as ordered    (Z13.89) Screening for depression  -See above. No concerns.    Orders:  The current medical regimen is effective; continue present plan and medications.    Electronically signed by:  TANIYA Zaman CNP  [facility]

## 2017-07-14 ENCOUNTER — NURSING HOME VISIT (OUTPATIENT)
Dept: GERIATRICS | Facility: CLINIC | Age: 77
End: 2017-07-14
Payer: COMMERCIAL

## 2017-07-14 DIAGNOSIS — I10 ESSENTIAL HYPERTENSION, BENIGN: ICD-10-CM

## 2017-07-14 DIAGNOSIS — Z13.31 SCREENING FOR DEPRESSION: ICD-10-CM

## 2017-07-14 DIAGNOSIS — G40.909 SEIZURE DISORDER (H): ICD-10-CM

## 2017-07-14 DIAGNOSIS — R41.89 COGNITIVE IMPAIRMENT: ICD-10-CM

## 2017-07-14 DIAGNOSIS — M16.11 PRIMARY OSTEOARTHRITIS OF RIGHT HIP: ICD-10-CM

## 2017-07-14 DIAGNOSIS — S73.005S HIP DISLOCATION, LEFT, SEQUELA: Primary | ICD-10-CM

## 2017-07-14 PROCEDURE — 99318 ZZC ANNUAL NURSING FAC ASSESSMNT, STABLE: CPT | Performed by: NURSE PRACTITIONER

## 2017-07-14 PROCEDURE — 99207 ZZC CDG-CORRECTLY CODED, REVIEWED AND AGREE: CPT | Performed by: NURSE PRACTITIONER

## 2017-08-08 ENCOUNTER — NURSING HOME VISIT (OUTPATIENT)
Dept: GERIATRICS | Facility: CLINIC | Age: 77
End: 2017-08-08
Payer: COMMERCIAL

## 2017-08-08 VITALS
SYSTOLIC BLOOD PRESSURE: 102 MMHG | RESPIRATION RATE: 18 BRPM | BODY MASS INDEX: 28.91 KG/M2 | WEIGHT: 163.2 LBS | OXYGEN SATURATION: 95 % | HEART RATE: 59 BPM | TEMPERATURE: 97.6 F | DIASTOLIC BLOOD PRESSURE: 56 MMHG

## 2017-08-08 DIAGNOSIS — R41.89 COGNITIVE IMPAIRMENT: ICD-10-CM

## 2017-08-08 DIAGNOSIS — G40.909 SEIZURE DISORDER (H): Primary | ICD-10-CM

## 2017-08-08 DIAGNOSIS — I10 ESSENTIAL HYPERTENSION, BENIGN: ICD-10-CM

## 2017-08-08 DIAGNOSIS — S73.004D DISLOCATED HIP, RIGHT, SUBSEQUENT ENCOUNTER: ICD-10-CM

## 2017-08-08 PROCEDURE — 99207 ZZC CDG-CORRECTLY CODED, REVIEWED AND AGREE: CPT | Performed by: INTERNAL MEDICINE

## 2017-08-08 PROCEDURE — 99309 SBSQ NF CARE MODERATE MDM 30: CPT | Performed by: INTERNAL MEDICINE

## 2017-08-11 ENCOUNTER — CLINICAL UPDATE (OUTPATIENT)
Dept: PHARMACY | Facility: CLINIC | Age: 77
End: 2017-08-11

## 2017-08-15 ENCOUNTER — NURSING HOME VISIT (OUTPATIENT)
Dept: GERIATRICS | Facility: CLINIC | Age: 77
End: 2017-08-15
Payer: COMMERCIAL

## 2017-08-15 VITALS
HEART RATE: 65 BPM | WEIGHT: 166.8 LBS | TEMPERATURE: 98 F | SYSTOLIC BLOOD PRESSURE: 146 MMHG | OXYGEN SATURATION: 95 % | BODY MASS INDEX: 29.55 KG/M2 | RESPIRATION RATE: 18 BRPM | DIASTOLIC BLOOD PRESSURE: 78 MMHG

## 2017-08-15 DIAGNOSIS — R53.1 WEAKNESS: ICD-10-CM

## 2017-08-15 DIAGNOSIS — G40.909 SEIZURE DISORDER (H): ICD-10-CM

## 2017-08-15 DIAGNOSIS — I10 ESSENTIAL HYPERTENSION, BENIGN: Primary | ICD-10-CM

## 2017-08-15 DIAGNOSIS — E55.9 VITAMIN D DEFICIENCY: ICD-10-CM

## 2017-08-15 PROCEDURE — 99309 SBSQ NF CARE MODERATE MDM 30: CPT | Performed by: NURSE PRACTITIONER

## 2017-08-15 RX ORDER — NYSTATIN 100000 [USP'U]/G
POWDER TOPICAL
COMMUNITY
End: 2017-12-16 | Stop reason: DRUGHIGH

## 2017-08-15 NOTE — PROGRESS NOTES
[place] [facility]    Chief Complaint   Patient presents with     RECHECK       HPI:    Yoanna Phillips is a 77 year old  (1940), who is being seen today for an episodic care visit at [facility].  HPI information obtained from: facility chart records, facility staff and patient report.      Today's concern is:  Essential hypertension, benign  BP readings range:  146/78  102/56  101/71  128/72  120/71  148/80  150/78  155/74  130/78  -Denies CP, SOB and lightheadedness     Weakness  -Some increased weakness. No recent falls     Seizure disorder (H)  Current regimen Carbamazepine 300mg po BID.   >5 years since last seizure     ALLERGIES: Aspirin  Past Medical, Surgical, Family and Social History reviewed and updated in [facility].    Current Outpatient Prescriptions   Medication Sig Dispense Refill     nystatin (MYCOSTATIN) 690038 UNIT/GM POWD Apply to Groin topically every day and evening shift for Rash AND Apply to Groin topically as needed for Rash QD       Acetaminophen (TYLENOL PO) Take 1,000 mg by mouth 3 times daily       NUTRITIONAL SUPPLEMENT LIQD Take by mouth 2 times daily Give 120mL       CARBAMAZEPINE ER PO Take 300 mg by mouth 2 times daily       cyanocobalamin (VITAMIN B12) 1000 MCG/ML injection Inject 1 mL into the muscle every 30 days       GABAPENTIN PO Take 1,200 mg by mouth 3 times daily (Takes 2 x 600mg tablet = 1200mg dose)       Potassium Chloride Zonia CR (K-DUR PO) Take 10 mEq by mouth 2 times daily        tiotropium (SPIRIVA) 18 MCG capsule Inhale 18 mcg into the lungs daily       FOLIC ACID PO Take 1 mg by mouth daily       Thiamine HCl (VITAMIN B-1 PO) Take 100 mg by mouth daily       LOSARTAN POTASSIUM PO Take 100 mg by mouth daily       Multiple Vitamins-Minerals (CENTRUM SILVER) per tablet Take 1 tablet by mouth daily       Medications reviewed:  Medications reconciled to facility chart and changes were made to reflect current medications as identified as above  med list. Below are the changes that were made:   Medications stopped since last EPIC medication reconciliation:   There are no discontinued medications.    Medications started since last [facility] medication reconciliation:  No orders of the defined types were placed in this encounter.        REVIEW OF SYSTEMS:  4 point ROS including Respiratory, CV, GI and , other than that noted in the HPI,  is negative    Physical Exam:  /78  Pulse 65  Temp 98  F (36.7  C)  Resp 18  Wt 166 lb 12.8 oz (75.7 kg)  SpO2 95%  BMI 29.55 kg/m2  GENERAL APPEARANCE:  Alert, in no distress  ENT:  Mouth and posterior oropharynx normal, moist mucous membranes  RESP:  respiratory effort and palpation of chest normal, lungs clear to auscultation   CV:  Palpation and auscultation of heart done , regular rate and rhythm, no murmur, rub, or gallop  SKIN:  Inspection of skin and subcutaneous tissue baseline  NEURO:   Cranial nerves 2-12 are normal tested and grossly at patient's baseline  PSYCH:  memory impaired , affect and mood normal    Recent Labs:      CBC RESULTS:   Recent Labs   Lab Test  05/23/17   1635  05/23/17   0955  05/23/17   0625 05/22/17   0940   WBC   --    --   11.5*   --   16.7*   RBC   --    --   3.41*   --   4.77   HGB  13.1  11.4*  11.7   < >  16.0*   HCT   --    --   34.9*   --   46.7   MCV   --    --   102*   --   98   MCH   --    --   34.3*   --   33.5*   MCHC   --    --   33.5   --   34.3   RDW   --    --   13.8   --   13.5   PLT   --    --   239   --   333    < > = values in this interval not displayed.       Last Basic Metabolic Panel:  Recent Labs   Lab Test  05/24/17   0645  05/23/17   0625  05/22/17   0940   NA   --   143  137   POTASSIUM   --   3.9  4.0   CHLORIDE   --   112*  99   FELICE   --   8.3*  10.1   CO2   --   22  29   BUN   --   20  30   CR   --   0.55  0.69   GLC  90  91  123*       Liver Function Studies -   Recent Labs   Lab Test  05/24/17   0645  05/23/17   0625   PROTTOTAL  5.1*  5.2*    ALBUMIN  2.1*  2.3*   BILITOTAL  0.2  0.3   ALKPHOS  74  78   AST  43  57*   ALT  37  38       TSH   Date Value Ref Range Status   03/08/2012 0.44 0.4 - 5.0 mU/L Final     Assessment/Plan:  (I10) Essential hypertension, benign  (primary encounter diagnosis)  Most recent BPs have been under goal <150/90 mmHg.   -Do not want tight control in this pt with dementia, at risk for falls.  Too tight control increases risk of hypotension, dizziness, falls, tissue/cerebral hypoperfusion.    -Decrease Losartan 50 mg /day    (R53.1) Weakness  (G40.909) Seizure disorder (H)  - Per previous Epic note in 2012, appears pt on high dose Gabapentin for h/o seizures in addition to the Carbamazepine.   -Will do GDR. Will decrease gabapentin 900 mg TID    (E55.9) Vitamin D deficiency   -Will add vitamin D3 1000 units/day for weakness/fall/fracture prevention     Orders:  The current medical regimen is effective; continue present plan and medications.      Total time spent with patient visit at the skilled nursing facility was 45 min including patient visit and review of past records. Greater than 50% of total time spent with counseling and coordinating care due to complexity of care    Electronically signed by  TANIYA Zaman CNP  [facility]

## 2017-08-19 NOTE — PROGRESS NOTES
"Yoanna Phillips is a 77 year old female seen August 10, 2017 at [facility] where she has resided for 2 months (admit 6/2017) seen to follow up recurrent hip dislocations, sz and LE edema  Patient reports she has \"mini-seizures\" but has not had one recently.   Her gabapentin has been reduced from 1200 mg tid to 900 mg tid, and she is aware of that change.  No sz since the change and she is okay with further GDR.     She has had LE edema, but that has improved and she is not wearing her ACE wraps today.   Would like to d/c those unless edema recurs.     Reports she eats/sleeps well, no pain.  Has occ cough.  Is repetitive during visit.     Patient has low cognitive scores and has failed trial of living on her own, with recurrent falls and inability to seek help when she needs it.   Now meets criteria for dementia dx.     She is ambulating with her walker, WBAT wearing immobilizer with hip precautions.    Reports shoulder pain R>L.   Had left [facility]SA in 2012.         Past Medical History:   Diagnosis Date     Arthritis     osteoarthritis     Cognitive impairment 7/3/2012     COPD (chronic obstructive pulmonary disease) (H)      Dyspnea on exertion      Epilepsy (H)     minor seizures 2x daily     Hypertension      Numbness and tingling     bilateral numbness     Pernicious anemia     resolved with vitamin replacement     Poor short term memory 5/20/2011     Seizures (H)    Dementia.      SH:  Lived alone, house in [facility].   Has lived there all her life.   She has 4 children, 3 of them live locally and help out.       ROS: negative other than above.     EXAM:  Pleasant, NAD  /56  Pulse 59  Temp 97.6  F (36.4  C)  Resp 18  Wt 163 lb 3.2 oz (74 kg)  SpO2 95%  BMI 28.91 kg/m2   Neck supple without adenopathy  Lungs with decreased BS, no rales or wheeze  Heart RRR s1s2 distant  Abd soft, NT, no distention, +BS  Ext without edema, no open areas.   Neuro: repetitive, no focal findings, no " tremor  Psych: affect okay.    Lab Results   Component Value Date    WBC 11.5 05/23/2017     Lab Results   Component Value Date    HGB 13.1 05/23/2017     Lab Results   Component Value Date     05/23/2017     Lab Results   Component Value Date     05/23/2017      Last Basic Metabolic Panel:  Lab Results   Component Value Date     05/23/2017      Lab Results   Component Value Date    POTASSIUM 3.9 05/23/2017     Lab Results   Component Value Date    CHLORIDE 112 05/23/2017     Lab Results   Component Value Date    FELICE 8.3 05/23/2017     Lab Results   Component Value Date    CO2 22 05/23/2017     Lab Results   Component Value Date    BUN 20 05/23/2017     Lab Results   Component Value Date    CR 0.55 05/23/2017     Lab Results   Component Value Date    GLC 90 05/24/2017       IMP/PLAN:  (S73.005D) Hip dislocation, left, subsequent encounter   Comment: s/p closed reduction  Plan: scheduled acetaminophen for pain  Important to wear immobilizer and follow hip precautions; will need reminders to do so.   Follow up with Orthopedics as scheduled.       (L89.282) Pressure ulcer of left heel, stage II  Comment: dry and healing   Plan: nutrition, pressure relief, daily dressing    (I10) Essential hypertension, benign  Comment: low bps  Plan: per PharmD recommendation, decrease losartan to 50 mg/day    (G30.1,  F02.80) Late onset Alzheimer's disease without behavioral disturbance  Comment: low cognitive scores and low functional status.     Plan: Board and Care support for meds, meals, activity, safety.        (G40.909) Seizure disorder (H)  Comment: longstanding  Plan: continue carbamazepine, monitor labs  Doing well with GDR of gabapentin.  If she remains sz-free, at next visit would decrease further to 600 mg tid.       COPD  Comment: vague history, and no current hx  Plan: continue Spiriva  If she becomes symptomatic, would send for PFTs to clarify dx.      Francy Swartz MD

## 2017-08-22 ENCOUNTER — TRANSFERRED RECORDS (OUTPATIENT)
Dept: HEALTH INFORMATION MANAGEMENT | Facility: CLINIC | Age: 77
End: 2017-08-22

## 2017-08-22 LAB
ALBUMIN SERPL-MCNC: 3.2 G/DL (ref 3.5–5)
ALP SERPL-CCNC: 80 U/L (ref 45–120)
ALT SERPL-CCNC: 13 U/L (ref 0–45)
AST SERPL-CCNC: 15 U/L (ref 0–40)
BILIRUB SERPL-MCNC: 0.3 MG/DL (ref 0–1)
PROT SERPL-MCNC: 6.4 G/DL (ref 6–8)

## 2017-10-10 ENCOUNTER — NURSING HOME VISIT (OUTPATIENT)
Dept: GERIATRICS | Facility: CLINIC | Age: 77
End: 2017-10-10
Payer: COMMERCIAL

## 2017-10-10 VITALS
HEART RATE: 64 BPM | TEMPERATURE: 98.8 F | RESPIRATION RATE: 18 BRPM | WEIGHT: 168.2 LBS | DIASTOLIC BLOOD PRESSURE: 73 MMHG | BODY MASS INDEX: 29.8 KG/M2 | SYSTOLIC BLOOD PRESSURE: 134 MMHG | OXYGEN SATURATION: 95 %

## 2017-10-10 DIAGNOSIS — G40.909 SEIZURE DISORDER (H): ICD-10-CM

## 2017-10-10 DIAGNOSIS — M16.11 PRIMARY OSTEOARTHRITIS OF RIGHT HIP: ICD-10-CM

## 2017-10-10 DIAGNOSIS — I10 ESSENTIAL HYPERTENSION, BENIGN: Primary | ICD-10-CM

## 2017-10-10 PROCEDURE — 99309 SBSQ NF CARE MODERATE MDM 30: CPT | Performed by: NURSE PRACTITIONER

## 2017-10-10 RX ORDER — NYSTATIN 10B UNIT
POWDER (EA) MISCELLANEOUS
COMMUNITY
End: 2017-10-10

## 2017-10-10 NOTE — PROGRESS NOTES
[facility]    Chief Complaint   Patient presents with     USP Regulatory       HPI:    Yoanna Phillips is a 77 year old  (1940), who is being seen today for a federally mandated E/M visit at [facility].  HPI information obtained from: facility chart records, facility staff, patient report and [facility] chart review.     Today's concerns are:  Seizure disorder (H)  -No recent seizure activity.   -Currently taking carbamazepine and gabapentin.     Essential hypertension, benign  -Denies CP, SOB, or lightheadedness.   -Blood pressure range 114-165/63-80  -Currently taking Losartan 50 mg/day    Primary osteoarthritis of right hip  -Pain controlled on scheduled acetaminophen-Follow up with ortho as scheduled/planned    ALLERGIES: Aspirin  PAST MEDICAL HISTORY:  has a past medical history of Arthritis; Cognitive impairment (7/3/2012); COPD (chronic obstructive pulmonary disease) (H); Dyspnea on exertion; Epilepsy (H); Hypertension; Numbness and tingling; Pernicious anemia; Poor short term memory (5/20/2011); and Seizures (H). She also has no past medical history of PONV (postoperative nausea and vomiting).  PAST SURGICAL HISTORY:  has a past surgical history that includes NONSPECIFIC PROCEDURE (1965); Craniotomy (1965); Cholecystectomy; Splenectomy; Arthroplasty shoulder (6/25/2012); appendectomy; Arthroplasty hip (Right, 4/11/2016); Arthroplasty revision hip (Right, 5/14/2016); Arthroplasty hip (Left, 3/20/2017); and Closed reduction hip (Left, 5/22/2017).  FAMILY HISTORY: family history includes Arthritis in her mother; HEART DISEASE in her mother; Hypertension in her mother; Neurologic Disorder in her mother.  SOCIAL HISTORY:  reports that she quit smoking about 55 years ago. Her smoking use included Cigarettes. She has a 5.00 pack-year smoking history. She has never used smokeless tobacco. She reports that she drinks alcohol. She reports that she does not use  illicit drugs.    MEDICATIONS:  Current Outpatient Prescriptions   Medication Sig Dispense Refill     AMOXICILLIN PO Take 2 g by mouth as needed for prophylaxis QD Prior to dental procedure.       nystatin (MYCOSTATIN) 108200 UNIT/GM POWD Apply to Groin topically every day and evening shift for Rash AND Apply to Groin topically as needed for Rash QD       cholecalciferol (VITAMIN D) 1000 UNIT tablet Take 1 tablet (1,000 Units) by mouth daily 100 tablet 3     Acetaminophen (TYLENOL PO) Take 1,000 mg by mouth 2 times daily        NUTRITIONAL SUPPLEMENT LIQD Take by mouth 2 times daily Give 120mL       CARBAMAZEPINE ER PO Take 300 mg by mouth 2 times daily       cyanocobalamin (VITAMIN B12) 1000 MCG/ML injection Inject 1,000 mcg into the muscle every day shift starting on the 25th and ending on the 25th every month       GABAPENTIN PO Take 900 mg by mouth 3 times daily       Potassium Chloride Zonia CR (K-DUR PO) Take 10 mEq by mouth 2 times daily        tiotropium (SPIRIVA) 18 MCG capsule Inhale 18 mcg into the lungs daily       FOLIC ACID PO Take 1 mg by mouth daily       Thiamine HCl (VITAMIN B-1 PO) Take 100 mg by mouth daily       LOSARTAN POTASSIUM PO Take 50 mg by mouth daily       Multiple Vitamins-Minerals (CENTRUM SILVER) per tablet Take 1 tablet by mouth daily       Medications reviewed:  Medications reconciled to facility chart and changes were made to reflect current medications as identified as above med list. Below are the changes that were made:   Medications stopped since last EPIC medication reconciliation:   Medications Discontinued During This Encounter   Medication Reason     nystatin POWD Discontinued by another Health Care Provider       Medications started since last [facility] medication reconciliation:  Orders Placed This Encounter   Medications     DISCONTD: nystatin POWD     Sig: Apply to redness topically as needed for Rash QD     AMOXICILLIN PO     Sig: Take 2 g by mouth as needed for prophylaxis  QD Prior to dental procedure.     Case Management:  I have reviewed the care plan and MDS and do agree with the plan. Patient's desire to return to the community is present, but is not able due to care needs .  Information reviewed:  Medications, vital signs, orders, and nursing notes.    ROS:  4 point ROS including Respiratory, CV, GI and , other than that noted in the HPI,  is negative    Exam:  Vitals: /73  Pulse 64  Temp 98.8  F (37.1  C)  Resp 18  Wt 168 lb 3.2 oz (76.3 kg)  SpO2 95%  BMI 29.8 kg/m2  BMI= Body mass index is 29.8 kg/(m^2).  GENERAL APPEARANCE:  Alert, in no distress  ENT:  Mouth and posterior oropharynx normal, moist mucous membranes, normal hearing acuity  RESP:  respiratory effort and palpation of chest normal, lungs clear to auscultation , no respiratory distress  CV:  Palpation and auscultation of heart done , regular rate and rhythm, no murmur, rub, or gallop  M/S:   Gait and station normal  Digits and nails normal  SKIN:  Inspection of skin and subcutaneous tissue baseline, Palpation of skin and subcutaneous tissue baseline  NEURO:   Cranial nerves 2-12 are normal tested and grossly at patient's baseline  PSYCH:  oriented X 3, affect and mood normal     BP Readin/75, 129/70, 142/76            HR:  64-76    Lab/Diagnostic data:    CBC RESULTS:   Recent Labs   Lab Test  17   1635  17   0955  17   0617   0940   WBC   --    --   11.5*   --   16.7*   RBC   --    --   3.41*   --   4.77   HGB  13.1  11.4*  11.7   < >  16.0*   HCT   --    --   34.9*   --   46.7   MCV   --    --   102*   --   98   MCH   --    --   34.3*   --   33.5*   MCHC   --    --   33.5   --   34.3   RDW   --    --   13.8   --   13.5   PLT   --    --   239   --   333    < > = values in this interval not displayed.       Last Basic Metabolic Panel:  Recent Labs   Lab Test  17   0645  17   0625  17   0940   NA   --   143  137   POTASSIUM   --   3.9  4.0    CHLORIDE   --   112*  99   FELICE   --   8.3*  10.1   CO2   --   22  29   BUN   --   20  30   CR   --   0.55  0.69   GLC  90  91  123*       Liver Function Studies -   Recent Labs   Lab Test 08/22/17 05/24/17   0645   PROTTOTAL  6.4  5.1*   ALBUMIN  3.2*  2.1*   BILITOTAL  0.3  0.2   ALKPHOS  80  74   AST  15  43   ALT  13  37       TSH   Date Value Ref Range Status   03/08/2012 0.44 0.4 - 5.0 mU/L Final       ASSESSMENT/PLAN  (I10) Essential hypertension, benign  (primary encounter diagnosis)  Chronic. Blood pressure stable on current dose of losartan.   -Keep SBP> 130 mmHg and DBP > 65 mmHg (levels below these increase mortality as shown by standard studies and observations).     (G40.909) Seizure disorder (H)  Stable on current medication regimen. No changes at this time and adjustments as needed.   -Continue to monitor labs.     (M16.11) Primary osteoarthritis of right hip  -Denies pain at today's visit.   -Continue scheduled tylenol as ordered.     Orders:  The current medical regimen is effective; continue present plan and medications.    Electronically signed by:  TANIYA Cornejo CNP

## 2017-11-17 ENCOUNTER — NURSING HOME VISIT (OUTPATIENT)
Dept: GERIATRICS | Facility: CLINIC | Age: 77
End: 2017-11-17
Payer: COMMERCIAL

## 2017-11-17 VITALS
BODY MASS INDEX: 29.83 KG/M2 | OXYGEN SATURATION: 95 % | HEART RATE: 75 BPM | WEIGHT: 168.4 LBS | TEMPERATURE: 98.5 F | RESPIRATION RATE: 18 BRPM | SYSTOLIC BLOOD PRESSURE: 154 MMHG | DIASTOLIC BLOOD PRESSURE: 72 MMHG

## 2017-11-17 DIAGNOSIS — R49.0 HOARSENESS OF VOICE: Primary | ICD-10-CM

## 2017-11-17 PROCEDURE — 99308 SBSQ NF CARE LOW MDM 20: CPT | Performed by: NURSE PRACTITIONER

## 2017-11-17 NOTE — PROGRESS NOTES
[facility]    Chief Complaint   Patient presents with     Hoarse       HPI:    Yoanna Phillips is a 77 year old  (1940), who is being seen today for an episodic care visit at [facility].  HPI information obtained from: facility chart records, facility staff and patient report.    Today's concern is:  Hoarseness of voice   Complaining of soft voice and dry cough.   Onset of s/sx 1-2 days ago. Otherwise feeling ok. Has improved since yesterday. Denies fever, chills or generalized malaise. Vital signs within normal limits.      ALLERGIES: Aspirin  Past Medical, Surgical, Family and Social History reviewed and updated in [facility].    Current Outpatient Prescriptions   Medication Sig Dispense Refill     AMOXICILLIN PO Take 2 g by mouth as needed for prophylaxis QD Prior to dental procedure.       nystatin (MYCOSTATIN) 295475 UNIT/GM POWD Apply to Groin topically every day and evening shift for Rash AND Apply to Groin topically as needed for Rash QD       cholecalciferol (VITAMIN D) 1000 UNIT tablet Take 1 tablet (1,000 Units) by mouth daily 100 tablet 3     Acetaminophen (TYLENOL PO) Take 1,000 mg by mouth 2 times daily        NUTRITIONAL SUPPLEMENT LIQD Take by mouth 2 times daily Give 120mL       CARBAMAZEPINE ER PO Take 300 mg by mouth 2 times daily       cyanocobalamin (VITAMIN B12) 1000 MCG/ML injection Inject 1,000 mcg into the muscle every day shift starting on the 25th and ending on the 25th every month       GABAPENTIN PO Take 900 mg by mouth 3 times daily       Potassium Chloride Zonia CR (K-DUR PO) Take 10 mEq by mouth 2 times daily        tiotropium (SPIRIVA) 18 MCG capsule Inhale 18 mcg into the lungs daily       FOLIC ACID PO Take 1 mg by mouth daily       Thiamine HCl (VITAMIN B-1 PO) Take 100 mg by mouth daily       LOSARTAN POTASSIUM PO Take 50 mg by mouth daily       Multiple Vitamins-Minerals (CENTRUM SILVER) per tablet Take 1 tablet by mouth daily       Medications  reviewed:  Medications reconciled to facility chart and changes were made to reflect current medications as identified as above med list. Below are the changes that were made:   Medications stopped since last EPIC medication reconciliation:   There are no discontinued medications.    Medications started since last [facility] medication reconciliation:  No orders of the defined types were placed in this encounter.      REVIEW OF SYSTEMS:  4 point ROS including Respiratory, CV, GI and , other than that noted in the HPI,  is negative    Physical Exam:  /72  Pulse 75  Temp 98.5  F (36.9  C)  Resp 18  Wt 168 lb 6.4 oz (76.4 kg)  SpO2 95%  BMI 29.83 kg/m2  GENERAL APPEARANCE:  Alert, in no distress  ENT:  Mouth and posterior oropharynx normal, moist mucous membranes  RESP:  respiratory effort and palpation of chest normal, lungs clear to auscultation , no respiratory distress  CV:  Palpation and auscultation of heart done , regular rate and rhythm, no murmur, rub, or gallop  M/S:   Gait and station normal  Digits and nails normal  SKIN:  Inspection of skin and subcutaneous tissue baseline, Palpation of skin and subcutaneous tissue baseline  NEURO:   Cranial nerves 2-12 are normal tested and grossly at patient's baseline  PSYCH:  oriented X 3     BP Readin/67, 137/77, 146/80             HR:  64-76    Recent Labs:    CBC RESULTS:   Recent Labs   Lab Test  17   1635  17   0955  17   0617   0940   WBC   --    --   11.5*   --   16.7*   RBC   --    --   3.41*   --   4.77   HGB  13.1  11.4*  11.7   < >  16.0*   HCT   --    --   34.9*   --   46.7   MCV   --    --   102*   --   98   MCH   --    --   34.3*   --   33.5*   MCHC   --    --   33.5   --   34.3   RDW   --    --   13.8   --   13.5   PLT   --    --   239   --   333    < > = values in this interval not displayed.       Last Basic Metabolic Panel:  Recent Labs   Lab Test  17   0645  17   0625  17   0940   NA   --    143  137   POTASSIUM   --   3.9  4.0   CHLORIDE   --   112*  99   FELICE   --   8.3*  10.1   CO2   --   22  29   BUN   --   20  30   CR   --   0.55  0.69   GLC  90  91  123*       Liver Function Studies -   Recent Labs   Lab Test 08/22/17 05/24/17   0645   PROTTOTAL  6.4  5.1*   ALBUMIN  3.2*  2.1*   BILITOTAL  0.3  0.2   ALKPHOS  80  74   AST  15  43   ALT  13  37       TSH   Date Value Ref Range Status   03/08/2012 0.44 0.4 - 5.0 mU/L Final         Assessment/Plan:  (R49.0) Hoarseness of voice  (primary encounter diagnosis)  Improving s/sx. No new orders at this time. Will continue to monitor and notify NP with changes.     Orders:  The current medical regimen is effective; continue present plan and medications.      Total time spent with patient visit at the skilled nursing facility was 15 min including patient visit and review of past records. Greater than 50% of total time spent with counseling and coordinating care due to new onset of illness    Electronically signed by  TANIYA Cornejo CNP

## 2017-11-21 ENCOUNTER — TRANSFERRED RECORDS (OUTPATIENT)
Dept: HEALTH INFORMATION MANAGEMENT | Facility: CLINIC | Age: 77
End: 2017-11-21

## 2017-11-21 LAB
ANION GAP SERPL CALCULATED.3IONS-SCNC: 7 MMOL/L (ref 5–18)
BUN SERPL-MCNC: 12 MG/DL (ref 8–28)
CALCIUM SERPL-MCNC: 10.1 MG/DL (ref 8.5–10.5)
CHLORIDE SERPLBLD-SCNC: 100 MMOL/L (ref 98–107)
CO2 SERPL-SCNC: 25 MMOL/L (ref 22–31)
CREAT SERPL-MCNC: 0.66 MG/DL (ref 0.6–1.1)
ERYTHROCYTE [DISTWIDTH] IN BLOOD BY AUTOMATED COUNT: 12.1 % (ref 11–14.5)
GFR SERPL CREATININE-BSD FRML MDRD: >60 ML/MIN/1.73M2
GLUCOSE SERPL-MCNC: 84 MG/DL (ref 70–125)
HCT VFR BLD AUTO: 37.8 % (ref 35–47)
HEMOGLOBIN: 12.8 G/DL (ref 12–16)
MCH RBC QN AUTO: 34.7 PG (ref 27–34)
MCHC RBC AUTO-ENTMCNC: 33.9 G/DL (ref 32–36)
MCV RBC AUTO: 102 FL (ref 80–100)
PLATELET # BLD AUTO: 399 THOU/UL (ref 140–440)
POTASSIUM SERPL-SCNC: 4.4 MMOL/L (ref 3.5–5)
RBC # BLD AUTO: 3.69 MILL/UL (ref 3.8–5.4)
SODIUM SERPL-SCNC: 132 MMOL/L (ref 136–145)
WBC # BLD AUTO: 9.5 THOU/UL (ref 4–11)

## 2017-12-15 ENCOUNTER — NURSING HOME VISIT (OUTPATIENT)
Dept: GERIATRICS | Facility: CLINIC | Age: 77
End: 2017-12-15
Payer: COMMERCIAL

## 2017-12-15 DIAGNOSIS — G40.909 SEIZURE DISORDER (H): Primary | ICD-10-CM

## 2017-12-15 DIAGNOSIS — F02.80 LATE ONSET ALZHEIMER'S DISEASE WITHOUT BEHAVIORAL DISTURBANCE (H): ICD-10-CM

## 2017-12-15 DIAGNOSIS — J44.9 CHRONIC OBSTRUCTIVE PULMONARY DISEASE, UNSPECIFIED COPD TYPE (H): ICD-10-CM

## 2017-12-15 DIAGNOSIS — I10 ESSENTIAL HYPERTENSION, BENIGN: ICD-10-CM

## 2017-12-15 DIAGNOSIS — G30.1 LATE ONSET ALZHEIMER'S DISEASE WITHOUT BEHAVIORAL DISTURBANCE (H): ICD-10-CM

## 2017-12-15 PROCEDURE — 99309 SBSQ NF CARE MODERATE MDM 30: CPT | Performed by: INTERNAL MEDICINE

## 2017-12-16 VITALS
RESPIRATION RATE: 18 BRPM | DIASTOLIC BLOOD PRESSURE: 60 MMHG | HEART RATE: 62 BPM | WEIGHT: 169.4 LBS | BODY MASS INDEX: 30.01 KG/M2 | OXYGEN SATURATION: 97 % | SYSTOLIC BLOOD PRESSURE: 155 MMHG | TEMPERATURE: 98 F

## 2017-12-21 ENCOUNTER — TRANSFERRED RECORDS (OUTPATIENT)
Dept: HEALTH INFORMATION MANAGEMENT | Facility: CLINIC | Age: 77
End: 2017-12-21

## 2017-12-21 LAB
ANION GAP SERPL CALCULATED.3IONS-SCNC: 9 MMOL/L (ref 5–18)
BUN SERPL-MCNC: 9 MG/DL (ref 8–28)
CALCIUM SERPL-MCNC: 9.7 MG/DL (ref 8.5–10.5)
CHLORIDE SERPLBLD-SCNC: 103 MMOL/L (ref 98–107)
CO2 SERPL-SCNC: 23 MMOL/L (ref 22–31)
CREAT SERPL-MCNC: 0.65 MG/DL (ref 0.6–1.1)
GFR SERPL CREATININE-BSD FRML MDRD: >60 ML/MIN/1.73M2
GLUCOSE SERPL-MCNC: 84 MG/DL (ref 70–125)
POTASSIUM SERPL-SCNC: 4.7 MMOL/L (ref 3.5–5)
SODIUM SERPL-SCNC: 135 MMOL/L (ref 136–145)

## 2017-12-26 PROBLEM — G30.1 LATE ONSET ALZHEIMER'S DISEASE WITHOUT BEHAVIORAL DISTURBANCE (H): Status: ACTIVE | Noted: 2017-12-26

## 2017-12-26 PROBLEM — F02.80 LATE ONSET ALZHEIMER'S DISEASE WITHOUT BEHAVIORAL DISTURBANCE (H): Status: ACTIVE | Noted: 2017-12-26

## 2017-12-27 NOTE — PROGRESS NOTES
"Yoanna Phillips is a 77 year old female seen December 15, 2017 at [facility] where she has resided for 6 months (admit 6/2017) seen to follow up recurrent hip dislocations, sz and LE edema  Patient is seen in her room, up to chair.   Had a recent gastroenteritis that lasted a short while.  Better, \"but I'm not going to win any races\"   She is however hoping to feel well enough to visit with her son, who is arriving from out of town later today.     Patient reports she has \"mini-seizures\" but has not had one recently.   Her gabapentin has been reduced from 1200 mg tid to 900 mg tid, and she is aware of that change.  No sz since the change and she is okay with further GDR.       Reports she eats/sleeps well, no pain.  Has occ cough.  Is repetitive during visit.     Patient has low cognitive scores and has failed trial of living on her own, with recurrent falls and inability to seek help when she needs it.   Now meets criteria for dementia dx.     She is ambulating with her walker, WBAT wearing immobilizer with hip precautions.    Reports shoulder pain R>L.   Had left [facility]SA in 2012.         Past Medical History:   Diagnosis Date     Arthritis     osteoarthritis     Cognitive impairment 7/3/2012     COPD (chronic obstructive pulmonary disease) (H)      Dyspnea on exertion      Epilepsy (H)     minor seizures 2x daily     Hypertension      Numbness and tingling     bilateral numbness     Pernicious anemia     resolved with vitamin replacement     Poor short term memory 5/20/2011     Seizures (H)    Dementia.      SH:  Lived alone, house in [facility].   Has lived there all her life.   She has 4 children, 3 of them live locally and help out.       ROS: negative other than above.   BP Readings from Last 3 Encounters:   12/16/17 155/60   11/17/17 154/72   10/10/17 134/73        EXAM:  Pleasant, NAD  /60  Pulse 62  Temp 98  F (36.7  C)  Resp 18  Wt 169 lb 6.4 oz (76.8 kg)  SpO2 97%  BMI 30.01 kg/m2 "   Neck supple without adenopathy  Lungs with decreased BS, no rales or wheeze  Heart RRR s1s2 distant  Abd soft, NT, no distention, +BS  Ext with trace edema, no open areas.   Neuro: repetitive, no focal findings, no tremor  Psych: affect okay.    Last Basic Metabolic Panel:  Lab Results   Component Value Date     11/21/2017      Lab Results   Component Value Date    POTASSIUM 4.4 11/21/2017     Lab Results   Component Value Date    CHLORIDE 100 11/21/2017     Lab Results   Component Value Date    FELICE 10.1 11/21/2017     Lab Results   Component Value Date    CO2 25 11/21/2017     Lab Results   Component Value Date    BUN 12 11/21/2017     Lab Results   Component Value Date    CR 0.66 11/21/2017     Lab Results   Component Value Date    GLC 84 11/21/2017     Lab Results   Component Value Date    WBC 9.5 11/21/2017     Component Value Date    HGB 12.8 11/21/2017     Component Value Date     11/21/2017     Component Value Date     11/21/2017     Lab Results   Component Value Date    AST 15 08/22/2017     Lab Results   Component Value Date    ALT 13 08/22/2017     Lab Results   Component Value Date    BILITOTAL 0.3 08/22/2017     Lab Results   Component Value Date    ALBUMIN 3.2 08/22/2017     Lab Results   Component Value Date    PROTTOTAL 6.4 08/22/2017      Lab Results   Component Value Date    ALKPHOS 80 08/22/2017          IMP/PLAN:  (S73.005D) Hip dislocation, left, subsequent encounter   Comment: s/p closed reduction, doing well so far.   Plan: scheduled acetaminophen for pain, continue immobilizer.       (I10) Essential hypertension, benign  Comment: fair control  Plan: continue losartan 50 mg/day    (G30.1,  F02.80) Late onset Alzheimer's disease without behavioral disturbance  Comment: low cognitive scores and low functional status.     Plan: Board and Care support for meds, meals, activity, safety.        (G40.909) Seizure disorder (H)  Comment: longstanding  Plan: continue carbamazepine,  monitor labs  Doing well with GDR of gabapentin.  Decrease further to 600 mg tid.     (J44.9) Chronic obstructive pulmonary disease, unspecified COPD type (H)  Comment: vague history, and no current hx  Plan: continue Spiriva  If she becomes symptomatic, would send for PFTs to clarify dx.      Francy Swartz MD

## 2018-02-12 ENCOUNTER — TRANSFERRED RECORDS (OUTPATIENT)
Dept: HEALTH INFORMATION MANAGEMENT | Facility: CLINIC | Age: 78
End: 2018-02-12

## 2018-02-12 ENCOUNTER — NURSING HOME VISIT (OUTPATIENT)
Dept: GERIATRICS | Facility: CLINIC | Age: 78
End: 2018-02-12
Payer: COMMERCIAL

## 2018-02-12 VITALS
HEART RATE: 65 BPM | BODY MASS INDEX: 30.29 KG/M2 | DIASTOLIC BLOOD PRESSURE: 78 MMHG | SYSTOLIC BLOOD PRESSURE: 148 MMHG | RESPIRATION RATE: 18 BRPM | TEMPERATURE: 98 F | OXYGEN SATURATION: 97 % | WEIGHT: 171 LBS

## 2018-02-12 DIAGNOSIS — I10 ESSENTIAL HYPERTENSION, BENIGN: ICD-10-CM

## 2018-02-12 DIAGNOSIS — E55.9 VITAMIN D DEFICIENCY: ICD-10-CM

## 2018-02-12 DIAGNOSIS — M79.604 BILATERAL LOWER EXTREMITY PAIN: Primary | ICD-10-CM

## 2018-02-12 DIAGNOSIS — M79.605 BILATERAL LOWER EXTREMITY PAIN: Primary | ICD-10-CM

## 2018-02-12 DIAGNOSIS — R41.89 COGNITIVE IMPAIRMENT: ICD-10-CM

## 2018-02-12 PROCEDURE — 99310 SBSQ NF CARE HIGH MDM 45: CPT | Performed by: NURSE PRACTITIONER

## 2018-02-12 NOTE — PROGRESS NOTES
[facility]    Chief Complaint   Patient presents with     custodial Regulatory       HPI:    Yoanna Phillips is a 77 year old  (1940), who is being seen today for a federally mandated E/M visit at [facility].  HPI information obtained from: facility chart records, facility staff and patient report.     Today's concerns are:  Bilateral lower extremity pain  Resident complaining of bilateral LE pain. Rating 6 out of 10. Resident also stopped by to 2nd floor to see a nurse reporting an increase of pain with movement but no pain with sitting. Has PRN acetaminophen available. No increased swelling noted. Denies recent injury or fall.     Essential hypertension, benign  Per nursing facility chart, all blood pressures <150/90. Denies CP, SOB, or lightheadedness. Currently taking losartan 50 mg/day.     Vitamin D deficiency  See labs.     Cognitive impairment  Last BIMS 10/15. No recent behaviors per staff and family. Weight stable 167 +/- 5 lbs. No recent falls documented.     ALLERGIES: Aspirin  PAST MEDICAL HISTORY:  has a past medical history of Arthritis; Cognitive impairment (7/3/2012); COPD (chronic obstructive pulmonary disease) (H); Dyspnea on exertion; Epilepsy (H); Hypertension; Numbness and tingling; Pernicious anemia; Poor short term memory (5/20/2011); and Seizures (H). She also has no past medical history of PONV (postoperative nausea and vomiting).  PAST SURGICAL HISTORY:  has a past surgical history that includes NONSPECIFIC PROCEDURE (1965); Craniotomy (1965); Cholecystectomy; Splenectomy; Arthroplasty shoulder (6/25/2012); appendectomy; Arthroplasty hip (Right, 4/11/2016); Arthroplasty revision hip (Right, 5/14/2016); Arthroplasty hip (Left, 3/20/2017); and Closed reduction hip (Left, 5/22/2017).  FAMILY HISTORY: family history includes Arthritis in her mother; HEART DISEASE in her mother; Hypertension in her mother; Neurologic Disorder in her mother.  SOCIAL  HISTORY:  reports that she quit smoking about 55 years ago. Her smoking use included Cigarettes. She has a 5.00 pack-year smoking history. She has never used smokeless tobacco. She reports that she drinks alcohol. She reports that she does not use illicit drugs.    MEDICATIONS:  Current Outpatient Prescriptions   Medication Sig Dispense Refill     Nystatin POWD Apply to redness topically as needed for Rash QD AND Apply to groin topically every day and evening shift for Rash Until resolved.       AMOXICILLIN PO Take 2 g by mouth as needed for prophylaxis QD Prior to dental procedure.       cholecalciferol (VITAMIN D) 1000 UNIT tablet Take 1 tablet (1,000 Units) by mouth daily 100 tablet 3     Acetaminophen (TYLENOL PO) Take 650 mg by mouth every 6 hours as needed        NUTRITIONAL SUPPLEMENT LIQD Take by mouth 2 times daily Give 120mL       CARBAMAZEPINE ER PO Take 300 mg by mouth 2 times daily       cyanocobalamin (VITAMIN B12) 1000 MCG/ML injection Inject 1,000 mcg into the muscle every day shift starting on the 25th and ending on the 25th every month       GABAPENTIN PO Take 900 mg by mouth 3 times daily       tiotropium (SPIRIVA) 18 MCG capsule Inhale 18 mcg into the lungs daily       FOLIC ACID PO Take 1 mg by mouth daily       Thiamine HCl (VITAMIN B-1 PO) Take 100 mg by mouth daily       LOSARTAN POTASSIUM PO Take 50 mg by mouth daily       Multiple Vitamins-Minerals (CENTRUM SILVER) per tablet Take 1 tablet by mouth daily       Medications reviewed:  Medications reconciled to facility chart and changes were made to reflect current medications as identified as above med list. Below are the changes that were made:   Medications stopped since last EPIC medication reconciliation:   There are no discontinued medications.    Medications started since last [facility] medication reconciliation:  No orders of the defined types were placed in this encounter.    Case Management:  I have reviewed the care plan and MDS and do  agree with the plan. Patient's desire to return to the community is not present.  Information reviewed:  Medications, vital signs, orders, and nursing notes.    ROS:  Unobtainable secondary to cognitive impairment.  and 4 point ROS including Respiratory, CV, GI and , other than that noted in the HPI,  is negative    Exam:  Vitals: /78  Pulse 65  Temp 98  F (36.7  C)  Resp 18  Wt 171 lb (77.6 kg)  SpO2 97%  BMI 30.29 kg/m2  BMI= Body mass index is 30.29 kg/(m^2).  GENERAL APPEARANCE:  Alert, in no distress  ENT:  Mouth and posterior oropharynx normal, moist mucous membranes  RESP:  respiratory effort and palpation of chest normal, lungs clear to auscultation   CV:  Palpation and auscultation of heart done , regular rate and rhythm, no murmur, rub, or gallop, no edema  M/S:   Gait and station normal  Digits and nails normal  SKIN:  Inspection of skin and subcutaneous tissue baseline, Palpation of skin and subcutaneous tissue baseline  NEURO:   Cranial nerves 2-12 are normal tested and grossly at patient's baseline  PSYCH:  oriented X 3, memory impaired , affect and mood normal     BP Readin/76, 134/68, 125/70                 HR:  64-76    Lab/Diagnostic data:   CBC RESULTS:   Recent Labs   Lab Test 17   1635   17   0625   WBC  9.5   --    --   11.5*   RBC  3.69*   --    --   3.41*   HGB  12.8  13.1   < >  11.7   HCT  37.8   --    --   34.9*   MCV  102*   --    --   102*   MCH  34.7*   --    --   34.3*   MCHC  33.9   --    --   33.5   RDW  12.1   --    --   13.8   PLT  399   --    --   239    < > = values in this interval not displayed.       Last Basic Metabolic Panel:  Recent Labs   Lab Test 17   NA  135*  132*   POTASSIUM  4.7  4.4   CHLORIDE  103  100   FELICE  9.7  10.1   CO2  23  25   BUN  9  12   CR  0.65  0.66   GLC  84  84       Liver Function Studies -   Recent Labs   Lab Test 17   0645   PROTTOTAL  6.4  5.1*   ALBUMIN  3.2*  2.1*    BILITOTAL  0.3  0.2   ALKPHOS  80  74   AST  15  43   ALT  13  37       TSH   Date Value Ref Range Status   03/08/2012 0.44 0.4 - 5.0 mU/L Final     ASSESSMENT/PLAN  (M79.604,  M79.605) Bilateral lower extremity pain  (primary encounter diagnosis)  Acute onset of bilateral LE pain with movement. Improves with rest. Negative Homans Sign. No documentation of injury.   -Stat venous ultrasound of bilateral LE- negative   -Start Tramadol 50 mg q6 hours PRN for pain.   -Encourage rest and elevation. Monitor pain and notify NP with changes.     (I10) Essential hypertension, benign  Chronic, stable on current medication regimen. No changes at this time and adjustments as clinically indication. Keep SBP> 130 mmHg and DBP > 65 mmHg (levels below these increase mortality as shown by standard studies and observations).     (E55.9) Vitamin D deficiency  Currently taking cholecalciferol 1000 mg/day.   -Vitamin D level next lab day to ensure stability.     (R41.89) Cognitive impairment  Chronic, progressive. Expect further functional and cognitive decline. Expect weight loss. Monitor weight. Monitor functional status. Monitor for behavioral disturbances.    Orders:  See new orders above     Electronically signed by:  TANIYA Zaman CNP

## 2018-02-13 ENCOUNTER — RECORDS - HEALTHEAST (OUTPATIENT)
Dept: LAB | Facility: CLINIC | Age: 78
End: 2018-02-13

## 2018-02-13 ENCOUNTER — TRANSFERRED RECORDS (OUTPATIENT)
Dept: HEALTH INFORMATION MANAGEMENT | Facility: CLINIC | Age: 78
End: 2018-02-13

## 2018-02-13 LAB
ANION GAP SERPL CALCULATED.3IONS-SCNC: 6 MMOL/L (ref 5–18)
ANION GAP SERPL CALCULATED.3IONS-SCNC: 6 MMOL/L (ref 5–18)
BASOPHILS # BLD AUTO: 0.1 THOU/UL (ref 0–0.2)
BASOPHILS NFR BLD AUTO: 1 % (ref 0–2)
BUN SERPL-MCNC: 13 MG/DL (ref 8–28)
BUN SERPL-MCNC: 13 MG/DL (ref 8–28)
CALCIUM SERPL-MCNC: 10.2 MG/DL (ref 8.5–10.5)
CALCIUM SERPL-MCNC: 10.2 MG/DL (ref 8.5–10.5)
CARBAMAZEPINE SERPL-MCNC: 6.8 UG/ML (ref 4–12)
CHLORIDE BLD-SCNC: 100 MMOL/L (ref 98–107)
CHLORIDE SERPLBLD-SCNC: 100 MMOL/L (ref 98–107)
CO2 SERPL-SCNC: 27 MMOL/L (ref 22–31)
CO2 SERPL-SCNC: 27 MMOL/L (ref 22–31)
CREAT SERPL-MCNC: 0.68 MG/DL (ref 0.6–1.1)
CREAT SERPL-MCNC: 0.68 MG/DL (ref 0.6–1.1)
DIFFERENTIAL: ABNORMAL
EOSINOPHIL # BLD AUTO: 0.1 THOU/UL (ref 0–0.4)
EOSINOPHIL NFR BLD AUTO: 2 % (ref 0–6)
ERYTHROCYTE [DISTWIDTH] IN BLOOD BY AUTOMATED COUNT: 12.1 % (ref 11–14.5)
ERYTHROCYTE [DISTWIDTH] IN BLOOD BY AUTOMATED COUNT: 12.1 % (ref 11–14.5)
GFR SERPL CREATININE-BSD FRML MDRD: >60 ML/MIN/1.73M2
GFR SERPL CREATININE-BSD FRML MDRD: >60 ML/MIN/1.73M2
GLUCOSE BLD-MCNC: 89 MG/DL (ref 70–125)
GLUCOSE SERPL-MCNC: 89 MG/DL (ref 70–125)
HCT VFR BLD AUTO: 38.6 % (ref 35–47)
HCT VFR BLD AUTO: 38.6 % (ref 35–47)
HEMOGLOBIN: 13 G/DL (ref 12–16)
HGB BLD-MCNC: 13 G/DL (ref 12–16)
LYMPHOCYTES # BLD AUTO: 3.1 THOU/UL (ref 0.8–4.4)
LYMPHOCYTES NFR BLD AUTO: 39 % (ref 20–40)
MAGNESIUM SERPL-MCNC: 1.8 MG/DL (ref 1.8–2.6)
MCH RBC QN AUTO: 34.5 PG (ref 27–34)
MCH RBC QN AUTO: 34.5 PG (ref 27–34)
MCHC RBC AUTO-ENTMCNC: 33.7 G/DL (ref 32–36)
MCHC RBC AUTO-ENTMCNC: 33.7 G/DL (ref 32–36)
MCV RBC AUTO: 102 FL (ref 80–100)
MCV RBC AUTO: 102 FL (ref 80–100)
MONOCYTES # BLD AUTO: 0.8 THOU/UL (ref 0–0.9)
MONOCYTES NFR BLD AUTO: 10 % (ref 2–10)
NEUTROPHILS # BLD AUTO: 4 THOU/UL (ref 2–7.7)
NEUTROPHILS NFR BLD AUTO: 49 % (ref 50–70)
PLATELET # BLD AUTO: 381 THOU/UL (ref 140–440)
PLATELET # BLD AUTO: 381 THOU/UL (ref 140–440)
PMV BLD AUTO: 9.8 FL (ref 8.5–12.5)
POTASSIUM BLD-SCNC: 4.3 MMOL/L (ref 3.5–5)
POTASSIUM SERPL-SCNC: 4.3 MMOL/L (ref 3.5–5)
RBC # BLD AUTO: 3.77 MILL/UL (ref 3.8–5.4)
RBC # BLD AUTO: 3.77 MILL/UL (ref 3.8–5.4)
SODIUM SERPL-SCNC: 133 MMOL/L (ref 136–145)
SODIUM SERPL-SCNC: 133 MMOL/L (ref 136–145)
WBC # BLD AUTO: 8.1 THOU/UL (ref 4–11)
WBC: 8.1 THOU/UL (ref 4–11)

## 2018-02-19 ENCOUNTER — NURSING HOME VISIT (OUTPATIENT)
Dept: GERIATRICS | Facility: CLINIC | Age: 78
End: 2018-02-19
Payer: COMMERCIAL

## 2018-02-19 VITALS
RESPIRATION RATE: 18 BRPM | WEIGHT: 171 LBS | HEART RATE: 61 BPM | DIASTOLIC BLOOD PRESSURE: 75 MMHG | TEMPERATURE: 98.4 F | BODY MASS INDEX: 30.29 KG/M2 | SYSTOLIC BLOOD PRESSURE: 134 MMHG | OXYGEN SATURATION: 97 %

## 2018-02-19 DIAGNOSIS — M79.605 BILATERAL LOWER EXTREMITY PAIN: Primary | ICD-10-CM

## 2018-02-19 DIAGNOSIS — M79.604 BILATERAL LOWER EXTREMITY PAIN: Primary | ICD-10-CM

## 2018-02-19 PROCEDURE — 99308 SBSQ NF CARE LOW MDM 20: CPT | Performed by: NURSE PRACTITIONER

## 2018-02-19 RX ORDER — TRAMADOL HYDROCHLORIDE 50 MG/1
50 TABLET ORAL EVERY 6 HOURS PRN
COMMUNITY
End: 2018-06-18

## 2018-02-19 NOTE — PROGRESS NOTES
[place] [facility]    Chief Complaint   Patient presents with     RECHECK       HPI:    Yoanna Phillips is a 77 year old  (1940), who is being seen today for an episodic care visit at [facility]  HPI information obtained from: facility chart records, facility staff and patient report.    Today's concern is:    ICD-10-CM    1. Bilateral lower extremity pain M79.604     M79.605    Pain improved with PRN tramadol. No further complaints.       ALLERGIES: Aspirin  Past Medical, Surgical, Family and Social History reviewed and updated in [facility].    Current Outpatient Prescriptions   Medication Sig Dispense Refill     traMADol (ULTRAM) 50 MG tablet Take 50 mg by mouth every 6 hours as needed for pain       Nystatin POWD Apply to redness topically as needed for Rash QD AND Apply to groin topically every day and evening shift for Rash Until resolved.       AMOXICILLIN PO Take 2 g by mouth as needed for prophylaxis QD Prior to dental procedure.       cholecalciferol (VITAMIN D) 1000 UNIT tablet Take 1 tablet (1,000 Units) by mouth daily 100 tablet 3     Acetaminophen (TYLENOL PO) Take 650 mg by mouth every 6 hours as needed        NUTRITIONAL SUPPLEMENT LIQD Take by mouth 2 times daily Give 120mL       CARBAMAZEPINE ER PO Take 300 mg by mouth 2 times daily       cyanocobalamin (VITAMIN B12) 1000 MCG/ML injection Inject 1,000 mcg into the muscle every day shift starting on the 25th and ending on the 25th every month       GABAPENTIN PO Take 900 mg by mouth 3 times daily       tiotropium (SPIRIVA) 18 MCG capsule Inhale 18 mcg into the lungs daily       FOLIC ACID PO Take 1 mg by mouth daily       Thiamine HCl (VITAMIN B-1 PO) Take 100 mg by mouth daily       LOSARTAN POTASSIUM PO Take 50 mg by mouth daily       Multiple Vitamins-Minerals (CENTRUM SILVER) per tablet Take 1 tablet by mouth daily       Medications reviewed:  Medications reconciled to facility chart and changes were made to reflect  current medications as identified as above med list. Below are the changes that were made:   Medications stopped since last EPIC medication reconciliation:   There are no discontinued medications.    Medications started since last [facility] medication reconciliation:  Orders Placed This Encounter   Medications     traMADol (ULTRAM) 50 MG tablet     Sig: Take 50 mg by mouth every 6 hours as needed for pain     REVIEW OF SYSTEMS:  4 point ROS including Respiratory, CV, GI and , other than that noted in the HPI,  is negative    Physical Exam:  /75  Pulse 61  Temp 98.4  F (36.9  C)  Resp 18  Wt 171 lb (77.6 kg)  SpO2 97%  BMI 30.29 kg/m2  GENERAL APPEARANCE:  Alert, in no distress  RESP:  respiratory effort and palpation of chest normal, lungs clear to auscultation   CV:  Palpation and auscultation of heart done , regular rate and rhythm, no murmur, rub, or gallop  M/S:   Gait and station normal  Digits and nails normal     BP Readin/82, 128/78, 117/64           HR:  61-76    Recent Labs:   CBC RESULTS:   Recent Labs   Lab Test 18   WBC  8.1  9.5   RBC  3.77*  3.69*   HGB  13.0  12.8   HCT  38.6  37.8   MCV  102*  102*   MCH  34.5*  34.7*   MCHC  33.7  33.9   RDW  12.1  12.1   PLT  381  399       Last Basic Metabolic Panel:  Recent Labs   Lab Test 18   NA  133*  135*   POTASSIUM  4.3  4.7   CHLORIDE  100  103   FELICE  10.2  9.7   CO2  27  23   BUN  13  9   CR  0.68  0.65   GLC  89  84       Liver Function Studies -   Recent Labs   Lab Test 17   0645   PROTTOTAL  6.4  5.1*   ALBUMIN  3.2*  2.1*   BILITOTAL  0.3  0.2   ALKPHOS  80  74   AST  15  43   ALT  13  37       TSH   Date Value Ref Range Status   2012 0.44 0.4 - 5.0 mU/L Final       Assessment/Plan:  (M79.604,  M79.605) Bilateral lower extremity pain  (primary encounter diagnosis)  Bilateral pain improved with PRN tramadol. Workup neg. Continue to monitor and notify NP with changes or worsening  condition.     Orders:  The current medical regimen is effective; continue present plan and medications.      Total time spent with patient visit at the skilled nursing facility was 15 min including patient visit and review of past records. Greater than 50% of total time spent with counseling and coordinating care due to follow up leg pain    Electronically signed by  TANIYA Zaman CNP

## 2018-03-05 ENCOUNTER — CLINICAL UPDATE (OUTPATIENT)
Dept: PHARMACY | Facility: CLINIC | Age: 78
End: 2018-03-05

## 2018-03-05 NOTE — PROGRESS NOTES
This patient's medication list and chart were reviewed as part of the service provided by [facility].    Assessment/Recommendations:  1. (Pain):  Prn Tramadol was started due to bilateral lower extremity pain.  May be of benefit to schedule Tylenol vs using only prn, and this may allow reduced need for prn Tramadol.  Should monitor LFTs if tylenol scheduled due to carbamazepine and tylenol combo possibly resulting in increased risk of tylenol hepatotoxicity.  2. (Seizures):  Has been >5yrs since a seizure, and pt has tolerated GDR of Gabapentin (most recent MD note indicates dose now at 600mg tid, however Epic med list indicates 900mg tid - please reconcile).  Possible that Gabapentin was helping with bilateral lower extremity pain as well.  Carbamazepine, Gabapentin (as well as Losartan) may all contribute to low Na, and pt does have low Na level.  Consider small reduction in Carbamazepine ER to 200mg bid and monitor seizure control, Na level to see if improvement.      Jeanie Bo, Pharm.D.,[facility] [place]  Board Certified Geriatric Pharmacist  Medication Therapy Management Pharmacist  [phone]

## 2018-03-30 ENCOUNTER — NURSING HOME VISIT (OUTPATIENT)
Dept: GERIATRICS | Facility: CLINIC | Age: 78
End: 2018-03-30
Payer: COMMERCIAL

## 2018-03-30 VITALS
HEART RATE: 72 BPM | TEMPERATURE: 98.4 F | OXYGEN SATURATION: 97 % | BODY MASS INDEX: 28.96 KG/M2 | DIASTOLIC BLOOD PRESSURE: 68 MMHG | SYSTOLIC BLOOD PRESSURE: 136 MMHG | RESPIRATION RATE: 18 BRPM | WEIGHT: 163.5 LBS

## 2018-03-30 DIAGNOSIS — M79.605 BILATERAL LOWER EXTREMITY PAIN: ICD-10-CM

## 2018-03-30 DIAGNOSIS — G40.909 SEIZURE DISORDER (H): Primary | ICD-10-CM

## 2018-03-30 DIAGNOSIS — M79.604 BILATERAL LOWER EXTREMITY PAIN: ICD-10-CM

## 2018-03-30 PROCEDURE — 99308 SBSQ NF CARE LOW MDM 20: CPT | Performed by: NURSE PRACTITIONER

## 2018-03-30 NOTE — PROGRESS NOTES
[place] [facility]    Chief Complaint   Patient presents with     RECHECK       HPI:    Yoanna Phillips is a 77 year old  (1940), who is being seen today for an episodic care visit at [facility].  HPI information obtained from: facility chart records, facility staff and patient report.    Today's concern is:  Seizure disorder (H)  Has had no history of seizure activity for >5 years. Currently taking gabapentin 900 mg/tid and carbamazepine 300 mg/bid.    Bilateral lower extremity pain  Pain spontaneously improved. Has tramadol available PRN. Tramadol not utilized over previous month.       ALLERGIES: Aspirin  Past Medical, Surgical, Family and Social History reviewed and updated in EPIC.    Current Outpatient Prescriptions   Medication Sig Dispense Refill     traMADol (ULTRAM) 50 MG tablet Take 50 mg by mouth every 6 hours as needed for pain for Pain 5-10 on pain scale 0/10       Nystatin POWD Apply to redness topically as needed for Rash QD AND Apply to groin topically every day and evening shift for Rash Until resolved.       AMOXICILLIN PO Take 2 g by mouth as needed for prophylaxis QD Prior to dental procedure.       cholecalciferol (VITAMIN D) 1000 UNIT tablet Take 1 tablet (1,000 Units) by mouth daily 100 tablet 3     Acetaminophen (TYLENOL PO) Take 650 mg by mouth 3 times daily as needed for pain 1-4 on pain scale 0/10 AND Give 650 mg by mouth one time a day for Pain       NUTRITIONAL SUPPLEMENT LIQD Take by mouth 2 times daily Give 120mL       CARBAMAZEPINE ER PO Take 300 mg by mouth 2 times daily       cyanocobalamin (VITAMIN B12) 1000 MCG/ML injection Inject 1,000 mcg into the muscle every day shift starting on the 25th and ending on the 25th every month       GABAPENTIN PO Take 900 mg by mouth 3 times daily       tiotropium (SPIRIVA) 18 MCG capsule Inhale 18 mcg into the lungs daily       FOLIC ACID PO Take 1 mg by mouth daily       Thiamine HCl (VITAMIN B-1 PO) Take 100  mg by mouth daily       LOSARTAN POTASSIUM PO Take 50 mg by mouth daily       Multiple Vitamins-Minerals (CENTRUM SILVER) per tablet Take 1 tablet by mouth daily       Medications reviewed:  Medications reconciled to facility chart and changes were made to reflect current medications as identified as above med list. Below are the changes that were made:   Medications stopped since last EPIC medication reconciliation:   There are no discontinued medications.    Medications started since last [facility] medication reconciliation:  No orders of the defined types were placed in this encounter.    REVIEW OF SYSTEMS:  4 point ROS including Respiratory, CV, GI and , other than that noted in the HPI,  is negative    Physical Exam:  /68  Pulse 72  Temp 98.4  F (36.9  C)  Resp 18  Wt 163 lb 8 oz (74.2 kg)  SpO2 97%  BMI 28.96 kg/m2  GENERAL APPEARANCE:  Alert, in no distress  ENT:  Mouth and posterior oropharynx normal, moist mucous membranes  RESP:  respiratory effort and palpation of chest normal, lungs clear to auscultation , no respiratory distress  CV:  Palpation and auscultation of heart done , regular rate and rhythm, no murmur, rub, or gallop  M/S:   Gait and station normal  Digits and nails normal  SKIN:  Inspection of skin and subcutaneous tissue baseline, Palpation of skin and subcutaneous tissue baseline  NEURO:   Cranial nerves 2-12 are normal tested and grossly at patient's baseline  PSYCH:  oriented X 3     BP Readin/70, 140/72, 132/76            HR:  67-76    Recent Labs:   CBC RESULTS:   Recent Labs   Lab Test 18   WBC  8.1  9.5   RBC  3.77*  3.69*   HGB  13.0  12.8   HCT  38.6  37.8   MCV  102*  102*   MCH  34.5*  34.7*   MCHC  33.7  33.9   RDW  12.1  12.1   PLT  381  399       Last Basic Metabolic Panel:  Recent Labs   Lab Test 18   NA  133*  135*   POTASSIUM  4.3  4.7   CHLORIDE  100  103   FELICE  10.2  9.7   CO2  27  23   BUN  13  9   CR  0.68  0.65   GLC  89  84        Liver Function Studies -   Recent Labs   Lab Test 08/22/17 05/24/17   0645   PROTTOTAL  6.4  5.1*   ALBUMIN  3.2*  2.1*   BILITOTAL  0.3  0.2   ALKPHOS  80  74   AST  15  43   ALT  13  37       TSH   Date Value Ref Range Status   03/08/2012 0.44 0.4 - 5.0 mU/L Final     Assessment/Plan:  (G40.909) Seizure disorder (H)  (primary encounter diagnosis)  No recent seizure activity. Will trial GDR of Gabapentin 600 mg/tid. Continue to monitor closely and notify NP with changes.   -LFTs next lab day d/t carbamazepine and tylenol combo.   -CBC and BMP next lab day.     (M79.604,  M79.605) Bilateral lower extremity pain  Resolved spontaneously.   -Add tylenol 1000 mg/tid. Discontinue tramadol PRN if continues to be asymptomatic. Monitor and notify NP with changes.     Orders:  See above     Total time spent with patient visit at the skilled nursing facility was 15 min including patient visit and review of past records. Greater than 50% of total time spent with counseling and coordinating care due to medication monitoring    Electronically signed by  TANIYA Zaman CNP

## 2018-04-07 RX ORDER — ACETAMINOPHEN 500 MG
1000 TABLET ORAL 3 TIMES DAILY
Qty: 100 TABLET | Refills: 0
Start: 2018-04-07 | End: 2018-06-18 | Stop reason: DRUGHIGH

## 2018-04-09 ENCOUNTER — RECORDS - HEALTHEAST (OUTPATIENT)
Dept: LAB | Facility: CLINIC | Age: 78
End: 2018-04-09

## 2018-04-10 ENCOUNTER — TRANSFERRED RECORDS (OUTPATIENT)
Dept: HEALTH INFORMATION MANAGEMENT | Facility: CLINIC | Age: 78
End: 2018-04-10

## 2018-04-10 LAB
ALBUMIN SERPL-MCNC: 3.3 G/DL (ref 3.5–5)
ALBUMIN SERPL-MCNC: 3.3 G/DL (ref 3.5–5)
ALP SERPL-CCNC: 102 U/L (ref 45–120)
ALP SERPL-CCNC: 102 U/L (ref 45–120)
ALT SERPL W P-5'-P-CCNC: 12 U/L (ref 0–45)
ALT SERPL-CCNC: 12 U/L (ref 0–45)
ANION GAP SERPL CALCULATED.3IONS-SCNC: 10 MMOL/L (ref 5–18)
ANION GAP SERPL CALCULATED.3IONS-SCNC: 10 MMOL/L (ref 5–18)
AST SERPL W P-5'-P-CCNC: 21 U/L (ref 0–40)
AST SERPL-CCNC: 21 U/L (ref 0–40)
BILIRUB DIRECT SERPL-MCNC: 0.1 MG/DL
BILIRUB SERPL-MCNC: 0.3 MG/DL (ref 0–1)
BILIRUB SERPL-MCNC: 0.3 MG/DL (ref 0–1)
BILIRUBIN DIRECT: 0.1 MG/DL
BUN SERPL-MCNC: 7 MG/DL (ref 8–28)
BUN SERPL-MCNC: 7 MG/DL (ref 8–28)
CALCIUM SERPL-MCNC: 9.9 MG/DL (ref 8.5–10.5)
CALCIUM SERPL-MCNC: 9.9 MG/DL (ref 8.5–10.5)
CHLORIDE BLD-SCNC: 101 MMOL/L (ref 98–107)
CHLORIDE SERPLBLD-SCNC: 101 MMOL/L (ref 98–107)
CO2 SERPL-SCNC: 21 MMOL/L (ref 22–31)
CO2 SERPL-SCNC: 21 MMOL/L (ref 22–31)
CREAT SERPL-MCNC: 0.65 MG/DL (ref 0.6–1.1)
CREAT SERPL-MCNC: 0.65 MG/DL (ref 0.6–1.1)
ERYTHROCYTE [DISTWIDTH] IN BLOOD BY AUTOMATED COUNT: 12.6 % (ref 11–14.5)
ERYTHROCYTE [DISTWIDTH] IN BLOOD BY AUTOMATED COUNT: 12.6 % (ref 11–14.5)
GFR SERPL CREATININE-BSD FRML MDRD: >60 ML/MIN/1.73M2
GFR SERPL CREATININE-BSD FRML MDRD: >60 ML/MIN/1.73M2
GLUCOSE BLD-MCNC: 101 MG/DL (ref 70–125)
GLUCOSE SERPL-MCNC: 101 MG/DL (ref 70–125)
HCT VFR BLD AUTO: 39.7 % (ref 35–47)
HCT VFR BLD AUTO: 39.7 % (ref 35–47)
HEMOGLOBIN: 13.2 G/DL (ref 12–16)
HGB BLD-MCNC: 13.2 G/DL (ref 12–16)
MCH RBC QN AUTO: 33.1 PG (ref 27–34)
MCH RBC QN AUTO: 33.1 PG (ref 27–34)
MCHC RBC AUTO-ENTMCNC: 33.2 G/DL (ref 32–36)
MCHC RBC AUTO-ENTMCNC: 33.2 G/DL (ref 32–36)
MCV RBC AUTO: 100 FL (ref 80–100)
MCV RBC AUTO: 100 FL (ref 80–100)
PLATELET # BLD AUTO: 345 THOU/UL (ref 140–440)
PLATELET # BLD AUTO: 345 THOU/UL (ref 140–440)
PMV BLD AUTO: 9.6 FL (ref 8.5–12.5)
POTASSIUM BLD-SCNC: 4.3 MMOL/L (ref 3.5–5)
POTASSIUM SERPL-SCNC: 4.3 MMOL/L (ref 3.5–5)
PROT SERPL-MCNC: 6.8 G/DL (ref 6–8)
PROT SERPL-MCNC: 6.8 G/DL (ref 6–8)
RBC # BLD AUTO: 3.99 MILL/UL (ref 3.8–5.4)
RBC # BLD AUTO: 3.99 MILL/UL (ref 3.8–5.4)
SODIUM SERPL-SCNC: 132 MMOL/L (ref 136–145)
SODIUM SERPL-SCNC: 132 MMOL/L (ref 136–145)
WBC # BLD AUTO: 6.4 THOU/UL (ref 4–11)
WBC: 6.4 THOU/UL (ref 4–11)

## 2018-04-27 ENCOUNTER — NURSING HOME VISIT (OUTPATIENT)
Dept: GERIATRICS | Facility: CLINIC | Age: 78
End: 2018-04-27
Payer: COMMERCIAL

## 2018-04-27 VITALS
SYSTOLIC BLOOD PRESSURE: 146 MMHG | HEART RATE: 58 BPM | OXYGEN SATURATION: 97 % | RESPIRATION RATE: 18 BRPM | DIASTOLIC BLOOD PRESSURE: 77 MMHG | WEIGHT: 169.8 LBS | TEMPERATURE: 97.9 F | BODY MASS INDEX: 30.08 KG/M2

## 2018-04-27 DIAGNOSIS — G30.1 LATE ONSET ALZHEIMER'S DISEASE WITHOUT BEHAVIORAL DISTURBANCE (H): ICD-10-CM

## 2018-04-27 DIAGNOSIS — G40.909 SEIZURE DISORDER (H): Primary | ICD-10-CM

## 2018-04-27 DIAGNOSIS — S73.005D DISLOCATION OF LEFT HIP, SUBSEQUENT ENCOUNTER: ICD-10-CM

## 2018-04-27 DIAGNOSIS — F02.80 LATE ONSET ALZHEIMER'S DISEASE WITHOUT BEHAVIORAL DISTURBANCE (H): ICD-10-CM

## 2018-04-27 PROCEDURE — 99308 SBSQ NF CARE LOW MDM 20: CPT | Performed by: INTERNAL MEDICINE

## 2018-04-28 NOTE — PROGRESS NOTES
"Yoanna Phillips is a 77 year old female seen April 27, 2018 at [facility] where she has resided for one year (admit 6/2017) seen to follow up recurrent hip dislocations, sz and LE edema  Patient is seen in her room, up to chair. States she is feeling well.   Denies any current pain.     Patient reports she has \"mini-seizures\" but has not had one recently.   Her gabapentin has been reduced from 1200 mg tid to 600 mg tid.  No sz since the change    Patient has low cognitive scores and has failed trial of living on her own, with recurrent falls and inability to seek help when she needs it.   Cognitive decline started >7 years ago.   Now meets criteria for dementia dx.     She is ambulating with her walker, WBAT     Past Medical History:   Diagnosis Date     Arthritis     osteoarthritis     Cognitive impairment 7/3/2012     COPD (chronic obstructive pulmonary disease) (H)      Dyspnea on exertion      Epilepsy (H)     minor seizures 2x daily     Hypertension      Numbness and tingling     bilateral numbness     Pernicious anemia     resolved with vitamin replacement     Poor short term memory 5/20/2011     Seizures (H)    Dementia.    S/p left TSA 2012    SH:  [place] [facility]   Has lived there all her life.   She has 4 children, 3 of them live locally and help out.       ROS: negative other than above.   BP Readings from Last 3 Encounters:   04/27/18 146/77   03/30/18 136/68   02/19/18 134/75        EXAM:  Pleasant, NAD  /77  Pulse 58  Temp 97.9  F (36.6  C)  Resp 18  Wt 169 lb 12.8 oz (77 kg)  SpO2 97%  BMI 30.08 kg/m2   Neck supple without adenopathy  Lungs with decreased BS, no rales or wheeze  Heart RRR s1s2 distant  Abd soft, NT, no distention, +BS  Ext with trace edema, no open areas.   Neuro: repetitive, no focal findings, no tremor  Psych: affect okay.    Last Basic Metabolic Panel:  Lab Results   Component Value Date     04/10/2018      Lab Results   Component " Value Date    POTASSIUM 4.3 04/10/2018     Lab Results   Component Value Date    CHLORIDE 101 04/10/2018     Lab Results   Component Value Date    FELICE 9.9 04/10/2018     Lab Results   Component Value Date    CO2 21 04/10/2018     Lab Results   Component Value Date    BUN 7 04/10/2018     Lab Results   Component Value Date    CR 0.65 04/10/2018     Lab Results   Component Value Date     04/10/2018     Lab Results   Component Value Date    WBC 6.4 04/10/2018      HGB 13.2 04/10/2018       04/10/2018       04/10/2018     Lab Results   Component Value Date    AST 21 04/10/2018     Lab Results   Component Value Date    ALT 12 04/10/2018     Lab Results   Component Value Date    BILITOTAL 0.3 04/10/2018     Lab Results   Component Value Date    ALBUMIN 3.3 04/10/2018     Lab Results   Component Value Date    PROTTOTAL 6.8 04/10/2018      Lab Results   Component Value Date    ALKPHOS 102 04/10/2018          IMP/PLAN:  (S73.005D) Hip dislocation, left, subsequent encounter   Comment: s/p closed reduction, doing well so far.   Plan: scheduled acetaminophen for pain, continue immobilizer.     D/c prn tramadol as no further need.       (I10) Essential hypertension, benign  Comment: fair control  Plan: continue losartan 50 mg/day    (G30.1,  F02.80) Late onset Alzheimer's disease without behavioral disturbance  Comment: low cognitive scores and low functional status.     Plan: Board and Care support for meds, meals, activity, safety.        (G40.909) Seizure disorder (H)  Comment: longstanding  Plan: continue carbamazepine, monitor labs.   Continue GDR of gabapentin to lowest effective dose.   Reviewed PharmD note, will follow Na.         (J44.9) Chronic obstructive pulmonary disease, unspecified COPD type (H)  Comment: vague history, and no current symptoms   Plan: continue Spiriva  If she becomes symptomatic, would send for PFTs to clarify dx.      Francy Swartz MD

## 2018-06-04 ENCOUNTER — RECORDS - HEALTHEAST (OUTPATIENT)
Dept: LAB | Facility: CLINIC | Age: 78
End: 2018-06-04

## 2018-06-05 ENCOUNTER — TRANSFERRED RECORDS (OUTPATIENT)
Dept: HEALTH INFORMATION MANAGEMENT | Facility: CLINIC | Age: 78
End: 2018-06-05

## 2018-06-05 LAB
ANION GAP SERPL CALCULATED.3IONS-SCNC: 9 MMOL/L (ref 5–18)
ANION GAP SERPL CALCULATED.3IONS-SCNC: 9 MMOL/L (ref 5–18)
BUN SERPL-MCNC: 10 MG/DL (ref 8–28)
BUN SERPL-MCNC: 10 MG/DL (ref 8–28)
CALCIUM SERPL-MCNC: 10.3 MG/DL (ref 8.5–10.5)
CALCIUM SERPL-MCNC: 10.3 MG/DL (ref 8.5–10.5)
CHLORIDE BLD-SCNC: 103 MMOL/L (ref 98–107)
CHLORIDE SERPLBLD-SCNC: 103 MMOL/L (ref 98–107)
CO2 SERPL-SCNC: 23 MMOL/L (ref 22–31)
CO2 SERPL-SCNC: 23 MMOL/L (ref 22–31)
CREAT SERPL-MCNC: 0.69 MG/DL (ref 0.6–1.1)
CREAT SERPL-MCNC: 0.69 MG/DL (ref 0.6–1.1)
ERYTHROCYTE [DISTWIDTH] IN BLOOD BY AUTOMATED COUNT: 13.3 % (ref 11–14.5)
ERYTHROCYTE [DISTWIDTH] IN BLOOD BY AUTOMATED COUNT: 13.3 % (ref 11–14.5)
GFR SERPL CREATININE-BSD FRML MDRD: >60 ML/MIN/1.73M2
GFR SERPL CREATININE-BSD FRML MDRD: >60 ML/MIN/1.73M2
GLUCOSE BLD-MCNC: 73 MG/DL (ref 70–125)
GLUCOSE SERPL-MCNC: 73 MG/DL (ref 70–125)
HCT VFR BLD AUTO: 40.5 % (ref 35–47)
HCT VFR BLD AUTO: 40.5 % (ref 35–47)
HEMOGLOBIN: 13.3 G/DL (ref 12–16)
HGB BLD-MCNC: 13.3 G/DL (ref 12–16)
MCH RBC QN AUTO: 34 PG (ref 27–34)
MCH RBC QN AUTO: 34 PG (ref 27–34)
MCHC RBC AUTO-ENTMCNC: 32.8 G/DL (ref 32–36)
MCHC RBC AUTO-ENTMCNC: 32.8 G/DL (ref 32–36)
MCV RBC AUTO: 104 FL (ref 80–100)
MCV RBC AUTO: 104 FL (ref 80–100)
PLATELET # BLD AUTO: 364 THOU/UL (ref 140–440)
PLATELET # BLD AUTO: 364 THOU/UL (ref 140–440)
PMV BLD AUTO: 9.4 FL (ref 8.5–12.5)
POTASSIUM BLD-SCNC: 3.6 MMOL/L (ref 3.5–5)
POTASSIUM SERPL-SCNC: 3.6 MMOL/L (ref 3.5–5)
RBC # BLD AUTO: 3.91 MILL/UL (ref 3.8–5.4)
RBC # BLD AUTO: 3.91 MILL/UL (ref 3.8–5.4)
SODIUM SERPL-SCNC: 135 MMOL/L (ref 136–145)
SODIUM SERPL-SCNC: 135 MMOL/L (ref 136–145)
WBC # BLD AUTO: 7 THOU/UL (ref 4–11)
WBC: 7 THOU/UL (ref 4–11)

## 2018-06-18 ENCOUNTER — NURSING HOME VISIT (OUTPATIENT)
Dept: GERIATRICS | Facility: CLINIC | Age: 78
End: 2018-06-18
Payer: COMMERCIAL

## 2018-06-18 VITALS
DIASTOLIC BLOOD PRESSURE: 90 MMHG | TEMPERATURE: 98.6 F | WEIGHT: 170.3 LBS | SYSTOLIC BLOOD PRESSURE: 135 MMHG | BODY MASS INDEX: 32.15 KG/M2 | OXYGEN SATURATION: 97 % | HEART RATE: 65 BPM | HEIGHT: 61 IN | RESPIRATION RATE: 16 BRPM

## 2018-06-18 DIAGNOSIS — F02.80 LATE ONSET ALZHEIMER'S DISEASE WITHOUT BEHAVIORAL DISTURBANCE (H): ICD-10-CM

## 2018-06-18 DIAGNOSIS — I10 ESSENTIAL HYPERTENSION, BENIGN: ICD-10-CM

## 2018-06-18 DIAGNOSIS — G40.909 SEIZURE DISORDER (H): Primary | ICD-10-CM

## 2018-06-18 DIAGNOSIS — G30.1 LATE ONSET ALZHEIMER'S DISEASE WITHOUT BEHAVIORAL DISTURBANCE (H): ICD-10-CM

## 2018-06-18 DIAGNOSIS — J44.9 CHRONIC OBSTRUCTIVE PULMONARY DISEASE, UNSPECIFIED COPD TYPE (H): ICD-10-CM

## 2018-06-18 PROCEDURE — 99309 SBSQ NF CARE MODERATE MDM 30: CPT | Performed by: NURSE PRACTITIONER

## 2018-06-18 RX ORDER — ACETAMINOPHEN 500 MG
1000 TABLET ORAL 2 TIMES DAILY
COMMUNITY
End: 2019-11-01 | Stop reason: CLARIF

## 2018-06-18 NOTE — PROGRESS NOTES
[place] [facility]    Chief Complaint   Patient presents with     jail Regulatory       [place] [facility] Record Number:  [id number]    HPI:    Yoanna Phillips is a 78 year old  (1940), who is being seen today for a federally mandated E/M visit at [facility].  HPI information obtained from: facility chart records, facility staff, patient report and [facility] chart review.     Today's concerns are:  1. Seizure disorder (H)    2. Chronic obstructive pulmonary disease, unspecified COPD type (H)    3. Essential hypertension, benign    4. Late onset Alzheimer's disease without behavioral disturbance      Resident was visited today while in her room. She states it is her birthday today but unsure of how old she is. She will be going out to dinner with her family later today. She has been feeling well, denies chest pain, shortness of breath, or wheezing. She has been eating well, has an occasional loose stool, denies nausea, or abdominal pain.      BP Readin/92  137/76  140/80   HR:  63-77    ALLERGIES: Aspirin  PAST MEDICAL HISTORY:  has a past medical history of Arthritis; Cognitive impairment (7/3/2012); COPD (chronic obstructive pulmonary disease) (H); Dyspnea on exertion; Epilepsy (H); Hypertension; Numbness and tingling; Pernicious anemia; Poor short term memory (2011); and Seizures (H). She also has no past medical history of PONV (postoperative nausea and vomiting).  PAST SURGICAL HISTORY:  has a past surgical history that includes NONSPECIFIC PROCEDURE (); Craniotomy (); Cholecystectomy; Splenectomy; Arthroplasty shoulder (2012); appendectomy; Arthroplasty hip (Right, 2016); Arthroplasty revision hip (Right, 2016); Arthroplasty hip (Left, 3/20/2017); and Closed reduction hip (Left, 2017).  FAMILY HISTORY: family history includes Arthritis in her mother; HEART DISEASE in her mother; Hypertension in  her mother; Neurologic Disorder in her mother.  SOCIAL HISTORY:  reports that she quit smoking about 56 years ago. Her smoking use included Cigarettes. She has a 5.00 pack-year smoking history. She has never used smokeless tobacco. She reports that she drinks alcohol. She reports that she does not use illicit drugs.    MEDICATIONS:  Current Outpatient Prescriptions   Medication Sig Dispense Refill     acetaminophen (TYLENOL) 500 MG tablet Take 1,000 mg by mouth daily as needed for mild pain       AMOXICILLIN PO Take 2 g by mouth as needed for prophylaxis QD Prior to dental procedure.       CARBAMAZEPINE ER PO Take 300 mg by mouth 2 times daily       cholecalciferol (VITAMIN D) 1000 UNIT tablet Take 1 tablet (1,000 Units) by mouth daily 100 tablet 3     cyanocobalamin (VITAMIN B12) 1000 MCG/ML injection Inject 1,000 mcg into the muscle every day shift starting on the 25th and ending on the 25th every month       FOLIC ACID PO Take 1 mg by mouth daily       GABAPENTIN PO Take 600 mg by mouth 3 times daily       LOSARTAN POTASSIUM PO Take 50 mg by mouth daily       Multiple Vitamins-Minerals (CENTRUM SILVER) per tablet Take 1 tablet by mouth daily       NUTRITIONAL SUPPLEMENT LIQD Take by mouth 2 times daily Give 120mL       Nystatin POWD Apply to redness topically as needed for Rash QD AND Apply to groin topically every day and evening shift for Rash Until resolved.       Thiamine HCl (VITAMIN B-1 PO) Take 100 mg by mouth daily       tiotropium (SPIRIVA) 18 MCG capsule Inhale 18 mcg into the lungs daily       Medications reviewed:  Medications reconciled to facility chart and changes were made to reflect current medications as identified as above med list. Below are the changes that were made:   Medications stopped since last EPIC medication reconciliation:   Medications Discontinued During This Encounter   Medication Reason     acetaminophen (TYLENOL) 500 MG tablet Dose adjustment     traMADol (ULTRAM) 50 MG tablet  "Discontinued by another Health Care Provider       Medications started since last [facility] medication reconciliation:  Orders Placed This Encounter   Medications     acetaminophen (TYLENOL) 500 MG tablet     Sig: Take 1,000 mg by mouth daily as needed for mild pain     Case Management:  I have reviewed the care plan and MDS and do agree with the plan. Patient's desire to return to the community is not present.  Information reviewed:  Medications, vital signs, orders, and nursing notes.    ROS:  4 point ROS including Respiratory, CV, GI and , other than that noted in the HPI,  is negative    Exam:  Vitals: /90  Pulse 65  Temp 98.6  F (37  C)  Resp 16  Ht 5' 1\" (1.549 m)  Wt 170 lb 4.8 oz (77.2 kg)  SpO2 97%  BMI 32.18 kg/m2  BMI= Body mass index is 32.18 kg/(m^2).  GENERAL APPEARANCE:  Alert, in no distress, pleasant, cooperative.  EYES:  EOM, lids, pupils and irises normal, sclera clear and conjunctiva normal, no discharge or mattering on lids or lashes noted  ENT:  Mouth normal, moist mucous membranes, nose without drainage or crusting, external ears without lesions, hearing acuity inact2  NECK:  Nontender, supple, symmetrical; no adenopathy, masses or thyromegaly, trachea midline  RESP:  respiratory effort and palpation of chest normal, no chest wall tenderness, no respiratory distress, Lung sounds clear, patient is on room air  CV:  Palpation and auscultation of heart done, rate and rhythm regular, no murmur. Edema none in bilateral lower extremities.  ABDOMEN:  normal bowel sounds, soft, nontender  M/S:   Gait and station: ambulates with walker, no tenderness or swelling of the joints; able to move all extremities palpation   SKIN:  Inspection and of skin and subcutaneous tissue: skin warm, dry and intact without rashes  NEURO: cranial nerves 2-12 grossly intact, no facial asymmetry, no speech deficits and able to follow directions, moves all extremities symmetrically  PSYCH: affect and mood " normal    Lab/Diagnostic data:   CBC RESULTS:   Recent Labs   Lab Test 06/05/18 04/10/18   WBC  7.0  6.4   RBC  3.91  3.99   HGB  13.3  13.2   HCT  40.5  39.7   MCV  104*  100   MCH  34.0  33.1   MCHC  32.8  33.2   RDW  13.3  12.6   PLT  364  345       Last Basic Metabolic Panel:  Recent Labs   Lab Test 06/05/18 04/10/18   NA  135*  132*   POTASSIUM  3.6  4.3   CHLORIDE  103  101   FELICE  10.3  9.9   CO2  23  21*   BUN  10  7*   CR  0.69  0.65   GLC  73  101       Liver Function Studies -   Recent Labs   Lab Test 04/10/18 08/22/17   PROTTOTAL  6.8  6.4   ALBUMIN  3.3*  3.2*   BILITOTAL  0.3  0.3   ALKPHOS  102  80   AST  21  15   ALT  12  13       TSH   Date Value Ref Range Status   03/08/2012 0.44 0.4 - 5.0 mU/L Final         ASSESSMENT/PLAN  (G40.909) Seizure disorder (H)  (primary encounter diagnosis)  Comment: no seizure activity noted by staff. CBC and liver panel within normal limits.   Plan: continue with carbameazepine 300 mg BID and gabapentin 600 mg TID.     (J44.9) Chronic obstructive pulmonary disease, unspecified COPD type (H)  Comment: chronic, stable. No s/s of exacerbation on exam  Plan: continue with tiotropium 18 mcg daily.     (I10) Essential hypertension, benign  Comment: chronic, controlled. Systolic blood pressure generally around 130's-140's.   Plan: continue with losartan 50 mg daily. Monitor blood pressure and titrate as needed.     (G30.1,  F02.80) Late onset Alzheimer's disease without behavioral disturbance  Comment: chronic, progressive. Stable weights. Sleeping well.  BIMS 13/15, no recent CPT scores.   Plan: continue with 24/7 nursing. Monitor weights and sleeping patterns.       Electronically signed by:  TANIYA Montez CNP

## 2018-06-18 NOTE — LETTER
2018        RE: Yoanna Phillips  [address] [facility] [address]          [facility]    Chief Complaint   Patient presents with     assisted Regulatory       [facility] Record Number:  [id number]    HPI:    Yoanna Phillips is a 78 year old  (1940), who is being seen today for a federally mandated E/M visit at [facility].  HPI information obtained from: facility chart records, facility staff, patient report and [facility] chart review.     Today's concerns are:  1. Seizure disorder (H)    2. Chronic obstructive pulmonary disease, unspecified COPD type (H)    3. Essential hypertension, benign    4. Late onset Alzheimer's disease without behavioral disturbance      Resident was visited today while in her room. She states it is her birthday today but unsure of how old she is. She will be going out to dinner with her family later today. She has been feeling well, denies chest pain, shortness of breath, or wheezing. She has been eating well, has an occasional loose stool, denies nausea, or abdominal pain.      BP Readin/92  137/76  140/80   HR:  63-77    ALLERGIES: Aspirin  PAST MEDICAL HISTORY:  has a past medical history of Arthritis; Cognitive impairment (7/3/2012); COPD (chronic obstructive pulmonary disease) (H); Dyspnea on exertion; Epilepsy (H); Hypertension; Numbness and tingling; Pernicious anemia; Poor short term memory (2011); and Seizures (H). She also has no past medical history of PONV (postoperative nausea and vomiting).  PAST SURGICAL HISTORY:  has a past surgical history that includes NONSPECIFIC PROCEDURE (); Craniotomy (); Cholecystectomy; Splenectomy; Arthroplasty shoulder (2012); appendectomy; Arthroplasty hip (Right, 2016); Arthroplasty revision hip (Right, 2016); Arthroplasty hip (Left, 3/20/2017); and Closed reduction hip (Left, 2017).  FAMILY  HISTORY: family history includes Arthritis in her mother; HEART DISEASE in her mother; Hypertension in her mother; Neurologic Disorder in her mother.  SOCIAL HISTORY:  reports that she quit smoking about 56 years ago. Her smoking use included Cigarettes. She has a 5.00 pack-year smoking history. She has never used smokeless tobacco. She reports that she drinks alcohol. She reports that she does not use illicit drugs.    MEDICATIONS:  Current Outpatient Prescriptions   Medication Sig Dispense Refill     acetaminophen (TYLENOL) 500 MG tablet Take 1,000 mg by mouth daily as needed for mild pain       AMOXICILLIN PO Take 2 g by mouth as needed for prophylaxis QD Prior to dental procedure.       CARBAMAZEPINE ER PO Take 300 mg by mouth 2 times daily       cholecalciferol (VITAMIN D) 1000 UNIT tablet Take 1 tablet (1,000 Units) by mouth daily 100 tablet 3     cyanocobalamin (VITAMIN B12) 1000 MCG/ML injection Inject 1,000 mcg into the muscle every day shift starting on the 25th and ending on the 25th every month       FOLIC ACID PO Take 1 mg by mouth daily       GABAPENTIN PO Take 600 mg by mouth 3 times daily       LOSARTAN POTASSIUM PO Take 50 mg by mouth daily       Multiple Vitamins-Minerals (CENTRUM SILVER) per tablet Take 1 tablet by mouth daily       NUTRITIONAL SUPPLEMENT LIQD Take by mouth 2 times daily Give 120mL       Nystatin POWD Apply to redness topically as needed for Rash QD AND Apply to groin topically every day and evening shift for Rash Until resolved.       Thiamine HCl (VITAMIN B-1 PO) Take 100 mg by mouth daily       tiotropium (SPIRIVA) 18 MCG capsule Inhale 18 mcg into the lungs daily       Medications reviewed:  Medications reconciled to facility chart and changes were made to reflect current medications as identified as above med list. Below are the changes that were made:   Medications stopped since last EPIC medication reconciliation:   Medications Discontinued During This Encounter  "  Medication Reason     acetaminophen (TYLENOL) 500 MG tablet Dose adjustment     traMADol (ULTRAM) 50 MG tablet Discontinued by another Health Care Provider       Medications started since last EPIC medication reconciliation:  Orders Placed This Encounter   Medications     acetaminophen (TYLENOL) 500 MG tablet     Sig: Take 1,000 mg by mouth daily as needed for mild pain     Case Management:  I have reviewed the care plan and MDS and do agree with the plan. Patient's desire to return to the community is not present.  Information reviewed:  Medications, vital signs, orders, and nursing notes.    ROS:  4 point ROS including Respiratory, CV, GI and , other than that noted in the HPI,  is negative    Exam:  Vitals: /90  Pulse 65  Temp 98.6  F (37  C)  Resp 16  Ht 5' 1\" (1.549 m)  Wt 170 lb 4.8 oz (77.2 kg)  SpO2 97%  BMI 32.18 kg/m2  BMI= Body mass index is 32.18 kg/(m^2).  GENERAL APPEARANCE:  Alert, in no distress, pleasant, cooperative.  EYES:  EOM, lids, pupils and irises normal, sclera clear and conjunctiva normal, no discharge or mattering on lids or lashes noted  ENT:  Mouth normal, moist mucous membranes, nose without drainage or crusting, external ears without lesions, hearing acuity inact2  NECK:  Nontender, supple, symmetrical; no adenopathy, masses or thyromegaly, trachea midline  RESP:  respiratory effort and palpation of chest normal, no chest wall tenderness, no respiratory distress, Lung sounds clear, patient is on room air  CV:  Palpation and auscultation of heart done, rate and rhythm regular, no murmur. Edema none in bilateral lower extremities.  ABDOMEN:  normal bowel sounds, soft, nontender  M/S:   Gait and station: ambulates with walker, no tenderness or swelling of the joints; able to move all extremities palpation   SKIN:  Inspection and of skin and subcutaneous tissue: skin warm, dry and intact without rashes  NEURO: cranial nerves 2-12 grossly intact, no facial asymmetry, no " speech deficits and able to follow directions, moves all extremities symmetrically  PSYCH: affect and mood normal    Lab/Diagnostic data:   CBC RESULTS:   Recent Labs   Lab Test 06/05/18 04/10/18   WBC  7.0  6.4   RBC  3.91  3.99   HGB  13.3  13.2   HCT  40.5  39.7   MCV  104*  100   MCH  34.0  33.1   MCHC  32.8  33.2   RDW  13.3  12.6   PLT  364  345       Last Basic Metabolic Panel:  Recent Labs   Lab Test 06/05/18 04/10/18   NA  135*  132*   POTASSIUM  3.6  4.3   CHLORIDE  103  101   FELICE  10.3  9.9   CO2  23  21*   BUN  10  7*   CR  0.69  0.65   GLC  73  101       Liver Function Studies -   Recent Labs   Lab Test 04/10/18 08/22/17   PROTTOTAL  6.8  6.4   ALBUMIN  3.3*  3.2*   BILITOTAL  0.3  0.3   ALKPHOS  102  80   AST  21  15   ALT  12  13       TSH   Date Value Ref Range Status   03/08/2012 0.44 0.4 - 5.0 mU/L Final         ASSESSMENT/PLAN  (G40.909) Seizure disorder (H)  (primary encounter diagnosis)  Comment: no seizure activity noted by staff. CBC and liver panel within normal limits.   Plan: continue with carbameazepine 300 mg BID and gabapentin 600 mg TID.     (J44.9) Chronic obstructive pulmonary disease, unspecified COPD type (H)  Comment: chronic, stable. No s/s of exacerbation on exam  Plan: continue with tiotropium 18 mcg daily.     (I10) Essential hypertension, benign  Comment: chronic, controlled. Systolic blood pressure generally around 130's-140's.   Plan: continue with losartan 50 mg daily. Monitor blood pressure and titrate as needed.     (G30.1,  F02.80) Late onset Alzheimer's disease without behavioral disturbance  Comment: chronic, progressive. Stable weights. Sleeping well.  BIMS 13/15, no recent CPT scores.   Plan: continue with 24/7 nursing. Monitor weights and sleeping patterns.       Electronically signed by:  TANIYA Montez CNP      Sincerely,        TANIYA Desai CNP

## 2018-07-09 ENCOUNTER — NURSING HOME VISIT (OUTPATIENT)
Dept: GERIATRICS | Facility: CLINIC | Age: 78
End: 2018-07-09
Payer: COMMERCIAL

## 2018-07-09 VITALS
WEIGHT: 169.7 LBS | HEIGHT: 61 IN | SYSTOLIC BLOOD PRESSURE: 141 MMHG | DIASTOLIC BLOOD PRESSURE: 77 MMHG | HEART RATE: 64 BPM | RESPIRATION RATE: 20 BRPM | BODY MASS INDEX: 32.04 KG/M2 | TEMPERATURE: 97.7 F | OXYGEN SATURATION: 97 %

## 2018-07-09 DIAGNOSIS — I10 ESSENTIAL HYPERTENSION, BENIGN: ICD-10-CM

## 2018-07-09 DIAGNOSIS — Z00.00 ROUTINE GENERAL MEDICAL EXAMINATION AT A HEALTH CARE FACILITY: Primary | ICD-10-CM

## 2018-07-09 DIAGNOSIS — G40.909 SEIZURE DISORDER (H): ICD-10-CM

## 2018-07-09 DIAGNOSIS — G30.1 LATE ONSET ALZHEIMER'S DISEASE WITHOUT BEHAVIORAL DISTURBANCE (H): ICD-10-CM

## 2018-07-09 DIAGNOSIS — J44.9 CHRONIC OBSTRUCTIVE PULMONARY DISEASE, UNSPECIFIED COPD TYPE (H): ICD-10-CM

## 2018-07-09 DIAGNOSIS — F02.80 LATE ONSET ALZHEIMER'S DISEASE WITHOUT BEHAVIORAL DISTURBANCE (H): ICD-10-CM

## 2018-07-09 PROCEDURE — 99318 ZZC ANNUAL NURSING FAC ASSESSMNT, STABLE: CPT | Performed by: NURSE PRACTITIONER

## 2018-07-09 NOTE — LETTER
7/9/2018        RE: Yoanna Phillips  [address] [facility] [address]        [place] [facility]  Chief Complaint   Patient presents with     Annual Comprehensive Nursing Home       [place] Medical Record Number:  [id number]    HPI:    Yoanna Phillips is a 78 year old  (1940), who is being seen today for an annual comprehensive visit at [facility].  HPI information obtained from: facility chart records, facility staff, patient report and [facility] chart review.      Today's concerns are:  Routine general medical examination at a health care facility  Completed today.     Seizure disorder (H)  Gabapentin was titrated down to 600 mg TID from 1200 mg over the past few months. No seizures noted by staff or resident.     Chronic obstructive pulmonary disease, unspecified COPD type (H)  Denies wheezing, shortness of breath or chest tightness. Managed with daily tiotropium.    Essential hypertension, benign  Based on JNC-8 goals,  patients age of 78 year old, no presence of diabetes or CKD, and goals of care goal BP is <150/90 mm Hg. Noted patients BP is higher than goal and will adjust plan of care by increasing losartan..  BP Reading:             HR:  61-77         02 SAT:  97%-98%  142/80  158/85  156/77    Weights:  170.8 lbs  170.0 lbs  171.8 lbs    Late onset Alzheimer's disease without behavioral disturbance  Currently on [facility] and requires support for medications and meals. She is fairly independent with cares. Sleeping well. States she goes to bed around midnight and wakes at 7 a.m.       ALLERGIES: Aspirin  PROBLEM LIST:  Patient Active Problem List   Diagnosis     Essential hypertension, benign     B-complex deficiency     Advance Care Planning     Shoulder joint replacement status     Cognitive impairment     DJD (degenerative joint disease)     Health Care Home     Seizure disorder (H)     Hip joint replacement status     Status post total  replacement of right hip     Primary osteoarthritis of hip     Dislocation of internal right hip prosthesis, subsequent encounter     Physical deconditioning     Anemia due to blood loss, acute     Dyspnea     Dislocated hip (H)     Hip dislocation, left (H)     Late onset Alzheimer's disease without behavioral disturbance     PAST MEDICAL HISTORY:  has a past medical history of Arthritis; Cognitive impairment (7/3/2012); COPD (chronic obstructive pulmonary disease) (H); Dyspnea on exertion; Epilepsy (H); Hypertension; Numbness and tingling; Pernicious anemia; Poor short term memory (5/20/2011); and Seizures (H). She also has no past medical history of PONV (postoperative nausea and vomiting).  PAST SURGICAL HISTORY:  has a past surgical history that includes NONSPECIFIC PROCEDURE (1965); Craniotomy (1965); Cholecystectomy; Splenectomy; Arthroplasty shoulder (6/25/2012); appendectomy; Arthroplasty hip (Right, 4/11/2016); Arthroplasty revision hip (Right, 5/14/2016); Arthroplasty hip (Left, 3/20/2017); and Closed reduction hip (Left, 5/22/2017).  FAMILY HISTORY: family history includes Arthritis in her mother; HEART DISEASE in her mother; Hypertension in her mother; Neurologic Disorder in her mother.  SOCIAL HISTORY:  reports that she quit smoking about 56 years ago. Her smoking use included Cigarettes. She has a 5.00 pack-year smoking history. She has never used smokeless tobacco. She reports that she drinks alcohol. She reports that she does not use illicit drugs.  IMMUNIZATIONS:  Most Recent Immunizations   Administered Date(s) Administered     Influenza (High Dose) 3 valent vaccine 10/03/2017     Pneumo Conj 13-V (2010&after) 11/10/2009     Pneumococcal 23 valent 03/05/2012     TD (ADULT, 7+) 06/18/2007     Tdap (Adult) Unspecified Formulation 06/16/2017     Above immunizations pulled from [facility]. [facility] and facility records also reconciled. Outstanding information sent to  to update [facility]  EPIC.  Future immunizations needed:  yearly influenza per facility protocol  MEDICATIONS:  Current Outpatient Prescriptions   Medication Sig Dispense Refill     acetaminophen (TYLENOL) 500 MG tablet Take 1,000 mg by mouth daily as needed for mild pain       AMOXICILLIN PO Take 2 g by mouth as needed for prophylaxis QD Prior to dental procedure.       CARBAMAZEPINE ER PO Take 300 mg by mouth 2 times daily       cholecalciferol (VITAMIN D) 1000 UNIT tablet Take 1 tablet (1,000 Units) by mouth daily 100 tablet 3     cyanocobalamin (VITAMIN B12) 1000 MCG/ML injection Inject 1,000 mcg into the muscle every day shift starting on the 25th and ending on the 25th every month       FOLIC ACID PO Take 1 mg by mouth daily       GABAPENTIN PO Take 600 mg by mouth 3 times daily       LOSARTAN POTASSIUM PO Take 50 mg by mouth daily       Multiple Vitamins-Minerals (CENTRUM SILVER) per tablet Take 1 tablet by mouth daily       NUTRITIONAL SUPPLEMENT LIQD Take by mouth 2 times daily Give 120mL       Nystatin POWD Apply to redness topically as needed for Rash QD AND Apply to groin topically every day and evening shift for Rash Until resolved.       Thiamine HCl (VITAMIN B-1 PO) Take 100 mg by mouth daily       tiotropium (SPIRIVA) 18 MCG capsule Inhale 18 mcg into the lungs daily       Medications reviewed:  Medications reconciled to facility chart and changes were made to reflect current medications as identified as above med list. Below are the changes that were made:   Medications stopped since last EPIC medication reconciliation:   There are no discontinued medications.    Medications started since last [facility] medication reconciliation:  No orders of the defined types were placed in this encounter.    Case Management:  I have reviewed the facility/SNF care plan/MDS which was done 5/24/18, including the falls risk, nutrition and pain screening. I also reviewed the current immunizations, and preventive care..Future cancer screening  "is not clinically indicated secondary to age/goals of care Patient's desire to return to the community is not present. Current Level of Care is appropriate.    Advance Directive Discussion:    I reviewed the current advanced directives as reflected in EPIC, the POLST and the facility chart, and verified the congruency of orders. I contacted the first party [facility] left a message regarding the plan of Care.  I did review the advance directives with the resident.     Team Discussion:  I communicated with the appropriate disciplines involved with the Plan of Care:   Nursing      Patient Goal:  Patient's goal is pain control and comfort.    Information reviewed:  Medications, vital signs, orders, and nursing notes.    ROS:  4 point ROS including Respiratory, CV, GI and , other than that noted in the HPI,  is negative    Exam:  /77  Pulse 64  Temp 97.7  F (36.5  C)  Resp 20  Ht 5' 1\" (1.549 m)  Wt 169 lb 11.2 oz (77 kg)  SpO2 97%  BMI 32.06 kg/m2  GENERAL APPEARANCE:  Alert, in no distress, pleasant, cooperative.  EYES:  EOM, lids, pupils and irises normal, sclera clear and conjunctiva normal, no discharge or mattering on lids or lashes noted  ENT:  Mouth normal, moist mucous membranes, nose without drainage or crusting, external ears without lesions, hearing acuity inact2  NECK:  Nontender, supple, symmetrical; no adenopathy, masses or thyromegaly, trachea midline  RESP:  respiratory effort and palpation of chest normal, no chest wall tenderness, no respiratory distress, Lung sounds clear, patient is on room air  CV:  Palpation and auscultation of heart done, rate and rhythm regular, no murmur. Edema none in bilateral lower extremities.  ABDOMEN:  normal bowel sounds, soft, nontender  M/S:   Gait and station: ambulates with walker, no tenderness or swelling of the joints; able to move all extremities palpation   SKIN:  Inspection and of skin and subcutaneous tissue: skin warm, dry and intact without " [place]  NEURO: cranial nerves 2-12 grossly intact, no facial asymmetry, no speech deficits and able to follow directions, moves all extremities symmetrically  PSYCH: affect and mood normal     Lab/Diagnostic data:   CBC RESULTS:   Recent Labs   Lab Test 06/05/18 04/10/18   WBC  7.0  6.4   RBC  3.91  3.99   HGB  13.3  13.2   HCT  40.5  39.7   MCV  104*  100   MCH  34.0  33.1   MCHC  32.8  33.2   RDW  13.3  12.6   PLT  364  345       Last Basic Metabolic Panel:  Recent Labs   Lab Test 06/05/18 04/10/18   NA  135*  132*   POTASSIUM  3.6  4.3   CHLORIDE  103  101   FELICE  10.3  9.9   CO2  23  21*   BUN  10  7*   CR  0.69  0.65   GLC  73  101       Liver Function Studies -   Recent Labs   Lab Test 04/10/18 08/22/17   PROTTOTAL  6.8  6.4   ALBUMIN  3.3*  3.2*   BILITOTAL  0.3  0.3   ALKPHOS  102  80   AST  21  15   ALT  12  13       TSH   Date Value Ref Range Status   03/08/2012 0.44 0.4 - 5.0 mU/L Final     ASSESSMENT/PLAN  (Z00.00) Routine general medical examination at a health care facility  (primary encounter diagnosis)  Comment: completed today.     (G40.909) Seizure disorder (H)  Comment: chronic, stable. No seizures as we gabapentin has been titrated down. Stable LFT's and HGB.   Plan: continue with carbamazepine 300 mg BID and gabapentin 600 mg TID. Monitor CBC and LFT's q3 months.     (J44.9) Chronic obstructive pulmonary disease, unspecified COPD type (H)  Comment: chronic, stable. No wheezing on exam today.   Plan: continue with tiotropium daily.     (I10) Essential hypertension, benign  Comment/Plan: Based on JNC-8 goals,  patients age of 78 year old, no presence of diabetes or CKD, and goals of care goal BP is <150/90 mm Hg. Noted patients BP is higher than goal and will adjust plan of care by increasing losartan to 75 mg daily.       (G30.1,  F02.80) Late onset Alzheimer's disease without behavioral disturbance  Comment: chronic, progressive. Slums 15/30 in 2016 and 18/30 in 2017. Current care setting in  appropriate. Sleeping well and weights are stable.   Plan: continue with assistance with meds, meals and cares as needed.     Electronically signed by:  TANIYA Montez CNP          Sincerely,        TANIYA Desai CNP

## 2018-07-09 NOTE — PROGRESS NOTES
[facility]  Chief Complaint   Patient presents with     Annual Comprehensive Nursing Home       [place] Medical Record Number:  [id number]    HPI:    Yoanna Phillips is a 78 year old  (1940), who is being seen today for an annual comprehensive visit at [facility].  HPI information obtained from: facility chart records, facility staff, patient report and [place] [facility] Epic chart review.      Today's concerns are:  Routine general medical examination at a health care facility  Completed today.     Seizure disorder (H)  Gabapentin was titrated down to 600 mg TID from 1200 mg over the past few months. No seizures noted by staff or resident.     Chronic obstructive pulmonary disease, unspecified COPD type (H)  Denies wheezing, shortness of breath or chest tightness. Managed with daily tiotropium.    Essential hypertension, benign  Based on JNC-8 goals,  patients age of 78 year old, no presence of diabetes or CKD, and goals of care goal BP is <150/90 mm Hg. Noted patients BP is higher than goal and will adjust plan of care by increasing losartan..  BP Reading:             HR:  61-77         02 SAT:  97%-98%  142/80  158/85  156/77    Weights:  170.8 lbs  170.0 lbs  171.8 lbs    Late onset Alzheimer's disease without behavioral disturbance  Currently on [facility] and requires support for medications and meals. She is fairly independent with cares. Sleeping well. States she goes to bed around midnight and wakes at 7 a.m.       ALLERGIES: Aspirin  PROBLEM LIST:  Patient Active Problem List   Diagnosis     Essential hypertension, benign     B-complex deficiency     Advance Care Planning     Shoulder joint replacement status     Cognitive impairment     DJD (degenerative joint disease)     [facility]     Seizure disorder (H)     Hip joint replacement status     Status post total replacement of right hip     Primary osteoarthritis of hip     Dislocation of internal right hip  prosthesis, subsequent encounter     Physical deconditioning     Anemia due to blood loss, acute     Dyspnea     Dislocated hip (H)     Hip dislocation, left (H)     Late onset Alzheimer's disease without behavioral disturbance     PAST MEDICAL HISTORY:  has a past medical history of Arthritis; Cognitive impairment (7/3/2012); COPD (chronic obstructive pulmonary disease) (H); Dyspnea on exertion; Epilepsy (H); Hypertension; Numbness and tingling; Pernicious anemia; Poor short term memory (5/20/2011); and Seizures (H). She also has no past medical history of PONV (postoperative nausea and vomiting).  PAST SURGICAL HISTORY:  has a past surgical history that includes NONSPECIFIC PROCEDURE (1965); Craniotomy (1965); Cholecystectomy; Splenectomy; Arthroplasty shoulder (6/25/2012); appendectomy; Arthroplasty hip (Right, 4/11/2016); Arthroplasty revision hip (Right, 5/14/2016); Arthroplasty hip (Left, 3/20/2017); and Closed reduction hip (Left, 5/22/2017).  FAMILY HISTORY: family history includes Arthritis in her mother; HEART DISEASE in her mother; Hypertension in her mother; Neurologic Disorder in her mother.  SOCIAL HISTORY:  reports that she quit smoking about 56 years ago. Her smoking use included Cigarettes. She has a 5.00 pack-year smoking history. She has never used smokeless tobacco. She reports that she drinks alcohol. She reports that she does not use illicit drugs.  IMMUNIZATIONS:  Most Recent Immunizations   Administered Date(s) Administered     Influenza (High Dose) 3 valent vaccine 10/03/2017     Pneumo Conj 13-V (2010&after) 11/10/2009     Pneumococcal 23 valent 03/05/2012     TD (ADULT, 7+) 06/18/2007     Tdap (Adult) Unspecified Formulation 06/16/2017     Above immunizations pulled from [facility]. [facility] and facility records also reconciled. Outstanding information sent to  to update [facility].  Future immunizations needed:  yearly influenza per facility  protocol  MEDICATIONS:  Current Outpatient Prescriptions   Medication Sig Dispense Refill     acetaminophen (TYLENOL) 500 MG tablet Take 1,000 mg by mouth daily as needed for mild pain       AMOXICILLIN PO Take 2 g by mouth as needed for prophylaxis QD Prior to dental procedure.       CARBAMAZEPINE ER PO Take 300 mg by mouth 2 times daily       cholecalciferol (VITAMIN D) 1000 UNIT tablet Take 1 tablet (1,000 Units) by mouth daily 100 tablet 3     cyanocobalamin (VITAMIN B12) 1000 MCG/ML injection Inject 1,000 mcg into the muscle every day shift starting on the 25th and ending on the 25th every month       FOLIC ACID PO Take 1 mg by mouth daily       GABAPENTIN PO Take 600 mg by mouth 3 times daily       LOSARTAN POTASSIUM PO Take 50 mg by mouth daily       Multiple Vitamins-Minerals (CENTRUM SILVER) per tablet Take 1 tablet by mouth daily       NUTRITIONAL SUPPLEMENT LIQD Take by mouth 2 times daily Give 120mL       Nystatin POWD Apply to redness topically as needed for Rash QD AND Apply to groin topically every day and evening shift for Rash Until resolved.       Thiamine HCl (VITAMIN B-1 PO) Take 100 mg by mouth daily       tiotropium (SPIRIVA) 18 MCG capsule Inhale 18 mcg into the lungs daily       Medications reviewed:  Medications reconciled to facility chart and changes were made to reflect current medications as identified as above med list. Below are the changes that were made:   Medications stopped since last EPIC medication reconciliation:   There are no discontinued medications.    Medications started since last [facility] medication reconciliation:  No orders of the defined types were placed in this encounter.    Case Management:  I have reviewed the facility/SNF care plan/MDS which was done 5/24/18, including the falls risk, nutrition and pain screening. I also reviewed the current immunizations, and preventive care..Future cancer screening is not clinically indicated secondary to age/goals of care Patient's  "desire to return to the community is not present. Current Level of Care is appropriate.    Advance Directive Discussion:    I reviewed the current advanced directives as reflected in EPIC, the POLST and the facility chart, and verified the congruency of orders. I contacted the first party [facility] left a message regarding the plan of Care.  I did review the advance directives with the resident.     Team Discussion:  I communicated with the appropriate disciplines involved with the Plan of Care:   Nursing      Patient Goal:  Patient's goal is pain control and comfort.    Information reviewed:  Medications, vital signs, orders, and nursing notes.    ROS:  4 point ROS including Respiratory, CV, GI and , other than that noted in the HPI,  is negative    Exam:  /77  Pulse 64  Temp 97.7  F (36.5  C)  Resp 20  Ht 5' 1\" (1.549 m)  Wt 169 lb 11.2 oz (77 kg)  SpO2 97%  BMI 32.06 kg/m2  GENERAL APPEARANCE:  Alert, in no distress, pleasant, cooperative.  EYES:  EOM, lids, pupils and irises normal, sclera clear and conjunctiva normal, no discharge or mattering on lids or lashes noted  ENT:  Mouth normal, moist mucous membranes, nose without drainage or crusting, external ears without lesions, hearing acuity inact2  NECK:  Nontender, supple, symmetrical; no adenopathy, masses or thyromegaly, trachea midline  RESP:  respiratory effort and palpation of chest normal, no chest wall tenderness, no respiratory distress, Lung sounds clear, patient is on room air  CV:  Palpation and auscultation of heart done, rate and rhythm regular, no murmur. Edema none in bilateral lower extremities.  ABDOMEN:  normal bowel sounds, soft, nontender  M/S:   Gait and station: ambulates with walker, no tenderness or swelling of the joints; able to move all extremities palpation   SKIN:  Inspection and of skin and subcutaneous tissue: skin warm, dry and intact without rashes  NEURO: cranial nerves 2-12 grossly intact, no facial asymmetry, " no speech deficits and able to follow directions, moves all extremities symmetrically  PSYCH: affect and mood normal     Lab/Diagnostic data:   CBC RESULTS:   Recent Labs   Lab Test 06/05/18 04/10/18   WBC  7.0  6.4   RBC  3.91  3.99   HGB  13.3  13.2   HCT  40.5  39.7   MCV  104*  100   MCH  34.0  33.1   MCHC  32.8  33.2   RDW  13.3  12.6   PLT  364  345       Last Basic Metabolic Panel:  Recent Labs   Lab Test 06/05/18 04/10/18   NA  135*  132*   POTASSIUM  3.6  4.3   CHLORIDE  103  101   FELICE  10.3  9.9   CO2  23  21*   BUN  10  7*   CR  0.69  0.65   GLC  73  101       Liver Function Studies -   Recent Labs   Lab Test 04/10/18 08/22/17   PROTTOTAL  6.8  6.4   ALBUMIN  3.3*  3.2*   BILITOTAL  0.3  0.3   ALKPHOS  102  80   AST  21  15   ALT  12  13       TSH   Date Value Ref Range Status   03/08/2012 0.44 0.4 - 5.0 mU/L Final     ASSESSMENT/PLAN  (Z00.00) Routine general medical examination at a health care facility  (primary encounter diagnosis)  Comment: completed today.     (G40.909) Seizure disorder (H)  Comment: chronic, stable. No seizures as we gabapentin has been titrated down. Stable LFT's and HGB.   Plan: continue with carbamazepine 300 mg BID and gabapentin 600 mg TID. Monitor CBC and LFT's q3 months.     (J44.9) Chronic obstructive pulmonary disease, unspecified COPD type (H)  Comment: chronic, stable. No wheezing on exam today.   Plan: continue with tiotropium daily.     (I10) Essential hypertension, benign  Comment/Plan: Based on JNC-8 goals,  patients age of 78 year old, no presence of diabetes or CKD, and goals of care goal BP is <150/90 mm Hg. Noted patients BP is higher than goal and will adjust plan of care by increasing losartan to 75 mg daily.       (G30.1,  F02.80) Late onset Alzheimer's disease without behavioral disturbance  Comment: chronic, progressive. Slums 15/30 in 2016 and 18/30 in 2017. Current care setting in appropriate. Sleeping well and weights are stable.   Plan: continue with  assistance with meds, meals and cares as needed.     Electronically signed by:  TANIYA Montez CNP

## 2018-07-10 ENCOUNTER — TRANSFERRED RECORDS (OUTPATIENT)
Dept: HEALTH INFORMATION MANAGEMENT | Facility: CLINIC | Age: 78
End: 2018-07-10

## 2018-07-10 ENCOUNTER — RECORDS - HEALTHEAST (OUTPATIENT)
Dept: LAB | Facility: CLINIC | Age: 78
End: 2018-07-10

## 2018-07-10 LAB
25(OH)D3 SERPL-MCNC: 28.6 NG/ML (ref 30–80)
ERYTHROCYTE [DISTWIDTH] IN BLOOD BY AUTOMATED COUNT: 12.9 % (ref 11–14.5)
ERYTHROCYTE [DISTWIDTH] IN BLOOD BY AUTOMATED COUNT: 12.9 % (ref 11–14.5)
HBA1C MFR BLD: 5.4 % (ref 4.2–6.1)
HBA1C MFR BLD: 5.4 % (ref 4.2–6.1)
HCT VFR BLD AUTO: 42 % (ref 35–47)
HCT VFR BLD AUTO: 42 % (ref 35–47)
HEMOGLOBIN: 14.2 G/DL (ref 12–16)
HGB BLD-MCNC: 14.2 G/DL (ref 12–16)
MCH RBC QN AUTO: 34.3 PG (ref 27–34)
MCH RBC QN AUTO: 34.3 PG (ref 27–34)
MCHC RBC AUTO-ENTMCNC: 33.8 G/DL (ref 32–36)
MCHC RBC AUTO-ENTMCNC: 33.8 G/DL (ref 32–36)
MCV RBC AUTO: 101 FL (ref 80–100)
MCV RBC AUTO: 101 FL (ref 80–100)
PLATELET # BLD AUTO: 373 THOU/UL (ref 140–440)
PLATELET # BLD AUTO: 373 THOU/UL (ref 140–440)
PMV BLD AUTO: 9.3 FL (ref 8.5–12.5)
RBC # BLD AUTO: 4.14 MILL/UL (ref 3.8–5.4)
RBC # BLD AUTO: 4.14 MILL/UL (ref 3.8–5.4)
TSH SERPL DL<=0.005 MIU/L-ACNC: 1.21 UIU/ML (ref 0.3–5)
TSH SERPL-ACNC: 1.21 UIU/ML (ref 0.3–5)
WBC # BLD AUTO: 9.1 THOU/UL (ref 4–11)
WBC: 9.1 THOU/UL (ref 4–11)

## 2018-08-17 ENCOUNTER — NURSING HOME VISIT (OUTPATIENT)
Dept: GERIATRICS | Facility: CLINIC | Age: 78
End: 2018-08-17
Payer: COMMERCIAL

## 2018-08-17 VITALS
HEIGHT: 61 IN | BODY MASS INDEX: 31.79 KG/M2 | DIASTOLIC BLOOD PRESSURE: 71 MMHG | WEIGHT: 168.4 LBS | SYSTOLIC BLOOD PRESSURE: 145 MMHG

## 2018-08-17 DIAGNOSIS — I10 ESSENTIAL HYPERTENSION, BENIGN: Primary | ICD-10-CM

## 2018-08-17 DIAGNOSIS — F02.80 LATE ONSET ALZHEIMER'S DISEASE WITHOUT BEHAVIORAL DISTURBANCE (H): ICD-10-CM

## 2018-08-17 DIAGNOSIS — S73.004D DISLOCATION OF RIGHT HIP, SUBSEQUENT ENCOUNTER: ICD-10-CM

## 2018-08-17 DIAGNOSIS — G40.909 SEIZURE DISORDER (H): ICD-10-CM

## 2018-08-17 DIAGNOSIS — G30.1 LATE ONSET ALZHEIMER'S DISEASE WITHOUT BEHAVIORAL DISTURBANCE (H): ICD-10-CM

## 2018-08-17 PROCEDURE — 99309 SBSQ NF CARE MODERATE MDM 30: CPT | Performed by: INTERNAL MEDICINE

## 2018-08-17 NOTE — LETTER
"    8/17/2018        RE: Yoanna Phillips  [address] [facility] [address]        Yoanna Phillips is a 78 year old female seen August 17, 2018 at [facility] where she has resided for one year (admit 6/2017) seen to follow up recurrent hip dislocations, sz and LE edema  Patient is seen in her room, up to chair. States she is feeling well.   Denies any current pain.     Patient has past h/o seizures.  Her gabapentin has been gradually reduced from 1200 mg tid to 600 mg tid.  No sz since the change    Patient has low cognitive scores and has failed trial of living on her own, with recurrent falls and inability to seek help when she needs it.   Cognitive decline started >7 years ago.   Now meets criteria for dementia dx.     She is ambulating with her walker, WBAT     Past Medical History:   Diagnosis Date     Arthritis     osteoarthritis     Cognitive impairment 7/3/2012     COPD (chronic obstructive pulmonary disease) (H)      Dyspnea on exertion      Epilepsy (H)     minor seizures 2x daily     Hypertension      Numbness and tingling     bilateral numbness     Pernicious anemia     resolved with vitamin replacement     Poor short term memory 5/20/2011     Seizures (H)    Dementia.    S/p left TSA 2012    SH:  Lived alone, house in [place] [facility].   Has lived there all her life.   She has 4 children, 3 of them live locally and help out.       ROS: negative other than above.   BP Readings from Last 3 Encounters:   08/16/18 145/71   07/09/18 141/77   06/18/18 135/90        EXAM:  Pleasant, NAD  /71  Ht 5' 1\" (1.549 m)  Wt 168 lb 6.4 oz (76.4 kg)  BMI 31.82 kg/m2   Neck supple without adenopathy  Lungs with decreased BS, no rales or wheeze  Heart RRR s1s2 distant  Abd soft, NT, no distention, +BS  Ext with trace edema, no open areas.   Neuro: repetitive, no focal findings, no tremor  Psych: affect a little flat     Lab Results   Component Value Date    WBC 9.1 07/10/2018      HGB 14.2 " 07/10/2018       07/10/2018       07/10/2018      TSH   Date Value Ref Range Status   07/10/2018 1.21 0.30 - 5.00 uIU/mL Final      Lab Results   Component Value Date    A1C 5.4 07/10/2018          IMP/PLAN:  (S73.005D) Hip dislocation, left, subsequent encounter   Comment: s/p closed reduction one year ago, no further dislocations      Plan: prn acetaminophen        (I10) Essential hypertension, benign  Comment:   BP Readings from Last 3 Encounters:   08/16/18 145/71   07/09/18 141/77   06/18/18 135/90      Plan: continue losartan    (G30.1,  F02.80) Late onset Alzheimer's disease without behavioral disturbance  Comment: low cognitive scores and low functional status.     Plan: Board and Care support for meds, meals, activity, safety.        (G40.909) Seizure disorder (H)  Comment: longstanding, labs stable to date.     Plan: continue carbamazepine, monitor labs.   Continue GDR of gabapentin to lowest effective dose; would look to decreasing to bid as next step.     Reviewed PharmD note, will follow Na.         (J44.9) Chronic obstructive pulmonary disease, unspecified COPD type (H)  Comment: vague history, and no current symptoms   Plan: continue Percy Swartz MD        Sincerely,        Francy Swartz MD

## 2018-08-24 ENCOUNTER — TELEPHONE (OUTPATIENT)
Dept: GERIATRICS | Facility: CLINIC | Age: 78
End: 2018-08-24

## 2018-08-24 NOTE — TELEPHONE ENCOUNTER
[facility] TELEPHONE ENCOUNTER    No chief complaint on file.      Yoanna Phillips is a 78 year old  (1940),Nurse called today to report: patient had 3 falls in the last 48 hours. B/ps have been wnl and orthostatic b/ps have been wnl. No other symptoms to report. No injury.    ASSESSMENT/PLAN  1. Decrease gabapentin to 300 mg twice daily  2. Check tegratal level on Tuesday  3. Check BMP on tuesday    Violet Scott NP

## 2018-08-27 ENCOUNTER — RECORDS - HEALTHEAST (OUTPATIENT)
Dept: LAB | Facility: CLINIC | Age: 78
End: 2018-08-27

## 2018-08-28 ENCOUNTER — TRANSFERRED RECORDS (OUTPATIENT)
Dept: HEALTH INFORMATION MANAGEMENT | Facility: CLINIC | Age: 78
End: 2018-08-28

## 2018-08-28 LAB
ANION GAP SERPL CALCULATED.3IONS-SCNC: 6 MMOL/L (ref 5–18)
ANION GAP SERPL CALCULATED.3IONS-SCNC: 6 MMOL/L (ref 5–18)
BUN SERPL-MCNC: 10 MG/DL (ref 8–28)
BUN SERPL-MCNC: 10 MG/DL (ref 8–28)
CALCIUM SERPL-MCNC: 10.2 MG/DL (ref 8.5–10.5)
CALCIUM SERPL-MCNC: 10.2 MG/DL (ref 8.5–10.5)
CARBAMAZEPINE SERPL-MCNC: 7.6 UG/ML (ref 4–12)
CHLORIDE BLD-SCNC: 103 MMOL/L (ref 98–107)
CHLORIDE SERPLBLD-SCNC: 103 MMOL/L (ref 98–107)
CO2 SERPL-SCNC: 26 MMOL/L (ref 22–31)
CO2 SERPL-SCNC: 26 MMOL/L (ref 22–31)
CREAT SERPL-MCNC: 0.62 MG/DL (ref 0.6–1.1)
CREAT SERPL-MCNC: 0.62 MG/DL (ref 0.6–1.1)
GFR SERPL CREATININE-BSD FRML MDRD: >60 ML/MIN/1.73M2
GFR SERPL CREATININE-BSD FRML MDRD: >60 ML/MIN/1.73M2
GLUCOSE BLD-MCNC: 73 MG/DL (ref 70–125)
GLUCOSE SERPL-MCNC: 73 MG/DL (ref 70–125)
POTASSIUM BLD-SCNC: 4.1 MMOL/L (ref 3.5–5)
POTASSIUM SERPL-SCNC: 4.1 MMOL/L (ref 3.5–5)
SODIUM SERPL-SCNC: 135 MMOL/L (ref 136–145)
SODIUM SERPL-SCNC: 135 MMOL/L (ref 136–145)

## 2018-08-29 NOTE — PROGRESS NOTES
"Yoanna Phillips is a 78 year old female seen August 17, 2018 at [facility] where she has resided for one year (admit 6/2017) seen to follow up recurrent hip dislocations, sz and LE edema  Patient is seen in her room, up to chair. States she is feeling well.   Denies any current pain.     Patient has past h/o seizures.  Her gabapentin has been gradually reduced from 1200 mg tid to 600 mg tid.  No sz since the change    Patient has low cognitive scores and has failed trial of living on her own, with recurrent falls and inability to seek help when she needs it.   Cognitive decline started >7 years ago.   Now meets criteria for dementia dx.     She is ambulating with her walker, WBAT     Past Medical History:   Diagnosis Date     Arthritis     osteoarthritis     Cognitive impairment 7/3/2012     COPD (chronic obstructive pulmonary disease) (H)      Dyspnea on exertion      Epilepsy (H)     minor seizures 2x daily     Hypertension      Numbness and tingling     bilateral numbness     Pernicious anemia     resolved with vitamin replacement     Poor short term memory 5/20/2011     Seizures (H)    Dementia.    S/p left TSA 2012    SH:  [place] alone[facility] [place] [facility]   Has lived there all her life.   She has 4 children, 3 of them live locally and help out.       ROS: negative other than above.   BP Readings from Last 3 Encounters:   08/16/18 145/71   07/09/18 141/77   06/18/18 135/90        EXAM:  Pleasant, NAD  /71  Ht 5' 1\" (1.549 m)  Wt 168 lb 6.4 oz (76.4 kg)  BMI 31.82 kg/m2   Neck supple without adenopathy  Lungs with decreased BS, no rales or wheeze  Heart RRR s1s2 distant  Abd soft, NT, no distention, +BS  Ext with trace edema, no open areas.   Neuro: repetitive, no focal findings, no tremor  Psych: affect a little flat     Lab Results   Component Value Date    WBC 9.1 07/10/2018      HGB 14.2 07/10/2018       07/10/2018       07/10/2018      TSH   Date Value Ref Range Status "   07/10/2018 1.21 0.30 - 5.00 uIU/mL Final      Lab Results   Component Value Date    A1C 5.4 07/10/2018          IMP/PLAN:  (S73.005D) Hip dislocation, left, subsequent encounter   Comment: s/p closed reduction one year ago, no further dislocations      Plan: prn acetaminophen        (I10) Essential hypertension, benign  Comment:   BP Readings from Last 3 Encounters:   08/16/18 145/71   07/09/18 141/77   06/18/18 135/90      Plan: continue losartan    (G30.1,  F02.80) Late onset Alzheimer's disease without behavioral disturbance  Comment: low cognitive scores and low functional status.     Plan: Board and Care support for meds, meals, activity, safety.        (G40.904) Seizure disorder (H)  Comment: longstanding, labs stable to date.     Plan: continue carbamazepine, monitor labs.   Continue GDR of gabapentin to lowest effective dose; would look to decreasing to bid as next step.     Reviewed PharmD note, will follow Na.         (J44.9) Chronic obstructive pulmonary disease, unspecified COPD type (H)  Comment: vague history, and no current symptoms   Plan: continue Spiriva    Francy Swartz MD

## 2018-09-30 VITALS
BODY MASS INDEX: 31.72 KG/M2 | DIASTOLIC BLOOD PRESSURE: 80 MMHG | OXYGEN SATURATION: 97 % | WEIGHT: 168 LBS | RESPIRATION RATE: 19 BRPM | SYSTOLIC BLOOD PRESSURE: 145 MMHG | HEIGHT: 61 IN | TEMPERATURE: 97.9 F | HEART RATE: 72 BPM

## 2018-09-30 NOTE — PROGRESS NOTES
[facility]    Chief Complaint   Patient presents with     longterm Regulatory       [facility] Record Number:  [id number]    HPI:    Yoanna Phillips is a 78 year old  (1940), who is being seen today for a federally mandated E/M visit at [facility].  HPI information obtained from: facility chart records, facility staff, patient report and [facility] chart review.     Today's concerns are:  Essential hypertension, benign  Per facility chart review, blood pressure range 122-155/62-89. Currently taking losartan 75 mg/day.     Seizure disorder (H)  No recent seizure activity noted. Currently taking carbamazepine, and gabapentin. Last sodium 135 on 8/28/18.     Late onset Alzheimer's disease without behavioral disturbance  Most recent BIMS assessment 11/15. Has had one recent fall on 8/24/18. Weight stable 169#. Continues to walk independently without assistance.     Right knee pain  Resident reports that she fell yesterday and aggravated right knee. Has had on and off chronic knee pain over previous months. Currently taking acetaminophen 1000 mg/bid. Does not wish to be prescribed further medication. Using walker for ambulation and has physical therapy three times/week.     Rash  Rash noted under bilateral breasts. Reports some pain when placing undergarments on. Onset 1-2 weeks ago.     BP Reading:                       HR:  67-80  145/80  132/72  135/78    ALLERGIES: Aspirin  PAST MEDICAL HISTORY:  has a past medical history of Arthritis; Cognitive impairment (7/3/2012); COPD (chronic obstructive pulmonary disease) (H); Dyspnea on exertion; Epilepsy (H); Hypertension; Numbness and tingling; Pernicious anemia; Poor short term memory (5/20/2011); and Seizures (H). She also has no past medical history of PONV (postoperative nausea and vomiting).  PAST SURGICAL HISTORY:  has a past surgical history that includes NONSPECIFIC PROCEDURE (1965); Craniotomy (1965);  Cholecystectomy; Splenectomy; Arthroplasty shoulder (6/25/2012); appendectomy; Arthroplasty hip (Right, 4/11/2016); Arthroplasty revision hip (Right, 5/14/2016); Arthroplasty hip (Left, 3/20/2017); and Closed reduction hip (Left, 5/22/2017).  FAMILY HISTORY: family history includes Arthritis in her mother; HEART DISEASE in her mother; Hypertension in her mother; Neurologic Disorder in her mother.  SOCIAL HISTORY:  reports that she quit smoking about 56 years ago. Her smoking use included Cigarettes. She has a 5.00 pack-year smoking history. She has never used smokeless tobacco. She reports that she drinks alcohol. She reports that she does not use illicit drugs.    MEDICATIONS:  Current Outpatient Prescriptions   Medication Sig Dispense Refill     acetaminophen (TYLENOL) 500 MG tablet Take 1,000 mg by mouth 2 times daily AND give 2 tablet by mouth every 24 hours as needed for Pain 1000mg QD PRN       AMOXICILLIN PO Take 2 g by mouth as needed for prophylaxis QD Prior to dental procedure.       CARBAMAZEPINE ER PO Take 300 mg by mouth 2 times daily       cholecalciferol (VITAMIN D) 1000 UNIT tablet Take 1 tablet (1,000 Units) by mouth daily 100 tablet 3     cyanocobalamin (VITAMIN B12) 1000 MCG/ML injection Inject 1,000 mcg into the muscle every day shift starting on the 25th and ending on the 25th every month       FOLIC ACID PO Take 1 mg by mouth daily       GABAPENTIN PO Take 600 mg by mouth 2 times daily        LOSARTAN POTASSIUM PO Take 75 mg by mouth daily        Multiple Vitamins-Minerals (CENTRUM SILVER) per tablet Take 1 tablet by mouth daily       NUTRITIONAL SUPPLEMENT LIQD Take by mouth 2 times daily Give 120mL       Nystatin POWD Apply to redness topically as needed for Rash QD AND Apply to groin topically every day and evening shift for Rash Until resolved.       Thiamine HCl (VITAMIN B-1 PO) Take 100 mg by mouth daily       tiotropium (SPIRIVA) 18 MCG capsule Inhale 18 mcg into the lungs daily    "    Medications reviewed:  Medications reconciled to facility chart and changes were made to reflect current medications as identified as above med list. Below are the changes that were made:   Medications stopped since last EPIC medication reconciliation:   There are no discontinued medications.    Medications started since last [facility] medication reconciliation:  No orders of the defined types were placed in this encounter.      Case Management:  I have reviewed the care plan and MDS and do agree with the plan. Patient's desire to return to the community is not present.  Information reviewed:  Medications, vital signs, orders, and nursing notes.    ROS:  4 point ROS including Respiratory, CV, GI and , other than that noted in the HPI,  is negative    Exam:  Vitals: /80  Pulse 72  Temp 97.9  F (36.6  C)  Resp 19  Ht 5' 1\" (1.549 m)  Wt 168 lb (76.2 kg)  SpO2 97%  BMI 31.74 kg/m2  BMI= Body mass index is 31.74 kg/(m^2).  GENERAL APPEARANCE:  Alert, in no distress  RESP:  respiratory effort and palpation of chest normal, lungs clear to auscultation , no respiratory distress  CV:  Palpation and auscultation of heart done , regular rate and rhythm, no murmur, rub, or gallop, no edema  M/S:   Gait and station normal  Digits and nails normal  SKIN:  erythema noted under bilateral breasts.   NEURO:   Cranial nerves 2-12 are normal tested and grossly at patient's baseline  PSYCH:  memory impaired , affect and mood normal    Lab/Diagnostic data:   CBC RESULTS:   Recent Labs   Lab Test 07/10/18 06/05/18   WBC  9.1  7.0   RBC  4.14  3.91   HGB  14.2  13.3   HCT  42.0  40.5   MCV  101*  104*   MCH  34.3*  34.0   MCHC  33.8  32.8   RDW  12.9  13.3   PLT  373  364       Last Basic Metabolic Panel:  Recent Labs   Lab Test 08/28/18 06/05/18   NA  135*  135*   POTASSIUM  4.1  3.6   CHLORIDE  103  103   FELICE  10.2  10.3   CO2  26  23   BUN  10  10   CR  0.62  0.69   GLC  73  73       Liver Function Studies -   Recent Labs "   Lab Test 04/10/18 08/22/17   PROTTOTAL  6.8  6.4   ALBUMIN  3.3*  3.2*   BILITOTAL  0.3  0.3   ALKPHOS  102  80   AST  21  15   ALT  12  13       TSH   Date Value Ref Range Status   07/10/2018 1.21 0.30 - 5.00 uIU/mL Final   03/08/2012 0.44 0.4 - 5.0 mU/L Final       Lab Results   Component Value Date    A1C 5.4 07/10/2018     ASSESSMENT AND PLAN  (I10) Essential hypertension, benign  (primary encounter diagnosis)  Blood pressures stable and at goal on current medications. Continue lostartan as ordered without change at this time. Keep SBP> 130 mmHg and DBP > 65 mmHg (levels below these increase mortality as shown by standard studies and observations).     (G40.909) Seizure disorder (H)  No recent seizure activity noted. Recent decrease in gabapentin and tegretol 2/2 frequent falls. Family would like resident to have follow up with neurology.   --Consult for [facility]    [phone]     (G30.1,  F02.80) Late onset Alzheimer's disease without behavioral disturbance  Chronic, progressive. Nursing to continue to help with medication management, nursing and meals. Resident appears to be doing well in Board and Care.    (M25.561) Acute pain of right knee  Acute on chronic right knee pain with recent fall.   --X-ray right knee. New orders pending results. Continue acetaminophen 1000 mg/bid.     (R21) Rash  Acute rash under bilateral breasts.   --Nystatin powder BID until rash resolves.     See above for new orders. Daughter Kay present during exam and agrees with POC.          Electronically signed by:  TANIYA Zaman CNP  [facility]

## 2018-10-01 ENCOUNTER — NURSING HOME VISIT (OUTPATIENT)
Dept: GERIATRICS | Facility: CLINIC | Age: 78
End: 2018-10-01
Payer: COMMERCIAL

## 2018-10-01 DIAGNOSIS — I10 ESSENTIAL HYPERTENSION, BENIGN: Primary | ICD-10-CM

## 2018-10-01 DIAGNOSIS — M25.561 ACUTE PAIN OF RIGHT KNEE: ICD-10-CM

## 2018-10-01 DIAGNOSIS — R21 RASH: ICD-10-CM

## 2018-10-01 DIAGNOSIS — F02.80 LATE ONSET ALZHEIMER'S DISEASE WITHOUT BEHAVIORAL DISTURBANCE (H): ICD-10-CM

## 2018-10-01 DIAGNOSIS — G40.909 SEIZURE DISORDER (H): ICD-10-CM

## 2018-10-01 DIAGNOSIS — G30.1 LATE ONSET ALZHEIMER'S DISEASE WITHOUT BEHAVIORAL DISTURBANCE (H): ICD-10-CM

## 2018-10-01 PROCEDURE — 99310 SBSQ NF CARE HIGH MDM 45: CPT | Performed by: NURSE PRACTITIONER

## 2018-10-04 ENCOUNTER — RECORDS - HEALTHEAST (OUTPATIENT)
Dept: LAB | Facility: CLINIC | Age: 78
End: 2018-10-04

## 2018-10-04 ENCOUNTER — TRANSFERRED RECORDS (OUTPATIENT)
Dept: HEALTH INFORMATION MANAGEMENT | Facility: CLINIC | Age: 78
End: 2018-10-04

## 2018-10-04 LAB
ALBUMIN SERPL-MCNC: 3.7 G/DL (ref 3.5–5)
ALBUMIN SERPL-MCNC: 3.7 G/DL (ref 3.5–5)
ALP SERPL-CCNC: 113 U/L (ref 45–120)
ALP SERPL-CCNC: 113 U/L (ref 45–120)
ALT SERPL W P-5'-P-CCNC: 14 U/L (ref 0–45)
ALT SERPL-CCNC: 14 U/L (ref 0–45)
AST SERPL W P-5'-P-CCNC: 20 U/L (ref 0–40)
AST SERPL-CCNC: 20 U/L (ref 0–40)
BILIRUB DIRECT SERPL-MCNC: <0.1 MG/DL
BILIRUB SERPL-MCNC: 0.2 MG/DL (ref 0–1)
BILIRUB SERPL-MCNC: 0.2 MG/DL (ref 0–1)
BILIRUBIN DIRECT: <0.1 MG/DL
ERYTHROCYTE [DISTWIDTH] IN BLOOD BY AUTOMATED COUNT: 12.3 % (ref 11–14.5)
ERYTHROCYTE [DISTWIDTH] IN BLOOD BY AUTOMATED COUNT: 12.3 % (ref 11–14.5)
HCT VFR BLD AUTO: 41.8 % (ref 35–47)
HCT VFR BLD AUTO: 41.8 % (ref 35–47)
HEMOGLOBIN: 13.8 G/DL (ref 12–16)
HGB BLD-MCNC: 13.8 G/DL (ref 12–16)
MCH RBC QN AUTO: 33.8 PG (ref 27–34)
MCH RBC QN AUTO: 33.8 PG (ref 27–34)
MCHC RBC AUTO-ENTMCNC: 33 G/DL (ref 32–36)
MCHC RBC AUTO-ENTMCNC: 33 G/DL (ref 32–36)
MCV RBC AUTO: 103 FL (ref 80–100)
MCV RBC AUTO: 103 FL (ref 80–100)
PLATELET # BLD AUTO: 410 THOU/UL (ref 140–440)
PLATELET # BLD AUTO: 410 THOU/UL (ref 140–440)
PMV BLD AUTO: 9.5 FL (ref 8.5–12.5)
PROT SERPL-MCNC: 7.4 G/DL (ref 6–8)
PROT SERPL-MCNC: 7.4 G/DL (ref 6–8)
RBC # BLD AUTO: 4.08 MILL/UL (ref 3.8–5.4)
RBC # BLD AUTO: 4.08 MILL/UL (ref 3.8–5.4)
WBC # BLD AUTO: 9.1 THOU/UL (ref 4–11)
WBC: 9.1 THOU/UL (ref 4–11)

## 2018-10-09 ENCOUNTER — NURSING HOME VISIT (OUTPATIENT)
Dept: GERIATRICS | Facility: CLINIC | Age: 78
End: 2018-10-09
Payer: COMMERCIAL

## 2018-10-09 ENCOUNTER — NURSING HOME VISIT (OUTPATIENT)
Dept: GERIATRICS | Facility: CLINIC | Age: 78
End: 2018-10-09

## 2018-10-09 VITALS
BODY MASS INDEX: 31.87 KG/M2 | WEIGHT: 168.8 LBS | HEIGHT: 61 IN | OXYGEN SATURATION: 97 % | DIASTOLIC BLOOD PRESSURE: 76 MMHG | TEMPERATURE: 97.4 F | RESPIRATION RATE: 20 BRPM | SYSTOLIC BLOOD PRESSURE: 150 MMHG | HEART RATE: 96 BPM

## 2018-10-09 DIAGNOSIS — M25.561 CHRONIC PAIN OF RIGHT KNEE: Primary | ICD-10-CM

## 2018-10-09 DIAGNOSIS — M25.561 ACUTE PAIN OF RIGHT KNEE: Primary | ICD-10-CM

## 2018-10-09 DIAGNOSIS — G89.29 CHRONIC PAIN OF RIGHT KNEE: Primary | ICD-10-CM

## 2018-10-09 PROCEDURE — 99308 SBSQ NF CARE LOW MDM 20: CPT | Performed by: NURSE PRACTITIONER

## 2018-10-09 NOTE — PROGRESS NOTES
[facility]    Chief Complaint   Patient presents with     RECHECK     Knee Pain       [facility] Record Number:  [id number]  Place of Service where encounter took place:  [facility] ([facility] [[id number]]    HPI:    Yoanna Phillips is a 78 year old  (1940), who is being seen today for an episodic care visit.  HPI information obtained from: facility chart records, facility staff and patient report.    Today's concern is:  Acute pain of right knee   Continues to complain of acute on chronic right knee pain. Xray neg for fracture or dislocation.   -Does not wish to increase or schedule acetaminophen.   -No worsening pain but not improved.     BP Readin/76  137/96  134/66    HR:  67-96  Wt Readings from Last 4 Encounters:   10/09/18 168 lb 12.8 oz (76.6 kg)   18 168 lb (76.2 kg)   18 168 lb 6.4 oz (76.4 kg)   18 169 lb 11.2 oz (77 kg)     ALLERGIES: Aspirin  Past Medical, Surgical, Family and Social History reviewed and updated in [facility].    Current Outpatient Prescriptions   Medication Sig Dispense Refill     acetaminophen (TYLENOL) 500 MG tablet Take 1,000 mg by mouth 2 times daily AND give 2 tablet by mouth every 24 hours as needed for Pain 1000mg QD PRN       AMOXICILLIN PO Take 2 g by mouth as needed for prophylaxis QD Prior to dental procedure.       CARBAMAZEPINE ER PO Take 300 mg by mouth 2 times daily       cholecalciferol (VITAMIN D) 1000 UNIT tablet Take 1 tablet (1,000 Units) by mouth daily 100 tablet 3     cyanocobalamin (VITAMIN B12) 1000 MCG/ML injection Inject 1,000 mcg into the muscle every day shift starting on the  and ending on the  every month       FOLIC ACID PO Take 1 mg by mouth daily       GABAPENTIN PO Take 600 mg by mouth 2 times daily        LOSARTAN POTASSIUM PO Take 75 mg by mouth daily        Multiple Vitamins-Minerals (CENTRUM SILVER) per tablet Take 1 tablet by mouth daily       NUTRITIONAL SUPPLEMENT LIQD Take by  "mouth 2 times daily Give 120mL       Nystatin POWD Apply to redness topically as needed for Rash QD AND Apply to groin topically every day and evening shift for Rash Until resolved.       Thiamine HCl (VITAMIN B-1 PO) Take 100 mg by mouth daily       tiotropium (SPIRIVA) 18 MCG capsule Inhale 18 mcg into the lungs daily       Medications reviewed:  Medications reconciled to facility chart and changes were made to reflect current medications as identified as above med list. Below are the changes that were made:   Medications stopped since last EPIC medication reconciliation:   There are no discontinued medications.    Medications started since last [facility] medication reconciliation:  No orders of the defined types were placed in this encounter.        REVIEW OF SYSTEMS:  4 point ROS including Respiratory, CV, GI and , other than that noted in the HPI,  is negative    Physical Exam:  /76  Pulse 96  Temp 97.4  F (36.3  C)  Resp 20  Ht 5' 1\" (1.549 m)  Wt 168 lb 12.8 oz (76.6 kg)  SpO2 97%  BMI 31.89 kg/m2  GENERAL APPEARANCE:  Alert, in no distress  ENT:  Mouth and posterior oropharynx normal, moist mucous membranes, [place]  RESP:  respiratory effort and palpation of chest normal, lungs clear to auscultation , no respiratory distress  CV:  Palpation and auscultation of heart done , regular rate and rhythm, no murmur, rub, or gallop  M/S:   Gait and station normal  Digits and nails normal  SKIN:  Inspection of skin and subcutaneous tissue baseline, Palpation of skin and subcutaneous tissue baseline  NEURO:   Cranial nerves 2-12 are normal tested and grossly at patient's baseline  PSYCH:  oriented X 3, insight and judgement impaired, memory impaired     Recent Labs:   CBC RESULTS:   Recent Labs   Lab Test 07/10/18 06/05/18   WBC  9.1  7.0   RBC  4.14  3.91   HGB  14.2  13.3   HCT  42.0  40.5   MCV  101*  104*   MCH  34.3*  34.0   MCHC  33.8  32.8   RDW  12.9  13.3   PLT  373  364       Last Basic Metabolic " Panel:  Recent Labs   Lab Test 08/28/18 06/05/18   NA  135*  135*   POTASSIUM  4.1  3.6   CHLORIDE  103  103   FELICE  10.2  10.3   CO2  26  23   BUN  10  10   CR  0.62  0.69   GLC  73  73       Liver Function Studies -   Recent Labs   Lab Test 04/10/18 08/22/17   PROTTOTAL  6.8  6.4   ALBUMIN  3.3*  3.2*   BILITOTAL  0.3  0.3   ALKPHOS  102  80   AST  21  15   ALT  12  13       TSH   Date Value Ref Range Status   07/10/2018 1.21 0.30 - 5.00 uIU/mL Final   03/08/2012 0.44 0.4 - 5.0 mU/L Final       Lab Results   Component Value Date    A1C 5.4 07/10/2018       Assessment/Plan:  (M25.569) Knee pain  (primary encounter diagnosis)  -Not acute findings on x-ray   -[facility] to eval with possibility of injection.  -Continue acetaminophen as ordered.      Electronically signed by  TANIYA Zaman CNP
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
## 2018-10-16 ENCOUNTER — RECORDS - HEALTHEAST (OUTPATIENT)
Dept: LAB | Facility: CLINIC | Age: 78
End: 2018-10-16

## 2018-10-16 ENCOUNTER — TRANSFERRED RECORDS (OUTPATIENT)
Dept: HEALTH INFORMATION MANAGEMENT | Facility: CLINIC | Age: 78
End: 2018-10-16

## 2018-10-16 LAB
ALBUMIN SERPL-MCNC: 3.9 G/DL (ref 3.5–5)
ALBUMIN SERPL-MCNC: 3.9 G/DL (ref 3.5–5)
ALP SERPL-CCNC: 113 U/L (ref 45–120)
ALP SERPL-CCNC: 113 U/L (ref 45–120)
ALT SERPL W P-5'-P-CCNC: 14 U/L (ref 0–45)
ALT SERPL-CCNC: 14 U/L (ref 0–45)
ANION GAP SERPL CALCULATED.3IONS-SCNC: 13 MMOL/L (ref 5–18)
ANION GAP SERPL CALCULATED.3IONS-SCNC: 13 MMOL/L (ref 5–18)
AST SERPL W P-5'-P-CCNC: 19 U/L (ref 0–40)
AST SERPL-CCNC: 19 U/L (ref 0–40)
BASOPHILS # BLD AUTO: 0.1 THOU/UL (ref 0–0.2)
BASOPHILS NFR BLD AUTO: 1 % (ref 0–2)
BILIRUB SERPL-MCNC: 0.4 MG/DL (ref 0–1)
BILIRUB SERPL-MCNC: 0.4 MG/DL (ref 0–1)
BUN SERPL-MCNC: 13 MG/DL (ref 8–28)
BUN SERPL-MCNC: 13 MG/DL (ref 8–28)
CALCIUM SERPL-MCNC: 10.5 MG/DL (ref 8.5–10.5)
CALCIUM SERPL-MCNC: 10.5 MG/DL (ref 8.5–10.5)
CARBAMAZEPINE SERPL-MCNC: 8.5 UG/ML (ref 4–12)
CHLORIDE BLD-SCNC: 101 MMOL/L (ref 98–107)
CHLORIDE SERPLBLD-SCNC: 101 MMOL/L (ref 98–107)
CO2 SERPL-SCNC: 20 MMOL/L (ref 22–31)
CO2 SERPL-SCNC: 20 MMOL/L (ref 22–31)
CREAT SERPL-MCNC: 0.68 MG/DL (ref 0.6–1.1)
CREAT SERPL-MCNC: 0.68 MG/DL (ref 0.6–1.1)
DIFFERENTIAL: ABNORMAL
EOSINOPHIL # BLD AUTO: 0.3 THOU/UL (ref 0–0.4)
EOSINOPHIL NFR BLD AUTO: 3 % (ref 0–6)
ERYTHROCYTE [DISTWIDTH] IN BLOOD BY AUTOMATED COUNT: 12.7 % (ref 11–14.5)
ERYTHROCYTE [DISTWIDTH] IN BLOOD BY AUTOMATED COUNT: 12.7 % (ref 11–14.5)
GFR SERPL CREATININE-BSD FRML MDRD: >60 ML/MIN/1.73M2
GFR SERPL CREATININE-BSD FRML MDRD: >60 ML/MIN/1.73M2
GLUCOSE BLD-MCNC: 86 MG/DL (ref 70–125)
GLUCOSE SERPL-MCNC: 86 MG/DL (ref 70–125)
HCT VFR BLD AUTO: 42.7 % (ref 35–47)
HCT VFR BLD AUTO: 42.7 % (ref 35–47)
HEMOGLOBIN: 14.1 G/DL (ref 12–16)
HGB BLD-MCNC: 14.1 G/DL (ref 12–16)
LYMPHOCYTES # BLD AUTO: 5.5 THOU/UL (ref 0.8–4.4)
LYMPHOCYTES NFR BLD AUTO: 55 % (ref 20–40)
MCH RBC QN AUTO: 33.8 PG (ref 27–34)
MCH RBC QN AUTO: 33.8 PG (ref 27–34)
MCHC RBC AUTO-ENTMCNC: 33 G/DL (ref 32–36)
MCHC RBC AUTO-ENTMCNC: 33 G/DL (ref 32–36)
MCV RBC AUTO: 102 FL (ref 80–100)
MCV RBC AUTO: 102 FL (ref 80–100)
MONOCYTES # BLD AUTO: 0.8 THOU/UL (ref 0–0.9)
MONOCYTES NFR BLD AUTO: 8 % (ref 2–10)
NEUTROPHILS # BLD AUTO: 3.3 THOU/UL (ref 2–7.7)
NEUTROPHILS NFR BLD AUTO: 33 % (ref 50–70)
PLATELET # BLD AUTO: 395 THOU/UL (ref 140–440)
PLATELET # BLD AUTO: 395 THOU/UL (ref 140–440)
PMV BLD AUTO: 9.5 FL (ref 8.5–12.5)
POTASSIUM BLD-SCNC: 4.7 MMOL/L (ref 3.5–5)
POTASSIUM SERPL-SCNC: 4.7 MMOL/L (ref 3.5–5)
PROT SERPL-MCNC: 7.2 G/DL (ref 6–8)
PROT SERPL-MCNC: 7.2 G/DL (ref 6–8)
RBC # BLD AUTO: 4.17 MILL/UL (ref 3.8–5.4)
RBC # BLD AUTO: 4.17 MILL/UL (ref 3.8–5.4)
SODIUM SERPL-SCNC: 134 MMOL/L (ref 136–145)
SODIUM SERPL-SCNC: 134 MMOL/L (ref 136–145)
WBC # BLD AUTO: 10 THOU/UL (ref 4–11)
WBC: 10 THOU/UL (ref 4–11)

## 2018-12-13 ENCOUNTER — NURSING HOME VISIT (OUTPATIENT)
Dept: GERIATRICS | Facility: CLINIC | Age: 78
End: 2018-12-13
Payer: COMMERCIAL

## 2018-12-13 VITALS
DIASTOLIC BLOOD PRESSURE: 69 MMHG | TEMPERATURE: 97.4 F | WEIGHT: 166.8 LBS | HEART RATE: 66 BPM | OXYGEN SATURATION: 96 % | BODY MASS INDEX: 31.49 KG/M2 | HEIGHT: 61 IN | RESPIRATION RATE: 18 BRPM | SYSTOLIC BLOOD PRESSURE: 137 MMHG

## 2018-12-13 DIAGNOSIS — J44.9 CHRONIC OBSTRUCTIVE PULMONARY DISEASE, UNSPECIFIED COPD TYPE (H): ICD-10-CM

## 2018-12-13 DIAGNOSIS — M15.0 PRIMARY OSTEOARTHRITIS INVOLVING MULTIPLE JOINTS: Primary | ICD-10-CM

## 2018-12-13 DIAGNOSIS — F02.80 LATE ONSET ALZHEIMER'S DISEASE WITHOUT BEHAVIORAL DISTURBANCE (H): ICD-10-CM

## 2018-12-13 DIAGNOSIS — G30.1 LATE ONSET ALZHEIMER'S DISEASE WITHOUT BEHAVIORAL DISTURBANCE (H): ICD-10-CM

## 2018-12-13 DIAGNOSIS — G40.909 SEIZURE DISORDER (H): ICD-10-CM

## 2018-12-13 PROCEDURE — 99309 SBSQ NF CARE MODERATE MDM 30: CPT | Performed by: INTERNAL MEDICINE

## 2018-12-13 ASSESSMENT — MIFFLIN-ST. JEOR: SCORE: 1168.98

## 2018-12-13 NOTE — LETTER
"    12/13/2018        RE: Yoanna Phillips  [address] [place] [address]        Yoanna Phillips is a 78 year old female seen December 13, 2018 at [facility] where she has resided for one and a half years (admit 6/2017) seen to follow up dementia and sz disorder.    She is seen in her room, up ambulating without device.   States she had \"a bad case of diarrhea this morning.   I get this periodically\"  On further questioning, seems to be a loose stool only.    No current abd pain, N/V or other GI symptoms, just feels tired.    Declines any treatment.   Does ask that Tylenol be given at HS, as that helps her sleep.   Patient has past h/o seizures.  Her gabapentin has been gradually reduced from 1200 mg tid to 600 mg bid.  No sz reported.   She [facility]ola [facility] in October who had some concerns about patient's gabapentin dose and increased it to 600 mg tid.     Carbamazepine level 8.5 at that time, and CBC was within normal limits.     Patient has low cognitive scores and has failed trial of living on her own, with recurrent falls and inability to seek help when she needs it.   Cognitive decline started >7 years ago.   Now meets criteria for dementia dx.   She does not recognize me today.   Thinks maybe she had a hip replacement, doesn't remember recurrent hip dislocations.       Past Medical History:   Diagnosis Date     Arthritis     osteoarthritis     Cognitive impairment 7/3/2012     COPD (chronic obstructive pulmonary disease) (H)      Dyspnea on exertion      Epilepsy (H)     minor seizures 2x daily     Hypertension      Numbness and tingling     bilateral numbness     Pernicious anemia     resolved with vitamin replacement     Poor short term memory 5/20/2011     Seizures (H)    Dementia.    S/p left TSA 2012    [facility]:  Previously lived with her son, house in [facility].   Has lived there all her life.   She has 4 children, 3 of them live locally and help out. " "      ROS:   BIMS 11/15  Wt Readings from Last 5 Encounters:   12/13/18 75.7 kg (166 lb 12.8 oz)   10/09/18 76.6 kg (168 lb 12.8 oz)   09/30/18 76.2 kg (168 lb)   08/16/18 76.4 kg (168 lb 6.4 oz)   07/09/18 77 kg (169 lb 11.2 oz)        EXAM:  Pleasant, NAD  /69   Pulse 66   Temp 97.4  F (36.3  C)   Resp 18   Ht 1.549 m (5' 1\")   Wt 75.7 kg (166 lb 12.8 oz)   SpO2 96%   BMI 31.52 kg/m      Neck supple without adenopathy  Lungs with decreased BS, no rales or wheeze  Heart RRR s1s2 distant  Abd soft, NT, no distention, +BS  Ext with trace edema, no open areas.   Neuro: repetitive, no focal findings, no tremor  Psych: affect a little flat      Last Comprehensive Metabolic Panel:  Sodium   Date Value Ref Range Status   10/16/2018 134 (L) 136 - 145 mmol/L Final     Potassium   Date Value Ref Range Status   10/16/2018 4.7 3.5 - 5.0 mmol/L Final     Chloride   Date Value Ref Range Status   10/16/2018 101 98 - 107 mmol/L Final     Carbon Dioxide   Date Value Ref Range Status   10/16/2018 20 (L) 22 - 31 mmol/L Final     Anion Gap   Date Value Ref Range Status   10/16/2018 13 5 - 18 mmol/L Final     Glucose   Date Value Ref Range Status   10/16/2018 86 70 - 125 mg/dL Final     Urea Nitrogen   Date Value Ref Range Status   10/16/2018 13 8 - 28 mg/dL Final     Creatinine   Date Value Ref Range Status   10/16/2018 0.68 0.60 - 1.10 mg/dL Final     GFR Estimate   Date Value Ref Range Status   10/16/2018 >60 >60 ml/min/1.73m2 Final     Calcium   Date Value Ref Range Status   10/16/2018 10.5 8.5 - 10.5 mg/dL Final     Lab Results   Component Value Date    WBC 10.0 10/16/2018      HGB 14.1 10/16/2018       10/16/2018       10/16/2018          IMP/PLAN:  (M15.0) Primary osteoarthritis involving multiple joints    Comment: intermittent pain  Plan: scheduled acetaminophen at HS, and available prn.     Local measures, ambulation with FWW    (I10) Essential hypertension, benign  Comment:   BP Readings from Last " 3 Encounters:   12/13/18 137/69   10/09/18 150/76   09/30/18 145/80      Plan: continue losartan    (G30.1,  F02.80) Late onset Alzheimer's disease without behavioral disturbance  Comment: low cognitive scores and low functional status.     Plan: Board and Care support for meds, meals, activity, safety.        (G40.707) Seizure disorder (H)  Comment: longstanding, labs stable to date.     Plan: continue carbamazepine and gabapentin at current doses  Follow up with Dr Banks as scheduled (Dr Fabiola Banks, [facility]:  [phone])    (J44.9) Chronic obstructive pulmonary disease, unspecified COPD type (H)  Comment: by history; no current symptoms   Plan: continue Percy Swartz MD        Sincerely,        Francy Swartz MD

## 2018-12-14 NOTE — PROGRESS NOTES
"Yoanna Phillips is a 78 year old female seen December 13, 2018 at [facility] where she has resided for one and a half years (admit 6/2017) seen to follow up dementia and sz disorder.    She is seen in her room, up ambulating without device.   States she had \"a bad case of diarrhea this morning.   I get this periodically\"  On further questioning, seems to be a loose stool only.    No current abd pain, N/V or other GI symptoms, just feels tired.    Declines any treatment.   Does ask that Tylenol be given at , as that helps her sleep.   Patient has past h/o seizures.  Her gabapentin has been gradually reduced from 1200 mg tid to 600 mg bid.  No sz reported.   She saw Dr Fabiola [facility] in October who had some concerns about patient's gabapentin dose and increased it to 600 mg tid.     Carbamazepine level 8.5 at that time, and CBC was within normal limits.     Patient has low cognitive scores and has failed trial of living on her own, with recurrent falls and inability to seek help when she needs it.   Cognitive decline started >7 years ago.   Now meets criteria for dementia dx.   She does not recognize me today.   Thinks maybe she had a hip replacement, doesn't remember recurrent hip dislocations.       Past Medical History:   Diagnosis Date     Arthritis     osteoarthritis     Cognitive impairment 7/3/2012     COPD (chronic obstructive pulmonary disease) (H)      Dyspnea on exertion      Epilepsy (H)     minor seizures 2x daily     Hypertension      Numbness and tingling     bilateral numbness     Pernicious anemia     resolved with vitamin replacement     Poor short term memory 5/20/2011     Seizures (H)    Dementia.    S/p left TSA 2012    [facility]:  Previously lived with her son, house in [facility].   Has lived there all her life.   She has 4 children, 3 of them live locally and help out.       ROS:   BIMS 11/15  Wt Readings from Last 5 Encounters:   12/13/18 75.7 kg (166 lb 12.8 oz) " "  10/09/18 76.6 kg (168 lb 12.8 oz)   09/30/18 76.2 kg (168 lb)   08/16/18 76.4 kg (168 lb 6.4 oz)   07/09/18 77 kg (169 lb 11.2 oz)        EXAM:  Pleasant, NAD  /69   Pulse 66   Temp 97.4  F (36.3  C)   Resp 18   Ht 1.549 m (5' 1\")   Wt 75.7 kg (166 lb 12.8 oz)   SpO2 96%   BMI 31.52 kg/m     Neck supple without adenopathy  Lungs with decreased BS, no rales or wheeze  Heart RRR s1s2 distant  Abd soft, NT, no distention, +BS  Ext with trace edema, no open areas.   Neuro: repetitive, no focal findings, no tremor  Psych: affect a little flat      Last Comprehensive Metabolic Panel:  Sodium   Date Value Ref Range Status   10/16/2018 134 (L) 136 - 145 mmol/L Final     Potassium   Date Value Ref Range Status   10/16/2018 4.7 3.5 - 5.0 mmol/L Final     Chloride   Date Value Ref Range Status   10/16/2018 101 98 - 107 mmol/L Final     Carbon Dioxide   Date Value Ref Range Status   10/16/2018 20 (L) 22 - 31 mmol/L Final     Anion Gap   Date Value Ref Range Status   10/16/2018 13 5 - 18 mmol/L Final     Glucose   Date Value Ref Range Status   10/16/2018 86 70 - 125 mg/dL Final     Urea Nitrogen   Date Value Ref Range Status   10/16/2018 13 8 - 28 mg/dL Final     Creatinine   Date Value Ref Range Status   10/16/2018 0.68 0.60 - 1.10 mg/dL Final     GFR Estimate   Date Value Ref Range Status   10/16/2018 >60 >60 ml/min/1.73m2 Final     Calcium   Date Value Ref Range Status   10/16/2018 10.5 8.5 - 10.5 mg/dL Final     Lab Results   Component Value Date    WBC 10.0 10/16/2018      HGB 14.1 10/16/2018       10/16/2018       10/16/2018          IMP/PLAN:  (M15.0) Primary osteoarthritis involving multiple joints    Comment: intermittent pain  Plan: scheduled acetaminophen at HS, and available prn.     Local measures, ambulation with FWW    (I10) Essential hypertension, benign  Comment:   BP Readings from Last 3 Encounters:   12/13/18 137/69   10/09/18 150/76   09/30/18 145/80      Plan: continue " losartan    (G30.1,  F02.80) Late onset Alzheimer's disease without behavioral disturbance  Comment: low cognitive scores and low functional status.     Plan: Board and Care support for meds, meals, activity, safety.        (G40.909) Seizure disorder (H)  Comment: longstanding, labs stable to date.     Plan: continue carbamazepine and gabapentin at current doses  Follow up with Dr Banks as scheduled (Dr Fabiola Banks, [facility]:  [phone])    (J44.9) Chronic obstructive pulmonary disease, unspecified COPD type (H)  Comment: by history; no current symptoms   Plan: continue Spiriva     Francy Swartz MD

## 2018-12-26 ENCOUNTER — RECORDS - HEALTHEAST (OUTPATIENT)
Dept: LAB | Facility: CLINIC | Age: 78
End: 2018-12-26

## 2018-12-27 ENCOUNTER — TRANSFERRED RECORDS (OUTPATIENT)
Dept: HEALTH INFORMATION MANAGEMENT | Facility: CLINIC | Age: 78
End: 2018-12-27

## 2018-12-27 LAB
ALBUMIN SERPL-MCNC: 3.3 G/DL (ref 3.5–5)
ALBUMIN SERPL-MCNC: 3.3 G/DL (ref 3.5–5)
ALP SERPL-CCNC: 87 U/L (ref 45–120)
ALP SERPL-CCNC: 87 U/L (ref 45–120)
ALT SERPL W P-5'-P-CCNC: 15 U/L (ref 0–45)
ALT SERPL-CCNC: 15 U/L (ref 0–45)
AST SERPL W P-5'-P-CCNC: 19 U/L (ref 0–40)
AST SERPL-CCNC: 19 U/L (ref 0–40)
BILIRUB DIRECT SERPL-MCNC: 0.1 MG/DL
BILIRUB SERPL-MCNC: 0.2 MG/DL (ref 0–1)
BILIRUB SERPL-MCNC: 0.2 MG/DL (ref 0–1)
BILIRUBIN DIRECT: 0.1 MG/DL
ERYTHROCYTE [DISTWIDTH] IN BLOOD BY AUTOMATED COUNT: 12.7 % (ref 11–14.5)
ERYTHROCYTE [DISTWIDTH] IN BLOOD BY AUTOMATED COUNT: 12.7 % (ref 11–14.5)
HCT VFR BLD AUTO: 39.5 % (ref 35–47)
HCT VFR BLD AUTO: 39.5 % (ref 35–47)
HEMOGLOBIN: 12.8 G/DL (ref 12–16)
HGB BLD-MCNC: 12.8 G/DL (ref 12–16)
MCH RBC QN AUTO: 33.2 PG (ref 27–34)
MCH RBC QN AUTO: 33.2 PG (ref 27–34)
MCHC RBC AUTO-ENTMCNC: 32.4 G/DL (ref 32–36)
MCHC RBC AUTO-ENTMCNC: 32.4 G/DL (ref 32–36)
MCV RBC AUTO: 103 FL (ref 80–100)
MCV RBC AUTO: 103 FL (ref 80–100)
PLATELET # BLD AUTO: 329 THOU/UL (ref 140–440)
PLATELET # BLD AUTO: 329 THOU/UL (ref 140–440)
PMV BLD AUTO: 9.6 FL (ref 8.5–12.5)
PROT SERPL-MCNC: 6.3 G/DL (ref 6–8)
PROT SERPL-MCNC: 6.3 G/DL (ref 6–8)
RBC # BLD AUTO: 3.85 MILL/UL (ref 3.8–5.4)
RBC # BLD AUTO: 3.85 MILL/UL (ref 3.8–5.4)
WBC # BLD AUTO: 7.1 THOU/UL (ref 4–11)
WBC: 7.1 THOU/UL (ref 4–11)

## 2019-01-22 ENCOUNTER — NURSING HOME VISIT (OUTPATIENT)
Dept: GERIATRICS | Facility: CLINIC | Age: 79
End: 2019-01-22
Payer: COMMERCIAL

## 2019-01-22 VITALS
HEART RATE: 77 BPM | TEMPERATURE: 98 F | RESPIRATION RATE: 18 BRPM | HEIGHT: 61 IN | OXYGEN SATURATION: 97 % | BODY MASS INDEX: 31.75 KG/M2 | DIASTOLIC BLOOD PRESSURE: 79 MMHG | SYSTOLIC BLOOD PRESSURE: 133 MMHG | WEIGHT: 168.2 LBS

## 2019-01-22 DIAGNOSIS — I10 ESSENTIAL HYPERTENSION, BENIGN: ICD-10-CM

## 2019-01-22 DIAGNOSIS — R05.9 COUGH: ICD-10-CM

## 2019-01-22 DIAGNOSIS — M15.0 PRIMARY OSTEOARTHRITIS INVOLVING MULTIPLE JOINTS: ICD-10-CM

## 2019-01-22 DIAGNOSIS — J02.9 SORE THROAT: Primary | ICD-10-CM

## 2019-01-22 PROCEDURE — 99309 SBSQ NF CARE MODERATE MDM 30: CPT | Performed by: NURSE PRACTITIONER

## 2019-01-22 ASSESSMENT — MIFFLIN-ST. JEOR: SCORE: 1175.33

## 2019-01-22 NOTE — PROGRESS NOTES
[facility]    Chief Complaint   Patient presents with     [facility]       [place] [facility] Record Number:  [id number]  Place of Service where encounter took place:  [facility] ([facility]) [[id number]]    HPI:    Yoanna Phillips is a 78 year old  (1940), who is being seen today for an episodic care visit.  HPI information obtained from: facility chart records, facility staff and patient report.    Today's concern is:  1. Sore throat  2. Cough  Resident complaining of sore throat, dry cough and runny nose. Onset of s/sx today. Denies fever, chills or generalized malaise. Eating and drinking as usual.     3. Primary osteoarthritis involving multiple joints  Pain of bilateral knees, unchanged. Pain has improved since scheduling acetaminophen 1000 mg/bid.     4. Essential hypertension, benign    Denies CP, SOB or lightheadedness. Currently taking losartan 75 mg/day.       BP Readin/79  149/83  136/74    HR:  65-77  Wt Readings from Last 4 Encounters:   19 76.3 kg (168 lb 3.2 oz)   18 75.7 kg (166 lb 12.8 oz)   10/09/18 76.6 kg (168 lb 12.8 oz)   18 76.2 kg (168 lb)     ALLERGIES: Aspirin  Past Medical, Surgical, Family and Social History reviewed and updated in EPIC.    Current Outpatient Medications   Medication Sig Dispense Refill     acetaminophen (TYLENOL) 500 MG tablet Take 1,000 mg by mouth 2 times daily Give at HS tell pt. she is getting Tylenol AND give 2 tablet by mouth every 24 hours as needed for Pain 1000mg QD PRN       CARBAMAZEPINE ER PO Take 300 mg by mouth 2 times daily       cholecalciferol (VITAMIN D) 1000 UNIT tablet Take 1 tablet (1,000 Units) by mouth daily 100 tablet 3     cyanocobalamin (VITAMIN B12) 1000 MCG/ML injection Inject 1,000 mcg into the muscle every day shift starting on the  and ending on the  every month       FOLIC ACID PO Take 1 mg by mouth daily       GABAPENTIN PO Take 600 mg by mouth 3 times daily         "LOSARTAN POTASSIUM PO Take 75 mg by mouth daily        Multiple Vitamins-Minerals (CENTRUM SILVER) per tablet Take 1 tablet by mouth daily       NUTRITIONAL SUPPLEMENT LIQD Take by mouth 2 times daily Give 120mL       Nystatin POWD Apply to redness topically as needed for Rash QD AND Apply to groin topically every day and evening shift for Rash Until resolved.       Thiamine HCl (VITAMIN B-1 PO) Take 100 mg by mouth daily       tiotropium (SPIRIVA) 18 MCG capsule Inhale 18 mcg into the lungs daily       AMOXICILLIN PO Take 2 g by mouth as needed for prophylaxis QD Prior to dental procedure.       Medications reviewed:  Medications reconciled to facility chart and changes were made to reflect current medications as identified as above med list. Below are the changes that were made:   Medications stopped since last EPIC medication reconciliation:   There are no discontinued medications.    Medications started since last [facility] medication reconciliation:  No orders of the defined types were placed in this encounter.    REVIEW OF SYSTEMS:  4 point ROS including Respiratory, CV, GI and , other than that noted in the HPI,  is negative    Physical Exam:  /79   Pulse 77   Temp 98  F (36.7  C)   Resp 18   Ht 1.549 m (5' 1\")   Wt 76.3 kg (168 lb 3.2 oz)   SpO2 97%   BMI 31.78 kg/m    GENERAL APPEARANCE:  Alert, in no distress  ENT:  Mouth and posterior oropharynx normal, moist mucous membranes, normal hearing acuity  RESP:  respiratory effort and palpation of chest normal, lungs clear to auscultation , no respiratory distress  CV:  Palpation and auscultation of heart done , regular rate and rhythm, no murmur, rub, or gallop  M/S:   Gait and station normal  Digits and nails normal  SKIN:  Inspection of skin and subcutaneous tissue baseline, Palpation of skin and subcutaneous tissue baseline  NEURO:   Cranial nerves 2-12 are normal tested and grossly at patient's baseline  PSYCH:  oriented X 3    Recent Labs:   CBC " RESULTS:   Recent Labs   Lab Test 12/27/18 10/16/18   WBC 7.1 10.0   RBC 3.85 4.17   HGB 12.8 14.1   HCT 39.5 42.7   * 102*   MCH 33.2 33.8   MCHC 32.4 33.0   RDW 12.7 12.7    395       Last Basic Metabolic Panel:  Recent Labs   Lab Test 10/16/18 08/28/18   * 135*   POTASSIUM 4.7 4.1   CHLORIDE 101 103   FELICE 10.5 10.2   CO2 20* 26   BUN 13 10   CR 0.68 0.62   GLC 86 73       Liver Function Studies -   Recent Labs   Lab Test 12/27/18 10/16/18   PROTTOTAL 6.3 7.2   ALBUMIN 3.3* 3.9   BILITOTAL 0.2 0.4   ALKPHOS 87 113   AST 19 19   ALT 15 14       TSH   Date Value Ref Range Status   07/10/2018 1.21 0.30 - 5.00 uIU/mL Final   03/08/2012 0.44 0.4 - 5.0 mU/L Final       Lab Results   Component Value Date    A1C 5.4 07/10/2018         Assessment/Plan:  (J02.9) Sore throat  (primary encounter diagnosis)  (R05) Cough  Vital signs stable and lungs clear upon today's exam. Likely viral.   -Salt water gurgle bid x 5 days   -Continue to monitor and notify NP with changes.     (M15.0) Primary osteoarthritis involving multiple joints  Chronic, pain managed on current scheduled dose of acetaminophen.   -Continue without change and adjustments as clinically indicated.     (I10) Essential hypertension, benign  Chronic, BP managed on current dose of losartan. No changes. Keep SBP> 130 mmHg and DBP > 65 mmHg (levels below these increase mortality as shown by standard studies and observations).       Electronically signed by  TANIYA Zaman CNP

## 2019-02-01 ENCOUNTER — NURSING HOME VISIT (OUTPATIENT)
Dept: GERIATRICS | Facility: CLINIC | Age: 79
End: 2019-02-01
Payer: COMMERCIAL

## 2019-02-01 VITALS
HEIGHT: 61 IN | OXYGEN SATURATION: 97 % | BODY MASS INDEX: 31.45 KG/M2 | WEIGHT: 166.6 LBS | RESPIRATION RATE: 20 BRPM | SYSTOLIC BLOOD PRESSURE: 148 MMHG | TEMPERATURE: 98 F | DIASTOLIC BLOOD PRESSURE: 77 MMHG | HEART RATE: 89 BPM

## 2019-02-01 DIAGNOSIS — M15.0 PRIMARY OSTEOARTHRITIS INVOLVING MULTIPLE JOINTS: Primary | ICD-10-CM

## 2019-02-01 DIAGNOSIS — R05.9 COUGH: ICD-10-CM

## 2019-02-01 DIAGNOSIS — G30.1 LATE ONSET ALZHEIMER'S DISEASE WITHOUT BEHAVIORAL DISTURBANCE (H): ICD-10-CM

## 2019-02-01 DIAGNOSIS — F02.80 LATE ONSET ALZHEIMER'S DISEASE WITHOUT BEHAVIORAL DISTURBANCE (H): ICD-10-CM

## 2019-02-01 DIAGNOSIS — E55.9 VITAMIN D DEFICIENCY: ICD-10-CM

## 2019-02-01 DIAGNOSIS — J02.9 SORE THROAT: ICD-10-CM

## 2019-02-01 DIAGNOSIS — I10 ESSENTIAL HYPERTENSION, BENIGN: ICD-10-CM

## 2019-02-01 PROCEDURE — 99309 SBSQ NF CARE MODERATE MDM 30: CPT | Performed by: NURSE PRACTITIONER

## 2019-02-01 ASSESSMENT — MIFFLIN-ST. JEOR: SCORE: 1168.07

## 2019-02-01 NOTE — PROGRESS NOTES
[facility]    Chief Complaint   Patient presents with     [facility]       [place] [facility] Record Number:  [id number]  Place of Service where encounter took place:  [facility] ([facility]) [[id number]]    HPI:    Yoanna Phillips is a 78 year old  (1940), who is being seen today for a federally mandated E/M visit.  HPI information obtained from: facility chart records, facility staff and patient report.     Today's concerns are:  1. Primary osteoarthritis involving multiple joints  Denies pain today. Does have history of bilateral knee pain. Has been taking scheduled acetaminophen but doesn't know if she still needs it every day.     2. Late onset Alzheimer's disease without behavioral disturbance  Alert and oriented x3. Recognized writer and able to recall recent conversations. Last BIMS 10/15.     3. Essential hypertension, benign    Currently taking losartan 75 mg/day. Denies CP, SOB or lightheadedness.     4. Cough  5. Sore throat  Signs and symptoms have resolved. Feeling at baseline.     6. Vitamin D deficiency  Taking cholecalciferol 1000 units/day    BP Readin/77  145/79  151/67    HR:  65-89  Wt Readings from Last 4 Encounters:   19 75.6 kg (166 lb 9.6 oz)   19 76.3 kg (168 lb 3.2 oz)   18 75.7 kg (166 lb 12.8 oz)   10/09/18 76.6 kg (168 lb 12.8 oz)     ALLERGIES: Aspirin  PAST MEDICAL HISTORY:  has a past medical history of Arthritis, Cognitive impairment (7/3/2012), COPD (chronic obstructive pulmonary disease) (H), Dyspnea on exertion, Epilepsy (H), Hypertension, Numbness and tingling, Pernicious anemia, Poor short term memory (2011), and Seizures (H). She also has no past medical history of PONV (postoperative nausea and vomiting).  PAST SURGICAL HISTORY:  has a past surgical history that includes NONSPECIFIC PROCEDURE (); Craniotomy (); Cholecystectomy; Splenectomy; Arthroplasty shoulder (2012); appendectomy;  Arthroplasty hip (Right, 4/11/2016); Arthroplasty revision hip (Right, 5/14/2016); Arthroplasty hip (Left, 3/20/2017); and Closed reduction hip (Left, 5/22/2017).  FAMILY HISTORY: family history includes Arthritis in her mother; Heart Disease in her mother; Hypertension in her mother; Neurologic Disorder in her mother.  SOCIAL HISTORY:  reports that she quit smoking about 56 years ago. Her smoking use included cigarettes. She has a 5.00 pack-year smoking history. she has never used smokeless tobacco. She reports that she drinks alcohol. She reports that she does not use drugs.    MEDICATIONS:  Current Outpatient Medications   Medication Sig Dispense Refill     acetaminophen (TYLENOL) 500 MG tablet Take 1,000 mg by mouth 2 times daily Give at HS tell pt. she is getting Tylenol AND give 2 tablet by mouth every 24 hours as needed for Pain 1000mg QD PRN       AMOXICILLIN PO Take 2 g by mouth as needed for prophylaxis QD Prior to dental procedure.       CARBAMAZEPINE ER PO Take 300 mg by mouth 2 times daily       cholecalciferol (VITAMIN D) 1000 UNIT tablet Take 1 tablet (1,000 Units) by mouth daily 100 tablet 3     cyanocobalamin (VITAMIN B12) 1000 MCG/ML injection Inject 1,000 mcg into the muscle every day shift starting on the 25th and ending on the 25th every month       FOLIC ACID PO Take 1 mg by mouth daily       GABAPENTIN PO Take 600 mg by mouth 3 times daily        LOSARTAN POTASSIUM PO Take 75 mg by mouth daily        Multiple Vitamins-Minerals (CENTRUM SILVER) per tablet Take 1 tablet by mouth daily       NUTRITIONAL SUPPLEMENT LIQD Take by mouth 2 times daily Give 120mL       Nystatin POWD Apply to redness topically as needed for Rash QD AND Apply to groin topically every day and evening shift for Rash Until resolved.       Thiamine HCl (VITAMIN B-1 PO) Take 100 mg by mouth daily       tiotropium (SPIRIVA) 18 MCG capsule Inhale 18 mcg into the lungs daily       Medications reviewed:  Medications reconciled to  "facility chart and changes were made to reflect current medications as identified as above med list. Below are the changes that were made:   Medications stopped since last EPIC medication reconciliation:   There are no discontinued medications.    Medications started since last [facility] medication reconciliation:  No orders of the defined types were placed in this encounter.        Case Management:  I have reviewed the care plan and MDS and do agree with the plan. Patient's desire to return to the community is present, but is not able due to care needs .  Information reviewed:  Medications, vital signs, orders, and nursing notes.    ROS:  10 point ROS of systems including Constitutional, Eyes, Respiratory, Cardiovascular, Gastroenterology, Genitourinary, Integumentary, Musculoskeletal, Psychiatric were all negative except for pertinent positives noted in my HPI.    Exam:  Vitals: /77   Pulse 89   Temp 98  F (36.7  C)   Resp 20   Ht 1.549 m (5' 1\")   Wt 75.6 kg (166 lb 9.6 oz)   SpO2 97%   BMI 31.48 kg/m    BMI= Body mass index is 31.48 kg/m .  GENERAL APPEARANCE:  Alert, in no distress  ENT:  Mouth and posterior oropharynx normal, moist mucous membranes  RESP:  respiratory effort and palpation of chest normal, lungs clear to auscultation , no respiratory distress  CV:  Palpation and auscultation of heart done , regular rate and rhythm, no murmur, rub, or gallop  M/S:   Gait and station normal  Digits and nails normal  SKIN:  Inspection of skin and subcutaneous tissue baseline, Palpation of skin and subcutaneous tissue baseline  NEURO:   Cranial nerves 2-12 are normal tested and grossly at patient's baseline  PSYCH:  oriented X 3, affect and mood normal    Lab/Diagnostic data:   CBC RESULTS:   Recent Labs   Lab Test 12/27/18 10/16/18   WBC 7.1 10.0   RBC 3.85 4.17   HGB 12.8 14.1   HCT 39.5 42.7   * 102*   MCH 33.2 33.8   MCHC 32.4 33.0   RDW 12.7 12.7    395       Last Basic Metabolic " Panel:  Recent Labs   Lab Test 10/16/18 08/28/18   * 135*   POTASSIUM 4.7 4.1   CHLORIDE 101 103   FELICE 10.5 10.2   CO2 20* 26   BUN 13 10   CR 0.68 0.62   GLC 86 73       Liver Function Studies -   Recent Labs   Lab Test 12/27/18 10/16/18   PROTTOTAL 6.3 7.2   ALBUMIN 3.3* 3.9   BILITOTAL 0.2 0.4   ALKPHOS 87 113   AST 19 19   ALT 15 14       TSH   Date Value Ref Range Status   07/10/2018 1.21 0.30 - 5.00 uIU/mL Final   03/08/2012 0.44 0.4 - 5.0 mU/L Final       Lab Results   Component Value Date    A1C 5.4 07/10/2018         ASSESSMENT/PLAN  (M15.0) Primary osteoarthritis involving multiple joints  (primary encounter diagnosis)  Longstanding history of bilateral knee pain. Denies pain while on scheduled acetaminophen. No changes at this time and adjustments as clinically indicated.     (G30.1,  F02.80) Late onset Alzheimer's disease without behavioral disturbance  Resides in board and care for ADLs and meal assistance.    (I10) Essential hypertension, benign  Chronic, managed on losartan. No changes and adjustments as clinically indicated. Keep SBP> 130 mmHg and DBP > 65 mmHg (levels below these increase mortality as shown by standard studies and observations).     (R05) Cough  (J02.9) Sore throat  Resolved.     (E55.9) Vitamin D deficiency  Continue vitamin D supplement.   -Vitamin D level next lab day.       Electronically signed by:  TANIYA Zaman CNP

## 2019-02-14 ENCOUNTER — TRANSFERRED RECORDS (OUTPATIENT)
Dept: HEALTH INFORMATION MANAGEMENT | Facility: CLINIC | Age: 79
End: 2019-02-14

## 2019-02-14 ENCOUNTER — RECORDS - HEALTHEAST (OUTPATIENT)
Dept: LAB | Facility: CLINIC | Age: 79
End: 2019-02-14

## 2019-02-15 LAB — 25(OH)D3 SERPL-MCNC: 25.4 NG/ML (ref 30–80)

## 2019-03-04 ENCOUNTER — CLINICAL UPDATE (OUTPATIENT)
Dept: PHARMACY | Facility: CLINIC | Age: 79
End: 2019-03-04

## 2019-03-04 NOTE — PROGRESS NOTES
This patient's medication list and chart were reviewed as part of the service provided by [facility].    Assessment/Recommendations:  Please review if Amoxicillin as dental prophylaxis is necessary.  Pt with h/o hip replacement.  Per recent guidelines, per Pharmacist's Letter:    For the prevention of endocarditis, antibiotics are recommended prior to dental procedures that involve manipulation of gingival tissue or the periapical region of the teeth, or perforation of the oral mucosa in the following people:  Individuals with artificial heart valves  Those with a history of infective endocarditis  Individuals with a heart transplant that develops a valve problem  Those with the following congenital heart conditions:  unrepaired or incompletely repaired cyanotic congenital heart disease  congenital heart defect that has been completely repaired with prosthetic material or device, during the first six months after the procedure  a repaired congenital heart defect with residual defect at the site or adjacent to the site of a prosthetic patch or a prosthetic device      For the prevention of joint infections, antibiotic prophylaxis should be considered for:  Individuals with orthopaedic implants who have certain combinations of significant risks, such as severe immunosuppression, previous prosthetic joint infections which required an operation, uncontrolled diabetes, recent joint replacements, planned invasive dental procedures, etc.    Recently, the American Academy of Orthopaedic Surgeons and the American Dental Association issued joint Appropriate Use Criteria for antibiotic prophylaxis in patients with orthopaedic implants undergoing dental procedures. They offer an online tool to provide guidance based on the combination of an individual patient s risk factors: http://www.orthoguidelines.org/go/auc/auc.cfm?auc_id=802309          Continue to follow appropriate labs given Carbamazepine  use (CBC, BMP, LFTs).  Note that Carbamazepine, as well as Losartan and Gabapentin may contribute to low Na; pt with h/o low Na and if continues to trend downward, may require dose adjustments.    Jeanie Bo Pharm.D.,[facility]  Board Certified Geriatric Pharmacist  Medication Therapy Management Pharmacist  [phone]

## 2019-03-18 ENCOUNTER — RECORDS - HEALTHEAST (OUTPATIENT)
Dept: LAB | Facility: CLINIC | Age: 79
End: 2019-03-18

## 2019-03-19 LAB
ALBUMIN SERPL-MCNC: 3.7 G/DL (ref 3.5–5)
ALP SERPL-CCNC: 97 U/L (ref 45–120)
ALT SERPL W P-5'-P-CCNC: 14 U/L (ref 0–45)
AST SERPL W P-5'-P-CCNC: 19 U/L (ref 0–40)
BILIRUB DIRECT SERPL-MCNC: 0.1 MG/DL
BILIRUB SERPL-MCNC: 0.3 MG/DL (ref 0–1)
ERYTHROCYTE [DISTWIDTH] IN BLOOD BY AUTOMATED COUNT: 13 % (ref 11–14.5)
HCT VFR BLD AUTO: 42.5 % (ref 35–47)
HGB BLD-MCNC: 14.1 G/DL (ref 12–16)
MCH RBC QN AUTO: 34.1 PG (ref 27–34)
MCHC RBC AUTO-ENTMCNC: 33.2 G/DL (ref 32–36)
MCV RBC AUTO: 103 FL (ref 80–100)
PLATELET # BLD AUTO: 397 THOU/UL (ref 140–440)
PMV BLD AUTO: 9.5 FL (ref 8.5–12.5)
PROT SERPL-MCNC: 7.2 G/DL (ref 6–8)
RBC # BLD AUTO: 4.13 MILL/UL (ref 3.8–5.4)
WBC: 7.7 THOU/UL (ref 4–11)

## 2019-04-11 ENCOUNTER — RECORDS - HEALTHEAST (OUTPATIENT)
Dept: LAB | Facility: CLINIC | Age: 79
End: 2019-04-11

## 2019-04-12 LAB — 25(OH)D3 SERPL-MCNC: 35.7 NG/ML (ref 30–80)

## 2019-04-14 RX ORDER — ACETAMINOPHEN 325 MG/1
650 TABLET ORAL EVERY 6 HOURS PRN
COMMUNITY
End: 2020-10-14

## 2019-04-14 ASSESSMENT — MIFFLIN-ST. JEOR: SCORE: 1178.51

## 2019-04-19 ENCOUNTER — NURSING HOME VISIT (OUTPATIENT)
Dept: GERIATRICS | Facility: CLINIC | Age: 79
End: 2019-04-19
Payer: COMMERCIAL

## 2019-04-19 VITALS
OXYGEN SATURATION: 98 % | DIASTOLIC BLOOD PRESSURE: 63 MMHG | WEIGHT: 168.9 LBS | HEIGHT: 61 IN | BODY MASS INDEX: 31.89 KG/M2 | RESPIRATION RATE: 19 BRPM | HEART RATE: 90 BPM | TEMPERATURE: 98 F | SYSTOLIC BLOOD PRESSURE: 141 MMHG

## 2019-04-19 DIAGNOSIS — F02.80 LATE ONSET ALZHEIMER'S DISEASE WITHOUT BEHAVIORAL DISTURBANCE (H): ICD-10-CM

## 2019-04-19 DIAGNOSIS — G40.909 SEIZURE DISORDER (H): ICD-10-CM

## 2019-04-19 DIAGNOSIS — J44.9 CHRONIC OBSTRUCTIVE PULMONARY DISEASE, UNSPECIFIED COPD TYPE (H): ICD-10-CM

## 2019-04-19 DIAGNOSIS — I10 ESSENTIAL HYPERTENSION, BENIGN: ICD-10-CM

## 2019-04-19 DIAGNOSIS — M16.11 PRIMARY OSTEOARTHRITIS OF RIGHT HIP: Primary | ICD-10-CM

## 2019-04-19 DIAGNOSIS — G30.1 LATE ONSET ALZHEIMER'S DISEASE WITHOUT BEHAVIORAL DISTURBANCE (H): ICD-10-CM

## 2019-04-19 PROCEDURE — 99309 SBSQ NF CARE MODERATE MDM 30: CPT | Performed by: INTERNAL MEDICINE

## 2019-04-19 NOTE — LETTER
4/19/2019        RE: Yoanna Phillips  [address] [place] [address]        Yoanna Phillips is a 78 year old female seen April 19, 2019 at [facility] where she has resided for 2 years (admit 6/2017) seen to follow up dementia and sz disorder.    She is seen in her room, up ambulating without device.   Another resident is visiting and they are enjoying a chat.     States she is feeling well, denies any current pain, dyspnea or other symptoms.      Patient has past h/o seizures.  Her gabapentin has been gradually reduced from 1200 mg tid to 600 mg bid.  No sz reported.   She saw Dr Fabiola [facility] in October who had some concerns about patient's gabapentin dose and increased it to 600 mg tid.     Carbamazepine level 8.5 at that time, and CBC was within normal limits.     Patient has low cognitive scores and has failed trial of living on her own, with recurrent falls and inability to seek help when she needs it.   Cognitive decline started >7 years ago.   Now meets criteria for dementia dx.   She does not recognize me routinely   Thinks maybe she had a hip replacement, doesn't remember recurrent hip dislocations.       Past Medical History:   Diagnosis Date     Arthritis     osteoarthritis     Cognitive impairment 7/3/2012     COPD (chronic obstructive pulmonary disease) (H)      Dyspnea on exertion      Epilepsy (H)     minor seizures 2x daily     Hypertension      Numbness and tingling     bilateral numbness     Pernicious anemia     resolved with vitamin replacement     Poor short term memory 5/20/2011     Seizures (H)    Dementia.    S/p left TSA 2012    SH:  Previously lived with her son, house in [facility].   Has lived there all her life.     She has 4 children, 3 of them live locally and help out.    Her granddaughter is here frequently.     ROS:   BIMS 11/15  Wt Readings from Last 5 Encounters:   04/14/19 76.6 kg (168 lb 14.4 oz)   02/01/19 75.6 kg (166 lb  "9.6 oz)   01/22/19 76.3 kg (168 lb 3.2 oz)   12/13/18 75.7 kg (166 lb 12.8 oz)   10/09/18 76.6 kg (168 lb 12.8 oz)        EXAM:  Pleasant, NAD  /63   Pulse 90   Temp 98  F (36.7  C)   Resp 19   Ht 1.549 m (5' 1\")   Wt 76.6 kg (168 lb 14.4 oz)   SpO2 98%   BMI 31.91 kg/m      +HOHNeck supple without adenopathy  Lungs with decreased BS, no rales or wheeze  Heart RRR s1s2 @90   occ ectopy  Abd soft, NT, no distention, +BS  Ext with trace edema, no open areas.   Neuro: repetitive, no focal findings, no tremor  Psych: affect a little flat, some confusion    3/19/19:     Vit D level 36   WBC 7.7  hgb 14.1      plts 397k  Normal LFTs    IMP/PLAN:  (M15.0) Primary osteoarthritis involving multiple joints    Comment: intermittent pain  Plan: scheduled acetaminophen at HS, and available prn.     Local measures, ambulation with FWW    (I10) Essential hypertension, benign  Comment:   BP Readings from Last 3 Encounters:   04/14/19 141/63   02/01/19 148/77   01/22/19 133/79      Plan: continue losartan, follow BMP    (G30.1,  F02.80) Late onset Alzheimer's disease without behavioral disturbance  Comment: low cognitive scores and low functional status.     Plan: Board and Care support for meds, meals, activity, safety.        (G40.909) Seizure disorder (H)  Comment: longstanding, labs stable to date.     Plan: continue carbamazepine and gabapentin at current doses, with quarterly check of BMP, LFTs, CBC  Follow up with Dr Banks as scheduled (Dr Fabiola Banks, [facility]:  [phone])    (J44.9) Chronic obstructive pulmonary disease, unspecified COPD type (H)  Comment: by history; no current symptoms   Plan: continue Spiriva     No need for abx prophylaxis prior to dental procedures: discontinue amoxicillin    Francy Swartz MD        Sincerely,        Francy Swartz MD    "

## 2019-04-22 PROBLEM — J44.9 CHRONIC OBSTRUCTIVE PULMONARY DISEASE, UNSPECIFIED COPD TYPE (H): Status: ACTIVE | Noted: 2019-04-22

## 2019-04-23 NOTE — PROGRESS NOTES
Yoanna Phillips is a 78 year old female seen April 19, 2019 at [facility] where she has resided for 2 years (admit 6/2017) seen to follow up dementia and sz disorder.    She is seen in her room, up ambulating without device.   Another resident is visiting and they are enjoying a chat.     States she is feeling well, denies any current pain, dyspnea or other symptoms.      Patient has past h/o seizures.  Her gabapentin has been gradually reduced from 1200 mg tid to 600 mg bid.  No sz reported.   She saw Dr Fabiola [facility] in October who had some concerns about patient's gabapentin dose and increased it to 600 mg tid.     Carbamazepine level 8.5 at that time, and CBC was within normal limits.     Patient has low cognitive scores and has failed trial of living on her own, with recurrent falls and inability to seek help when she needs it.   Cognitive decline started >7 years ago.   Now meets criteria for dementia dx.   She does not recognize me routinely   Thinks maybe she had a hip replacement, doesn't remember recurrent hip dislocations.       Past Medical History:   Diagnosis Date     Arthritis     osteoarthritis     Cognitive impairment 7/3/2012     COPD (chronic obstructive pulmonary disease) (H)      Dyspnea on exertion      Epilepsy (H)     minor seizures 2x daily     Hypertension      Numbness and tingling     bilateral numbness     Pernicious anemia     resolved with vitamin replacement     Poor short term memory 5/20/2011     Seizures (H)    Dementia.    S/p left TSA 2012    SH:  Previously lived with her son, [place] [facility] [place].   Has lived there all her life.     She has 4 children, 3 of them live locally and help out.    Her granddaughter is here frequently.     ROS:   BIMS 11/15  Wt Readings from Last 5 Encounters:   04/14/19 76.6 kg (168 lb 14.4 oz)   02/01/19 75.6 kg (166 lb 9.6 oz)   01/22/19 76.3 kg (168 lb 3.2 oz)   12/13/18 75.7 kg (166 lb 12.8 oz)   10/09/18 76.6 kg  "(168 lb 12.8 oz)        EXAM:  Pleasant, NAD  /63   Pulse 90   Temp 98  F (36.7  C)   Resp 19   Ht 1.549 m (5' 1\")   Wt 76.6 kg (168 lb 14.4 oz)   SpO2 98%   BMI 31.91 kg/m     +HOHNeck supple without adenopathy  Lungs with decreased BS, no rales or wheeze  Heart RRR s1s2 @90   occ ectopy  Abd soft, NT, no distention, +BS  Ext with trace edema, no open areas.   Neuro: repetitive, no focal findings, no tremor  Psych: affect a little flat, some confusion    3/19/19:     Vit D level 36   WBC 7.7  hgb 14.1      plts 397k  Normal LFTs    IMP/PLAN:  (M15.0) Primary osteoarthritis involving multiple joints    Comment: intermittent pain  Plan: scheduled acetaminophen at HS, and available prn.     Local measures, ambulation with FWW    (I10) Essential hypertension, benign  Comment:   BP Readings from Last 3 Encounters:   04/14/19 141/63   02/01/19 148/77   01/22/19 133/79      Plan: continue losartan, follow BMP    (G30.1,  F02.80) Late onset Alzheimer's disease without behavioral disturbance  Comment: low cognitive scores and low functional status.     Plan: Board and Care support for meds, meals, activity, safety.        (G40.909) Seizure disorder (H)  Comment: longstanding, labs stable to date.     Plan: continue carbamazepine and gabapentin at current doses, with quarterly check of BMP, LFTs, CBC  Follow up with Dr Banks as scheduled (Dr Fabiola Banks, [facility]:  [phone])    (J44.9) Chronic obstructive pulmonary disease, unspecified COPD type (H)  Comment: by history; no current symptoms   Plan: continue Spiriva     No need for abx prophylaxis prior to dental procedures: discontinue amoxicillin    Francy Swartz MD    "

## 2019-04-30 ENCOUNTER — NURSING HOME VISIT (OUTPATIENT)
Dept: GERIATRICS | Facility: CLINIC | Age: 79
End: 2019-04-30
Payer: COMMERCIAL

## 2019-04-30 VITALS
DIASTOLIC BLOOD PRESSURE: 84 MMHG | BODY MASS INDEX: 30.79 KG/M2 | HEIGHT: 61 IN | HEART RATE: 78 BPM | TEMPERATURE: 98 F | SYSTOLIC BLOOD PRESSURE: 128 MMHG | RESPIRATION RATE: 19 BRPM | OXYGEN SATURATION: 98 % | WEIGHT: 163.1 LBS

## 2019-04-30 DIAGNOSIS — R21 RASH: Primary | ICD-10-CM

## 2019-04-30 PROCEDURE — 99308 SBSQ NF CARE LOW MDM 20: CPT | Performed by: NURSE PRACTITIONER

## 2019-04-30 ASSESSMENT — MIFFLIN-ST. JEOR: SCORE: 1152.2

## 2019-04-30 NOTE — LETTER
4/30/2019        RE: Yoanna Phillips  [address] [place] [address]        [facility]  [place] Medical Record Number:  [id number]  Place of Service where encounter took place:  [facility] ([facility]) [[id number]]  Chief Complaint   Patient presents with     Derm Problem       HPI:    Yoanna Phillips  is a 78 year old (1940), who is being seen today for an episodic care visit.  HPI information obtained from: facility chart records, facility staff and patient report.     Today's concern is:   Diagnosis Comments   1. Rash  Rash located left forearm. Has been using hydrocortisone cream without relief of s/sx. Complaining of burning and pain. Minor itching present. Onset roughly 1-2 days ago. Has not used new fragrance or personal products.        Past Medical and Surgical History reviewed in Epic today.    MEDICATIONS:  Current Outpatient Medications   Medication Sig Dispense Refill     acetaminophen (TYLENOL) 325 MG tablet Take 650 mg by mouth every 6 hours as needed for pain       CARBAMAZEPINE ER PO Take 300 mg by mouth 2 times daily       cyanocobalamin (VITAMIN B12) 1000 MCG/ML injection Inject 1,000 mcg into the muscle every day shift starting on the 25th and ending on the 25th every month       FOLIC ACID PO Take 1 mg by mouth daily       GABAPENTIN PO Take 600 mg by mouth 3 times daily        hydrocortisone 1 % CREA cream Apply to Left forearm topically two times a day for Apply to (Lt) Forearm BID affected areas until resloved and Left forearm AND Apply to (Lt) Forearm topically every 12 hours as needed for Rash and redness       LOSARTAN POTASSIUM PO Take 75 mg by mouth daily        Multiple Vitamins-Minerals (CENTRUM SILVER) per tablet Take 1 tablet by mouth daily       NUTRITIONAL SUPPLEMENT LIQD Take by mouth 2 times daily Give 120mL       Nystatin POWD Apply to redness topically as needed for Rash QD AND Apply to groin topically every day and evening  "shift for Rash Until resolved.       Thiamine HCl (VITAMIN B-1 PO) Take 100 mg by mouth daily       tiotropium (SPIRIVA) 18 MCG capsule Inhale 18 mcg into the lungs daily       VITAMIN D, CHOLECALCIFEROL, PO Take 2,000 Units by mouth daily       acetaminophen (TYLENOL) 500 MG tablet Take 1,000 mg by mouth 2 times daily Give at HS tell pt. she is getting Tylenol AND give 2 tablet by mouth every 24 hours as needed for Pain 1000mg QD PRN       AMOXICILLIN PO Take 2 g by mouth as needed for prophylaxis QD Prior to dental procedure.       cholecalciferol (VITAMIN D) 1000 UNIT tablet Take 1 tablet (1,000 Units) by mouth daily (Patient not taking: Reported on 4/14/2019) 100 tablet 3     REVIEW OF SYSTEMS:  4 point ROS including Respiratory, CV, GI and , other than that noted in the HPI,  is negative    Objective:  /84   Pulse 78   Temp 98  F (36.7  C)   Resp 19   Ht 1.549 m (5' 1\")   Wt 74 kg (163 lb 1.6 oz)   SpO2 98%   BMI 30.82 kg/m     Exam:  GENERAL APPEARANCE:  Alert  SKIN: Single stripe red fluid-filled blisters   PSYCH:  oriented X 3    Labs:   Labs done in SNF are in [facility]. Please refer to them using [facility]/Care Everywhere. and Recent labs in EPIC reviewed by me today.     ASSESSMENT/PLAN:  (R21) Rash  (primary encounter diagnosis)  Comment: Signs and symptoms consistent with Shingles.  Plan: Valacyclovir Give 1000 mg by mouth two times a day for shingles for 7 Days.   -Contact isolation as possible   -Continue to monitor and notify NP with changes.         Orders written by provider at facility  See above       Electronically signed by:  TANIYA Zaman [facility]             Sincerely,        Muna Del Castillo NP    "

## 2019-05-10 ENCOUNTER — NURSING HOME VISIT (OUTPATIENT)
Dept: GERIATRICS | Facility: CLINIC | Age: 79
End: 2019-05-10
Payer: COMMERCIAL

## 2019-05-10 VITALS
OXYGEN SATURATION: 98 % | BODY MASS INDEX: 33.32 KG/M2 | HEART RATE: 91 BPM | WEIGHT: 176.5 LBS | TEMPERATURE: 97.9 F | SYSTOLIC BLOOD PRESSURE: 128 MMHG | DIASTOLIC BLOOD PRESSURE: 63 MMHG | HEIGHT: 61 IN | RESPIRATION RATE: 19 BRPM

## 2019-05-10 DIAGNOSIS — L56.8 PHOTOSENSITIVITY: ICD-10-CM

## 2019-05-10 DIAGNOSIS — B02.9 HERPES ZOSTER WITHOUT COMPLICATION: Primary | ICD-10-CM

## 2019-05-10 DIAGNOSIS — E55.9 VITAMIN D DEFICIENCY: ICD-10-CM

## 2019-05-10 PROCEDURE — 99309 SBSQ NF CARE MODERATE MDM 30: CPT | Performed by: NURSE PRACTITIONER

## 2019-05-10 ASSESSMENT — MIFFLIN-ST. JEOR: SCORE: 1212.98

## 2019-05-10 NOTE — LETTER
5/10/2019        RE: Yoanna Phillips  [address] [place] [address]        [facility]  [place] Medical Record Number:  [id number]  Place of Service where encounter took place:  [facility] ([facility]) [[id number]]  Chief Complaint   Patient presents with     RECHECK       HPI:    Yoanna Phillips  is a 78 year old (1940), who is being seen today for an episodic care visit.  HPI information obtained from: facility chart records, facility staff and patient report.     Today's concern is:   Diagnosis Comments   1. Herpes zoster without complication  Denies pain or itching. Continues on antiviral without side effects or complications. Lesions are not open. Localized to left forearm.    2. Photosensitivity  Acute photosensitivity. Seeing  for evaluation and treatment. Denies headaches or changes in vision acuity. Has never had this in past. Wearing sunglasses indoors presently. Family aware with new condition and of eye appointment.    3. Vitamin D deficiency  Taking vitamin D 2000 units/day.        Past Medical and Surgical History reviewed in Epic today.    MEDICATIONS:  Current Outpatient Medications   Medication Sig Dispense Refill     acetaminophen (TYLENOL) 325 MG tablet Take 650 mg by mouth every 6 hours as needed for pain       CARBAMAZEPINE ER PO Take 300 mg by mouth 2 times daily       cyanocobalamin (VITAMIN B12) 1000 MCG/ML injection Inject 1,000 mcg into the muscle every day shift starting on the 25th and ending on the 25th every month       FOLIC ACID PO Take 1 mg by mouth daily       GABAPENTIN PO Take 600 mg by mouth 3 times daily        LOSARTAN POTASSIUM PO Take 75 mg by mouth daily        Multiple Vitamins-Minerals (CENTRUM SILVER) per tablet Take 1 tablet by mouth daily       NUTRITIONAL SUPPLEMENT LIQD Take by mouth 2 times daily Give 120mL       Nystatin POWD Apply to redness topically as needed for Rash QD AND Apply to groin  "topically every day and evening shift for Rash Until resolved.       Thiamine HCl (VITAMIN B-1 PO) Take 100 mg by mouth daily       tiotropium (SPIRIVA) 18 MCG capsule Inhale 18 mcg into the lungs daily       VITAMIN D, CHOLECALCIFEROL, PO Take 2,000 Units by mouth daily       acetaminophen (TYLENOL) 500 MG tablet Take 1,000 mg by mouth 2 times daily Give at HS tell pt. she is getting Tylenol AND give 2 tablet by mouth every 24 hours as needed for Pain 1000mg QD PRN       AMOXICILLIN PO Take 2 g by mouth as needed for prophylaxis QD Prior to dental procedure.       cholecalciferol (VITAMIN D) 1000 UNIT tablet Take 1 tablet (1,000 Units) by mouth daily (Patient not taking: Reported on 4/14/2019) 100 tablet 3     hydrocortisone 1 % CREA cream Apply to Left forearm topically two times a day for Apply to (Lt) Forearm BID affected areas until resloved and Left forearm AND Apply to (Lt) Forearm topically every 12 hours as needed for Rash and redness           REVIEW OF SYSTEMS:  4 point ROS including Respiratory, CV, GI and , other than that noted in the HPI,  is negative    Objective:  /63   Pulse 91   Temp 97.9  F (36.6  C)   Resp 19   Ht 1.549 m (5' 1\")   Wt 80.1 kg (176 lb 8 oz)   SpO2 98%   BMI 33.35 kg/m     Exam:  GENERAL APPEARANCE:  Alert, appears healthy  ENT:  Mouth and posterior oropharynx normal, moist mucous membranes, normal hearing acuity  RESP:  respiratory effort and palpation of chest normal, lungs clear to auscultation   CV:  Palpation and auscultation of heart done , regular rate and rhythm, no murmur, rub, or gallop  M/S:   Gait and station normal  Digits and nails normal  SKIN:  rash present, type:vesicular, appearance: slightly red, location: left  NEURO:   Cranial nerves 2-12 are normal tested and grossly at patient's baseline  PSYCH:  oriented X 3    Labs:   Labs done in SNF are in [place] EPIC. Please refer to them using [employer]/Care Everywhere. and Recent labs in EPIC reviewed by " me today.     ASSESSMENT/PLAN:  (B02.9) Herpes zoster without complication  (primary encounter diagnosis)  Comment: Improving with antiviral.   Plan: Continue without change at this time. Continue to monitor and notify NP with changes.     (L56.8) Photosensitivity  Comment: New onset of photosensitivity without additional s/sx.   Plan:   1.) Ophthalmology to eval and treat. Has appointment with . May need neurology consult in future if continues with unclear etiology.     (E55.9) Vitamin D deficiency  Comment: Longstanding   Plan: Continue daily vitamin D.  -Vitamin D level next lab day.       Electronically signed by:  TANIYA Zaman [facility]             Sincerely,        Muna Del Castillo NP

## 2019-05-10 NOTE — PROGRESS NOTES
[facility]  [place] Medical Record Number:  [id number]  Place of Service where encounter took place:  [facility] ([facility]) [[id number]]  Chief Complaint   Patient presents with     RECHECK       HPI:    Yoanna Phillips  is a 78 year old (1940), who is being seen today for an episodic care visit.  HPI information obtained from: facility chart records, facility staff and patient report.     Today's concern is:   Diagnosis Comments   1. Herpes zoster without complication  Denies pain or itching. Continues on antiviral without side effects or complications. Lesions are not open. Localized to left forearm.    2. Photosensitivity  Acute photosensitivity. Seeing  for evaluation and treatment. Denies headaches or changes in vision acuity. Has never had this in past. Wearing sunglasses indoors presently. Family aware with new condition and of eye appointment.    3. Vitamin D deficiency  Taking vitamin D 2000 units/day.        Past Medical and Surgical History reviewed in Epic today.    MEDICATIONS:  Current Outpatient Medications   Medication Sig Dispense Refill     acetaminophen (TYLENOL) 325 MG tablet Take 650 mg by mouth every 6 hours as needed for pain       CARBAMAZEPINE ER PO Take 300 mg by mouth 2 times daily       cyanocobalamin (VITAMIN B12) 1000 MCG/ML injection Inject 1,000 mcg into the muscle every day shift starting on the 25th and ending on the 25th every month       FOLIC ACID PO Take 1 mg by mouth daily       GABAPENTIN PO Take 600 mg by mouth 3 times daily        LOSARTAN POTASSIUM PO Take 75 mg by mouth daily        Multiple Vitamins-Minerals (CENTRUM SILVER) per tablet Take 1 tablet by mouth daily       NUTRITIONAL SUPPLEMENT LIQD Take by mouth 2 times daily Give 120mL       Nystatin POWD Apply to redness topically as needed for Rash QD AND Apply to groin topically every day and evening shift for Rash Until resolved.       Thiamine HCl (VITAMIN B-1 PO) Take 100  "mg by mouth daily       tiotropium (SPIRIVA) 18 MCG capsule Inhale 18 mcg into the lungs daily       VITAMIN D, CHOLECALCIFEROL, PO Take 2,000 Units by mouth daily       acetaminophen (TYLENOL) 500 MG tablet Take 1,000 mg by mouth 2 times daily Give at HS tell pt. she is getting Tylenol AND give 2 tablet by mouth every 24 hours as needed for Pain 1000mg QD PRN       AMOXICILLIN PO Take 2 g by mouth as needed for prophylaxis QD Prior to dental procedure.       cholecalciferol (VITAMIN D) 1000 UNIT tablet Take 1 tablet (1,000 Units) by mouth daily (Patient not taking: Reported on 4/14/2019) 100 tablet 3     hydrocortisone 1 % CREA cream Apply to Left forearm topically two times a day for Apply to (Lt) Forearm BID affected areas until resloved and Left forearm AND Apply to (Lt) Forearm topically every 12 hours as needed for Rash and redness           REVIEW OF SYSTEMS:  4 point ROS including Respiratory, CV, GI and , other than that noted in the HPI,  is negative    Objective:  /63   Pulse 91   Temp 97.9  F (36.6  C)   Resp 19   Ht 1.549 m (5' 1\")   Wt 80.1 kg (176 lb 8 oz)   SpO2 98%   BMI 33.35 kg/m    Exam:  GENERAL APPEARANCE:  Alert, appears healthy  ENT:  Mouth and posterior oropharynx normal, moist mucous membranes, normal hearing acuity  RESP:  respiratory effort and palpation of chest normal, lungs clear to auscultation   CV:  Palpation and auscultation of heart done , regular rate and rhythm, no murmur, rub, or gallop  M/S:   Gait and station normal  Digits and nails normal  SKIN:  rash present, type:vesicular, appearance: slightly red, location: left  NEURO:   Cranial nerves 2-12 are normal tested and grossly at patient's baseline  PSYCH:  oriented X 3    Labs:   Labs done in SNF are in [facility]. Please refer to them using [employer]/Care Everywhere. and Recent labs in EPIC reviewed by me today.     ASSESSMENT/PLAN:  (B02.9) Herpes zoster without complication  (primary encounter " diagnosis)  Comment: Improving with antiviral.   Plan: Continue without change at this time. Continue to monitor and notify NP with changes.     (L56.8) Photosensitivity  Comment: New onset of photosensitivity without additional s/sx.   Plan:   1.) Ophthalmology to eval and treat. Has appointment with . May need neurology consult in future if continues with unclear etiology.     (E55.9) Vitamin D deficiency  Comment: Longstanding   Plan: Continue daily vitamin D.  -Vitamin D level next lab day.       Electronically signed by:  TANIYA Zaman [facility]

## 2019-06-10 ENCOUNTER — NURSING HOME VISIT (OUTPATIENT)
Dept: GERIATRICS | Facility: CLINIC | Age: 79
End: 2019-06-10
Payer: COMMERCIAL

## 2019-06-10 VITALS
HEART RATE: 78 BPM | TEMPERATURE: 98.3 F | BODY MASS INDEX: 32 KG/M2 | RESPIRATION RATE: 20 BRPM | HEIGHT: 61 IN | OXYGEN SATURATION: 97 % | WEIGHT: 169.5 LBS | DIASTOLIC BLOOD PRESSURE: 64 MMHG | SYSTOLIC BLOOD PRESSURE: 128 MMHG

## 2019-06-10 DIAGNOSIS — B02.9 HERPES ZOSTER WITHOUT COMPLICATION: Primary | ICD-10-CM

## 2019-06-10 DIAGNOSIS — L56.8 PHOTOSENSITIVITY: ICD-10-CM

## 2019-06-10 DIAGNOSIS — G40.909 SEIZURE DISORDER (H): ICD-10-CM

## 2019-06-10 DIAGNOSIS — I10 ESSENTIAL HYPERTENSION, BENIGN: ICD-10-CM

## 2019-06-10 PROCEDURE — 99309 SBSQ NF CARE MODERATE MDM 30: CPT | Performed by: NURSE PRACTITIONER

## 2019-06-10 ASSESSMENT — MIFFLIN-ST. JEOR: SCORE: 1181.23

## 2019-06-10 NOTE — LETTER
6/10/2019        RE: Yoanna Phillips  [address] [place] [address]        [facility]  Chief Complaint   Patient presents with     group home Regulatory     [place] Medical Record Number:  [id number]  Place of Service where encounter took place:  [facility] ([facility]) [[id number]]    HPI:    Yoanna Phillips  is 78 year old (1940), who is being seen today for a federally mandated E/M visit.  HPI information obtained from: facility chart records, facility staff, patient report and [facility] chart review.     Today's concerns are:    ICD-10-CM    1. Herpes zoster without complication B02.9    2. Photosensitivity L56.8    3. Essential hypertension, benign I10    4. Seizure disorder (H) G40.909      Resident found resting in room upon today's exam. Feeling at baseline expect some increased fatigue 2/2 poor sleep last night. Herpes zoster improved left arm, has small scab from recent scratching. Requesting cream/oitment for itch and pain. Has completed antiviral medication. Additionally had some photosensitivity requiring sunglasses indoors. Was evaluated by ophthalmology without significant findings. Signs and symptoms have since resolved and denies HA or visual changes. Denies CP, SOB or lightheadedness. No seizure activity since admission.     BP Readings from Last 3 Encounters:   06/10/19 128/64   05/10/19 128/63   04/30/19 128/84     Pulse Readings from Last 4 Encounters:   06/10/19 78   05/10/19 91   04/30/19 78   04/14/19 90     Vitals:    06/10/19 1011   Weight: 76.9 kg (169 lb 8 oz)       ALLERGIES:Aspirin  PAST MEDICAL HISTORY:   has a past medical history of Arthritis, Cognitive impairment (7/3/2012), COPD (chronic obstructive pulmonary disease) (H), Dyspnea on exertion, Epilepsy (H), Hypertension, Numbness and tingling, Pernicious anemia, Poor short term memory (5/20/2011), and Seizures (H). She also has no past medical history of PONV  (postoperative nausea and vomiting).  PAST SURGICAL HISTORY:   has a past surgical history that includes NONSPECIFIC PROCEDURE (1965); Craniotomy (1965); Cholecystectomy; Splenectomy; Arthroplasty shoulder (6/25/2012); appendectomy; Arthroplasty hip (Right, 4/11/2016); Arthroplasty revision hip (Right, 5/14/2016); Arthroplasty hip (Left, 3/20/2017); and Closed reduction hip (Left, 5/22/2017).  FAMILY HISTORY: family history includes Arthritis in her mother; Heart Disease in her mother; Hypertension in her mother; Neurologic Disorder in her mother.  SOCIAL HISTORY:  reports that she quit smoking about 57 years ago. Her smoking use included cigarettes. She has a 5.00 pack-year smoking history. She has never used smokeless tobacco. She reports that she drinks alcohol. She reports that she does not use drugs.    MEDICATIONS:  Current Outpatient Medications   Medication Sig Dispense Refill     acetaminophen (TYLENOL) 325 MG tablet Take 650 mg by mouth every 6 hours as needed for pain       CARBAMAZEPINE ER PO Take 300 mg by mouth 2 times daily       cyanocobalamin (VITAMIN B12) 1000 MCG/ML injection Inject 1,000 mcg into the muscle every day shift starting on the 25th and ending on the 25th every month       FOLIC ACID PO Take 1 mg by mouth daily       GABAPENTIN PO Take 600 mg by mouth 3 times daily        LOSARTAN POTASSIUM PO Take 75 mg by mouth daily        Multiple Vitamins-Minerals (CENTRUM SILVER) per tablet Take 1 tablet by mouth daily       NUTRITIONAL SUPPLEMENT LIQD Take by mouth 2 times daily Give 120mL       Nystatin POWD Apply to redness topically as needed for Rash QD AND Apply to groin topically every day and evening shift for Rash Until resolved.       Thiamine HCl (VITAMIN B-1 PO) Take 100 mg by mouth daily       tiotropium (SPIRIVA) 18 MCG capsule Inhale 18 mcg into the lungs daily       VITAMIN D, CHOLECALCIFEROL, PO Take 2,000 Units by mouth daily       acetaminophen (TYLENOL) 500 MG tablet Take  "1,000 mg by mouth 2 times daily Give at HS tell pt. she is getting Tylenol AND give 2 tablet by mouth every 24 hours as needed for Pain 1000mg QD PRN       AMOXICILLIN PO Take 2 g by mouth as needed for prophylaxis QD Prior to dental procedure.       cholecalciferol (VITAMIN D) 1000 UNIT tablet Take 1 tablet (1,000 Units) by mouth daily (Patient not taking: Reported on 4/14/2019) 100 tablet 3     hydrocortisone 1 % CREA cream Apply to Left forearm topically two times a day for Apply to (Lt) Forearm BID affected areas until resloved and Left forearm AND Apply to (Lt) Forearm topically every 12 hours as needed for Rash and redness         Case Management:  I have reviewed the care plan and MDS and do agree with the plan. Patient's desire to return to the community is present, but is not able due to care needs . Information reviewed:  Medications, vital signs, orders, and nursing notes.    ROS:  4 point ROS including Respiratory, CV, GI and , other than that noted in the HPI,  is negative    Vitals:  /64   Pulse 78   Temp 98.3  F (36.8  C)   Resp 20   Ht 1.549 m (5' 1\")   Wt 76.9 kg (169 lb 8 oz)   SpO2 97%   BMI 32.03 kg/m     Body mass index is 32.03 kg/m .  Exam:  GENERAL APPEARANCE:  Alert, in no distress  ENT:  Mouth and posterior oropharynx normal, moist mucous membranes, normal hearing acuity  RESP:  respiratory effort and palpation of chest normal, lungs clear to auscultation , no respiratory distress  CV:  Palpation and auscultation of heart done , regular rate and rhythm, no murmur, rub, or gallop  M/S:   Gait and station normal  Digits and nails normal  SKIN:  Inspection of skin and subcutaneous tissue baseline, Palpation of skin and subcutaneous tissue baseline  NEURO:   Cranial nerves 2-12 are normal tested and grossly at patient's baseline  PSYCH:  oriented X 3    Lab/Diagnostic data:   Labs done in SNF are in [place] EPIC. Please refer to them using [email or web address] Everywhere. and Recent labs " in EPIC reviewed by me today.     ASSESSMENT/PLAN  (B02.9) Herpes zoster without complication  (primary encounter diagnosis)  Comment: Improved with antiviral medication, now completed. Continues to have itching and pain.   Plan:   1.) Calamine lotion apply left arm bid.     (L56.8) Photosensitivity  Comment: S/Sx resolved. Evaluated by ophthalmologist without significant finding.   Plan: Continue to monitor and notify NP with changes.     (I10) Essential hypertension, benign  Comment:   BP Readings from Last 3 Encounters:   06/10/19 128/64   05/10/19 128/63   04/30/19 128/84   Plan: Continue current medication regimen without changes and adjustments as clinically indicated.     (G40.909) Seizure disorder (H)  Comment: Stable, no seizure activity since admission  Plan: Continue gabapentin and carbamazepine  -Check BMP, LFT and CBC next lab day.   -Follow up with Dr. Banks as scheduled.         Orders written by provider at facility  See above         Electronically signed by:  TANIYA Zaman [facility]           Sincerely,        Muna Del Castillo NP

## 2019-06-10 NOTE — PROGRESS NOTES
[facility]  Chief Complaint   Patient presents with     [facility] Regulatory     [place] Medical Record Number:  [id number]  Place of Service where encounter took place:  [facility] ([facility]) [[id number]]    HPI:    Yoanna Phillips  is 78 year old (1940), who is being seen today for a federally mandated E/M visit.  HPI information obtained from: facility chart records, facility staff, patient report and [facility] chart review.     Today's concerns are:    ICD-10-CM    1. Herpes zoster without complication B02.9    2. Photosensitivity L56.8    3. Essential hypertension, benign I10    4. Seizure disorder (H) G40.909      Resident found resting in room upon today's exam. Feeling at baseline expect some increased fatigue 2/2 poor sleep last night. Herpes zoster improved left arm, has small scab from recent scratching. Requesting cream/oitment for itch and pain. Has completed antiviral medication. Additionally had some photosensitivity requiring sunglasses indoors. Was evaluated by ophthalmology without significant findings. Signs and symptoms have since resolved and denies HA or visual changes. Denies CP, SOB or lightheadedness. No seizure activity since admission.     BP Readings from Last 3 Encounters:   06/10/19 128/64   05/10/19 128/63   04/30/19 128/84     Pulse Readings from Last 4 Encounters:   06/10/19 78   05/10/19 91   04/30/19 78   04/14/19 90     Vitals:    06/10/19 1011   Weight: 76.9 kg (169 lb 8 oz)       ALLERGIES:Aspirin  PAST MEDICAL HISTORY:   has a past medical history of Arthritis, Cognitive impairment (7/3/2012), COPD (chronic obstructive pulmonary disease) (H), Dyspnea on exertion, Epilepsy (H), Hypertension, Numbness and tingling, Pernicious anemia, Poor short term memory (5/20/2011), and Seizures (H). She also has no past medical history of PONV (postoperative nausea and vomiting).  PAST SURGICAL HISTORY:   has a past surgical history that includes  NONSPECIFIC PROCEDURE (1965); Craniotomy (1965); Cholecystectomy; Splenectomy; Arthroplasty shoulder (6/25/2012); appendectomy; Arthroplasty hip (Right, 4/11/2016); Arthroplasty revision hip (Right, 5/14/2016); Arthroplasty hip (Left, 3/20/2017); and Closed reduction hip (Left, 5/22/2017).  FAMILY HISTORY: family history includes Arthritis in her mother; Heart Disease in her mother; Hypertension in her mother; Neurologic Disorder in her mother.  SOCIAL HISTORY:  reports that she quit smoking about 57 years ago. Her smoking use included cigarettes. She has a 5.00 pack-year smoking history. She has never used smokeless tobacco. She reports that she drinks alcohol. She reports that she does not use drugs.    MEDICATIONS:  Current Outpatient Medications   Medication Sig Dispense Refill     acetaminophen (TYLENOL) 325 MG tablet Take 650 mg by mouth every 6 hours as needed for pain       CARBAMAZEPINE ER PO Take 300 mg by mouth 2 times daily       cyanocobalamin (VITAMIN B12) 1000 MCG/ML injection Inject 1,000 mcg into the muscle every day shift starting on the 25th and ending on the 25th every month       FOLIC ACID PO Take 1 mg by mouth daily       GABAPENTIN PO Take 600 mg by mouth 3 times daily        LOSARTAN POTASSIUM PO Take 75 mg by mouth daily        Multiple Vitamins-Minerals (CENTRUM SILVER) per tablet Take 1 tablet by mouth daily       NUTRITIONAL SUPPLEMENT LIQD Take by mouth 2 times daily Give 120mL       Nystatin POWD Apply to redness topically as needed for Rash QD AND Apply to groin topically every day and evening shift for Rash Until resolved.       Thiamine HCl (VITAMIN B-1 PO) Take 100 mg by mouth daily       tiotropium (SPIRIVA) 18 MCG capsule Inhale 18 mcg into the lungs daily       VITAMIN D, CHOLECALCIFEROL, PO Take 2,000 Units by mouth daily       acetaminophen (TYLENOL) 500 MG tablet Take 1,000 mg by mouth 2 times daily Give at HS tell pt. she is getting Tylenol AND give 2 tablet by mouth every  "24 hours as needed for Pain 1000mg QD PRN       AMOXICILLIN PO Take 2 g by mouth as needed for prophylaxis QD Prior to dental procedure.       cholecalciferol (VITAMIN D) 1000 UNIT tablet Take 1 tablet (1,000 Units) by mouth daily (Patient not taking: Reported on 4/14/2019) 100 tablet 3     hydrocortisone 1 % CREA cream Apply to Left forearm topically two times a day for Apply to (Lt) Forearm BID affected areas until resloved and Left forearm AND Apply to (Lt) Forearm topically every 12 hours as needed for Rash and redness         Case Management:  I have reviewed the care plan and MDS and do agree with the plan. Patient's desire to return to the community is present, but is not able due to care needs . Information reviewed:  Medications, vital signs, orders, and nursing notes.    ROS:  4 point ROS including Respiratory, CV, GI and , other than that noted in the HPI,  is negative    Vitals:  /64   Pulse 78   Temp 98.3  F (36.8  C)   Resp 20   Ht 1.549 m (5' 1\")   Wt 76.9 kg (169 lb 8 oz)   SpO2 97%   BMI 32.03 kg/m    Body mass index is 32.03 kg/m .  Exam:  GENERAL APPEARANCE:  Alert, in no distress  ENT:  Mouth and posterior oropharynx normal, moist mucous membranes, normal hearing acuity  RESP:  respiratory effort and palpation of chest normal, lungs clear to auscultation , no respiratory distress  CV:  Palpation and auscultation of heart done , regular rate and rhythm, no murmur, rub, or gallop  M/S:   Gait and station normal  Digits and nails normal  SKIN:  Inspection of skin and subcutaneous tissue baseline, Palpation of skin and subcutaneous tissue baseline  NEURO:   Cranial nerves 2-12 are normal tested and grossly at patient's baseline  PSYCH:  oriented X 3    Lab/Diagnostic data:   Labs done in SNF are in [facility]. Please refer to them using [facility] Everywhere. and Recent labs in [facility] reviewed by me today.     ASSESSMENT/PLAN  (B02.9) Herpes zoster without complication  (primary " encounter diagnosis)  Comment: Improved with antiviral medication, now completed. Continues to have itching and pain.   Plan:   1.) Calamine lotion apply left arm bid.     (L56.8) Photosensitivity  Comment: S/Sx resolved. Evaluated by ophthalmologist without significant finding.   Plan: Continue to monitor and notify NP with changes.     (I10) Essential hypertension, benign  Comment:   BP Readings from Last 3 Encounters:   06/10/19 128/64   05/10/19 128/63   04/30/19 128/84   Plan: Continue current medication regimen without changes and adjustments as clinically indicated.     (G40.909) Seizure disorder (H)  Comment: Stable, no seizure activity since admission  Plan: Continue gabapentin and carbamazepine  -Check BMP, LFT and CBC next lab day.   -Follow up with Dr. Banks as scheduled.         Orders written by provider at facility  See above         Electronically signed by:  TANIYA Zaman CNP  [facility]

## 2019-06-11 ENCOUNTER — RECORDS - HEALTHEAST (OUTPATIENT)
Dept: LAB | Facility: CLINIC | Age: 79
End: 2019-06-11

## 2019-06-11 ENCOUNTER — TRANSFERRED RECORDS (OUTPATIENT)
Dept: HEALTH INFORMATION MANAGEMENT | Facility: CLINIC | Age: 79
End: 2019-06-11

## 2019-06-11 LAB
ALBUMIN SERPL-MCNC: 3.7 G/DL (ref 3.5–5)
ALBUMIN SERPL-MCNC: 3.7 G/DL (ref 3.5–5)
ALP SERPL-CCNC: 102 U/L (ref 45–120)
ALP SERPL-CCNC: 102 U/L (ref 45–120)
ALT SERPL W P-5'-P-CCNC: 13 U/L (ref 0–45)
ALT SERPL-CCNC: 13 U/L (ref 0–45)
ANION GAP SERPL CALCULATED.3IONS-SCNC: 8 MMOL/L (ref 5–18)
ANION GAP SERPL CALCULATED.3IONS-SCNC: 8 MMOL/L (ref 5–18)
AST SERPL W P-5'-P-CCNC: 19 U/L (ref 0–40)
AST SERPL-CCNC: 19 U/L (ref 0–40)
BILIRUB DIRECT SERPL-MCNC: 0.1 MG/DL
BILIRUB SERPL-MCNC: 0.3 MG/DL (ref 0–1)
BILIRUB SERPL-MCNC: 0.3 MG/DL (ref 0–1)
BILIRUBIN DIRECT: 0.1 MG/DL
BUN SERPL-MCNC: 8 MG/DL (ref 8–28)
BUN SERPL-MCNC: 8 MG/DL (ref 8–28)
CALCIUM SERPL-MCNC: 10.6 MG/DL (ref 8.5–10.5)
CALCIUM SERPL-MCNC: 10.6 MG/DL (ref 8.5–10.5)
CHLORIDE BLD-SCNC: 99 MMOL/L (ref 98–107)
CHLORIDE SERPLBLD-SCNC: 99 MMOL/L (ref 98–107)
CO2 SERPL-SCNC: 27 MMOL/L (ref 22–31)
CO2 SERPL-SCNC: 27 MMOL/L (ref 22–31)
CREAT SERPL-MCNC: 0.69 MG/DL (ref 0.6–1.1)
CREAT SERPL-MCNC: 0.69 MG/DL (ref 0.6–1.1)
ERYTHROCYTE [DISTWIDTH] IN BLOOD BY AUTOMATED COUNT: 13.1 % (ref 11–14.5)
ERYTHROCYTE [DISTWIDTH] IN BLOOD BY AUTOMATED COUNT: ABNORMAL % (ref 11–14.5)
GFR SERPL CREATININE-BSD FRML MDRD: >60 ML/MIN/1.73M2
GFR SERPL CREATININE-BSD FRML MDRD: >60 ML/MIN/1.73M2
GLUCOSE BLD-MCNC: 83 MG/DL (ref 70–125)
GLUCOSE SERPL-MCNC: 83 MG/DL (ref 70–125)
HCT VFR BLD AUTO: 42.4 % (ref 35–47)
HCT VFR BLD AUTO: 42.4 % (ref 35–47)
HEMOGLOBIN: 14.3 G/DL (ref 12–16)
HGB BLD-MCNC: 14.3 G/DL (ref 12–16)
MCH RBC QN AUTO: 34 PG (ref 27–34)
MCH RBC QN AUTO: 34 PG (ref 27–34)
MCHC RBC AUTO-ENTMCNC: 33.7 G/DL (ref 32–36)
MCHC RBC AUTO-ENTMCNC: 33.7 G/DL (ref 32–36)
MCV RBC AUTO: 101 FL (ref 80–100)
MCV RBC AUTO: 101 FL (ref 80–100)
PLATELET # BLD AUTO: 360 THOU/UL (ref 140–440)
PLATELET # BLD AUTO: 360 THOU/UL (ref 140–440)
PMV BLD AUTO: 9.3 FL (ref 8.5–12.5)
POTASSIUM BLD-SCNC: 3.9 MMOL/L (ref 3.5–5)
POTASSIUM SERPL-SCNC: 3.9 MMOL/L (ref 3.5–5)
PROT SERPL-MCNC: 7.5 G/DL (ref 6–8)
PROT SERPL-MCNC: 7.5 G/DL (ref 6–8)
RBC # BLD AUTO: 4.21 MILL/UL (ref 3.8–5.4)
RBC # BLD AUTO: 4.21 MILL/UL (ref 3.8–5.4)
SODIUM SERPL-SCNC: 134 MMOL/L (ref 136–145)
SODIUM SERPL-SCNC: 134 MMOL/L (ref 136–145)
WBC # BLD AUTO: 10.1 THOU/UL (ref 4–11)
WBC: 10.1 THOU/UL (ref 4–11)

## 2019-07-05 ENCOUNTER — NURSING HOME VISIT (OUTPATIENT)
Dept: GERIATRICS | Facility: CLINIC | Age: 79
End: 2019-07-05
Payer: COMMERCIAL

## 2019-07-05 VITALS
HEIGHT: 61 IN | DIASTOLIC BLOOD PRESSURE: 71 MMHG | RESPIRATION RATE: 18 BRPM | SYSTOLIC BLOOD PRESSURE: 143 MMHG | BODY MASS INDEX: 32.87 KG/M2 | TEMPERATURE: 98.3 F | OXYGEN SATURATION: 97 % | WEIGHT: 174.1 LBS | HEART RATE: 66 BPM

## 2019-07-05 DIAGNOSIS — M16.11 PRIMARY OSTEOARTHRITIS OF RIGHT HIP: ICD-10-CM

## 2019-07-05 DIAGNOSIS — I10 ESSENTIAL HYPERTENSION, BENIGN: Primary | ICD-10-CM

## 2019-07-05 DIAGNOSIS — Z00.00 ROUTINE GENERAL MEDICAL EXAMINATION AT A HEALTH CARE FACILITY: ICD-10-CM

## 2019-07-05 DIAGNOSIS — J44.9 CHRONIC OBSTRUCTIVE PULMONARY DISEASE, UNSPECIFIED COPD TYPE (H): ICD-10-CM

## 2019-07-05 DIAGNOSIS — F02.80 LATE ONSET ALZHEIMER'S DISEASE WITHOUT BEHAVIORAL DISTURBANCE (H): ICD-10-CM

## 2019-07-05 DIAGNOSIS — G30.1 LATE ONSET ALZHEIMER'S DISEASE WITHOUT BEHAVIORAL DISTURBANCE (H): ICD-10-CM

## 2019-07-05 DIAGNOSIS — G40.909 SEIZURE DISORDER (H): ICD-10-CM

## 2019-07-05 PROCEDURE — 99318 ZZC ANNUAL NURSING FAC ASSESSMNT, STABLE: CPT | Performed by: NURSE PRACTITIONER

## 2019-07-05 RX ORDER — CALAMINE
LOTION (ML) TOPICAL 2 TIMES DAILY
COMMUNITY
End: 2019-11-01 | Stop reason: CLARIF

## 2019-07-05 ASSESSMENT — MIFFLIN-ST. JEOR: SCORE: 1197.09

## 2019-07-05 NOTE — LETTER
7/5/2019        RE: Yoanna Phillips  [address] [place] [address]        [facility]  Chief Complaint   Patient presents with     Annual Comprehensive Nursing Home     [facility] Record Number:  [id number]  Place of Service where encounter took place:  [facility] ([facility]) [[id number]]    HPI:    Yoanna Phillips  is a 79 year old  (1940), who is being seen today for an annual comprehensive visit. HPI information obtained from: facility chart records, facility staff, patient report and [facility] chart review.  Today's concerns are:  Routine general medical examination at a health care facility  Completed today.     Seizure disorder (H)  Gabapentin  600 mg TID. No seizures noted by staff or resident.     Chronic obstructive pulmonary disease, unspecified COPD type (H)  Denies wheezing, shortness of breath or chest tightness. Managed with daily tiotropium.    Essential hypertension, benign  Based on JNC-8 goals,  patients age of 78 year old, no presence of diabetes or CKD, and goals of care goal BP is <150/90 mm Hg. Remains asymptomatic. Meeting goal BP.   BP Readings from Last 3 Encounters:   07/05/19 143/71   06/10/19 128/64   05/10/19 128/63     Pulse Readings from Last 4 Encounters:   07/05/19 66   06/10/19 78   05/10/19 91   04/30/19 78     Vitals:    07/05/19 0938   Weight: 79 kg (174 lb 1.6 oz)     Late onset Alzheimer's disease without behavioral disturbance  Currently on board and care and requires support for medications and meals. She is fairly independent with cares and is a good historian. Sleeping well per staff and resident. Weight stable. No behaviors or mood changes noted by staff.       ALLERGIES: Aspirin  PROBLEM LIST:  Patient Active Problem List   Diagnosis     Essential hypertension, benign     B-complex deficiency     Advance Care Planning     Shoulder joint replacement status     Cognitive impairment     DJD (degenerative joint  disease)     Health Care Home     Seizure disorder (H)     Hip joint replacement status     Status post total replacement of right hip     Primary osteoarthritis of hip     Dislocation of internal right hip prosthesis, subsequent encounter     Physical deconditioning     Anemia due to blood loss, acute     Dyspnea     Dislocated hip (H)     Hip dislocation, left (H)     Late onset Alzheimer's disease without behavioral disturbance     PAST MEDICAL HISTORY:  has a past medical history of Arthritis; Cognitive impairment (7/3/2012); COPD (chronic obstructive pulmonary disease) (H); Dyspnea on exertion; Epilepsy (H); Hypertension; Numbness and tingling; Pernicious anemia; Poor short term memory (5/20/2011); and Seizures (H). She also has no past medical history of PONV (postoperative nausea and vomiting).  PAST SURGICAL HISTORY:  has a past surgical history that includes NONSPECIFIC PROCEDURE (1965); Craniotomy (1965); Cholecystectomy; Splenectomy; Arthroplasty shoulder (6/25/2012); appendectomy; Arthroplasty hip (Right, 4/11/2016); Arthroplasty revision hip (Right, 5/14/2016); Arthroplasty hip (Left, 3/20/2017); and Closed reduction hip (Left, 5/22/2017).  FAMILY HISTORY: family history includes Arthritis in her mother; HEART DISEASE in her mother; Hypertension in her mother; Neurologic Disorder in her mother.  SOCIAL HISTORY:  reports that she quit smoking about 56 years ago. Her smoking use included Cigarettes. She has a 5.00 pack-year smoking history. She has never used smokeless tobacco. She reports that she drinks alcohol. She reports that she does not use illicit drugs.  IMMUNIZATIONS:  Most Recent Immunizations   Administered Date(s) Administered     Influenza (High Dose) 3 valent vaccine 10/03/2017     Pneumo Conj 13-V (2010&after) 11/10/2009     Pneumococcal 23 valent 03/05/2012     TD (ADULT, 7+) 06/18/2007     Tdap (Adult) Unspecified Formulation 06/16/2017     Above immunizations pulled from [facility].  [facility] and facility records also reconciled. Outstanding information sent to  to update [facility].  Future immunizations needed:  yearly influenza per facility protocol  MEDICATIONS:  Current Outpatient Prescriptions   Medication Sig Dispense Refill     acetaminophen (TYLENOL) 500 MG tablet Take 1,000 mg by mouth daily as needed for mild pain       AMOXICILLIN PO Take 2 g by mouth as needed for prophylaxis QD Prior to dental procedure.       CARBAMAZEPINE ER PO Take 300 mg by mouth 2 times daily       cholecalciferol (VITAMIN D) 1000 UNIT tablet Take 1 tablet (1,000 Units) by mouth daily 100 tablet 3     cyanocobalamin (VITAMIN B12) 1000 MCG/ML injection Inject 1,000 mcg into the muscle every day shift starting on the 25th and ending on the 25th every month       FOLIC ACID PO Take 1 mg by mouth daily       GABAPENTIN PO Take 600 mg by mouth 3 times daily       LOSARTAN POTASSIUM PO Take 50 mg by mouth daily       Multiple Vitamins-Minerals (CENTRUM SILVER) per tablet Take 1 tablet by mouth daily       NUTRITIONAL SUPPLEMENT LIQD Take by mouth 2 times daily Give 120mL       Nystatin POWD Apply to redness topically as needed for Rash QD AND Apply to groin topically every day and evening shift for Rash Until resolved.       Thiamine HCl (VITAMIN B-1 PO) Take 100 mg by mouth daily       tiotropium (SPIRIVA) 18 MCG capsule Inhale 18 mcg into the lungs daily       Medications reviewed:  Medications reconciled to facility chart and changes were made to reflect current medications as identified as above med list. Below are the changes that were made:   Medications stopped since last EPIC medication reconciliation:   There are no discontinued medications.    Medications started since last [facility] medication reconciliation:  No orders of the defined types were placed in this encounter.    Case Management:  I have reviewed the facility/SNF care plan/MDS which was done 5/24/18, including the falls risk,  "nutrition and pain screening. I also reviewed the current immunizations, and preventive care..Future cancer screening is not clinically indicated secondary to age/goals of care Patient's desire to return to the community is not present. Current Level of Care is appropriate.    Advance Directive Discussion:    I reviewed the current advanced directives as reflected in EPIC, the POLST and the facility chart, and verified the congruency of orders. I contacted the first party Jean Mariecheri left a message regarding the plan of Care.  I did review the advance directives with the resident.     Team Discussion:  I communicated with the appropriate disciplines involved with the Plan of Care:   Nursing      Patient Goal:  Patient's goal is pain control and comfort.    Information reviewed:  Medications, vital signs, orders, and nursing notes.    ROS:  4 point ROS including Respiratory, CV, GI and , other than that noted in the HPI,  is negative    Exam:  /71   Pulse 66   Temp 98.3  F (36.8  C)   Resp 18   Ht 1.549 m (5' 1\")   Wt 79 kg (174 lb 1.6 oz)   SpO2 97%   BMI 32.90 kg/m       GENERAL APPEARANCE:  Alert, in no distress, pleasant, cooperative.  EYES:  EOM, lids, pupils and irises normal, sclera clear and conjunctiva normal, no discharge or mattering on lids or lashes noted  ENT:  Mouth normal, moist mucous membranes, nose without drainage or crusting, external ears without lesions, hearing acuity inact2  NECK:  Nontender, supple, symmetrical; no adenopathy, masses or thyromegaly, trachea midline  RESP:  respiratory effort and palpation of chest normal, no chest wall tenderness, no respiratory distress, Lung sounds clear, patient is on room air  CV:  Palpation and auscultation of heart done, rate and rhythm regular, no murmur. Edema none in bilateral lower extremities.  ABDOMEN:  normal bowel sounds, soft, nontender  M/S:   Gait and station: ambulates with walker, no tenderness or swelling of the joints; able " to move all extremities palpation   SKIN:  Inspection and of skin and subcutaneous tissue: skin warm, dry and intact without rashes  NEURO: cranial nerves 2-12 grossly intact, no facial asymmetry, no speech deficits and able to follow directions, moves all extremities symmetrically  PSYCH: affect and mood normal     Lab/Diagnostic data:   CBC RESULTS:   Recent Labs   Lab Test 06/05/18 04/10/18   WBC  7.0  6.4   RBC  3.91  3.99   HGB  13.3  13.2   HCT  40.5  39.7   MCV  104*  100   MCH  34.0  33.1   MCHC  32.8  33.2   RDW  13.3  12.6   PLT  364  345     Lab Results   Component Value Date    WBC 10.1 06/11/2019     Lab Results   Component Value Date    RBC 4.21 06/11/2019     Lab Results   Component Value Date    HGB 14.3 06/11/2019     Lab Results   Component Value Date    HCT 42.4 06/11/2019     Lab Results   Component Value Date     06/11/2019     Lab Results   Component Value Date    MCH 34.0 06/11/2019     Lab Results   Component Value Date    MCHC 33.7 06/11/2019     Lab Results   Component Value Date    RDW See scan 06/11/2019     Lab Results   Component Value Date     06/11/2019         Last Basic Metabolic Panel:  Recent Labs   Lab Test 06/05/18 04/10/18   NA  135*  132*   POTASSIUM  3.6  4.3   CHLORIDE  103  101   FELICE  10.3  9.9   CO2  23  21*   BUN  10  7*   CR  0.69  0.65   GLC  73  101     Last Comprehensive Metabolic Panel:  Sodium   Date Value Ref Range Status   06/11/2019 134 (A) 136 - 145 mmol/L Final     Potassium   Date Value Ref Range Status   06/11/2019 3.9 3.5 - 5.0 mmol/L Final     Chloride   Date Value Ref Range Status   06/11/2019 99 98 - 107 mmol/L Final     Carbon Dioxide   Date Value Ref Range Status   06/11/2019 27 22 - 31 mmol/L Final     Anion Gap   Date Value Ref Range Status   06/11/2019 8 5 - 18 mmol/L Final     Glucose   Date Value Ref Range Status   06/11/2019 83 70 - 125 mg/dL Final     Urea Nitrogen   Date Value Ref Range Status   06/11/2019 8 8 - 28 mg/dL Final      Creatinine   Date Value Ref Range Status   06/11/2019 0.69 0.60 - 1.10 mg/dL Final     GFR Estimate   Date Value Ref Range Status   06/11/2019 >60 >60 ml/min/1.73m2 Final     Calcium   Date Value Ref Range Status   06/11/2019 10.6 (A) 8.5 - 10.5 mg/dL Final         Liver Function Studies -   Recent Labs   Lab Test 04/10/18 08/22/17   PROTTOTAL  6.8  6.4   ALBUMIN  3.3*  3.2*   BILITOTAL  0.3  0.3   ALKPHOS  102  80   AST  21  15   ALT  12  13     Lab Results   Component Value Date    AST 19 06/11/2019     Lab Results   Component Value Date    ALT 13 06/11/2019         TSH   Date Value Ref Range Status   03/08/2012 0.44 0.4 - 5.0 mU/L Final     TSH   Date Value Ref Range Status   07/10/2018 1.21 0.30 - 5.00 uIU/mL Final       ASSESSMENT/PLAN  (Z00.00) Routine general medical examination at a health care facility  (primary encounter diagnosis)  Comment: completed today.     (G40.909) Seizure disorder (H)  Comment: chronic, stable. No seizures noted on current dose of gabapentin and carbamazepine. Stable LFT's and HGB.   Plan: continue with carbamazepine 300 mg BID and gabapentin 600 mg TID. Monitor CBC and LFT's q3 months.     (J44.9) Chronic obstructive pulmonary disease, unspecified COPD type (H)  Comment: chronic, stable. No wheezing on exam today.   Plan: continue with tiotropium daily.     (I10) Essential hypertension, benign  Comment/Plan: Based on JNC-8 goals,  patients age of 78 year old, no presence of diabetes or CKD, and goals of care goal BP is <150/90 mm Hg. BP at goal. Keep SBP> 130 mmHg and DBP > 65 mmHg (levels below these increase mortality as shown by standard studies and observations).     (G30.1,  F02.80) Late onset Alzheimer's disease without behavioral disturbance  Comment: chronic, progressive. Current care setting is appropriate per level of need. Sleeping well and weights are stable.   Plan: continue with assistance with meds, meals and cares as needed.     Electronically signed by:  Muna  Judith Magdalene, APRN CNP  [facility]             [facility]  Chief Complaint   Patient presents with     Annual Comprehensive Nursing Home     [facility] Record Number:  [id number]  Place of Service where encounter took place:  [facility] ([facility]) [[id number]]    HPI:    Yoanna Phillips  is a 79 year old  (1940), who is being seen today for an annual comprehensive visit. HPI information obtained from: facility chart records, facility staff, patient report and [facility] chart review.  Today's concerns are:  Routine general medical examination at a health care facility  Completed today.     Seizure disorder (H)  Gabapentin  600 mg TID. No seizures noted by staff or resident.     Chronic obstructive pulmonary disease, unspecified COPD type (H)  Denies wheezing, shortness of breath or chest tightness. Managed with daily tiotropium.    Essential hypertension, benign  Based on JNC-8 goals,  patients age of 78 year old, no presence of diabetes or CKD, and goals of care goal BP is <150/90 mm Hg. Remains asymptomatic. Meeting goal BP.   BP Readings from Last 3 Encounters:   07/05/19 143/71   06/10/19 128/64   05/10/19 128/63     Pulse Readings from Last 4 Encounters:   07/05/19 66   06/10/19 78   05/10/19 91   04/30/19 78     Vitals:    07/05/19 0938   Weight: 79 kg (174 lb 1.6 oz)     Late onset Alzheimer's disease without behavioral disturbance  Currently on board and care and requires support for medications and meals. She is fairly independent with cares and is a good historian. Sleeping well per staff and resident. Weight stable. No behaviors or mood changes noted by staff.     Primary osteoarthritis of right hip  Continue to complain of some hip pain but able to ambulate with walker without difficulty. Managed on scheduled and PRN acetaminophen.       ALLERGIES: Aspirin  PROBLEM LIST:  Patient Active Problem List   Diagnosis     Essential hypertension, benign      B-complex deficiency     Advance Care Planning     Shoulder joint replacement status     Cognitive impairment     DJD (degenerative joint disease)     Health Care Home     Seizure disorder (H)     Hip joint replacement status     Status post total replacement of right hip     Primary osteoarthritis of hip     Dislocation of internal right hip prosthesis, subsequent encounter     Physical deconditioning     Anemia due to blood loss, acute     Dyspnea     Dislocated hip (H)     Hip dislocation, left (H)     Late onset Alzheimer's disease without behavioral disturbance     PAST MEDICAL HISTORY:  has a past medical history of Arthritis; Cognitive impairment (7/3/2012); COPD (chronic obstructive pulmonary disease) (H); Dyspnea on exertion; Epilepsy (H); Hypertension; Numbness and tingling; Pernicious anemia; Poor short term memory (5/20/2011); and Seizures (H). She also has no past medical history of PONV (postoperative nausea and vomiting).  PAST SURGICAL HISTORY:  has a past surgical history that includes NONSPECIFIC PROCEDURE (1965); Craniotomy (1965); Cholecystectomy; Splenectomy; Arthroplasty shoulder (6/25/2012); appendectomy; Arthroplasty hip (Right, 4/11/2016); Arthroplasty revision hip (Right, 5/14/2016); Arthroplasty hip (Left, 3/20/2017); and Closed reduction hip (Left, 5/22/2017).  FAMILY HISTORY: family history includes Arthritis in her mother; HEART DISEASE in her mother; Hypertension in her mother; Neurologic Disorder in her mother.  SOCIAL HISTORY:  reports that she quit smoking about 56 years ago. Her smoking use included Cigarettes. She has a 5.00 pack-year smoking history. She has never used smokeless tobacco. She reports that she drinks alcohol. She reports that she does not use illicit drugs.  IMMUNIZATIONS:  Most Recent Immunizations   Administered Date(s) Administered     Influenza (High Dose) 3 valent vaccine 10/03/2017     Pneumo Conj 13-V (2010&after) 11/10/2009     Pneumococcal 23 valent  03/05/2012     TD (ADULT, 7+) 06/18/2007     Tdap (Adult) Unspecified Formulation 06/16/2017     Above immunizations pulled from [facility]. [facility] and facility records also reconciled. Outstanding information sent to  to update [facility].  Future immunizations needed:  yearly influenza per facility protocol  MEDICATIONS:  Current Outpatient Prescriptions   Medication Sig Dispense Refill     acetaminophen (TYLENOL) 500 MG tablet Take 1,000 mg by mouth daily as needed for mild pain       AMOXICILLIN PO Take 2 g by mouth as needed for prophylaxis QD Prior to dental procedure.       CARBAMAZEPINE ER PO Take 300 mg by mouth 2 times daily       cholecalciferol (VITAMIN D) 1000 UNIT tablet Take 1 tablet (1,000 Units) by mouth daily 100 tablet 3     cyanocobalamin (VITAMIN B12) 1000 MCG/ML injection Inject 1,000 mcg into the muscle every day shift starting on the 25th and ending on the 25th every month       FOLIC ACID PO Take 1 mg by mouth daily       GABAPENTIN PO Take 600 mg by mouth 3 times daily       LOSARTAN POTASSIUM PO Take 50 mg by mouth daily       Multiple Vitamins-Minerals (CENTRUM SILVER) per tablet Take 1 tablet by mouth daily       NUTRITIONAL SUPPLEMENT LIQD Take by mouth 2 times daily Give 120mL       Nystatin POWD Apply to redness topically as needed for Rash QD AND Apply to groin topically every day and evening shift for Rash Until resolved.       Thiamine HCl (VITAMIN B-1 PO) Take 100 mg by mouth daily       tiotropium (SPIRIVA) 18 MCG capsule Inhale 18 mcg into the lungs daily       Medications reviewed:  Medications reconciled to facility chart and changes were made to reflect current medications as identified as above med list. Below are the changes that were made:   Medications stopped since last EPIC medication reconciliation:   There are no discontinued medications.    Medications started since last [facility] medication reconciliation:  No orders of the defined types were placed  "in this encounter.    Case Management:  I have reviewed the facility/SNF care plan/MDS which was done 5/24/18, including the falls risk, nutrition and pain screening. I also reviewed the current immunizations, and preventive care..Future cancer screening is not clinically indicated secondary to age/goals of care Patient's desire to return to the community is not present. Current Level of Care is appropriate.    Advance Directive Discussion:    I reviewed the current advanced directives as reflected in EPIC, the POLST and the facility chart, and verified the congruency of orders. I contacted the first party Meli left a message regarding the plan of Care.  I did review the advance directives with the resident.     Team Discussion:  I communicated with the appropriate disciplines involved with the Plan of Care:   Nursing      Patient Goal:  Patient's goal is pain control and comfort.    Information reviewed:  Medications, vital signs, orders, and nursing notes.    ROS:  4 point ROS including Respiratory, CV, GI and , other than that noted in the HPI,  is negative    Exam:  /71   Pulse 66   Temp 98.3  F (36.8  C)   Resp 18   Ht 1.549 m (5' 1\")   Wt 79 kg (174 lb 1.6 oz)   SpO2 97%   BMI 32.90 kg/m       GENERAL APPEARANCE:  Alert, in no distress, pleasant, cooperative.  EYES:  EOM, lids, pupils and irises normal, sclera clear and conjunctiva normal, no discharge or mattering on lids or lashes noted  ENT:  Mouth normal, moist mucous membranes, nose without drainage or crusting, external ears without lesions, hearing acuity inact2  NECK:  Nontender, supple, symmetrical; no adenopathy, masses or thyromegaly, trachea midline  RESP:  respiratory effort and palpation of chest normal, no chest wall tenderness, no respiratory distress, Lung sounds clear, patient is on room air  CV:  Palpation and auscultation of heart done, rate and rhythm regular, no murmur. Edema none in bilateral lower " extremities.  ABDOMEN:  normal bowel sounds, soft, nontender  M/S:   Gait and station: ambulates with walker, no tenderness or swelling of the joints; able to move all extremities palpation   SKIN:  Inspection and of skin and subcutaneous tissue: skin warm, dry and intact without rashes  NEURO: cranial nerves 2-12 grossly intact, no facial asymmetry, no speech deficits and able to follow directions, moves all extremities symmetrically  PSYCH: affect and mood normal     Lab/Diagnostic data:   CBC RESULTS:   Recent Labs   Lab Test 06/05/18 04/10/18   WBC  7.0  6.4   RBC  3.91  3.99   HGB  13.3  13.2   HCT  40.5  39.7   MCV  104*  100   MCH  34.0  33.1   MCHC  32.8  33.2   RDW  13.3  12.6   PLT  364  345     Lab Results   Component Value Date    WBC 10.1 06/11/2019     Lab Results   Component Value Date    RBC 4.21 06/11/2019     Lab Results   Component Value Date    HGB 14.3 06/11/2019     Lab Results   Component Value Date    HCT 42.4 06/11/2019     Lab Results   Component Value Date     06/11/2019     Lab Results   Component Value Date    MCH 34.0 06/11/2019     Lab Results   Component Value Date    MCHC 33.7 06/11/2019     Lab Results   Component Value Date    RDW See scan 06/11/2019     Lab Results   Component Value Date     06/11/2019         Last Basic Metabolic Panel:  Recent Labs   Lab Test 06/05/18 04/10/18   NA  135*  132*   POTASSIUM  3.6  4.3   CHLORIDE  103  101   FELICE  10.3  9.9   CO2  23  21*   BUN  10  7*   CR  0.69  0.65   GLC  73  101     Last Comprehensive Metabolic Panel:  Sodium   Date Value Ref Range Status   06/11/2019 134 (A) 136 - 145 mmol/L Final     Potassium   Date Value Ref Range Status   06/11/2019 3.9 3.5 - 5.0 mmol/L Final     Chloride   Date Value Ref Range Status   06/11/2019 99 98 - 107 mmol/L Final     Carbon Dioxide   Date Value Ref Range Status   06/11/2019 27 22 - 31 mmol/L Final     Anion Gap   Date Value Ref Range Status   06/11/2019 8 5 - 18 mmol/L Final      Glucose   Date Value Ref Range Status   06/11/2019 83 70 - 125 mg/dL Final     Urea Nitrogen   Date Value Ref Range Status   06/11/2019 8 8 - 28 mg/dL Final     Creatinine   Date Value Ref Range Status   06/11/2019 0.69 0.60 - 1.10 mg/dL Final     GFR Estimate   Date Value Ref Range Status   06/11/2019 >60 >60 ml/min/1.73m2 Final     Calcium   Date Value Ref Range Status   06/11/2019 10.6 (A) 8.5 - 10.5 mg/dL Final         Liver Function Studies -   Recent Labs   Lab Test 04/10/18 08/22/17   PROTTOTAL  6.8  6.4   ALBUMIN  3.3*  3.2*   BILITOTAL  0.3  0.3   ALKPHOS  102  80   AST  21  15   ALT  12  13     Lab Results   Component Value Date    AST 19 06/11/2019     Lab Results   Component Value Date    ALT 13 06/11/2019         TSH   Date Value Ref Range Status   03/08/2012 0.44 0.4 - 5.0 mU/L Final     TSH   Date Value Ref Range Status   07/10/2018 1.21 0.30 - 5.00 uIU/mL Final       ASSESSMENT/PLAN  (Z00.00) Routine general medical examination at a health care facility  (primary encounter diagnosis)  Comment: completed today.     (G40.909) Seizure disorder (H)  Comment: chronic, stable. No seizures noted on current dose of gabapentin and carbamazepine. Stable LFT's and HGB.   Plan: continue with carbamazepine 300 mg BID and gabapentin 600 mg TID. Monitor CBC and LFT's q3 months.     (J44.9) Chronic obstructive pulmonary disease, unspecified COPD type (H)  Comment: chronic, stable. No wheezing on exam today.   Plan: continue with tiotropium daily.     (I10) Essential hypertension, benign  Comment/Plan: Based on JNC-8 goals,  patients age of 78 year old, no presence of diabetes or CKD, and goals of care goal BP is <150/90 mm Hg. BP at goal. Keep SBP> 130 mmHg and DBP > 65 mmHg (levels below these increase mortality as shown by standard studies and observations).     (G30.1,  F02.80) Late onset Alzheimer's disease without behavioral disturbance  Comment: chronic, progressive. Current care setting is appropriate per  level of need. Sleeping well and weights are stable.   Plan: continue with assistance with meds, meals and cares as needed.     (M16.11) Primary osteoarthritis of left hip  Comment: Longstanding history. Pain managed.  Plan: Continue acetaminophen as ordered. No changes.     Electronically signed by:  TANIYA Zaman [facility]               Sincerely,        uMna Del Castillo NP

## 2019-07-08 NOTE — PROGRESS NOTES
[place] [facility]  Chief Complaint   Patient presents with     Annual Comprehensive Nursing Home     [place] Medical Record Number:  [id number]  Place of Service where encounter took place:  [facility] ([facility]) [[id number]]    HPI:    Yoanna Phillips  is a 79 year old  (1940), who is being seen today for an annual comprehensive visit. HPI information obtained from: facility chart records, facility staff, patient report and [facility] chart review.  Today's concerns are:  Routine general medical examination at a health care facility  Completed today.     Seizure disorder (H)  Gabapentin  600 mg TID. No seizures noted by staff or resident.     Chronic obstructive pulmonary disease, unspecified COPD type (H)  Denies wheezing, shortness of breath or chest tightness. Managed with daily tiotropium.    Essential hypertension, benign  Based on JNC-8 goals,  patients age of 78 year old, no presence of diabetes or CKD, and goals of care goal BP is <150/90 mm Hg. Remains asymptomatic. Meeting goal BP.   BP Readings from Last 3 Encounters:   07/05/19 143/71   06/10/19 128/64   05/10/19 128/63     Pulse Readings from Last 4 Encounters:   07/05/19 66   06/10/19 78   05/10/19 91   04/30/19 78     Vitals:    07/05/19 0938   Weight: 79 kg (174 lb 1.6 oz)     Late onset Alzheimer's disease without behavioral disturbance  Currently on board and care and requires support for medications and meals. She is fairly independent with cares and is a good historian. Sleeping well per staff and resident. Weight stable. No behaviors or mood changes noted by staff.     Primary osteoarthritis of right hip  Continue to complain of some hip pain but able to ambulate with walker without difficulty. Managed on scheduled and PRN acetaminophen.       ALLERGIES: Aspirin  PROBLEM LIST:  Patient Active Problem List   Diagnosis     Essential hypertension, benign     B-complex deficiency     Advance Care Planning     Shoulder  joint replacement status     Cognitive impairment     DJD (degenerative joint disease)     Health Care Home     Seizure disorder (H)     Hip joint replacement status     Status post total replacement of right hip     Primary osteoarthritis of hip     Dislocation of internal right hip prosthesis, subsequent encounter     Physical deconditioning     Anemia due to blood loss, acute     Dyspnea     Dislocated hip (H)     Hip dislocation, left (H)     Late onset Alzheimer's disease without behavioral disturbance     PAST MEDICAL HISTORY:  has a past medical history of Arthritis; Cognitive impairment (7/3/2012); COPD (chronic obstructive pulmonary disease) (H); Dyspnea on exertion; Epilepsy (H); Hypertension; Numbness and tingling; Pernicious anemia; Poor short term memory (5/20/2011); and Seizures (H). She also has no past medical history of PONV (postoperative nausea and vomiting).  PAST SURGICAL HISTORY:  has a past surgical history that includes NONSPECIFIC PROCEDURE (1965); Craniotomy (1965); Cholecystectomy; Splenectomy; Arthroplasty shoulder (6/25/2012); appendectomy; Arthroplasty hip (Right, 4/11/2016); Arthroplasty revision hip (Right, 5/14/2016); Arthroplasty hip (Left, 3/20/2017); and Closed reduction hip (Left, 5/22/2017).  FAMILY HISTORY: family history includes Arthritis in her mother; HEART DISEASE in her mother; Hypertension in her mother; Neurologic Disorder in her mother.  SOCIAL HISTORY:  reports that she quit smoking about 56 years ago. Her smoking use included Cigarettes. She has a 5.00 pack-year smoking history. She has never used smokeless tobacco. She reports that she drinks alcohol. She reports that she does not use illicit drugs.  IMMUNIZATIONS:  Most Recent Immunizations   Administered Date(s) Administered     Influenza (High Dose) 3 valent vaccine 10/03/2017     Pneumo Conj 13-V (2010&after) 11/10/2009     Pneumococcal 23 valent 03/05/2012     TD (ADULT, 7+) 06/18/2007     Tdap (Adult)  Unspecified Formulation 06/16/2017     Above immunizations pulled from [facility]. [facility] and facility records also reconciled. Outstanding information sent to  to update [facility].  Future immunizations needed:  yearly influenza per facility protocol  MEDICATIONS:  Current Outpatient Prescriptions   Medication Sig Dispense Refill     acetaminophen (TYLENOL) 500 MG tablet Take 1,000 mg by mouth daily as needed for mild pain       AMOXICILLIN PO Take 2 g by mouth as needed for prophylaxis QD Prior to dental procedure.       CARBAMAZEPINE ER PO Take 300 mg by mouth 2 times daily       cholecalciferol (VITAMIN D) 1000 UNIT tablet Take 1 tablet (1,000 Units) by mouth daily 100 tablet 3     cyanocobalamin (VITAMIN B12) 1000 MCG/ML injection Inject 1,000 mcg into the muscle every day shift starting on the 25th and ending on the 25th every month       FOLIC ACID PO Take 1 mg by mouth daily       GABAPENTIN PO Take 600 mg by mouth 3 times daily       LOSARTAN POTASSIUM PO Take 50 mg by mouth daily       Multiple Vitamins-Minerals (CENTRUM SILVER) per tablet Take 1 tablet by mouth daily       NUTRITIONAL SUPPLEMENT LIQD Take by mouth 2 times daily Give 120mL       Nystatin POWD Apply to redness topically as needed for Rash QD AND Apply to groin topically every day and evening shift for Rash Until resolved.       Thiamine HCl (VITAMIN B-1 PO) Take 100 mg by mouth daily       tiotropium (SPIRIVA) 18 MCG capsule Inhale 18 mcg into the lungs daily       Medications reviewed:  Medications reconciled to facility chart and changes were made to reflect current medications as identified as above med list. Below are the changes that were made:   Medications stopped since last EPIC medication reconciliation:   There are no discontinued medications.    Medications started since last Psychiatric medication reconciliation:  No orders of the defined types were placed in this encounter.    Case Management:  I have reviewed the  "facility/SNF care plan/MDS which was done 5/24/18, including the falls risk, nutrition and pain screening. I also reviewed the current immunizations, and preventive care..Future cancer screening is not clinically indicated secondary to age/goals of care Patient's desire to return to the community is not present. Current Level of Care is appropriate.    Advance Directive Discussion:    I reviewed the current advanced directives as reflected in EPIC, the POLST and the facility chart, and verified the congruency of orders. I contacted the first party Jean Mariecheri left a message regarding the plan of Care.  I did review the advance directives with the resident.     Team Discussion:  I communicated with the appropriate disciplines involved with the Plan of Care:   Nursing      Patient Goal:  Patient's goal is pain control and comfort.    Information reviewed:  Medications, vital signs, orders, and nursing notes.    ROS:  4 point ROS including Respiratory, CV, GI and , other than that noted in the HPI,  is negative    Exam:  /71   Pulse 66   Temp 98.3  F (36.8  C)   Resp 18   Ht 1.549 m (5' 1\")   Wt 79 kg (174 lb 1.6 oz)   SpO2 97%   BMI 32.90 kg/m      GENERAL APPEARANCE:  Alert, in no distress, pleasant, cooperative.  EYES:  EOM, lids, pupils and irises normal, sclera clear and conjunctiva normal, no discharge or mattering on lids or lashes noted  ENT:  Mouth normal, moist mucous membranes, nose without drainage or crusting, external ears without lesions, hearing acuity inact2  NECK:  Nontender, supple, symmetrical; no adenopathy, masses or thyromegaly, trachea midline  RESP:  respiratory effort and palpation of chest normal, no chest wall tenderness, no respiratory distress, Lung sounds clear, patient is on room air  CV:  Palpation and auscultation of heart done, rate and rhythm regular, no murmur. Edema none in bilateral lower extremities.  ABDOMEN:  normal bowel sounds, soft, nontender  M/S:   Gait and " station: ambulates with walker, no tenderness or swelling of the joints; able to move all extremities palpation   SKIN:  Inspection and of skin and subcutaneous tissue: skin warm, dry and intact without rashes  NEURO: cranial nerves 2-12 grossly intact, no facial asymmetry, no speech deficits and able to follow directions, moves all extremities symmetrically  PSYCH: affect and mood normal     Lab/Diagnostic data:   CBC RESULTS:   Recent Labs   Lab Test 06/05/18 04/10/18   WBC  7.0  6.4   RBC  3.91  3.99   HGB  13.3  13.2   HCT  40.5  39.7   MCV  104*  100   MCH  34.0  33.1   MCHC  32.8  33.2   RDW  13.3  12.6   PLT  364  345     Lab Results   Component Value Date    WBC 10.1 06/11/2019     Lab Results   Component Value Date    RBC 4.21 06/11/2019     Lab Results   Component Value Date    HGB 14.3 06/11/2019     Lab Results   Component Value Date    HCT 42.4 06/11/2019     Lab Results   Component Value Date     06/11/2019     Lab Results   Component Value Date    MCH 34.0 06/11/2019     Lab Results   Component Value Date    MCHC 33.7 06/11/2019     Lab Results   Component Value Date    RDW See scan 06/11/2019     Lab Results   Component Value Date     06/11/2019         Last Basic Metabolic Panel:  Recent Labs   Lab Test 06/05/18 04/10/18   NA  135*  132*   POTASSIUM  3.6  4.3   CHLORIDE  103  101   FELICE  10.3  9.9   CO2  23  21*   BUN  10  7*   CR  0.69  0.65   GLC  73  101     Last Comprehensive Metabolic Panel:  Sodium   Date Value Ref Range Status   06/11/2019 134 (A) 136 - 145 mmol/L Final     Potassium   Date Value Ref Range Status   06/11/2019 3.9 3.5 - 5.0 mmol/L Final     Chloride   Date Value Ref Range Status   06/11/2019 99 98 - 107 mmol/L Final     Carbon Dioxide   Date Value Ref Range Status   06/11/2019 27 22 - 31 mmol/L Final     Anion Gap   Date Value Ref Range Status   06/11/2019 8 5 - 18 mmol/L Final     Glucose   Date Value Ref Range Status   06/11/2019 83 70 - 125 mg/dL Final     Urea  Nitrogen   Date Value Ref Range Status   06/11/2019 8 8 - 28 mg/dL Final     Creatinine   Date Value Ref Range Status   06/11/2019 0.69 0.60 - 1.10 mg/dL Final     GFR Estimate   Date Value Ref Range Status   06/11/2019 >60 >60 ml/min/1.73m2 Final     Calcium   Date Value Ref Range Status   06/11/2019 10.6 (A) 8.5 - 10.5 mg/dL Final         Liver Function Studies -   Recent Labs   Lab Test 04/10/18 08/22/17   PROTTOTAL  6.8  6.4   ALBUMIN  3.3*  3.2*   BILITOTAL  0.3  0.3   ALKPHOS  102  80   AST  21  15   ALT  12  13     Lab Results   Component Value Date    AST 19 06/11/2019     Lab Results   Component Value Date    ALT 13 06/11/2019         TSH   Date Value Ref Range Status   03/08/2012 0.44 0.4 - 5.0 mU/L Final     TSH   Date Value Ref Range Status   07/10/2018 1.21 0.30 - 5.00 uIU/mL Final       ASSESSMENT/PLAN  (Z00.00) Routine general medical examination at a health care facility  (primary encounter diagnosis)  Comment: completed today.     (G40.909) Seizure disorder (H)  Comment: chronic, stable. No seizures noted on current dose of gabapentin and carbamazepine. Stable LFT's and HGB.   Plan: continue with carbamazepine 300 mg BID and gabapentin 600 mg TID. Monitor CBC and LFT's q3 months.     (J44.9) Chronic obstructive pulmonary disease, unspecified COPD type (H)  Comment: chronic, stable. No wheezing on exam today.   Plan: continue with tiotropium daily.     (I10) Essential hypertension, benign  Comment/Plan: Based on JNC-8 goals,  patients age of 78 year old, no presence of diabetes or CKD, and goals of care goal BP is <150/90 mm Hg. BP at goal. Keep SBP> 130 mmHg and DBP > 65 mmHg (levels below these increase mortality as shown by standard studies and observations).     (G30.1,  F02.80) Late onset Alzheimer's disease without behavioral disturbance  Comment: chronic, progressive. Current care setting is appropriate per level of need. Sleeping well and weights are stable.   Plan: continue with assistance  with meds, meals and cares as needed.     (M16.11) Primary osteoarthritis of left hip  Comment: Longstanding history. Pain managed.  Plan: Continue acetaminophen as ordered. No changes.     Electronically signed by:  TANIYA Zaman [facility]

## 2019-07-25 ENCOUNTER — NURSING HOME VISIT (OUTPATIENT)
Dept: GERIATRICS | Facility: CLINIC | Age: 79
End: 2019-07-25
Payer: COMMERCIAL

## 2019-07-25 VITALS
DIASTOLIC BLOOD PRESSURE: 82 MMHG | HEART RATE: 88 BPM | HEIGHT: 61 IN | OXYGEN SATURATION: 97 % | BODY MASS INDEX: 32.7 KG/M2 | SYSTOLIC BLOOD PRESSURE: 133 MMHG | RESPIRATION RATE: 18 BRPM | WEIGHT: 173.2 LBS | TEMPERATURE: 96.9 F

## 2019-07-25 DIAGNOSIS — I10 ESSENTIAL HYPERTENSION, BENIGN: ICD-10-CM

## 2019-07-25 DIAGNOSIS — G89.29 CHRONIC PAIN OF RIGHT KNEE: ICD-10-CM

## 2019-07-25 DIAGNOSIS — M25.561 CHRONIC PAIN OF RIGHT KNEE: ICD-10-CM

## 2019-07-25 DIAGNOSIS — R29.6 FREQUENT FALLS: Primary | ICD-10-CM

## 2019-07-25 PROCEDURE — 99309 SBSQ NF CARE MODERATE MDM 30: CPT | Performed by: NURSE PRACTITIONER

## 2019-07-25 ASSESSMENT — MIFFLIN-ST. JEOR: SCORE: 1193.01

## 2019-07-25 NOTE — PROGRESS NOTES
[place] [facility]  [place] Medical Record Number:  [id number]  Place of Service where encounter took place:  [facility] ([facility]) [[id number]]  Chief Complaint   Patient presents with     Fall       HPI:    Yoanna Phillips  is a 79 year old (1940), who is being seen today for an episodic care visit.  HPI information obtained from: facility chart records, facility staff, patient report and [facility] chart review.     Today's concern is:    ICD-10-CM    1. Frequent falls R29.6    2. Essential hypertension, benign I10    3. Chronic pain of right knee M25.561     G89.29      Per nursing facility documentation, Patient had fall on 7/22/2019. Staff responded to bathroom call light and found resident on the floor. She stated that he slid from her recliner and then crawled to bathroom to reach the call light. She was attempting to unplug a standing light. No apparent injuries noted, but bruises are expected in the coming days. She is independent with bed mobility, transfers and ambulation with 2 wheeled walker to all destinations. Orthostatic BP WNL. No new medications/ dosage changes.     Resident has had two falls over previous 2 weeks. Denies any new sign/symptoms. Denies lightheadedness, palpation, CP. No injuries noted and denies increased pain or bilateral knees.     BP Readings from Last 3 Encounters:   07/25/19 133/82   07/05/19 143/71   06/10/19 128/64       Past Medical and Surgical History reviewed in Epic today.    MEDICATIONS:  Current Outpatient Medications   Medication Sig Dispense Refill     acetaminophen (TYLENOL) 325 MG tablet Take 650 mg by mouth every 6 hours as needed for pain       CARBAMAZEPINE ER PO Take 300 mg by mouth 2 times daily       cyanocobalamin (VITAMIN B12) 1000 MCG/ML injection Inject 1,000 mcg into the muscle every day shift starting on the 25th and ending on the 25th every month       FOLIC ACID PO Take 1 mg by mouth daily       GABAPENTIN PO Take 600 mg  "by mouth 3 times daily        LOSARTAN POTASSIUM PO Take 75 mg by mouth daily        Multiple Vitamins-Minerals (CENTRUM SILVER) per tablet Take 1 tablet by mouth daily       NUTRITIONAL SUPPLEMENT LIQD Take by mouth 2 times daily Give 120mL       Nystatin POWD Apply to redness topically as needed for Rash QD AND Apply to groin topically every day and evening shift for Rash Until resolved.       Thiamine HCl (VITAMIN B-1 PO) Take 100 mg by mouth daily       tiotropium (SPIRIVA) 18 MCG capsule Inhale 18 mcg into the lungs daily       VITAMIN D, CHOLECALCIFEROL, PO Take 2,000 Units by mouth daily       acetaminophen (TYLENOL) 500 MG tablet Take 1,000 mg by mouth 2 times daily Give at HS tell pt. she is getting Tylenol AND give 2 tablet by mouth every 24 hours as needed for Pain 1000mg QD PRN       AMOXICILLIN PO Take 2 g by mouth as needed for prophylaxis QD Prior to dental procedure.       Calamine external lotion Apply topically 2 times daily to LEFT FOREARM for Itching       cholecalciferol (VITAMIN D) 1000 UNIT tablet Take 1 tablet (1,000 Units) by mouth daily (Patient not taking: Reported on 4/14/2019) 100 tablet 3     hydrocortisone 1 % CREA cream Apply to Left forearm topically two times a day for Apply to (Lt) Forearm BID affected areas until resloved and Left forearm AND Apply to (Lt) Forearm topically every 12 hours as needed for Rash and redness       REVIEW OF SYSTEMS:  4 point ROS including Respiratory, CV, GI and , other than that noted in the HPI,  is negative    Objective:  /82   Pulse 88   Temp 96.9  F (36.1  C)   Resp 18   Ht 1.549 m (5' 1\")   Wt 78.6 kg (173 lb 3.2 oz)   SpO2 97%   BMI 32.73 kg/m    Exam:  GENERAL APPEARANCE:  Alert, appears healthy  RESP:  respiratory effort and palpation of chest normal, lungs clear to auscultation , no respiratory distress  CV:  Palpation and auscultation of heart done , regular rate and rhythm, no murmur, rub, or gallop  M/S:   Gait and station " normal  Digits and nails normal  SKIN:  Inspection of skin and subcutaneous tissue baseline, Palpation of skin and subcutaneous tissue baseline  NEURO:   Cranial nerves 2-12 are normal tested and grossly at patient's baseline  PSYCH:  memory impaired , affect and mood normal    Labs:   Labs done in SNF are in [facility]. Please refer to them using EPIC/Care Everywhere. and Recent labs in [facility] reviewed by me today.     ASSESSMENT/PLAN:  (R29.6) Frequent falls  (primary encounter diagnosis)  Comment: Increased mechanical falls without new s/sx.   Plan:   1.) physical therapy eval and treat  2.) Continue to monitor and notify NP with any new falls/changes    (I10) Essential hypertension, benign  Comment:   BP Readings from Last 3 Encounters:   07/25/19 133/82   07/05/19 143/71   06/10/19 128/64   Plan: Well managed on current regimen. No changes at this time and adjustments as clinically indicated. Keep SBP> 130 mmHg and DBP > 65 mmHg (levels below these increase mortality as shown by standard studies and observations).     (M25.561,  G89.29) Chronic pain of right knee  Comment: No pain upon today's exam.   Plan: No change.       Electronically signed by:  TANIYA Zaman CNP  [facility]

## 2019-08-08 ENCOUNTER — NURSING HOME VISIT (OUTPATIENT)
Dept: GERIATRICS | Facility: CLINIC | Age: 79
End: 2019-08-08
Payer: COMMERCIAL

## 2019-08-08 VITALS
DIASTOLIC BLOOD PRESSURE: 77 MMHG | RESPIRATION RATE: 18 BRPM | BODY MASS INDEX: 32.66 KG/M2 | HEART RATE: 77 BPM | SYSTOLIC BLOOD PRESSURE: 143 MMHG | HEIGHT: 61 IN | OXYGEN SATURATION: 97 % | WEIGHT: 173 LBS | TEMPERATURE: 98 F

## 2019-08-08 DIAGNOSIS — J44.9 CHRONIC OBSTRUCTIVE PULMONARY DISEASE, UNSPECIFIED COPD TYPE (H): ICD-10-CM

## 2019-08-08 DIAGNOSIS — M16.11 PRIMARY OSTEOARTHRITIS OF RIGHT HIP: ICD-10-CM

## 2019-08-08 DIAGNOSIS — F02.80 LATE ONSET ALZHEIMER'S DISEASE WITHOUT BEHAVIORAL DISTURBANCE (H): ICD-10-CM

## 2019-08-08 DIAGNOSIS — I10 ESSENTIAL HYPERTENSION, BENIGN: Primary | ICD-10-CM

## 2019-08-08 DIAGNOSIS — G40.909 SEIZURE DISORDER (H): ICD-10-CM

## 2019-08-08 DIAGNOSIS — G30.1 LATE ONSET ALZHEIMER'S DISEASE WITHOUT BEHAVIORAL DISTURBANCE (H): ICD-10-CM

## 2019-08-08 PROCEDURE — 99309 SBSQ NF CARE MODERATE MDM 30: CPT | Performed by: INTERNAL MEDICINE

## 2019-08-08 ASSESSMENT — MIFFLIN-ST. JEOR: SCORE: 1192.1

## 2019-08-08 NOTE — LETTER
8/8/2019        RE: Yoanna Phillips  [address] [place] [address]        Yoanna Phillips is a 79 year old female seen August 8, 2019 at [facility] where she has resided for 2 years (admit 6/2017) seen to follow up dementia and sz disorder.    She is seen in her room, up ambulating without device and fairly confused.    States she is feeling well, denies any current pain, dyspnea or other symptoms.    She has a FWW that she uses once outside of her room, ambulates to  and out with family.   She had a fall in her bathroom last month      Patient has past h/o seizures.  Currently on gabapentin and carbamazepine, and followed by Dr Fabiola [facility]   Medications are managed by Dr Banks.     Patient has low cognitive scores and has failed trial of living on her own, with recurrent falls and inability to seek help when she needs it.   Cognitive decline started >8 years ago.   Now meets criteria for dementia dx.   She does not recognize me routinely   Thinks maybe she had a hip replacement, doesn't remember recurrent hip dislocations.       Past Medical History:   Diagnosis Date     Arthritis     osteoarthritis     Cognitive impairment 7/3/2012     COPD (chronic obstructive pulmonary disease) (H)      Dyspnea on exertion      Epilepsy (H)     minor seizures 2x daily     Hypertension      Numbness and tingling     bilateral numbness     Pernicious anemia     resolved with vitamin replacement     Poor short term memory 5/20/2011     Seizures (H)    Dementia.    S/p left TSA 2012    SH:  Previously lived with her son, house in [facility].   Has lived there all her life.     She has 4 children, 3 of them live locally and help out.    Her granddaughter is here frequently.     ROS:   BIMS 11/15  Wt Readings from Last 5 Encounters:   08/08/19 78.5 kg (173 lb)   07/25/19 78.6 kg (173 lb 3.2 oz)   07/05/19 79 kg (174 lb 1.6 oz)   06/10/19 76.9 kg (169 lb 8 oz)   05/10/19  "80.1 kg (176 lb 8 oz)        EXAM:  Pleasant, NAD  BP (!) 143/77   Pulse 77   Temp 98  F (36.7  C)   Resp 18   Ht 1.549 m (5' 1\")   Wt 78.5 kg (173 lb)   SpO2 97%   BMI 32.69 kg/m      +HOHNeck supple without adenopathy  Lungs with decreased BS, no rales or wheeze  Heart RRR s1s2 @90   occ ectopy  Abd soft, NT, no distention, +BS  Ext without edema, no open areas.   Neuro: repetitive, no focal findings, no tremor  Psych: affect a little flat, some confusion    LAB 6/11/19:  Units Value    WBC 4.0 - 11.0 thou/uL 10.1    RBC 3.80 - 5.40 mill/uL 4.21    Hemoglobin 12.0 - 16.0 g/dL 14.3    Hematocrit 35.0 - 47.0 % 42.4    MCV 80 - 100 fL 101High     MCH 27.0 - 34.0 pg 34.0    MCHC 32.0 - 36.0 g/dL 33.7    RDW 11.0 - 14.5 % 13.1    Platelets 140 - 440 thou/uL 360       Value    Bilirubin, Total 0.0 - 1.0 mg/dL 0.3    Bilirubin, Direct <=0.5 mg/dL 0.1    Protein, Total 6.0 - 8.0 g/dL 7.5    Albumin 3.5 - 5.0 g/dL 3.7    Alkaline Phosphatase 45 - 120 U/L 102    AST 0 - 40 U/L 19    ALT 0 - 45 U/L 13      Value     Sodium 136 - 145 mmol/L 134Low     Potassium 3.5 - 5.0 mmol/L 3.9    Chloride 98 - 107 mmol/L 99    CO2 22 - 31 mmol/L 27    Anion Gap, Calculation 5 - 18 mmol/L 8    Glucose 70 - 125 mg/dL 83    Calcium 8.5 - 10.5 mg/dL 10.6High     BUN 8 - 28 mg/dL 8    Creatinine 0.60 - 1.10 mg/dL 0.69    GFR MDRD Af Amer >60 mL/min/1.73m2 >60    GFR MDRD Non Af Amer >60 mL/min/1.73m2 >60        IMP/PLAN:  (M15.0) Primary osteoarthritis involving multiple joints    Comment: intermittent pain,fall risk  Plan: scheduled acetaminophen at HS, and available prn.     Local measures, ambulation with FWW to all destinations    (I10) Essential hypertension, benign  Comment:   BP Readings from Last 3 Encounters:   08/08/19 (!) 143/77   07/25/19 133/82   07/05/19 143/71      Plan: continue losartan, follow BMP    (G30.1,  F02.80) Late onset Alzheimer's disease without behavioral disturbance  Comment: low cognitive scores and low " functional status.     Plan: Board and Care support for meds, meals, activity, safety.      Can discontinue MVI and supplements.       (G40.079) Seizure disorder (H)  Comment: longstanding, labs stable to date.     Plan: continue carbamazepine and gabapentin at current doses, with quarterly check of BMP, LFTs, CBC, which are all currently WNL  Follow up with Dr Banks as scheduled (Dr Fabiola Banks, [facility]:  [phone])    (J44.9) Chronic obstructive pulmonary disease, unspecified COPD type (H)  Comment: by history; no current symptoms   Plan: continue Spiriva     Francy Swartz MD        Sincerely,        Francy Swartz MD

## 2019-08-19 NOTE — PROGRESS NOTES
Yoanna Phillips is a 79 year old female seen August 8, 2019 at [facility] where she has resided for 2 years (admit 6/2017) seen to follow up dementia and sz disorder.    She is seen in her room, up ambulating without device and fairly confused.    States she is feeling well, denies any current pain, dyspnea or other symptoms.    She has a FWW that she uses once outside of her room, ambulates to  and out with family.   She had a fall in her bathroom last month      Patient has past h/o seizures.  Currently on gabapentin and carbamazepine, and followed by Dr Fabiola [facility]   Medications are managed by Dr Banks.     Patient has low cognitive scores and has failed trial of living on her own, with recurrent falls and inability to seek help when she needs it.   Cognitive decline started >8 years ago.   Now meets criteria for dementia dx.   She does not recognize me routinely   Thinks maybe she had a hip replacement, doesn't remember recurrent hip dislocations.       Past Medical History:   Diagnosis Date     Arthritis     osteoarthritis     Cognitive impairment 7/3/2012     COPD (chronic obstructive pulmonary disease) (H)      Dyspnea on exertion      Epilepsy (H)     minor seizures 2x daily     Hypertension      Numbness and tingling     bilateral numbness     Pernicious anemia     resolved with vitamin replacement     Poor short term memory 5/20/2011     Seizures (H)    Dementia.    S/p left TSA 2012    SH:  Previously lived with her son, house in [facility].   Has lived there all her life.     She has 4 children, 3 of them live locally and help out.    Her granddaughter is here frequently.     ROS:   BIMS 11/15  Wt Readings from Last 5 Encounters:   08/08/19 78.5 kg (173 lb)   07/25/19 78.6 kg (173 lb 3.2 oz)   07/05/19 79 kg (174 lb 1.6 oz)   06/10/19 76.9 kg (169 lb 8 oz)   05/10/19 80.1 kg (176 lb 8 oz)        EXAM:  Pleasant, NAD  BP (!) 143/77   Pulse 77   Temp 98  F (36.7  C)   " Resp 18   Ht 1.549 m (5' 1\")   Wt 78.5 kg (173 lb)   SpO2 97%   BMI 32.69 kg/m     +HOHNeck supple without adenopathy  Lungs with decreased BS, no rales or wheeze  Heart RRR s1s2 @90   occ ectopy  Abd soft, NT, no distention, +BS  Ext without edema, no open areas.   Neuro: repetitive, no focal findings, no tremor  Psych: affect a little flat, some confusion    LAB 6/11/19:  Units Value    WBC 4.0 - 11.0 thou/uL 10.1    RBC 3.80 - 5.40 mill/uL 4.21    Hemoglobin 12.0 - 16.0 g/dL 14.3    Hematocrit 35.0 - 47.0 % 42.4    MCV 80 - 100 fL 101High     MCH 27.0 - 34.0 pg 34.0    MCHC 32.0 - 36.0 g/dL 33.7    RDW 11.0 - 14.5 % 13.1    Platelets 140 - 440 thou/uL 360       Value    Bilirubin, Total 0.0 - 1.0 mg/dL 0.3    Bilirubin, Direct <=0.5 mg/dL 0.1    Protein, Total 6.0 - 8.0 g/dL 7.5    Albumin 3.5 - 5.0 g/dL 3.7    Alkaline Phosphatase 45 - 120 U/L 102    AST 0 - 40 U/L 19    ALT 0 - 45 U/L 13      Value     Sodium 136 - 145 mmol/L 134Low     Potassium 3.5 - 5.0 mmol/L 3.9    Chloride 98 - 107 mmol/L 99    CO2 22 - 31 mmol/L 27    Anion Gap, Calculation 5 - 18 mmol/L 8    Glucose 70 - 125 mg/dL 83    Calcium 8.5 - 10.5 mg/dL 10.6High     BUN 8 - 28 mg/dL 8    Creatinine 0.60 - 1.10 mg/dL 0.69    GFR MDRD Af Amer >60 mL/min/1.73m2 >60    GFR MDRD Non Af Amer >60 mL/min/1.73m2 >60        IMP/PLAN:  (M15.0) Primary osteoarthritis involving multiple joints    Comment: intermittent pain,fall risk  Plan: scheduled acetaminophen at HS, and available prn.     Local measures, ambulation with FWW to all destinations    (I10) Essential hypertension, benign  Comment:   BP Readings from Last 3 Encounters:   08/08/19 (!) 143/77   07/25/19 133/82   07/05/19 143/71      Plan: continue losartan, follow BMP    (G30.1,  F02.80) Late onset Alzheimer's disease without behavioral disturbance  Comment: low cognitive scores and low functional status.     Plan: Board and Care support for meds, meals, activity, safety.      Can " discontinue MVI and supplements.       (G40.849) Seizure disorder (H)  Comment: longstanding, labs stable to date.     Plan: continue carbamazepine and gabapentin at current doses, with quarterly check of BMP, LFTs, CBC, which are all currently WNL  Follow up with Dr Banks as scheduled (Dr Fabiola Banks, [facility]:  [phone])    (J44.9) Chronic obstructive pulmonary disease, unspecified COPD type (H)  Comment: by history; no current symptoms   Plan: continue Percy Swartz MD

## 2019-08-30 ENCOUNTER — NURSING HOME VISIT (OUTPATIENT)
Dept: GERIATRICS | Facility: CLINIC | Age: 79
End: 2019-08-30
Payer: COMMERCIAL

## 2019-08-30 VITALS
HEIGHT: 61 IN | BODY MASS INDEX: 32.4 KG/M2 | WEIGHT: 171.6 LBS | RESPIRATION RATE: 18 BRPM | SYSTOLIC BLOOD PRESSURE: 140 MMHG | OXYGEN SATURATION: 97 % | TEMPERATURE: 98.4 F | DIASTOLIC BLOOD PRESSURE: 76 MMHG | HEART RATE: 65 BPM

## 2019-08-30 DIAGNOSIS — R21 RASH: Primary | ICD-10-CM

## 2019-08-30 PROCEDURE — 99308 SBSQ NF CARE LOW MDM 20: CPT | Performed by: NURSE PRACTITIONER

## 2019-08-30 RX ORDER — VALACYCLOVIR HYDROCHLORIDE 1 G/1
1000 TABLET, FILM COATED ORAL 2 TIMES DAILY
COMMUNITY
End: 2019-11-01

## 2019-08-30 ASSESSMENT — MIFFLIN-ST. JEOR: SCORE: 1185.75

## 2019-08-30 NOTE — LETTER
8/30/2019        RE: Yoanna Phillips  [address] [place] [address]        [facility]  [facility] Record Number:  [id number]  Place of Service where encounter took place:  [facility] ([facility]) [[id number]]  Chief Complaint   Patient presents with     Shingles       HPI:    Yoanna Phillips  is a 79 year old (1940), who is being seen today for an episodic care visit.  HPI information obtained from: facility chart records, facility staff and patient report.     Today's concern is:    ICD-10-CM    1. Rash R21      Resident pleasant upon today's exam. Exercising with therapy on stationary bike. Denies SOB or chest pain. Did however, complain of rash on left forearm with some pain, burning, and feels very sensitive. Onset 1-2 days ago. Has not tried any new cosmetics/lotion/detergent. Denies any additional skin problems. Otherwise at baseline.     Past Medical and Surgical History reviewed in Epic today.    MEDICATIONS:  Current Outpatient Medications   Medication Sig Dispense Refill     acetaminophen (TYLENOL) 325 MG tablet Take 650 mg by mouth every 6 hours as needed for pain       CARBAMAZEPINE ER PO Take 300 mg by mouth 2 times daily       cyanocobalamin (VITAMIN B12) 1000 MCG/ML injection Inject 1,000 mcg into the muscle every day shift starting on the 25th and ending on the 25th every month       FOLIC ACID PO Take 1 mg by mouth daily       GABAPENTIN PO Take 600 mg by mouth 3 times daily        LOSARTAN POTASSIUM PO Take 75 mg by mouth daily        Multiple Vitamins-Minerals (CENTRUM SILVER) per tablet Take 1 tablet by mouth daily       NUTRITIONAL SUPPLEMENT LIQD Take by mouth 2 times daily Give 120mL       Nystatin POWD Apply to redness topically as needed for Rash QD AND Apply to groin topically every day and evening shift for Rash Until resolved.       Thiamine HCl (VITAMIN B-1 PO) Take 100 mg by mouth daily       tiotropium (SPIRIVA) 18 MCG  "capsule Inhale 18 mcg into the lungs daily       valACYclovir (VALTREX) 1000 mg tablet Take 1,000 mg by mouth 2 times daily for shingles for 7 Days       VITAMIN D, CHOLECALCIFEROL, PO Take 2,000 Units by mouth daily       acetaminophen (TYLENOL) 500 MG tablet Take 1,000 mg by mouth 2 times daily Give at HS tell pt. she is getting Tylenol AND give 2 tablet by mouth every 24 hours as needed for Pain 1000mg QD PRN       AMOXICILLIN PO Take 2 g by mouth as needed for prophylaxis QD Prior to dental procedure.       Calamine external lotion Apply topically 2 times daily to LEFT FOREARM for Itching       hydrocortisone 1 % CREA cream Apply to Left forearm topically two times a day for Apply to (Lt) Forearm BID affected areas until resloved and Left forearm AND Apply to (Lt) Forearm topically every 12 hours as needed for Rash and redness           REVIEW OF SYSTEMS:  4 point ROS including Respiratory, CV, GI and , other than that noted in the HPI,  is negative    Objective:  BP (!) 140/76   Pulse 65   Temp 98.4  F (36.9  C)   Resp 18   Ht 1.549 m (5' 1\")   Wt 77.8 kg (171 lb 9.6 oz)   SpO2 97%   BMI 32.42 kg/m     Exam:  GENERAL APPEARANCE:  Alert, in no distress  RESP:  respiratory effort and palpation of chest normal, lungs clear to auscultation   CV:  Palpation and auscultation of heart done , regular rate and rhythm, no murmur, rub, or gallop  SKIN:  Rash located left forarm, line of clear blisters.     Labs:   Labs done in SNF are in [facility]. Please refer to them using EPIC/Care Everywhere. and Recent labs in [facility] reviewed by me today.     ASSESSMENT/PLAN:  (R21) Rash  (primary encounter diagnosis)  Comment: Clear blister rash with burning, pain and sensitivity consistent with diagnosis of herpes zoster.   Plan:  1.) Valacyclovir 1000 mg/BID x 7 days. Try to isolate as able. Continue to monitor and notify NP with changes.       Electronically signed by:  TANIYA Zaman [facility]             Sincerely,        Muna Del Castillo, NP

## 2019-10-02 ENCOUNTER — HEALTH MAINTENANCE LETTER (OUTPATIENT)
Age: 79
End: 2019-10-02

## 2019-10-24 ENCOUNTER — NURSING HOME VISIT (OUTPATIENT)
Dept: GERIATRICS | Facility: CLINIC | Age: 79
End: 2019-10-24
Payer: COMMERCIAL

## 2019-10-24 VITALS
SYSTOLIC BLOOD PRESSURE: 103 MMHG | TEMPERATURE: 98.6 F | RESPIRATION RATE: 18 BRPM | HEIGHT: 61 IN | DIASTOLIC BLOOD PRESSURE: 67 MMHG | BODY MASS INDEX: 33.14 KG/M2 | OXYGEN SATURATION: 97 % | WEIGHT: 175.5 LBS | HEART RATE: 66 BPM

## 2019-10-24 DIAGNOSIS — M25.561 ACUTE PAIN OF RIGHT KNEE: ICD-10-CM

## 2019-10-24 DIAGNOSIS — F02.80 LATE ONSET ALZHEIMER'S DISEASE WITHOUT BEHAVIORAL DISTURBANCE (H): ICD-10-CM

## 2019-10-24 DIAGNOSIS — E55.9 VITAMIN D DEFICIENCY: ICD-10-CM

## 2019-10-24 DIAGNOSIS — G30.1 LATE ONSET ALZHEIMER'S DISEASE WITHOUT BEHAVIORAL DISTURBANCE (H): ICD-10-CM

## 2019-10-24 DIAGNOSIS — I10 ESSENTIAL HYPERTENSION, BENIGN: Primary | ICD-10-CM

## 2019-10-24 PROCEDURE — 99309 SBSQ NF CARE MODERATE MDM 30: CPT | Performed by: NURSE PRACTITIONER

## 2019-10-24 ASSESSMENT — MIFFLIN-ST. JEOR: SCORE: 1203.44

## 2019-10-24 NOTE — PROGRESS NOTES
[facility]  Chief Complaint   Patient presents with     care home Regulatory     [facility] Record Number:  [id number]  Place of Service where encounter took place:  [facility] ([facility]) [[id number]]    HPI:    Yoanna Phillips  is 79 year old (1940), who is being seen today for a federally mandated E/M visit.  HPI information obtained from: facility chart records, facility staff, patient report and [facility] chart review.     Today's concerns are:  Essential hypertension, benign  BP Readings from Last 3 Encounters:   10/24/19 103/67   08/30/19 (!) 140/76   08/08/19 (!) 143/77   Denies CP, SOB or lightheadedness    Late onset Alzheimer's disease without behavioral disturbance (H)  Last BIMS 12/15. No behaviors or mood changes noted.   --mild forgetfulness     Vitamin D deficiency  No recent vitamin D level. Currently taking cholecalciferol 2000 units/day.     Acute pain of right knee  Denies pain upon exam. Remains ambulatory       ALLERGIES:Aspirin  PAST MEDICAL HISTORY:   has a past medical history of Arthritis, Cognitive impairment (7/3/2012), COPD (chronic obstructive pulmonary disease) (H), Dyspnea on exertion, Epilepsy (H), Hypertension, Numbness and tingling, Pernicious anemia, Poor short term memory (5/20/2011), and Seizures (H). She also has no past medical history of PONV (postoperative nausea and vomiting).  PAST SURGICAL HISTORY:   has a past surgical history that includes NONSPECIFIC PROCEDURE (1965); Craniotomy (1965); Cholecystectomy; Splenectomy; Arthroplasty shoulder (6/25/2012); appendectomy; Arthroplasty hip (Right, 4/11/2016); Arthroplasty revision hip (Right, 5/14/2016); Arthroplasty hip (Left, 3/20/2017); and Closed reduction hip (Left, 5/22/2017).  FAMILY HISTORY: family history includes Arthritis in her mother; Heart Disease in her mother; Hypertension in her mother; Neurologic Disorder in her mother.  SOCIAL HISTORY:  reports that she quit smoking  about 57 years ago. Her smoking use included cigarettes. She has a 5.00 pack-year smoking history. She has never used smokeless tobacco. She reports current alcohol use. She reports that she does not use drugs.    MEDICATIONS:  Current Outpatient Medications   Medication Sig Dispense Refill     acetaminophen (TYLENOL) 325 MG tablet Take 650 mg by mouth every 6 hours as needed for pain       CARBAMAZEPINE ER PO Take 300 mg by mouth 2 times daily       cyanocobalamin (VITAMIN B12) 1000 MCG/ML injection Inject 1,000 mcg into the muscle every day shift starting on the 25th and ending on the 25th every month       FOLIC ACID PO Take 1 mg by mouth daily       GABAPENTIN PO Take 600 mg by mouth 3 times daily        LOSARTAN POTASSIUM PO Take 75 mg by mouth daily        Multiple Vitamins-Minerals (CENTRUM SILVER) per tablet Take 1 tablet by mouth daily       NUTRITIONAL SUPPLEMENT LIQD Take by mouth 2 times daily Give 120mL       Nystatin POWD Apply to redness topically as needed for Rash QD AND Apply to groin topically every day and evening shift for Rash Until resolved.       Thiamine HCl (VITAMIN B-1 PO) Take 100 mg by mouth daily       tiotropium (SPIRIVA) 18 MCG capsule Inhale 18 mcg into the lungs daily       VITAMIN D, CHOLECALCIFEROL, PO Take 2,000 Units by mouth daily       acetaminophen (TYLENOL) 500 MG tablet Take 1,000 mg by mouth 2 times daily Give at HS tell pt. she is getting Tylenol AND give 2 tablet by mouth every 24 hours as needed for Pain 1000mg QD PRN       AMOXICILLIN PO Take 2 g by mouth as needed for prophylaxis QD Prior to dental procedure.       Calamine external lotion Apply topically 2 times daily to LEFT FOREARM for Itching       hydrocortisone 1 % CREA cream Apply to Left forearm topically two times a day for Apply to (Lt) Forearm BID affected areas until resloved and Left forearm AND Apply to (Lt) Forearm topically every 12 hours as needed for Rash and redness       valACYclovir (VALTREX) 1000  "mg tablet Take 1,000 mg by mouth 2 times daily for shingles for 7 Days         Case Management:  I have reviewed the care plan and MDS and do agree with the plan. Patient's desire to return to the community is present, but is not able due to care needs . Information reviewed:  Medications, vital signs, orders, and nursing notes.    ROS:  10 point ROS of systems including Constitutional, Eyes, Respiratory, Cardiovascular, Gastroenterology, Genitourinary, Integumentary, Musculoskeletal, Psychiatric were all negative except for pertinent positives noted in my HPI.    Vitals:  /67   Pulse 66   Temp 98.6  F (37  C)   Resp 18   Ht 1.549 m (5' 1\")   Wt 79.6 kg (175 lb 8 oz)   SpO2 97%   BMI 33.16 kg/m    Body mass index is 33.16 kg/m .  Exam:  GENERAL APPEARANCE:  Alert  ENT:  Mouth and posterior oropharynx normal, moist mucous membranes, normal hearing acuity  RESP:  respiratory effort and palpation of chest normal, lungs clear to auscultation   CV:  Palpation and auscultation of heart done , regular rate and rhythm, no murmur, rub, or gallop  M/S:   Gait and station normal  Digits and nails normal  SKIN:  Inspection of skin and subcutaneous tissue baseline, Palpation of skin and subcutaneous tissue baseline  NEURO:   Cranial nerves 2-12 are normal tested and grossly at patient's baseline  PSYCH:  oriented X 3    Lab/Diagnostic data:   Labs done in SNF are in [facility]. Please refer to them using [employer]/Care Everywhere. and Recent labs in [facility] reviewed by me today.    Last Comprehensive Metabolic Panel:  Sodium   Date Value Ref Range Status   06/11/2019 134 (A) 136 - 145 mmol/L Final     Potassium   Date Value Ref Range Status   06/11/2019 3.9 3.5 - 5.0 mmol/L Final     Chloride   Date Value Ref Range Status   06/11/2019 99 98 - 107 mmol/L Final     Carbon Dioxide   Date Value Ref Range Status   06/11/2019 27 22 - 31 mmol/L Final     Anion Gap   Date Value Ref Range Status   06/11/2019 8 5 - 18 mmol/L Final "     Glucose   Date Value Ref Range Status   06/11/2019 83 70 - 125 mg/dL Final     Urea Nitrogen   Date Value Ref Range Status   06/11/2019 8 8 - 28 mg/dL Final     Creatinine   Date Value Ref Range Status   06/11/2019 0.69 0.60 - 1.10 mg/dL Final     GFR Estimate   Date Value Ref Range Status   06/11/2019 >60 >60 ml/min/1.73m2 Final     Calcium   Date Value Ref Range Status   06/11/2019 10.6 (A) 8.5 - 10.5 mg/dL Final     Lab Results   Component Value Date    WBC 10.1 06/11/2019     Lab Results   Component Value Date    RBC 4.21 06/11/2019     Lab Results   Component Value Date    HGB 14.3 06/11/2019     Lab Results   Component Value Date    HCT 42.4 06/11/2019     Lab Results   Component Value Date     06/11/2019     Lab Results   Component Value Date    MCH 34.0 06/11/2019     Lab Results   Component Value Date    MCHC 33.7 06/11/2019     Lab Results   Component Value Date    RDW See scan 06/11/2019     Lab Results   Component Value Date     06/11/2019     Lab Results   Component Value Date    A1C 5.4 07/10/2018       ASSESSMENT/PLAN  (I10) Essential hypertension, benign  (primary encounter diagnosis)  Comment: Chronic, bp less tahn goal 150/90.  Plan: No changes at this time and adjustments as clinically indicated. Keep SBP> 130 mmHg and DBP > 65 mmHg (levels below these increase mortality as shown by standard studies and observations).     (G30.1,  F02.80) Late onset Alzheimer's disease without behavioral disturbance (H)  Comment: Mild cognitive impairment with mild memory loss. Remains verbal, ambulatory. Weight stable.   Plan: Chronic, progressive. Needs 24 hour skilled nursing care. HPI/ROS difficult to obtain with cognitive impairment. Expect further functional and cognitive decline. Expect weight loss. Continue 24 hour care. Monitor weight. Monitor functional status. Monitor for behavioral disturbances.    (E55.9) Vitamin D deficiency  Comment: No recent labs.   Plan:   1.) Continue vitamin D  2000 units/day  2.) Vitamin D level 11/12/19    (M25.561) Acute pain of right knee  Comment: Pain managed on current regimen   Plan: no changes and adjustments as clinically indicated.         Orders written by provider at facility  1.) Vitamin D level land A1c next lab day.         Electronically signed by:  TANIYA Zaman [facility]

## 2019-10-24 NOTE — LETTER
10/24/2019        RE: Yoanna Phillips  [address] [place] [address]        [facility]  Chief Complaint   Patient presents with     correction Regulatory     [place] Medical Record Number:  [id number]  Place of Service where encounter took place:  [facility] ([facility]) [[id number]]    HPI:    Yoanna Phillips  is 79 year old (1940), who is being seen today for a federally mandated E/M visit.  HPI information obtained from: facility chart records, facility staff, patient report and [facility] chart review.     Today's concerns are:  Essential hypertension, benign  BP Readings from Last 3 Encounters:   10/24/19 103/67   08/30/19 (!) 140/76   08/08/19 (!) 143/77   Denies CP, SOB or lightheadedness    Late onset Alzheimer's disease without behavioral disturbance (H)  Last BIMS 12/15. No behaviors or mood changes noted.   --mild forgetfulness     Vitamin D deficiency  No recent vitamin D level. Currently taking cholecalciferol 2000 units/day.     Acute pain of right knee  Denies pain upon exam. Remains ambulatory       ALLERGIES:Aspirin  PAST MEDICAL HISTORY:   has a past medical history of Arthritis, Cognitive impairment (7/3/2012), COPD (chronic obstructive pulmonary disease) (H), Dyspnea on exertion, Epilepsy (H), Hypertension, Numbness and tingling, Pernicious anemia, Poor short term memory (5/20/2011), and Seizures (H). She also has no past medical history of PONV (postoperative nausea and vomiting).  PAST SURGICAL HISTORY:   has a past surgical history that includes NONSPECIFIC PROCEDURE (1965); Craniotomy (1965); Cholecystectomy; Splenectomy; Arthroplasty shoulder (6/25/2012); appendectomy; Arthroplasty hip (Right, 4/11/2016); Arthroplasty revision hip (Right, 5/14/2016); Arthroplasty hip (Left, 3/20/2017); and Closed reduction hip (Left, 5/22/2017).  FAMILY HISTORY: family history includes Arthritis in her mother; Heart Disease in her mother;  Hypertension in her mother; Neurologic Disorder in her mother.  SOCIAL HISTORY:  reports that she quit smoking about 57 years ago. Her smoking use included cigarettes. She has a 5.00 pack-year smoking history. She has never used smokeless tobacco. She reports current alcohol use. She reports that she does not use drugs.    MEDICATIONS:  Current Outpatient Medications   Medication Sig Dispense Refill     acetaminophen (TYLENOL) 325 MG tablet Take 650 mg by mouth every 6 hours as needed for pain       CARBAMAZEPINE ER PO Take 300 mg by mouth 2 times daily       cyanocobalamin (VITAMIN B12) 1000 MCG/ML injection Inject 1,000 mcg into the muscle every day shift starting on the 25th and ending on the 25th every month       FOLIC ACID PO Take 1 mg by mouth daily       GABAPENTIN PO Take 600 mg by mouth 3 times daily        LOSARTAN POTASSIUM PO Take 75 mg by mouth daily        Multiple Vitamins-Minerals (CENTRUM SILVER) per tablet Take 1 tablet by mouth daily       NUTRITIONAL SUPPLEMENT LIQD Take by mouth 2 times daily Give 120mL       Nystatin POWD Apply to redness topically as needed for Rash QD AND Apply to groin topically every day and evening shift for Rash Until resolved.       Thiamine HCl (VITAMIN B-1 PO) Take 100 mg by mouth daily       tiotropium (SPIRIVA) 18 MCG capsule Inhale 18 mcg into the lungs daily       VITAMIN D, CHOLECALCIFEROL, PO Take 2,000 Units by mouth daily       acetaminophen (TYLENOL) 500 MG tablet Take 1,000 mg by mouth 2 times daily Give at HS tell pt. she is getting Tylenol AND give 2 tablet by mouth every 24 hours as needed for Pain 1000mg QD PRN       AMOXICILLIN PO Take 2 g by mouth as needed for prophylaxis QD Prior to dental procedure.       Calamine external lotion Apply topically 2 times daily to LEFT FOREARM for Itching       hydrocortisone 1 % CREA cream Apply to Left forearm topically two times a day for Apply to (Lt) Forearm BID affected areas until resloved and Left forearm AND  "Apply to (Lt) Forearm topically every 12 hours as needed for Rash and redness       valACYclovir (VALTREX) 1000 mg tablet Take 1,000 mg by mouth 2 times daily for shingles for 7 Days         Case Management:  I have reviewed the care plan and MDS and do agree with the plan. Patient's desire to return to the community is present, but is not able due to care needs . Information reviewed:  Medications, vital signs, orders, and nursing notes.    ROS:  10 point ROS of systems including Constitutional, Eyes, Respiratory, Cardiovascular, Gastroenterology, Genitourinary, Integumentary, Musculoskeletal, Psychiatric were all negative except for pertinent positives noted in my HPI.    Vitals:  /67   Pulse 66   Temp 98.6  F (37  C)   Resp 18   Ht 1.549 m (5' 1\")   Wt 79.6 kg (175 lb 8 oz)   SpO2 97%   BMI 33.16 kg/m     Body mass index is 33.16 kg/m .  Exam:  GENERAL APPEARANCE:  Alert  ENT:  Mouth and posterior oropharynx normal, moist mucous membranes, normal hearing acuity  RESP:  respiratory effort and palpation of chest normal, lungs clear to auscultation   CV:  Palpation and auscultation of heart done , regular rate and rhythm, no murmur, rub, or gallop  M/S:   Gait and station normal  Digits and nails normal  SKIN:  Inspection of skin and subcutaneous tissue baseline, Palpation of skin and subcutaneous tissue baseline  NEURO:   Cranial nerves 2-12 are normal tested and grossly at patient's baseline  PSYCH:  oriented X 3    Lab/Diagnostic data:   Labs done in SNF are in [facility]. Please refer to them using [employer] Everywhere. and Recent labs in [facility] reviewed by me today.    Last Comprehensive Metabolic Panel:  Sodium   Date Value Ref Range Status   06/11/2019 134 (A) 136 - 145 mmol/L Final     Potassium   Date Value Ref Range Status   06/11/2019 3.9 3.5 - 5.0 mmol/L Final     Chloride   Date Value Ref Range Status   06/11/2019 99 98 - 107 mmol/L Final     Carbon Dioxide   Date Value Ref Range Status "   06/11/2019 27 22 - 31 mmol/L Final     Anion Gap   Date Value Ref Range Status   06/11/2019 8 5 - 18 mmol/L Final     Glucose   Date Value Ref Range Status   06/11/2019 83 70 - 125 mg/dL Final     Urea Nitrogen   Date Value Ref Range Status   06/11/2019 8 8 - 28 mg/dL Final     Creatinine   Date Value Ref Range Status   06/11/2019 0.69 0.60 - 1.10 mg/dL Final     GFR Estimate   Date Value Ref Range Status   06/11/2019 >60 >60 ml/min/1.73m2 Final     Calcium   Date Value Ref Range Status   06/11/2019 10.6 (A) 8.5 - 10.5 mg/dL Final     Lab Results   Component Value Date    WBC 10.1 06/11/2019     Lab Results   Component Value Date    RBC 4.21 06/11/2019     Lab Results   Component Value Date    HGB 14.3 06/11/2019     Lab Results   Component Value Date    HCT 42.4 06/11/2019     Lab Results   Component Value Date     06/11/2019     Lab Results   Component Value Date    MCH 34.0 06/11/2019     Lab Results   Component Value Date    MCHC 33.7 06/11/2019     Lab Results   Component Value Date    RDW See scan 06/11/2019     Lab Results   Component Value Date     06/11/2019     Lab Results   Component Value Date    A1C 5.4 07/10/2018       ASSESSMENT/PLAN  (I10) Essential hypertension, benign  (primary encounter diagnosis)  Comment: Chronic, bp less tahn goal 150/90.  Plan: No changes at this time and adjustments as clinically indicated. Keep SBP> 130 mmHg and DBP > 65 mmHg (levels below these increase mortality as shown by standard studies and observations).     (G30.1,  F02.80) Late onset Alzheimer's disease without behavioral disturbance (H)  Comment: Mild cognitive impairment with mild memory loss. Remains verbal, ambulatory. Weight stable.   Plan: Chronic, progressive. Needs 24 hour skilled nursing care. HPI/ROS difficult to obtain with cognitive impairment. Expect further functional and cognitive decline. Expect weight loss. Continue 24 hour care. Monitor weight. Monitor functional status. Monitor for  behavioral disturbances.    (E55.9) Vitamin D deficiency  Comment: No recent labs.   Plan:   1.) Continue vitamin D 2000 units/day  2.) Vitamin D level 11/12/19    (M25.561) Acute pain of right knee  Comment: Pain managed on current regimen   Plan: no changes and adjustments as clinically indicated.         Orders written by provider at facility  1.) Vitamin D level land A1c next lab day.         Electronically signed by:  TANIYA Zaman [facility]           Sincerely,        Muna Del Castillo NP

## 2019-12-10 ENCOUNTER — NURSING HOME VISIT (OUTPATIENT)
Dept: GERIATRICS | Facility: CLINIC | Age: 79
End: 2019-12-10
Payer: COMMERCIAL

## 2019-12-10 VITALS
DIASTOLIC BLOOD PRESSURE: 76 MMHG | RESPIRATION RATE: 16 BRPM | HEART RATE: 77 BPM | HEIGHT: 61 IN | BODY MASS INDEX: 32.34 KG/M2 | SYSTOLIC BLOOD PRESSURE: 140 MMHG | OXYGEN SATURATION: 97 % | WEIGHT: 171.3 LBS | TEMPERATURE: 97.9 F

## 2019-12-10 DIAGNOSIS — J44.9 CHRONIC OBSTRUCTIVE PULMONARY DISEASE, UNSPECIFIED COPD TYPE (H): ICD-10-CM

## 2019-12-10 DIAGNOSIS — G30.1 LATE ONSET ALZHEIMER'S DISEASE WITHOUT BEHAVIORAL DISTURBANCE (H): ICD-10-CM

## 2019-12-10 DIAGNOSIS — G40.909 SEIZURE DISORDER (H): Primary | ICD-10-CM

## 2019-12-10 DIAGNOSIS — F02.80 LATE ONSET ALZHEIMER'S DISEASE WITHOUT BEHAVIORAL DISTURBANCE (H): ICD-10-CM

## 2019-12-10 PROCEDURE — 99309 SBSQ NF CARE MODERATE MDM 30: CPT | Performed by: INTERNAL MEDICINE

## 2019-12-10 ASSESSMENT — MIFFLIN-ST. JEOR: SCORE: 1184.39

## 2019-12-10 NOTE — LETTER
12/10/2019        RE: Yoanna Phillips  [address] [place] [address]        Yoanna Phillips is a 79 year old female seen December 10, 2019 at [facility] where she has resided for 2 and a half years (admit 6/2017) seen to follow up dementia and sz disorder.    She is seen in her room, up ambulating without device washing her hair independently.    States she is feeling well, denies any current pain, dyspnea or other symptoms.    She has a FWW that she uses once outside of her room, ambulates to DR and out with family.   Doesn't recognize me, other clear STML.      Patient has past h/o seizures.  Currently on gabapentin and carbamazepine, and followed by Dr Fab[facility]ola [facility]   Medications are managed by Dr Banks.     Patient has low cognitive scores and has failed trial of living on her own, with recurrent falls and inability to seek help when she needs it.   Cognitive decline started >8 years ago.   Now meets criteria for dementia dx.   She had an episode of zoster in August, now clear and no PHN reported    Past Medical History:   Diagnosis Date     Arthritis     osteoarthritis     Cognitive impairment 7/3/2012     COPD (chronic obstructive pulmonary disease) (H)      Dyspnea on exertion      Epilepsy (H)     minor seizures 2x daily     Hypertension      Numbness and tingling     bilateral numbness     Pernicious anemia     resolved with vitamin replacement     Poor short term memory 5/20/2011     Seizures (H)    Dementia.    S/p left TSA 2012    SH:  Previously lived with her son, house in [place] [facility].   Has lived there all her life.     She has 4 children, 3 of them live locally and help out.    Her granddaughter is here frequently.     ROS:   BIMS 11/15  Wt Readings from Last 5 Encounters:   12/10/19 77.7 kg (171 lb 4.8 oz)   10/24/19 79.6 kg (175 lb 8 oz)   08/30/19 77.8 kg (171 lb 9.6 oz)   08/08/19 78.5 kg (173 lb)   07/25/19 78.6 kg (173 lb 3.2 oz)     "    EXAM:  Pleasant, NAD  BP (!) 140/76   Pulse 77   Temp 97.9  F (36.6  C)   Resp 16   Ht 1.549 m (5' 1\")   Wt 77.7 kg (171 lb 4.8 oz)   SpO2 97%   BMI 32.37 kg/m      +[place]  Neck supple without adenopathy  Lungs with decreased BS, no rales or wheeze  Heart RRR s1s2 @90   occ ectopy  Abd soft, NT, no distention, +BS  Ext without edema, no open areas.   Neuro: repetitive, no focal findings, no tremor  Psych: affect a little flat, some confusion    Labs reviewed    IMP/PLAN:  (M15.0) Primary osteoarthritis involving multiple joints    Comment: intermittent pain,fall risk  Plan: scheduled acetaminophen at HS, and available prn.     Local measures, ambulation with FWW to all destinations    (I10) Essential hypertension, benign  Comment:   BP Readings from Last 3 Encounters:   12/10/19 (!) 140/76   10/24/19 103/67   08/30/19 (!) 140/76      Plan: continue losartan 75 mg/day, follow BMP    (G30.1,  F02.80) Late onset Alzheimer's disease without behavioral disturbance  Comment: maintains a fair amount of functional independence, so appropriate for Board and Care.     Plan: Board and Care support for meds, meals, activity, safety.     She is eating well, can discontinue MVI and supplements.       (G40.909) Seizure disorder (H)  Comment: longstanding, labs stable to date.     Plan: continue carbamazepine and gabapentin at current doses, with quarterly check of BMP, LFTs, CBC, which have been WNL to date.    Follow up with Dr Banks as scheduled (Dr Fabiola Banks, [facility]:  [phone])    (J44.9) Chronic obstructive pulmonary disease, unspecified COPD type (H)  Comment: by history; no current symptoms   Plan: continue Spiriva daily    Francy Swartz MD        Sincerely,        Francy Swartz MD    "

## 2019-12-15 NOTE — PROGRESS NOTES
Yoanna Phillips is a 79 year old female seen December 10, 2019 at [facility] where she has resided for 2 and a half years (admit 6/2017) seen to follow up dementia and sz disorder.    She is seen in her room, up ambulating without device washing her hair independently.    States she is feeling well, denies any current pain, dyspnea or other symptoms.    She has a FWW that she uses once outside of her room, ambulates to  and out with family.   Doesn't recognize me, other clear STML.      Patient has past h/o seizures.  Currently on gabapentin and carbamazepine, and followed by Dr Fabiola [facility]   Medications are managed by Dr Banks.     Patient has low cognitive scores and has failed trial of living on her own, with recurrent falls and inability to seek help when she needs it.   Cognitive decline started >8 years ago.   Now meets criteria for dementia dx.   She had an episode of zoster in August, now clear and no PHN reported    Past Medical History:   Diagnosis Date     Arthritis     osteoarthritis     Cognitive impairment 7/3/2012     COPD (chronic obstructive pulmonary disease) (H)      Dyspnea on exertion      Epilepsy (H)     minor seizures 2x daily     Hypertension      Numbness and tingling     bilateral numbness     Pernicious anemia     resolved with vitamin replacement     Poor short term memory 5/20/2011     Seizures (H)    Dementia.    S/p left TSA 2012    SH:  Previously lived with her son, house in [place] [facility].   Has lived there all her life.     She has 4 children, 3 of them live locally and help out.    Her granddaughter is here frequently.     ROS:   BIMS 11/15  Wt Readings from Last 5 Encounters:   12/10/19 77.7 kg (171 lb 4.8 oz)   10/24/19 79.6 kg (175 lb 8 oz)   08/30/19 77.8 kg (171 lb 9.6 oz)   08/08/19 78.5 kg (173 lb)   07/25/19 78.6 kg (173 lb 3.2 oz)        EXAM:  Pleasant, NAD  BP (!) 140/76   Pulse 77   Temp 97.9  F (36.6  C)   Resp 16   Ht 1.549 m (5'  "1\")   Wt 77.7 kg (171 lb 4.8 oz)   SpO2 97%   BMI 32.37 kg/m     +[place]  Neck supple without adenopathy  Lungs with decreased BS, no rales or wheeze  Heart RRR s1s2 @90   occ ectopy  Abd soft, NT, no distention, +BS  Ext without edema, no open areas.   Neuro: repetitive, no focal findings, no tremor  Psych: affect a little flat, some confusion    Labs reviewed    IMP/PLAN:  (M15.0) Primary osteoarthritis involving multiple joints    Comment: intermittent pain,fall risk  Plan: scheduled acetaminophen at HS, and available prn.     Local measures, ambulation with FWW to all destinations    (I10) Essential hypertension, benign  Comment:   BP Readings from Last 3 Encounters:   12/10/19 (!) 140/76   10/24/19 103/67   08/30/19 (!) 140/76      Plan: continue losartan 75 mg/day, follow BMP    (G30.1,  F02.80) Late onset Alzheimer's disease without behavioral disturbance  Comment: maintains a fair amount of functional independence, so appropriate for Board and Care.     Plan: Board and Care support for meds, meals, activity, safety.     She is eating well, can discontinue MVI and supplements.       (G40.909) Seizure disorder (H)  Comment: longstanding, labs stable to date.     Plan: continue carbamazepine and gabapentin at current doses, with quarterly check of BMP, LFTs, CBC, which have been WNL to date.    Follow up with Dr Banks as scheduled (Dr Fabiola Banks, [facility]:  [phone])    (J44.9) Chronic obstructive pulmonary disease, unspecified COPD type (H)  Comment: by history; no current symptoms   Plan: continue Spiriva daily    Francy Swartz MD    "

## 2019-12-16 ENCOUNTER — HEALTH MAINTENANCE LETTER (OUTPATIENT)
Age: 79
End: 2019-12-16

## 2020-02-05 ENCOUNTER — NURSING HOME VISIT (OUTPATIENT)
Dept: GERIATRICS | Facility: CLINIC | Age: 80
End: 2020-02-05
Payer: COMMERCIAL

## 2020-02-05 VITALS
SYSTOLIC BLOOD PRESSURE: 131 MMHG | DIASTOLIC BLOOD PRESSURE: 72 MMHG | OXYGEN SATURATION: 97 % | HEIGHT: 61 IN | RESPIRATION RATE: 19 BRPM | WEIGHT: 170.1 LBS | BODY MASS INDEX: 32.12 KG/M2 | HEART RATE: 81 BPM | TEMPERATURE: 97.9 F

## 2020-02-05 DIAGNOSIS — E55.9 VITAMIN D DEFICIENCY: ICD-10-CM

## 2020-02-05 DIAGNOSIS — I10 ESSENTIAL HYPERTENSION, BENIGN: Primary | ICD-10-CM

## 2020-02-05 DIAGNOSIS — J44.9 CHRONIC OBSTRUCTIVE PULMONARY DISEASE, UNSPECIFIED COPD TYPE (H): ICD-10-CM

## 2020-02-05 DIAGNOSIS — G40.909 SEIZURE DISORDER (H): ICD-10-CM

## 2020-02-05 PROCEDURE — 99309 SBSQ NF CARE MODERATE MDM 30: CPT | Performed by: NURSE PRACTITIONER

## 2020-02-05 PROCEDURE — 99207 ZZC CDG-CODE CATEGORY CHANGED: CPT | Performed by: NURSE PRACTITIONER

## 2020-02-05 ASSESSMENT — MIFFLIN-ST. JEOR: SCORE: 1178.95

## 2020-02-05 NOTE — PROGRESS NOTES
[place] [facility]  Chief Complaint   Patient presents with     longterm Regulatory     [place] Medical Record Number:  [id number]  Place of Service where encounter took place:  [facility] ([facility]) [[id number]]    HPI:    Yoanna Phillips  is 79 year old (1940), who is being seen today for a federally mandated E/M visit.  HPI information obtained from: facility chart records, facility staff, patient report and [facility] chart review. Today's concerns are:  Chronic obstructive pulmonary disease, unspecified COPD type (H)  Denies SOB or wheezing. Unable to reports date of last exacerbation. Currently taking spiriva     Seizure disorder (H)  No recent activity. Followed by neurology. Taking carbamazepine and gabapentin.     Essential hypertension, benign  BP Readings from Last 3 Encounters:   02/05/20 131/72   12/10/19 (!) 140/76   10/24/19 103/67   Denies SOB and lightheadedness.     Vitamin D deficiency  Taking vitamin D 2000 units/day.       ALLERGIES:Aspirin  PAST MEDICAL HISTORY:   has a past medical history of Arthritis, Cognitive impairment (7/3/2012), COPD (chronic obstructive pulmonary disease) (H), Dyspnea on exertion, Epilepsy (H), Hypertension, Numbness and tingling, Pernicious anemia, Poor short term memory (5/20/2011), and Seizures (H). She also has no past medical history of PONV (postoperative nausea and vomiting).  PAST SURGICAL HISTORY:   has a past surgical history that includes NONSPECIFIC PROCEDURE (1965); Craniotomy (1965); Cholecystectomy; Splenectomy; Arthroplasty shoulder (6/25/2012); appendectomy; Arthroplasty hip (Right, 4/11/2016); Arthroplasty revision hip (Right, 5/14/2016); Arthroplasty hip (Left, 3/20/2017); and Closed reduction hip (Left, 5/22/2017).  FAMILY HISTORY: family history includes Arthritis in her mother; Heart Disease in her mother; Hypertension in her mother; Neurologic Disorder in her mother.  SOCIAL HISTORY:  reports that she quit smoking  "about 57 years ago. Her smoking use included cigarettes. She has a 5.00 pack-year smoking history. She has never used smokeless tobacco. She reports current alcohol use. She reports that she does not use drugs.    MEDICATIONS:  Current Outpatient Medications   Medication Sig Dispense Refill     acetaminophen (TYLENOL) 325 MG tablet Take 650 mg by mouth every 6 hours as needed for pain       CARBAMAZEPINE ER PO Take 300 mg by mouth 2 times daily       cyanocobalamin (VITAMIN B12) 1000 MCG/ML injection Inject 1,000 mcg into the muscle every day shift starting on the 25th and ending on the 25th every month       FOLIC ACID PO Take 1 mg by mouth daily       GABAPENTIN PO Take 600 mg by mouth 3 times daily        LOSARTAN POTASSIUM PO Take 75 mg by mouth daily        Multiple Vitamins-Minerals (CENTRUM SILVER) per tablet Take 1 tablet by mouth daily       NUTRITIONAL SUPPLEMENT LIQD Take by mouth 2 times daily Give 120mL       Nystatin POWD Apply to redness topically as needed for Rash QD AND Apply to groin topically every day and evening shift for Rash Until resolved.       Thiamine HCl (VITAMIN B-1 PO) Take 100 mg by mouth daily       tiotropium (SPIRIVA) 18 MCG capsule Inhale 18 mcg into the lungs daily       VITAMIN D, CHOLECALCIFEROL, PO Take 2,000 Units by mouth daily       Case Management:  I have reviewed the care plan and MDS and do agree with the plan. Patient's desire to return to the community is present, but is not able due to care needs . Information reviewed:  Medications, vital signs, orders, and nursing notes.    ROS:  4 point ROS including Respiratory, CV, GI and , other than that noted in the HPI,  is negative    Vitals:  /72   Pulse 81   Temp 97.9  F (36.6  C)   Resp 19   Ht 1.549 m (5' 1\")   Wt 77.2 kg (170 lb 1.6 oz)   SpO2 97%   BMI 32.14 kg/m    Body mass index is 32.14 kg/m .  Exam:  GENERAL APPEARANCE:  Alert  ENT:  Mouth and posterior oropharynx normal, moist mucous membranes, " normal hearing acuity  RESP:  respiratory effort and palpation of chest normal, lungs clear to auscultation   CV:  Palpation and auscultation of heart done , regular rate and rhythm, no murmur, rub, or gallop  M/S:   Gait and station normal  Digits and nails normal  SKIN:  Inspection of skin and subcutaneous tissue baseline, Palpation of skin and subcutaneous tissue baseline  NEURO:   Cranial nerves 2-12 are normal tested and grossly at patient's baseline  PSYCH:  oriented X 3    Lab/Diagnostic data:   Labs done in SNF are in [facility]. Please refer to them using [employer] Everywhere. and Recent labs in [facility] reviewed by me today.     ASSESSMENT/PLAN  (I10) Essential hypertension, benign  (primary encounter diagnosis)  Comment: Chronic, blood pressure less than goal 150/90.   Plan:   -Reduce losartan 50 mg/day.   -BMP next lab day.     (J44.9) Chronic obstructive pulmonary disease, unspecified COPD type (H)  Comment: Chronic, stable  Plan: No change, continue spiriva     (G40.909) Seizure disorder (H)  Comment: Followed by neurology.   Plan: Continue current POC and follow up with neurology as planned.     (E55.9) Vitamin D deficiency  Comment: No recent levels.   Plan:   -Reduce vitamin D 1000 units/day.   -vitamin D level next lab day         Orders written by provider at facility  -Decrease vitamin D 1000 units/day  -Vitamin D level next lab day  -d'c MVI  -d'c nutritional supplement   -reduce losartan 50 mg/day  -BMP next lab day  -d'c thiamine     Electronically signed by:  TANIYA Zaman CNP  [facility]

## 2020-02-05 NOTE — LETTER
2/5/2020        RE: Yoanna Phillips  [address] [place] [address]        [facility]  Chief Complaint   Patient presents with     residential Regulatory     [facility] Record Number:  [id number]  Place of Service where encounter took place:  [facility] [[id number]]    HPI:    Yoanna Phillips  is 79 year old (1940), who is being seen today for a federally mandated E/M visit.  HPI information obtained from: facility chart records, facility staff, patient report and [facility] chart review. Today's concerns are:  Chronic obstructive pulmonary disease, unspecified COPD type (H)  Denies SOB or wheezing. Unable to reports date of last exacerbation. Currently taking spiriva     Seizure disorder (H)  No recent activity. Followed by neurology. Taking carbamazepine and gabapentin.     Essential hypertension, benign  BP Readings from Last 3 Encounters:   02/05/20 131/72   12/10/19 (!) 140/76   10/24/19 103/67   Denies SOB and lightheadedness.     Vitamin D deficiency  Taking vitamin D 2000 units/day.       ALLERGIES:Aspirin  PAST MEDICAL HISTORY:   has a past medical history of Arthritis, Cognitive impairment (7/3/2012), COPD (chronic obstructive pulmonary disease) (H), Dyspnea on exertion, Epilepsy (H), Hypertension, Numbness and tingling, Pernicious anemia, Poor short term memory (5/20/2011), and Seizures (H). She also has no past medical history of PONV (postoperative nausea and vomiting).  PAST SURGICAL HISTORY:   has a past surgical history that includes NONSPECIFIC PROCEDURE (1965); Craniotomy (1965); Cholecystectomy; Splenectomy; Arthroplasty shoulder (6/25/2012); appendectomy; Arthroplasty hip (Right, 4/11/2016); Arthroplasty revision hip (Right, 5/14/2016); Arthroplasty hip (Left, 3/20/2017); and Closed reduction hip (Left, 5/22/2017).  FAMILY HISTORY: family history includes Arthritis in her mother; Heart Disease in her mother; Hypertension  "in her mother; Neurologic Disorder in her mother.  SOCIAL HISTORY:  reports that she quit smoking about 57 years ago. Her smoking use included cigarettes. She has a 5.00 pack-year smoking history. She has never used smokeless tobacco. She reports current alcohol use. She reports that she does not use drugs.    MEDICATIONS:  Current Outpatient Medications   Medication Sig Dispense Refill     acetaminophen (TYLENOL) 325 MG tablet Take 650 mg by mouth every 6 hours as needed for pain       CARBAMAZEPINE ER PO Take 300 mg by mouth 2 times daily       cyanocobalamin (VITAMIN B12) 1000 MCG/ML injection Inject 1,000 mcg into the muscle every day shift starting on the 25th and ending on the 25th every month       FOLIC ACID PO Take 1 mg by mouth daily       GABAPENTIN PO Take 600 mg by mouth 3 times daily        LOSARTAN POTASSIUM PO Take 75 mg by mouth daily        Multiple Vitamins-Minerals (CENTRUM SILVER) per tablet Take 1 tablet by mouth daily       NUTRITIONAL SUPPLEMENT LIQD Take by mouth 2 times daily Give 120mL       Nystatin POWD Apply to redness topically as needed for Rash QD AND Apply to groin topically every day and evening shift for Rash Until resolved.       Thiamine HCl (VITAMIN B-1 PO) Take 100 mg by mouth daily       tiotropium (SPIRIVA) 18 MCG capsule Inhale 18 mcg into the lungs daily       VITAMIN D, CHOLECALCIFEROL, PO Take 2,000 Units by mouth daily       Case Management:  I have reviewed the care plan and MDS and do agree with the plan. Patient's desire to return to the community is present, but is not able due to care needs . Information reviewed:  Medications, vital signs, orders, and nursing notes.    ROS:  4 point ROS including Respiratory, CV, GI and , other than that noted in the HPI,  is negative    Vitals:  /72   Pulse 81   Temp 97.9  F (36.6  C)   Resp 19   Ht 1.549 m (5' 1\")   Wt 77.2 kg (170 lb 1.6 oz)   SpO2 97%   BMI 32.14 kg/m     Body mass index is 32.14 " kg/m .  Exam:  GENERAL APPEARANCE:  Alert  ENT:  Mouth and posterior oropharynx normal, moist mucous membranes, normal hearing acuity  RESP:  respiratory effort and palpation of chest normal, lungs clear to auscultation   CV:  Palpation and auscultation of heart done , regular rate and rhythm, no murmur, rub, or gallop  M/S:   Gait and station normal  Digits and nails normal  SKIN:  Inspection of skin and subcutaneous tissue baseline, Palpation of skin and subcutaneous tissue baseline  NEURO:   Cranial nerves 2-12 are normal tested and grossly at patient's baseline  PSYCH:  oriented X 3    Lab/Diagnostic data:   Labs done in SNF are in [facility]. Please refer to them using [employer]/Care Everywhere. and Recent labs in [facility] reviewed by me today.     ASSESSMENT/PLAN  (I10) Essential hypertension, benign  (primary encounter diagnosis)  Comment: Chronic, blood pressure less than goal 150/90.   Plan:   -Reduce losartan 50 mg/day.   -BMP next lab day.     (J44.9) Chronic obstructive pulmonary disease, unspecified COPD type (H)  Comment: Chronic, stable  Plan: No change, continue spiriva     (G40.909) Seizure disorder (H)  Comment: Followed by neurology.   Plan: Continue current POC and follow up with neurology as planned.     (E55.9) Vitamin D deficiency  Comment: No recent levels.   Plan:   -Reduce vitamin D 1000 units/day.   -vitamin D level next lab day         Orders written by provider at facility  -Decrease vitamin D 1000 units/day  -Vitamin D level next lab day  -d'c MVI  -d'c nutritional supplement   -reduce losartan 50 mg/day  -BMP next lab day  -d'c thiamine     Electronically signed by:  TANIYA Zaman CNP  [facility]         Sincerely,        Muna Del Castillo NP

## 2020-02-11 ENCOUNTER — CLINICAL UPDATE (OUTPATIENT)
Dept: PHARMACY | Facility: CLINIC | Age: 80
End: 2020-02-11
Payer: COMMERCIAL

## 2020-02-11 DIAGNOSIS — I10 ESSENTIAL HYPERTENSION, BENIGN: Primary | ICD-10-CM

## 2020-02-11 DIAGNOSIS — E63.9 NUTRITIONAL DEFICIENCY: ICD-10-CM

## 2020-02-11 DIAGNOSIS — G40.909 SEIZURE DISORDER (H): ICD-10-CM

## 2020-02-11 DIAGNOSIS — G30.1 LATE ONSET ALZHEIMER'S DISEASE WITHOUT BEHAVIORAL DISTURBANCE (H): ICD-10-CM

## 2020-02-11 DIAGNOSIS — F02.80 LATE ONSET ALZHEIMER'S DISEASE WITHOUT BEHAVIORAL DISTURBANCE (H): ICD-10-CM

## 2020-02-11 PROCEDURE — 99207 ZZC NO CHARGE LOS: CPT | Performed by: PHARMACIST

## 2020-02-11 NOTE — PROGRESS NOTES
This patient's medication list and chart were reviewed as part of the service provided by [facility].    Assessment/Recommendations:  1. (Supplements):  Pt with elevated calcium level in June 2019.  Please d'c MVI.  May also consider d'c of nutritional supplement if eating most of meals.  Following these potential changes, if calcium remains elevated, please consider reduction in vitamin D dose to 1000u daily since this may contribute to hypercalcemia as well.  Please also consider d'c of Thiamine as likely not adding benefit.  2. (HTN):  BP goal <150-160/90mmHg reasonable for this elderly patient with dementia, at risk of dizziness, hypotension, falls, tissue/cerebral hypoperfusion.  May consider reduction in Losartan to 50mg daily and monitor.  Consider follow-up BMP one week from potential dose change.  BMP last checked June 2019.      Noted pt follows with neurology re seizures.  Continue to follow appropriate labs given Carbamazepine use (CBC, BMP, LFTs).  Note that Carbamazepine, as well as Losartan and Gabapentin may contribute to low Na; pt with h/o low Na and if continues to trend downward, may require dose adjustments if able.    Jeanie Bo, Pharm.D.,[facility]  Board Certified Geriatric Pharmacist  Medication Therapy Management Pharmacist  [phone]

## 2020-02-18 ENCOUNTER — RECORDS - HEALTHEAST (OUTPATIENT)
Dept: LAB | Facility: CLINIC | Age: 80
End: 2020-02-18

## 2020-02-18 ENCOUNTER — TRANSFERRED RECORDS (OUTPATIENT)
Dept: HEALTH INFORMATION MANAGEMENT | Facility: CLINIC | Age: 80
End: 2020-02-18

## 2020-02-18 LAB
ANION GAP SERPL CALCULATED.3IONS-SCNC: 6 MMOL/L (ref 5–18)
ANION GAP SERPL CALCULATED.3IONS-SCNC: 6 MMOL/L (ref 5–18)
BUN SERPL-MCNC: 10 MG/DL (ref 8–28)
BUN SERPL-MCNC: 10 MG/DL (ref 8–28)
CALCIUM SERPL-MCNC: 10.5 MG/DL (ref 8.5–10.5)
CALCIUM SERPL-MCNC: 10.5 MG/DL (ref 8.5–10.5)
CHLORIDE BLD-SCNC: 102 MMOL/L (ref 98–107)
CHLORIDE SERPLBLD-SCNC: 102 MMOL/L (ref 98–107)
CO2 SERPL-SCNC: 28 MMOL/L (ref 22–31)
CO2 SERPL-SCNC: 28 MMOL/L (ref 22–31)
CREAT SERPL-MCNC: 0.67 MG/DL (ref 0.6–1.1)
CREAT SERPL-MCNC: 0.67 MG/DL (ref 0.6–1.1)
GFR SERPL CREATININE-BSD FRML MDRD: >60 ML/MIN/1.73M2
GFR SERPL CREATININE-BSD FRML MDRD: >60 ML/MIN/1.73M2
GLUCOSE BLD-MCNC: 78 MG/DL (ref 70–125)
GLUCOSE SERPL-MCNC: 78 MG/DL (ref 70–125)
POTASSIUM BLD-SCNC: 4 MMOL/L (ref 3.5–5)
POTASSIUM SERPL-SCNC: 4 MMOL/L (ref 3.5–5)
SODIUM SERPL-SCNC: 136 MMOL/L (ref 136–145)
SODIUM SERPL-SCNC: 136 MMOL/L (ref 136–145)

## 2020-02-19 ENCOUNTER — RECORDS - HEALTHEAST (OUTPATIENT)
Dept: LAB | Facility: CLINIC | Age: 80
End: 2020-02-19

## 2020-02-19 LAB — 25(OH)D3 SERPL-MCNC: 34.7 NG/ML (ref 30–80)

## 2020-02-20 LAB
ANION GAP SERPL CALCULATED.3IONS-SCNC: 8 MMOL/L (ref 5–18)
BUN SERPL-MCNC: 9 MG/DL (ref 8–28)
CALCIUM SERPL-MCNC: 10.2 MG/DL (ref 8.5–10.5)
CHLORIDE BLD-SCNC: 101 MMOL/L (ref 98–107)
CO2 SERPL-SCNC: 27 MMOL/L (ref 22–31)
CREAT SERPL-MCNC: 0.69 MG/DL (ref 0.6–1.1)
GFR SERPL CREATININE-BSD FRML MDRD: >60 ML/MIN/1.73M2
GLUCOSE BLD-MCNC: 81 MG/DL (ref 70–125)
POTASSIUM BLD-SCNC: 4 MMOL/L (ref 3.5–5)
SODIUM SERPL-SCNC: 136 MMOL/L (ref 136–145)

## 2020-03-12 ENCOUNTER — RECORDS - HEALTHEAST (OUTPATIENT)
Dept: LAB | Facility: CLINIC | Age: 80
End: 2020-03-12

## 2020-03-12 LAB
ALBUMIN SERPL-MCNC: 3.3 G/DL (ref 3.5–5)
ALP SERPL-CCNC: 101 U/L (ref 45–120)
ALT SERPL W P-5'-P-CCNC: 9 U/L (ref 0–45)
ANION GAP SERPL CALCULATED.3IONS-SCNC: 7 MMOL/L (ref 5–18)
AST SERPL W P-5'-P-CCNC: 15 U/L (ref 0–40)
BASOPHILS # BLD AUTO: 0.1 THOU/UL (ref 0–0.2)
BASOPHILS NFR BLD AUTO: 1 % (ref 0–2)
BILIRUB SERPL-MCNC: 0.3 MG/DL (ref 0–1)
BUN SERPL-MCNC: 8 MG/DL (ref 8–28)
CALCIUM SERPL-MCNC: 10.1 MG/DL (ref 8.5–10.5)
CARBAMAZEPINE SERPL-MCNC: 6.7 UG/ML (ref 4–12)
CHLORIDE BLD-SCNC: 102 MMOL/L (ref 98–107)
CO2 SERPL-SCNC: 25 MMOL/L (ref 22–31)
CREAT SERPL-MCNC: 0.68 MG/DL (ref 0.6–1.1)
EOSINOPHIL # BLD AUTO: 0.3 THOU/UL (ref 0–0.4)
EOSINOPHIL NFR BLD AUTO: 4 % (ref 0–6)
ERYTHROCYTE [DISTWIDTH] IN BLOOD BY AUTOMATED COUNT: 12.9 % (ref 11–14.5)
GFR SERPL CREATININE-BSD FRML MDRD: >60 ML/MIN/1.73M2
GLUCOSE BLD-MCNC: 95 MG/DL (ref 70–125)
HCT VFR BLD AUTO: 37.8 % (ref 35–47)
HGB BLD-MCNC: 12.9 G/DL (ref 12–16)
LYMPHOCYTES # BLD AUTO: 3.1 THOU/UL (ref 0.8–4.4)
LYMPHOCYTES NFR BLD AUTO: 44 % (ref 20–40)
MCH RBC QN AUTO: 33.7 PG (ref 27–34)
MCHC RBC AUTO-ENTMCNC: 34.1 G/DL (ref 32–36)
MCV RBC AUTO: 99 FL (ref 80–100)
MONOCYTES # BLD AUTO: 0.6 THOU/UL (ref 0–0.9)
MONOCYTES NFR BLD AUTO: 9 % (ref 2–10)
NEUTROPHILS # BLD AUTO: 3 THOU/UL (ref 2–7.7)
NEUTROPHILS NFR BLD AUTO: 43 % (ref 50–70)
PLATELET # BLD AUTO: 316 THOU/UL (ref 140–440)
PMV BLD AUTO: 10.2 FL (ref 8.5–12.5)
POTASSIUM BLD-SCNC: 4 MMOL/L (ref 3.5–5)
PROT SERPL-MCNC: 6.7 G/DL (ref 6–8)
RBC # BLD AUTO: 3.83 MILL/UL (ref 3.8–5.4)
SODIUM SERPL-SCNC: 134 MMOL/L (ref 136–145)
WBC: 7.1 THOU/UL (ref 4–11)

## 2020-03-14 LAB
CARBAMAZEPINE EP SERPL-MCNC: 1 UG/ML (ref 0.4–4)
SPECIMEN STATUS: NORMAL

## 2020-04-23 ENCOUNTER — VIRTUAL VISIT (OUTPATIENT)
Dept: GERIATRICS | Facility: CLINIC | Age: 80
End: 2020-04-23
Payer: COMMERCIAL

## 2020-04-23 VITALS
HEART RATE: 74 BPM | OXYGEN SATURATION: 96 % | TEMPERATURE: 98.6 F | BODY MASS INDEX: 31.66 KG/M2 | SYSTOLIC BLOOD PRESSURE: 147 MMHG | HEIGHT: 61 IN | RESPIRATION RATE: 18 BRPM | DIASTOLIC BLOOD PRESSURE: 75 MMHG | WEIGHT: 167.7 LBS

## 2020-04-23 DIAGNOSIS — G40.909 SEIZURE DISORDER (H): ICD-10-CM

## 2020-04-23 DIAGNOSIS — G30.1 LATE ONSET ALZHEIMER'S DISEASE WITHOUT BEHAVIORAL DISTURBANCE (H): ICD-10-CM

## 2020-04-23 DIAGNOSIS — J44.9 CHRONIC OBSTRUCTIVE PULMONARY DISEASE, UNSPECIFIED COPD TYPE (H): Primary | ICD-10-CM

## 2020-04-23 DIAGNOSIS — I10 ESSENTIAL HYPERTENSION, BENIGN: ICD-10-CM

## 2020-04-23 DIAGNOSIS — F02.80 LATE ONSET ALZHEIMER'S DISEASE WITHOUT BEHAVIORAL DISTURBANCE (H): ICD-10-CM

## 2020-04-23 DIAGNOSIS — M16.11 PRIMARY OSTEOARTHRITIS OF RIGHT HIP: ICD-10-CM

## 2020-04-23 PROCEDURE — 99207 ZZC NO CHARGE LOS: CPT | Performed by: INTERNAL MEDICINE

## 2020-04-23 PROCEDURE — 99309 SBSQ NF CARE MODERATE MDM 30: CPT | Mod: GT | Performed by: INTERNAL MEDICINE

## 2020-04-23 ASSESSMENT — MIFFLIN-ST. JEOR: SCORE: 1168.06

## 2020-04-23 NOTE — LETTER
"    4/23/2020        RE: Yoanna Phillips  [address] [place] [address]        [facility] [facility] Regulatory   Yoanna Phillips is being evaluated via a billable video visit due to the restrictions of the Covid-19 pandemic.   The patient has been notified of following:  \"This video visit will be conducted via a call between you and your provider. We have found that certain health care needs can be provided without the need for an in-person physical exam.  This service lets us provide the care you need with a video conversation. If during the course of the call the provider feels a video visit is not appropriate, you will not be charged for this service.\"   The provider has received verbal consent for a Video Visit from the patient or first contact? Yes  Patient or facility staff would like the video invitation sent by: N/A   Video Start Time: 10:23 am  [facility] Medical Record Number:  [id number]  Place of Location at the time of visit: [facility]   Chief Complaint   Patient presents with     [facility] Regulatory     HPI:  Yoanna Phillips  is a 79 year old (1940), who is being seen today for an E-REG visit.  HPI information obtained from: facility staff, patient report and [facility] chart review. She is at [facility] where she has resided for almost 3 years (admit 6/2017) seen to follow up dementia and sz disorder.  Nursing staff reports pt took a fall yesterday, trying to pick her face mask up off the floor.   No reported injuries and pt does not remember the fall today.   Denies any current pain, states she feels well. She has a FWW that she doesn't always remember to use.    Patient has past h/o seizures.  Currently on gabapentin and carbamazepine, and followed by Dr Fabiola Banks [facility], and anti-convulsants are managed by Dr Banks.     Patient has low cognitive scores and has failed trial of living on her own, with recurrent falls and " inability to seek help when she needs it.   Cognitive decline started >8 years ago.   Now meets criteria for dementia dx.     Past Medical History:   Diagnosis Date     Arthritis     osteoarthritis     Cognitive impairment 7/3/2012     COPD (chronic obstructive pulmonary disease) (H)      Dyspnea on exertion      Epilepsy (H)     minor seizures 2x daily     Hypertension      Numbness and tingling     bilateral numbness     Pernicious anemia     resolved with vitamin replacement     Poor short term memory 5/20/2011     Seizures (H)    Dementia.    S/p left TSA 2012    SH:  Previously lived with her son, house in hospitals.   Has lived there all her life.     She has 4 children, 3 of them live locally and help out.       MEDICATIONS:  Current Outpatient Medications   Medication Sig Dispense Refill     acetaminophen (TYLENOL) 325 MG tablet Take 650 mg by mouth every 6 hours as needed for pain       CARBAMAZEPINE ER PO Take 300 mg by mouth 2 times daily       cyanocobalamin (VITAMIN B12) 1000 MCG/ML injection Inject 1,000 mcg into the muscle every day shift starting on the 25th and ending on the 25th every month       FOLIC ACID PO Take 1 mg by mouth daily       GABAPENTIN PO Take 600 mg by mouth 3 times daily        LOSARTAN POTASSIUM PO Take 50 mg by mouth daily       Nystatin POWD Apply to redness topically as needed for Rash QD AND Apply to groin topically every day and evening shift for Rash Until resolved.       tiotropium (SPIRIVA) 18 MCG capsule Inhale 18 mcg into the lungs daily       VITAMIN D, CHOLECALCIFEROL, PO Take 1,000 Units by mouth daily       REVIEW OF SYSTEMS: Unobtainable secondary to cognitive impairment.   Wt Readings from Last 5 Encounters:   04/23/20 76.1 kg (167 lb 11.2 oz)   02/05/20 77.2 kg (170 lb 1.6 oz)   12/10/19 77.7 kg (171 lb 4.8 oz)   10/24/19 79.6 kg (175 lb 8 oz)   08/30/19 77.8 kg (171 lb 9.6 oz)      Objective: BP (!) 147/75   Pulse 74   Temp 98.6  F (37  C)   Resp 18   Ht 1.549  "m (5' 1\")   Wt 76.1 kg (167 lb 11.2 oz)   SpO2 96%   BMI 31.69 kg/m    Limited visit exam done given COVID-19 precautions.   GENERAL APPEARANCE:  Alert, in no distress   Some confusion, especially with virtual visit and not able to give much history.     Appears comfortable.     Labs: 3/12/20   Ref Range & Units  1mo ago    WBC  4.0 - 11.0 thou/uL  7.1     RBC  3.80 - 5.40 mill/uL  3.83     Hemoglobin  12.0 - 16.0 g/dL  12.9     Hematocrit  35.0 - 47.0 %  37.8     MCV  80 - 100 fL  99     MCH  27.0 - 34.0 pg  33.7     MCHC  32.0 - 36.0 g/dL  34.1     RDW  11.0 - 14.5 %  12.9     Platelets  140 - 440 thou/uL  316      Sodium  136 - 145 mmol/L  134Low       Potassium  3.5 - 5.0 mmol/L  4.0     Chloride  98 - 107 mmol/L  102     CO2  22 - 31 mmol/L  25     Anion Gap, Calculation  5 - 18 mmol/L  7     Glucose  70 - 125 mg/dL  95     BUN  8 - 28 mg/dL  8     Creatinine  0.60 - 1.10 mg/dL  0.68     GFR MDRD Af Amer  >60 mL/min/1.73m2  >60     GFR MDRD Non Af Amer  >60 mL/min/1.73m2  >60     Bilirubin, Total  0.0 - 1.0 mg/dL  0.3     Calcium  8.5 - 10.5 mg/dL  10.1     Protein, Total  6.0 - 8.0 g/dL  6.7     Albumin  3.5 - 5.0 g/dL  3.3Low       Alkaline Phosphatase  45 - 120 U/L  101     AST  0 - 40 U/L  15     ALT  0 - 45 U/L  9        ASSESSMENT/PLAN:  (M15.0) Primary osteoarthritis involving multiple joints    Comment: intermittent pain,fall risk  Plan: scheduled acetaminophen at HS, and available prn.     Local measures, ambulation with FWW to all destinations, fall precautions    (I10) Essential hypertension, benign  Comment: renal function okay, Na in 134-136 range  BP Readings from Last 3 Encounters:   12/10/19 (!) 140/76   10/24/19 103/67   08/30/19 (!) 140/76      Plan: continue losartan 50 mg/day, follow BMP    (G30.1,  F02.80) Late onset Alzheimer's disease without behavioral disturbance  Comment: +STML but maintains a fair amount of functional independence, so appropriate for Board and Care.     Plan: Board " and Care support for meds, meals, activity, safety.       (G40.479) Seizure disorder (H)  Comment: longstanding, labs stable to date.     Plan: continue carbamazepine and gabapentin at current doses, with quarterly check of BMP, LFTs, CBC which have been WNL to date.    Follow up with Dr Banks as scheduled (Dr Fabiola Banks, [facility]:  [phone])    (J44.9) Chronic obstructive pulmonary disease, unspecified COPD type (H)  Comment: by history; no current symptoms   Plan: continue Spiriva daily    Electronically signed by:  Francy Swartz MD     Video-Visit Details  Type of service:  Video Visit  Video End Time (time video stopped): 10:31 am  Distant Location (provider location):  [facility]             Sincerely,        Francy Swartz MD

## 2020-04-23 NOTE — PROGRESS NOTES
"[facility] Regulatory   Yoanna Phillips is being evaluated via a billable video visit due to the restrictions of the Covid-19 pandemic.   The patient has been notified of following:  \"This video visit will be conducted via a call between you and your provider. We have found that certain health care needs can be provided without the need for an in-person physical exam.  This service lets us provide the care you need with a video conversation. If during the course of the call the provider feels a video visit is not appropriate, you will not be charged for this service.\"   The provider has received verbal consent for a Video Visit from the patient or first contact? Yes  Patient or facility staff would like the video invitation sent by: N/A   Video Start Time: 10:23 am  [facility] Medical Record Number:  [id number]  Place of Location at the time of visit: [facility]   Chief Complaint   Patient presents with     [employer] Regulatory     HPI:  Yoanna Phillips  is a 79 year old (1940), who is being seen today for an E-REG visit.  HPI information obtained from: facility staff, patient report and [facility] chart review. She is at [facility] where she has resided for almost 3 years (admit 6/2017) seen to follow up dementia and sz disorder.  Nursing staff reports pt took a fall yesterday, trying to pick her face mask up off the floor.   No reported injuries and pt does not remember the fall today.   Denies any current pain, states she feels well. She has a FWW that she doesn't always remember to use.    Patient has past h/o seizures.  Currently on gabapentin and carbamazepine, and followed by Dr Fabiola Banks [facility], and anti-convulsants are managed by Dr Banks.     Patient has low cognitive scores and has failed trial of living on her own, with recurrent falls and inability to seek help when she needs it.   Cognitive decline started >8 years ago.   Now meets " "criteria for dementia dx.     Past Medical History:   Diagnosis Date     Arthritis     osteoarthritis     Cognitive impairment 7/3/2012     COPD (chronic obstructive pulmonary disease) (H)      Dyspnea on exertion      Epilepsy (H)     minor seizures 2x daily     Hypertension      Numbness and tingling     bilateral numbness     Pernicious anemia     resolved with vitamin replacement     Poor short term memory 5/20/2011     Seizures (H)    Dementia.    S/p left TSA 2012    SH:  Previously lived with her son, house in [facility].   Has lived there all her life.     She has 4 children, 3 of them live locally and help out.       MEDICATIONS:  Current Outpatient Medications   Medication Sig Dispense Refill     acetaminophen (TYLENOL) 325 MG tablet Take 650 mg by mouth every 6 hours as needed for pain       CARBAMAZEPINE ER PO Take 300 mg by mouth 2 times daily       cyanocobalamin (VITAMIN B12) 1000 MCG/ML injection Inject 1,000 mcg into the muscle every day shift starting on the 25th and ending on the 25th every month       FOLIC ACID PO Take 1 mg by mouth daily       GABAPENTIN PO Take 600 mg by mouth 3 times daily        LOSARTAN POTASSIUM PO Take 50 mg by mouth daily       Nystatin POWD Apply to redness topically as needed for Rash QD AND Apply to groin topically every day and evening shift for Rash Until resolved.       tiotropium (SPIRIVA) 18 MCG capsule Inhale 18 mcg into the lungs daily       VITAMIN D, CHOLECALCIFEROL, PO Take 1,000 Units by mouth daily       REVIEW OF SYSTEMS: Unobtainable secondary to cognitive impairment.   Wt Readings from Last 5 Encounters:   04/23/20 76.1 kg (167 lb 11.2 oz)   02/05/20 77.2 kg (170 lb 1.6 oz)   12/10/19 77.7 kg (171 lb 4.8 oz)   10/24/19 79.6 kg (175 lb 8 oz)   08/30/19 77.8 kg (171 lb 9.6 oz)      Objective: BP (!) 147/75   Pulse 74   Temp 98.6  F (37  C)   Resp 18   Ht 1.549 m (5' 1\")   Wt 76.1 kg (167 lb 11.2 oz)   SpO2 96%   BMI 31.69 kg/m    Limited visit exam " done given COVID-19 precautions.   GENERAL APPEARANCE:  Alert, in no distress   Some confusion, especially with virtual visit and not able to give much history.     Appears comfortable.     Labs: 3/12/20   Ref Range & Units  1mo ago    WBC  4.0 - 11.0 thou/uL  7.1     RBC  3.80 - 5.40 mill/uL  3.83     Hemoglobin  12.0 - 16.0 g/dL  12.9     Hematocrit  35.0 - 47.0 %  37.8     MCV  80 - 100 fL  99     MCH  27.0 - 34.0 pg  33.7     MCHC  32.0 - 36.0 g/dL  34.1     RDW  11.0 - 14.5 %  12.9     Platelets  140 - 440 thou/uL  316      Sodium  136 - 145 mmol/L  134Low       Potassium  3.5 - 5.0 mmol/L  4.0     Chloride  98 - 107 mmol/L  102     CO2  22 - 31 mmol/L  25     Anion Gap, Calculation  5 - 18 mmol/L  7     Glucose  70 - 125 mg/dL  95     BUN  8 - 28 mg/dL  8     Creatinine  0.60 - 1.10 mg/dL  0.68     GFR MDRD Af Amer  >60 mL/min/1.73m2  >60     GFR MDRD Non Af Amer  >60 mL/min/1.73m2  >60     Bilirubin, Total  0.0 - 1.0 mg/dL  0.3     Calcium  8.5 - 10.5 mg/dL  10.1     Protein, Total  6.0 - 8.0 g/dL  6.7     Albumin  3.5 - 5.0 g/dL  3.3Low       Alkaline Phosphatase  45 - 120 U/L  101     AST  0 - 40 U/L  15     ALT  0 - 45 U/L  9        ASSESSMENT/PLAN:  (M15.0) Primary osteoarthritis involving multiple joints    Comment: intermittent pain,fall risk  Plan: scheduled acetaminophen at HS, and available prn.     Local measures, ambulation with FWW to all destinations, fall precautions    (I10) Essential hypertension, benign  Comment: renal function okay, Na in 134-136 range  BP Readings from Last 3 Encounters:   12/10/19 (!) 140/76   10/24/19 103/67   08/30/19 (!) 140/76      Plan: continue losartan 50 mg/day, follow BMP    (G30.1,  F02.80) Late onset Alzheimer's disease without behavioral disturbance  Comment: +STML but maintains a fair amount of functional independence, so appropriate for Board and Care.     Plan: Board and Care support for meds, meals, activity, safety.       (G40.909) Seizure disorder  (H)  Comment: longstanding, labs stable to date.     Plan: continue carbamazepine and gabapentin at current doses, with quarterly check of BMP, LFTs, CBC which have been WNL to date.    Follow up with Dr Banks as scheduled (Dr Fabiola Banks, [facility]:  [phone])    (J44.9) Chronic obstructive pulmonary disease, unspecified COPD type (H)  Comment: by history; no current symptoms   Plan: continue Spiriva daily    Electronically signed by:  Francy Swartz MD     Video-Visit Details  Type of service:  Video Visit  Video End Time (time video stopped): 10:31 am  Distant Location (provider location):  [place] [facility]

## 2020-06-18 ENCOUNTER — VIRTUAL VISIT (OUTPATIENT)
Dept: GERIATRICS | Facility: CLINIC | Age: 80
End: 2020-06-18
Payer: COMMERCIAL

## 2020-06-18 VITALS
TEMPERATURE: 97 F | WEIGHT: 165.1 LBS | OXYGEN SATURATION: 96 % | DIASTOLIC BLOOD PRESSURE: 59 MMHG | HEART RATE: 72 BPM | HEIGHT: 61 IN | RESPIRATION RATE: 18 BRPM | SYSTOLIC BLOOD PRESSURE: 141 MMHG | BODY MASS INDEX: 31.17 KG/M2

## 2020-06-18 DIAGNOSIS — J44.9 CHRONIC OBSTRUCTIVE PULMONARY DISEASE, UNSPECIFIED COPD TYPE (H): Primary | ICD-10-CM

## 2020-06-18 DIAGNOSIS — I10 ESSENTIAL HYPERTENSION, BENIGN: ICD-10-CM

## 2020-06-18 DIAGNOSIS — G40.909 SEIZURE DISORDER (H): ICD-10-CM

## 2020-06-18 DIAGNOSIS — F02.80 LATE ONSET ALZHEIMER'S DISEASE WITHOUT BEHAVIORAL DISTURBANCE (H): ICD-10-CM

## 2020-06-18 DIAGNOSIS — M16.11 PRIMARY OSTEOARTHRITIS OF RIGHT HIP: ICD-10-CM

## 2020-06-18 DIAGNOSIS — Z00.00 ANNUAL PHYSICAL EXAM: ICD-10-CM

## 2020-06-18 DIAGNOSIS — G30.1 LATE ONSET ALZHEIMER'S DISEASE WITHOUT BEHAVIORAL DISTURBANCE (H): ICD-10-CM

## 2020-06-18 DIAGNOSIS — E55.9 VITAMIN D DEFICIENCY: ICD-10-CM

## 2020-06-18 PROCEDURE — 99309 SBSQ NF CARE MODERATE MDM 30: CPT | Mod: 95 | Performed by: NURSE PRACTITIONER

## 2020-06-18 ASSESSMENT — MIFFLIN-ST. JEOR: SCORE: 1151.27

## 2020-06-18 NOTE — LETTER
"    6/18/2020        RE: Yoanna Phillips  [address] [place] [address]        [facility]   Yoanna Phillips is being evaluated via a billable video visit due to the restrictions of the Covid-19 pandemic.   The patient has been notified of following:  \"This video visit will be conducted via a call between you and your provider. We have found that certain health care needs can be provided without the need for an in-person physical exam.  This service lets us provide the care you need with a video conversation. If during the course of the call the provider feels a video visit is not appropriate, you will not be charged for this service.\"   The provider has received verbal consent for a Video Visit from the patient or first contact? Yes  Patient  or facility staff would like the video invitation sent by: Text to cell phone: Nurse Silva  Video Start Time: 12:16 PM    [facility] Medical Record Number:  [id number]  Place of Location at the time of visit: [facility]   Chief Complaint   Patient presents with     Annual Comprehensive Nursing Home     HPI:  Yoanna Phillips  is a 80 year old (1940), who is being seen today for a visit.  HPI information obtained from: facility chart records, facility staff and patient report.     Today's concern is:  Chronic obstructive pulmonary disease, unspecified COPD type (H)  Denies SOB, continues to take tiotropium daily. No recent illness or hospitalization.     Essential hypertension, benign  Denies CP, SOB or lightheadedness.   BP Readings from Last 3 Encounters:   06/18/20 (!) 141/59   04/23/20 (!) 147/75   02/05/20 131/72   Taking losartan 50 mg/day    Late onset Alzheimer's disease without behavioral disturbance (H)  BIMS 11/15.   -Alert and oriented x3 upon today's visit. No behaviors noted by staff.   Wt Readings from Last 4 Encounters:   06/18/20 74.9 kg (165 lb 1.6 oz)   04/23/20 76.1 kg (167 lb 11.2 oz)   02/05/20 77.2 kg (170 " lb 1.6 oz)   12/10/19 77.7 kg (171 lb 4.8 oz)     Seizure disorder (H)  No recent seizure activity. Taking gabapentin 600 mg/TID and carbamazepine 300 mg/BID.     Vitamin D deficiency  Taking vitamin D 1000 units daily. No recent vitamin D level noted.     Primary osteoarthritis of right hip  Denies pain most time with occasional right hip pain.    Annual physical exam  06/18/20    Case Management and Team Discussion:    I spoke with Nursing staff at the facility regarding the current Plan of Care. I have reviewed the facility/SNF care plan/MDS, including the falls risk, nutrition and pain screening. I also reviewed the current immunizations, and preventive care.Patient's desire to return to the community is present, but is not able due to care needs . Current Level of Care is appropriate at this time. The Plan of Care is appropriate at this time.     Advance Directive Discussion:    I reviewed the current advanced directives as reflected in EPIC, the POLST and the facility chart, and verified the congruency of orders . I contacted the first party Daughter Jeanie and discussed the plan of Care.  I did not due to cognitive impairment review the advance directives with the resident.     Past Medical and Surgical History reviewed in Epic today.  MEDICATIONS:    Current Outpatient Medications   Medication Sig Dispense Refill     acetaminophen (TYLENOL) 325 MG tablet Take 650 mg by mouth every 6 hours as needed for pain       CARBAMAZEPINE ER PO Take 300 mg by mouth 2 times daily       cyanocobalamin (VITAMIN B12) 1000 MCG/ML injection Inject 1,000 mcg into the muscle every day shift starting on the 25th and ending on the 25th every month       FOLIC ACID PO Take 1 mg by mouth daily       GABAPENTIN PO Take 600 mg by mouth 3 times daily        LOSARTAN POTASSIUM PO Take 50 mg by mouth daily       Nystatin POWD Apply to redness topically as needed for Rash QD AND Apply to groin topically every day and evening shift for Rash  "Until resolved.       tiotropium (SPIRIVA) 18 MCG capsule Inhale 18 mcg into the lungs daily       VITAMIN D, CHOLECALCIFEROL, PO Take 1,000 Units by mouth daily       REVIEW OF SYSTEMS: 10 point ROS of systems including Constitutional, Eyes, Respiratory, Cardiovascular, Gastroenterology, Genitourinary, Integumentary, Musculoskeletal, Psychiatric were all negative except for pertinent positives noted in my HPI.  Objective: BP (!) 141/59   Pulse 72   Temp 97  F (36.1  C)   Resp 18   Ht 1.549 m (5' 1\")   Wt 74.9 kg (165 lb 1.6 oz)   SpO2 96%   BMI 31.20 kg/m    Limited visit exam done given COVID-19 precautions.   GENERAL APPEARANCE:  Alert  ENT:  normal hearing acuity  RESP:  no respiratory distress  CV:  no edema  M/S:   Gait and station normal  SKIN:  Inspection of skin and subcutaneous tissue baseline  NEURO:   Cranial nerves 2-12 are normal tested and grossly at patient's baseline  PSYCH:  oriented X 3  Labs:   Labs done in SNF are in [facility]. Please refer to them using [employer] Everywhere. and Recent labs in [facility] reviewed by me today.     ASSESSMENT/PLAN:  (J44.9) Chronic obstructive pulmonary disease, unspecified COPD type (H)  (primary encounter diagnosis)  Comment: Chronic, symptoms managed on tiotropium.   Plan:  -No changes at this time.     (I10) Essential hypertension, benign  Comment: Chronic, blood pressure less than goal 150/90  Plan: Keep SBP> 130 mmHg and DBP > 65 mmHg (levels below these increase mortality as shown by standard studies and observations).   -Continue losartan as ordered.     (G30.1,  F02.80) Late onset Alzheimer's disease without behavioral disturbance (H)  Comment: Progressive. Doing well on Board and Care  Plan:   Resides at Board and Care. No reports of behaviors or mood changes. No sleep disturbances reported.     (G40.909) Seizure disorder (H)  Comment: No recent activity on current medication regimen.   Plan:   -Continue carbamazepine and gabapentin as ordered. "     (E55.9) Vitamin D deficiency  Comment: On supplement, no recent level   Plan:   -Vitamin D level next lab day     (M16.11) Primary osteoarthritis of right hip  Comment: Pain managed on PRN acetaminophen.   Plan: Continue without change     (Z00.00) Annual physical exam  Comment: 06/18/20      Orders written by provider and faxed to facility   1.) BMP, CBC and vitamin d level next lab day.       Electronically signed by:  TANIYA Zaman CNP  [facility]       Video-Visit Details  Type of service:  Video Visit  Video End Time (time video stopped): 12:33 PM    Distant Location (provider location):  [facility]           Sincerely,        Muna Del Castillo, NP

## 2020-06-18 NOTE — PROGRESS NOTES
"[facility]   Yoanna Phillips is being evaluated via a billable video visit due to the restrictions of the Covid-19 pandemic.   The patient has been notified of following:  \"This video visit will be conducted via a call between you and your provider. We have found that certain health care needs can be provided without the need for an in-person physical exam.  This service lets us provide the care you need with a video conversation. If during the course of the call the provider feels a video visit is not appropriate, you will not be charged for this service.\"   The provider has received verbal consent for a Video Visit from the patient or first contact? Yes  Patient  or facility staff would like the video invitation sent by: Text to cell phone: Nurse Silva  Video Start Time: 12:16 PM    [facility] Medical Record Number:  [id number]  Place of Location at the time of visit: [facility]   Chief Complaint   Patient presents with     Annual Comprehensive Nursing Home     HPI:  Yoanna Phillips  is a 80 year old (1940), who is being seen today for a visit.  HPI information obtained from: facility chart records, facility staff and patient report.     Today's concern is:  Chronic obstructive pulmonary disease, unspecified COPD type (H)  Denies SOB, continues to take tiotropium daily. No recent illness or hospitalization.     Essential hypertension, benign  Denies CP, SOB or lightheadedness.   BP Readings from Last 3 Encounters:   06/18/20 (!) 141/59   04/23/20 (!) 147/75   02/05/20 131/72   Taking losartan 50 mg/day    Late onset Alzheimer's disease without behavioral disturbance (H)  BIMS 11/15.   -Alert and oriented x3 upon today's visit. No behaviors noted by staff.   Wt Readings from Last 4 Encounters:   06/18/20 74.9 kg (165 lb 1.6 oz)   04/23/20 76.1 kg (167 lb 11.2 oz)   02/05/20 77.2 kg (170 lb 1.6 oz)   12/10/19 77.7 kg (171 lb 4.8 oz)     Seizure disorder (H)  No recent seizure activity. " Taking gabapentin 600 mg/TID and carbamazepine 300 mg/BID.     Vitamin D deficiency  Taking vitamin D 1000 units daily. No recent vitamin D level noted.     Primary osteoarthritis of right hip  Denies pain most time with occasional right hip pain.    Annual physical exam  06/18/20    Case Management and Team Discussion:    I spoke with Nursing staff at the facility regarding the current Plan of Care. I have reviewed the facility/SNF care plan/MDS, including the falls risk, nutrition and pain screening. I also reviewed the current immunizations, and preventive care.Patient's desire to return to the community is present, but is not able due to care needs . Current Level of Care is appropriate at this time. The Plan of Care is appropriate at this time.     Advance Directive Discussion:    I reviewed the current advanced directives as reflected in EPIC, the POLST and the facility chart, and verified the congruency of orders . I contacted the first party Daughter Jeanie and discussed the plan of Care.  I did not due to cognitive impairment review the advance directives with the resident.     Past Medical and Surgical History reviewed in Epic today.  MEDICATIONS:    Current Outpatient Medications   Medication Sig Dispense Refill     acetaminophen (TYLENOL) 325 MG tablet Take 650 mg by mouth every 6 hours as needed for pain       CARBAMAZEPINE ER PO Take 300 mg by mouth 2 times daily       cyanocobalamin (VITAMIN B12) 1000 MCG/ML injection Inject 1,000 mcg into the muscle every day shift starting on the 25th and ending on the 25th every month       FOLIC ACID PO Take 1 mg by mouth daily       GABAPENTIN PO Take 600 mg by mouth 3 times daily        LOSARTAN POTASSIUM PO Take 50 mg by mouth daily       Nystatin POWD Apply to redness topically as needed for Rash QD AND Apply to groin topically every day and evening shift for Rash Until resolved.       tiotropium (SPIRIVA) 18 MCG capsule Inhale 18 mcg into the lungs daily        "VITAMIN D, CHOLECALCIFEROL, PO Take 1,000 Units by mouth daily       REVIEW OF SYSTEMS: 10 point ROS of systems including Constitutional, Eyes, Respiratory, Cardiovascular, Gastroenterology, Genitourinary, Integumentary, Musculoskeletal, Psychiatric were all negative except for pertinent positives noted in my HPI.  Objective: BP (!) 141/59   Pulse 72   Temp 97  F (36.1  C)   Resp 18   Ht 1.549 m (5' 1\")   Wt 74.9 kg (165 lb 1.6 oz)   SpO2 96%   BMI 31.20 kg/m    Limited visit exam done given COVID-19 precautions.   GENERAL APPEARANCE:  Alert  ENT:  normal hearing acuity  RESP:  no respiratory distress  CV:  no edema  M/S:   Gait and station normal  SKIN:  Inspection of skin and subcutaneous tissue baseline  NEURO:   Cranial nerves 2-12 are normal tested and grossly at patient's baseline  PSYCH:  oriented X 3  Labs:   Labs done in SNF are in [facility]. Please refer to them using [employer] Everywhere. and Recent labs in [facility] reviewed by me today.     ASSESSMENT/PLAN:  (J44.9) Chronic obstructive pulmonary disease, unspecified COPD type (H)  (primary encounter diagnosis)  Comment: Chronic, symptoms managed on tiotropium.   Plan:  -No changes at this time.     (I10) Essential hypertension, benign  Comment: Chronic, blood pressure less than goal 150/90  Plan: Keep SBP> 130 mmHg and DBP > 65 mmHg (levels below these increase mortality as shown by standard studies and observations).   -Continue losartan as ordered.     (G30.1,  F02.80) Late onset Alzheimer's disease without behavioral disturbance (H)  Comment: Progressive. Doing well on Board and Care  Plan:   Resides at Board and Care. No reports of behaviors or mood changes. No sleep disturbances reported.     (G40.909) Seizure disorder (H)  Comment: No recent activity on current medication regimen.   Plan:   -Continue carbamazepine and gabapentin as ordered.     (E55.9) Vitamin D deficiency  Comment: On supplement, no recent level   Plan:   -Vitamin D level " next lab day     (M16.11) Primary osteoarthritis of right hip  Comment: Pain managed on PRN acetaminophen.   Plan: Continue without change     (Z00.00) Annual physical exam  Comment: 06/18/20      Orders written by provider and faxed to facility   1.) BMP, CBC and vitamin d level next lab day.       Electronically signed by:  TANIYA Zaman CNP  [facility]       Video-Visit Details  Type of service:  Video Visit  Video End Time (time video stopped): 12:33 PM    Distant Location (provider location):  [facility]

## 2020-06-23 ENCOUNTER — RECORDS - HEALTHEAST (OUTPATIENT)
Dept: LAB | Facility: CLINIC | Age: 80
End: 2020-06-23

## 2020-06-23 LAB
ANION GAP SERPL CALCULATED.3IONS-SCNC: 10 MMOL/L (ref 5–18)
BUN SERPL-MCNC: 10 MG/DL (ref 8–28)
CALCIUM SERPL-MCNC: 10.2 MG/DL (ref 8.5–10.5)
CHLORIDE BLD-SCNC: 100 MMOL/L (ref 98–107)
CO2 SERPL-SCNC: 24 MMOL/L (ref 22–31)
CREAT SERPL-MCNC: 0.68 MG/DL (ref 0.6–1.1)
ERYTHROCYTE [DISTWIDTH] IN BLOOD BY AUTOMATED COUNT: 13 % (ref 11–14.5)
GFR SERPL CREATININE-BSD FRML MDRD: >60 ML/MIN/1.73M2
GLUCOSE BLD-MCNC: 84 MG/DL (ref 70–125)
HCT VFR BLD AUTO: 41.8 % (ref 35–47)
HGB BLD-MCNC: 13.6 G/DL (ref 12–16)
MCH RBC QN AUTO: 32.1 PG (ref 27–34)
MCHC RBC AUTO-ENTMCNC: 32.5 G/DL (ref 32–36)
MCV RBC AUTO: 99 FL (ref 80–100)
PLATELET # BLD AUTO: 334 THOU/UL (ref 140–440)
PMV BLD AUTO: 9.9 FL (ref 8.5–12.5)
POTASSIUM BLD-SCNC: 4.5 MMOL/L (ref 3.5–5)
RBC # BLD AUTO: 4.24 MILL/UL (ref 3.8–5.4)
SODIUM SERPL-SCNC: 134 MMOL/L (ref 136–145)
WBC: 8.6 THOU/UL (ref 4–11)

## 2020-06-25 LAB — 25(OH)D3 SERPL-MCNC: 31.8 NG/ML (ref 30–80)

## 2020-08-17 ENCOUNTER — NURSING HOME VISIT (OUTPATIENT)
Dept: GERIATRICS | Facility: CLINIC | Age: 80
End: 2020-08-17
Payer: COMMERCIAL

## 2020-08-17 VITALS
RESPIRATION RATE: 18 BRPM | HEART RATE: 73 BPM | SYSTOLIC BLOOD PRESSURE: 132 MMHG | BODY MASS INDEX: 31.79 KG/M2 | TEMPERATURE: 97.6 F | HEIGHT: 61 IN | OXYGEN SATURATION: 95 % | WEIGHT: 168.4 LBS | DIASTOLIC BLOOD PRESSURE: 79 MMHG

## 2020-08-17 DIAGNOSIS — J44.9 CHRONIC OBSTRUCTIVE PULMONARY DISEASE, UNSPECIFIED COPD TYPE (H): Primary | ICD-10-CM

## 2020-08-17 DIAGNOSIS — F02.80 LATE ONSET ALZHEIMER'S DISEASE WITHOUT BEHAVIORAL DISTURBANCE (H): ICD-10-CM

## 2020-08-17 DIAGNOSIS — G30.1 LATE ONSET ALZHEIMER'S DISEASE WITHOUT BEHAVIORAL DISTURBANCE (H): ICD-10-CM

## 2020-08-17 DIAGNOSIS — G40.909 SEIZURE DISORDER (H): ICD-10-CM

## 2020-08-17 DIAGNOSIS — M16.11 PRIMARY OSTEOARTHRITIS OF RIGHT HIP: ICD-10-CM

## 2020-08-17 DIAGNOSIS — I10 ESSENTIAL HYPERTENSION, BENIGN: ICD-10-CM

## 2020-08-17 PROCEDURE — 99309 SBSQ NF CARE MODERATE MDM 30: CPT | Performed by: INTERNAL MEDICINE

## 2020-08-17 ASSESSMENT — MIFFLIN-ST. JEOR: SCORE: 1166.24

## 2020-08-17 NOTE — LETTER
8/17/2020        RE: Yoanna Phillips  [address] [place] [address]        Yoanna Phillips is a 80 year old female seen August 17, 2020 at [facility] where she has resided for 3 years (admit 6/2017) seen to follow up dementia and sz disorder.    She is seen in her room up to chair, doesn't remember me visit to visit and slightly guarded.    States she is feeling well, denies any current pain, dyspnea or other symptoms.    She has a FWW that she is supposed to use for all destinations, but doesn't always remember to and has had occ falls.   No other concerns reported by Nursing staff.        Patient has past h/o seizures.  Currently on gabapentin and carbamazepine, and [facility] Dr [facility]   Medications are managed by Dr Banks.     Patient has low cognitive scores and has failed trial of living on her own, with recurrent falls and inability to seek help when she needs it.   Cognitive decline started >8 years ago.   Now meets criteria for dementia dx.     Past Medical History:   Diagnosis Date     Arthritis     osteoarthritis     Cognitive impairment 7/3/2012     COPD (chronic obstructive pulmonary disease) (H)      Dyspnea on exertion      Epilepsy (H)     minor seizures 2x daily     Hypertension      Numbness and tingling     bilateral numbness     Pernicious anemia     resolved with vitamin replacement     Poor short term memory 5/20/2011     Seizures (H)    Dementia.    S/p left TSA 2012    SH:  Previously lived [place].   Has lived there all her life.     She has 4 children, 3 of them live locally and help out.    Her granddaughter is here frequently.     ROS:   BIMS 11/15  Wt Readings from Last 5 Encounters:   08/17/20 76.4 kg (168 lb 6.4 oz)   06/18/20 74.9 kg (165 lb 1.6 oz)   04/23/20 76.1 kg (167 lb 11.2 oz)   02/05/20 77.2 kg (170 lb 1.6 oz)   12/10/19 77.7 kg (171 lb 4.8 oz)        EXAM:  Pleasant, NAD  /79    "Pulse 73   Temp 97.6  F (36.4  C)   Resp 18   Ht 1.549 m (5' 1\")   Wt 76.4 kg (168 lb 6.4 oz)   SpO2 95%   BMI 31.82 kg/m     +[place]  Neck supple without adenopathy  Lungs with decreased BS, no rales or wheeze  Heart RRR s1s2 @90   occ ectopy  Abd soft, NT, no distention, +BS  Ext without edema, no open areas.   Neuro: repetitive, no focal findings, no tremor  Psych: affect a little flat, some confusion    Labs 6/23/2020:  Sodium  136 - 145 mmol/L  134Low       Potassium  3.5 - 5.0 mmol/L  4.5     Chloride  98 - 107 mmol/L  100     CO2  22 - 31 mmol/L  24     Anion Gap, Calculation  5 - 18 mmol/L  10     Glucose  70 - 125 mg/dL  84     Calcium  8.5 - 10.5 mg/dL  10.2     BUN  8 - 28 mg/dL  10     Creatinine  0.60 - 1.10 mg/dL  0.68     GFR MDRD Af Amer  >60 mL/min/1.73m2  >60     GFR MDRD Non Af Amer  >60 mL/min/1.73m2  >60      WBC  4.0 - 11.0 thou/uL  8.6     RBC  3.80 - 5.40 mill/uL  4.24     Hemoglobin  12.0 - 16.0 g/dL  13.6     Hematocrit  35.0 - 47.0 %  41.8     MCV  80 - 100 fL  99     MCH  27.0 - 34.0 pg  32.1     MCHC  32.0 - 36.0 g/dL  32.5     RDW  11.0 - 14.5 %  13.0     Platelets  140 - 440 thou/uL  334      Vitamin D, Total (25-Hydroxy)  30.0 - 80.0 ng/mL  31.8          IMP/PLAN:  (M15.0) Primary osteoarthritis involving multiple joints    Comment: intermittent pain, fall risk  Plan: scheduled acetaminophen at HS, and available prn along with local measures.     Encourage ambulation with FWW to all destinations    (I10) Essential hypertension, benign  Comment:   BP Readings from Last 3 Encounters:   08/17/20 132/79   06/18/20 (!) 141/59   04/23/20 (!) 147/75      Plan: continue losartan 50 mg/day, follow BMP    (G30.1,  F02.80) Late onset Alzheimer's disease without behavioral disturbance  Comment: maintains a fair amount of functional independence, so appropriate for Board and Care.     Plan: Board and Care support for meds, meals, activity, safety.     She is eating well, can discontinue MVI and " supplements.       (G40.389) Seizure disorder (H)  Comment: longstanding, labs stable to date.     Plan: continue carbamazepine and gabapentin at current doses, with quarterly check of BMP, LFTs, CBC, which have been WNL to date.    Follow up with Dr Banks as scheduled (Dr Fabiola Banks, [facility]:  [phone])    (J44.9) Chronic obstructive pulmonary disease, unspecified COPD type (H)  Comment: by history; no current symptoms   Plan: continue Spiriva daily    Francy Swartz MD        Sincerely,        Francy Swartz MD

## 2020-08-24 NOTE — PROGRESS NOTES
"Yoanna Phillips is a 80 year old female seen August 17, 2020 at [facility] where she has resided for 3 years (admit 6/2017) seen to follow up dementia and sz disorder.    She is seen in her room up to chair, doesn't remember me visit to visit and slightly guarded.    States she is feeling well, denies any current pain, dyspnea or other symptoms.    She has a FWW that she is supposed to use for all destinations, but doesn't always remember to and has had occ falls.   No other concerns reported by Nursing staff.        Patient has past h/o seizures.  Currently on gabapentin and carbamazepine, and followed by Dr Fabiola [facility]   Medications are managed by Dr Banks.     Patient has low cognitive scores and has failed trial of living on her own, with recurrent falls and inability to seek help when she needs it.   Cognitive decline started >8 years ago.   Now meets criteria for dementia dx.     Past Medical History:   Diagnosis Date     Arthritis     osteoarthritis     Cognitive impairment 7/3/2012     COPD (chronic obstructive pulmonary disease) (H)      Dyspnea on exertion      Epilepsy (H)     minor seizures 2x daily     Hypertension      Numbness and tingling     bilateral numbness     Pernicious anemia     resolved with vitamin replacement     Poor short term memory 5/20/2011     Seizures (H)    Dementia.    S/p left TSA 2012    SH:  Previously lived with her son, house in [facility].   Has lived there all her life.     She has 4 children, 3 of them live locally and help out.    Her granddaughter is here frequently.     ROS:   BIMS 11/15  Wt Readings from Last 5 Encounters:   08/17/20 76.4 kg (168 lb 6.4 oz)   06/18/20 74.9 kg (165 lb 1.6 oz)   04/23/20 76.1 kg (167 lb 11.2 oz)   02/05/20 77.2 kg (170 lb 1.6 oz)   12/10/19 77.7 kg (171 lb 4.8 oz)        EXAM:  Pleasant, NAD  /79   Pulse 73   Temp 97.6  F (36.4  C)   Resp 18   Ht 1.549 m (5' 1\")   Wt 76.4 kg (168 lb 6.4 oz)  "  SpO2 95%   BMI 31.82 kg/m     +[place]  Neck supple without adenopathy  Lungs with decreased BS, no rales or wheeze  Heart RRR s1s2 @90   occ ectopy  Abd soft, NT, no distention, +BS  Ext without edema, no open areas.   Neuro: repetitive, no focal findings, no tremor  Psych: affect a little flat, some confusion    Labs 6/23/2020:  Sodium  136 - 145 mmol/L  134Low       Potassium  3.5 - 5.0 mmol/L  4.5     Chloride  98 - 107 mmol/L  100     CO2  22 - 31 mmol/L  24     Anion Gap, Calculation  5 - 18 mmol/L  10     Glucose  70 - 125 mg/dL  84     Calcium  8.5 - 10.5 mg/dL  10.2     BUN  8 - 28 mg/dL  10     Creatinine  0.60 - 1.10 mg/dL  0.68     GFR MDRD Af Amer  >60 mL/min/1.73m2  >60     GFR MDRD Non Af Amer  >60 mL/min/1.73m2  >60      WBC  4.0 - 11.0 thou/uL  8.6     RBC  3.80 - 5.40 mill/uL  4.24     Hemoglobin  12.0 - 16.0 g/dL  13.6     Hematocrit  35.0 - 47.0 %  41.8     MCV  80 - 100 fL  99     MCH  27.0 - 34.0 pg  32.1     MCHC  32.0 - 36.0 g/dL  32.5     RDW  11.0 - 14.5 %  13.0     Platelets  140 - 440 thou/uL  334      Vitamin D, Total (25-Hydroxy)  30.0 - 80.0 ng/mL  31.8          IMP/PLAN:  (M15.0) Primary osteoarthritis involving multiple joints    Comment: intermittent pain, fall risk  Plan: scheduled acetaminophen at HS, and available prn along with local measures.     Encourage ambulation with FWW to all destinations    (I10) Essential hypertension, benign  Comment:   BP Readings from Last 3 Encounters:   08/17/20 132/79   06/18/20 (!) 141/59   04/23/20 (!) 147/75      Plan: continue losartan 50 mg/day, follow BMP    (G30.1,  F02.80) Late onset Alzheimer's disease without behavioral disturbance  Comment: maintains a fair amount of functional independence, so appropriate for Board and Care.     Plan: Board and Care support for meds, meals, activity, safety.     She is eating well, can discontinue MVI and supplements.       (G40.909) Seizure disorder (H)  Comment: longstanding, labs stable to date.      Plan: continue carbamazepine and gabapentin at current doses, with quarterly check of BMP, LFTs, CBC, which have been WNL to date.    Follow up with Dr Banks as scheduled (Dr aFbiola Banks, [facility]:  [phone])    (J44.9) Chronic obstructive pulmonary disease, unspecified COPD type (H)  Comment: by history; no current symptoms   Plan: continue Spiriva daily    Francy Swartz MD

## 2020-10-03 ASSESSMENT — MIFFLIN-ST. JEOR: SCORE: 1163.52

## 2020-10-06 ENCOUNTER — NURSING HOME VISIT (OUTPATIENT)
Dept: GERIATRICS | Facility: CLINIC | Age: 80
End: 2020-10-06
Payer: COMMERCIAL

## 2020-10-06 VITALS
RESPIRATION RATE: 17 BRPM | HEART RATE: 63 BPM | HEIGHT: 61 IN | SYSTOLIC BLOOD PRESSURE: 138 MMHG | TEMPERATURE: 97.4 F | DIASTOLIC BLOOD PRESSURE: 66 MMHG | BODY MASS INDEX: 31.68 KG/M2 | OXYGEN SATURATION: 97 % | WEIGHT: 167.8 LBS

## 2020-10-06 DIAGNOSIS — F02.80 LATE ONSET ALZHEIMER'S DISEASE WITHOUT BEHAVIORAL DISTURBANCE (H): ICD-10-CM

## 2020-10-06 DIAGNOSIS — J44.9 CHRONIC OBSTRUCTIVE PULMONARY DISEASE, UNSPECIFIED COPD TYPE (H): Primary | ICD-10-CM

## 2020-10-06 DIAGNOSIS — M16.11 PRIMARY OSTEOARTHRITIS OF RIGHT HIP: ICD-10-CM

## 2020-10-06 DIAGNOSIS — G30.1 LATE ONSET ALZHEIMER'S DISEASE WITHOUT BEHAVIORAL DISTURBANCE (H): ICD-10-CM

## 2020-10-06 DIAGNOSIS — G40.909 SEIZURE DISORDER (H): ICD-10-CM

## 2020-10-06 DIAGNOSIS — I10 ESSENTIAL HYPERTENSION, BENIGN: ICD-10-CM

## 2020-10-06 PROCEDURE — 99309 SBSQ NF CARE MODERATE MDM 30: CPT | Performed by: NURSE PRACTITIONER

## 2020-10-06 NOTE — PROGRESS NOTES
[facility]  Chief Complaint   Patient presents with     care home Regulatory     Wellness Visit     [place] Medical Record Number:  [id number]  Place of Service where encounter took place:  [facility] ([facility]) [[id number]]    HPI:    Yoanna Phillips  is 80 year old (1940), who is being seen today for a federally mandated E/M visit.  HPI information obtained from: facility chart records, facility staff and patient report.     Today's concerns are:  1. Chronic obstructive pulmonary disease, unspecified COPD type (H)  No recent flares. Resident feels her breathing is at baseline. No concerns at this time. Takes spiriva 18 mcg/day.     2. Essential hypertension, benign  BP Readings from Last 3 Encounters:   10/03/20 138/66   08/17/20 132/79   06/18/20 (!) 141/59   Denies CP, SOB or lightheadedness. Takes losartan 50 mg/day.     3. Late onset Alzheimer's disease without behavioral disturbance (H)  BIMS 12/15. Alert and oriented x3 upon today's visit. No behavior or mood concerns per staff.     4. Seizure disorder (H)  No recent activity. Taking gabapentin and carbamazepine.     5. Primary osteoarthritis of right hip  Denies pain, recently requested acetaminophen to be disconnected.       ALLERGIES:Aspirin  PAST MEDICAL HISTORY:   has a past medical history of Arthritis, Cognitive impairment (7/3/2012), COPD (chronic obstructive pulmonary disease) (H), Dyspnea on exertion, Epilepsy (H), Hypertension, Numbness and tingling, Pernicious anemia, Poor short term memory (5/20/2011), and Seizures (H). She also has no past medical history of PONV (postoperative nausea and vomiting).  PAST SURGICAL HISTORY:   has a past surgical history that includes NONSPECIFIC PROCEDURE (1965); Craniotomy (1965); Cholecystectomy; Splenectomy; Arthroplasty shoulder (6/25/2012); appendectomy; Arthroplasty hip (Right, 4/11/2016); Arthroplasty revision hip (Right, 5/14/2016); Arthroplasty hip (Left, 3/20/2017);  "and Closed reduction hip (Left, 5/22/2017).  FAMILY HISTORY: family history includes Arthritis in her mother; Heart Disease in her mother; Hypertension in her mother; Neurologic Disorder in her mother.  SOCIAL HISTORY:  reports that she quit smoking about 58 years ago. Her smoking use included cigarettes. She has a 5.00 pack-year smoking history. She has never used smokeless tobacco. She reports current alcohol use. She reports that she does not use drugs.    MEDICATIONS:  Current Outpatient Medications   Medication Sig Dispense Refill     acetaminophen (TYLENOL) 325 MG tablet Take 650 mg by mouth every 6 hours as needed for pain       CARBAMAZEPINE ER PO Take 300 mg by mouth 2 times daily       cyanocobalamin (VITAMIN B12) 1000 MCG/ML injection Inject 1,000 mcg into the muscle every day shift starting on the 25th and ending on the 25th every month       FOLIC ACID PO Take 1 mg by mouth daily       GABAPENTIN PO Take 600 mg by mouth 3 times daily        LOSARTAN POTASSIUM PO Take 50 mg by mouth daily       Nystatin POWD Apply to redness topically as needed for Rash QD AND Apply to groin topically every day and evening shift for Rash Until resolved.       tiotropium (SPIRIVA) 18 MCG capsule Inhale 18 mcg into the lungs daily       VITAMIN D, CHOLECALCIFEROL, PO Take 1,000 Units by mouth daily           Case Management:  I have reviewed the care plan and MDS and do agree with the plan. Patient's desire to return to the community is present, but is not able due to care needs . Information reviewed:  Medications, vital signs, orders, and nursing notes.    ROS:  4 point ROS including Respiratory, CV, GI and , other than that noted in the HPI,  is negative    Vitals:  /66   Pulse 63   Temp 97.4  F (36.3  C)   Resp 17   Ht 1.549 m (5' 1\")   Wt 76.1 kg (167 lb 12.8 oz)   SpO2 97%   BMI 31.71 kg/m    Body mass index is 31.71 kg/m .  Exam:  GENERAL APPEARANCE:  Alert, in no distress  ENT:  Mouth and posterior " oropharynx normal, moist mucous membranes  RESP:  respiratory effort and palpation of chest normal, lungs clear to auscultation   CV:  Palpation and auscultation of heart done , regular rate and rhythm, no murmur, rub, or gallop  SKIN:  Inspection of skin and subcutaneous tissue baseline, Palpation of skin and subcutaneous tissue baseline  NEURO:   Cranial nerves 2-12 are normal tested and grossly at patient's baseline    Lab/Diagnostic data:   Labs done in SNF are in [facility]. Please refer to them using Cued/Care Everywhere. and Recent labs in [facility] reviewed by me today.             ASSESSMENT/PLAN  (J44.9) Chronic obstructive pulmonary disease, unspecified COPD type (H)  (primary encounter diagnosis)  Comment: Chronic, stable. Without recent illness or flare.   Plan: Continue without change at this time, may utilize prednisone w/flare     (I10) Essential hypertension, benign  Comment: Chronic, blood pressure less than goal 150/90 on current regimen.   Plan: No change at this time and adjustments as clinically indicated. Keep SBP> 130 mmHg and DBP > 65 mmHg (levels below these increase mortality as shown by standard studies and observations).       (G30.1,  F02.80) Late onset Alzheimer's disease without behavioral disturbance (H)  Comment: Stable at this time, no concerns per staff. Alert and oriented x3  Plan:   -Continue board and care support with medication administration, meals and safety.     (G40.909) Seizure disorder (H)  Comment: No recent activity   Plan: Continue gabapentin and carbamazepine as ordered. Follow up with neurology as planned.     (M16.11) Primary osteoarthritis of right hip  Comment: Denies pain.   Plan:   -Monitor, not taking medications at this time.     The current medical regimen is effective; continue present plan and medications.    Electronically signed by:  TANIYA Zaman [facility]

## 2020-10-30 ENCOUNTER — TRANSFERRED RECORDS (OUTPATIENT)
Dept: HEALTH INFORMATION MANAGEMENT | Facility: CLINIC | Age: 80
End: 2020-10-30

## 2020-12-11 ENCOUNTER — NURSING HOME VISIT (OUTPATIENT)
Dept: GERIATRICS | Facility: CLINIC | Age: 80
End: 2020-12-11
Payer: COMMERCIAL

## 2020-12-11 VITALS
SYSTOLIC BLOOD PRESSURE: 146 MMHG | BODY MASS INDEX: 32.45 KG/M2 | RESPIRATION RATE: 20 BRPM | TEMPERATURE: 96.7 F | OXYGEN SATURATION: 96 % | DIASTOLIC BLOOD PRESSURE: 73 MMHG | WEIGHT: 171.9 LBS | HEART RATE: 82 BPM | HEIGHT: 61 IN

## 2020-12-11 DIAGNOSIS — J44.9 CHRONIC OBSTRUCTIVE PULMONARY DISEASE, UNSPECIFIED COPD TYPE (H): ICD-10-CM

## 2020-12-11 DIAGNOSIS — G40.909 SEIZURE DISORDER (H): ICD-10-CM

## 2020-12-11 DIAGNOSIS — I10 ESSENTIAL HYPERTENSION, BENIGN: Primary | ICD-10-CM

## 2020-12-11 DIAGNOSIS — M16.11 PRIMARY OSTEOARTHRITIS OF RIGHT HIP: ICD-10-CM

## 2020-12-11 DIAGNOSIS — G30.1 LATE ONSET ALZHEIMER'S DISEASE WITHOUT BEHAVIORAL DISTURBANCE (H): ICD-10-CM

## 2020-12-11 DIAGNOSIS — F02.80 LATE ONSET ALZHEIMER'S DISEASE WITHOUT BEHAVIORAL DISTURBANCE (H): ICD-10-CM

## 2020-12-11 PROCEDURE — 99309 SBSQ NF CARE MODERATE MDM 30: CPT | Performed by: INTERNAL MEDICINE

## 2020-12-11 ASSESSMENT — MIFFLIN-ST. JEOR: SCORE: 1182.11

## 2020-12-11 NOTE — LETTER
"    12/11/2020        RE: Yoanna Phillips  [address] [place] [address]        Yoanna Phillips is a 80 year old female seen December 11, 2020 at [facility] where she has resided for 3 and a half years (admit 6/2017) seen to follow up dementia and sz disorder.    She is seen in her room up to chair, states she is feeling well, denies any current pain, dyspnea or other symptoms.  She has an old family desk in her room now, recently brought in because \"I'm moving here forever.\"   She tells a story about her father and his brother building it four times during visit.      She has a FWW that she is supposed to use for all destinations, but doesn't always remember to and has had occ falls.   No other concerns reported by Nursing staff.        Patient has past h/o seizures.  Currently on gabapentin and carbamazepine, and followed by Dr Fabiola Banks [facility]   Medications are managed by Dr Banks.     Patient has low cognitive scores and has failed trial of living on her own, with recurrent falls and inability to seek help when she needs it.   Cognitive decline started >8 years ago.   Now meets criteria for dementia dx.     Past Medical History:   Diagnosis Date     Arthritis     osteoarthritis     Cognitive impairment 7/3/2012     COPD (chronic obstructive pulmonary disease) (H)      Dyspnea on exertion      Epilepsy (H)     minor seizures 2x daily     Hypertension      Numbness and tingling     bilateral numbness     Pernicious anemia     resolved with vitamin replacement     Poor short term memory 5/20/2011     Seizures (H)    Dementia.    S/p left [facility]SA 2012    SH:  Previously lived with her son, [place].   Has lived there all her life.     She has 4 children, 3 of them live locally and help out.    Her granddaughter is here frequently.     ROS:   BIMS 11/15  Wt Readings from Last 5 Encounters:   12/11/20 78 kg (171 lb 14.4 oz)   10/03/20 76.1 kg (167 lb 12.8 oz) " "  08/17/20 76.4 kg (168 lb 6.4 oz)   06/18/20 74.9 kg (165 lb 1.6 oz)   04/23/20 76.1 kg (167 lb 11.2 oz)      EXAM:  Pleasant, NAD  BP (!) 146/73   Pulse 82   Temp 96.7  F (35.9  C)   Resp 20   Ht 1.549 m (5' 1\")   Wt 78 kg (171 lb 14.4 oz)   SpO2 96%   BMI 32.48 kg/m     +[place]  Neck supple without adenopathy  Lungs with decreased BS, no rales or wheeze  Heart RRR s1s2 @90   occ ectopy  Abd soft, NT, no distention, +BS  Ext without edema, no open areas.   Neuro: repetitive, no focal findings, no tremor  Psych: affect okay      Labs reviewed    IMP/PLAN:  (M15.0) Primary osteoarthritis involving multiple joints    Comment: intermittent pain, fall risk  Plan: acetaminophen available prn along with local measures.     Encourage ambulation with FWW to all destinations    (I10) Essential hypertension, benign  Comment:   BP Readings from Last 3 Encounters:   12/11/20 (!) 146/73   10/03/20 138/66   08/17/20 132/79      Plan: continue losartan 50 mg/day, follow BMP    (G30.1,  F02.80) Late onset Alzheimer's disease without behavioral disturbance  Comment: maintains a fair amount of functional independence, so appropriate for Board and Care.     Plan: Board and Care support for meds, meals, activity, safety.       (G40.909) Seizure disorder (H)  Comment: longstanding, labs stable to date.     Plan: continue carbamazepine and gabapentin at current doses, with quarterly check of BMP, LFTs, CBC, which have been WNL to date.    Follow up with Dr Banks as scheduled (Dr Fabiola Banks, [facility]:  [phone])    (J44.9) Chronic obstructive pulmonary disease, unspecified COPD type (H)  Comment: by history; no current symptoms   Plan: continue Spiriva daily    Francy Swartz MD          Sincerely,        Francy Swartz MD    "

## 2020-12-21 NOTE — PROGRESS NOTES
"Yoanna Phillips is a 80 year old female seen December 11, 2020 at [facility] where she has resided for 3 and a half years (admit 6/2017) seen to follow up dementia and sz disorder.    She is seen in her room up to chair, states she is feeling well, denies any current pain, dyspnea or other symptoms.  She has an old family desk in her room now, recently brought in because \"I'm moving here forever.\"   She tells a story about her father and his brother building it four times during visit.      She has a FWW that she is supposed to use for all destinations, but doesn't always remember to and has had occ falls.   No other concerns reported by Nursing staff.        Patient has past h/o seizures.  Currently on gabapentin and carbamazepine, and followed by Dr Fabiola Banks [facility]   Medications are managed by Dr Banks.     Patient has low cognitive scores and has failed trial of living on her own, with recurrent falls and inability to seek help when she needs it.   Cognitive decline started >8 years ago.   Now meets criteria for dementia dx.     Past Medical History:   Diagnosis Date     Arthritis     osteoarthritis     Cognitive impairment 7/3/2012     COPD (chronic obstructive pulmonary disease) (H)      Dyspnea on exertion      Epilepsy (H)     minor seizures 2x daily     Hypertension      Numbness and tingling     bilateral numbness     Pernicious anemia     resolved with vitamin replacement     Poor short term memory 5/20/2011     Seizures (H)    Dementia.    S/p left [facility] 2012    SH:  Previously lived with her son, house in [facility].   Has lived there all her life.     She has 4 children, 3 of them live locally and help out.    Her granddaughter is here frequently.     ROS:   BIMS 11/15  Wt Readings from Last 5 Encounters:   12/11/20 78 kg (171 lb 14.4 oz)   10/03/20 76.1 kg (167 lb 12.8 oz)   08/17/20 76.4 kg (168 lb 6.4 oz)   06/18/20 74.9 kg (165 lb 1.6 oz)   04/23/20 76.1 kg (167 lb 11.2 " "oz)      EXAM:  Pleasant, NAD  BP (!) 146/73   Pulse 82   Temp 96.7  F (35.9  C)   Resp 20   Ht 1.549 m (5' 1\")   Wt 78 kg (171 lb 14.4 oz)   SpO2 96%   BMI 32.48 kg/m     +[place]  Neck supple without adenopathy  Lungs with decreased BS, no rales or wheeze  Heart RRR s1s2 @90   occ ectopy  Abd soft, NT, no distention, +BS  Ext without edema, no open areas.   Neuro: repetitive, no focal findings, no tremor  Psych: affect okay      Labs reviewed    IMP/PLAN:  (M15.0) Primary osteoarthritis involving multiple joints    Comment: intermittent pain, fall risk  Plan: acetaminophen available prn along with local measures.     Encourage ambulation with FWW to all destinations    (I10) Essential hypertension, benign  Comment:   BP Readings from Last 3 Encounters:   12/11/20 (!) 146/73   10/03/20 138/66   08/17/20 132/79      Plan: continue losartan 50 mg/day, follow BMP    (G30.1,  F02.80) Late onset Alzheimer's disease without behavioral disturbance  Comment: maintains a fair amount of functional independence, so appropriate for Board and Care.     Plan: Board and Care support for meds, meals, activity, safety.       (G40.909) Seizure disorder (H)  Comment: longstanding, labs stable to date.     Plan: continue carbamazepine and gabapentin at current doses, with quarterly check of BMP, LFTs, CBC, which have been WNL to date.    Follow up with Dr Banks as scheduled (Dr Fabiola Banks, [facility]:  [phone])    (J44.9) Chronic obstructive pulmonary disease, unspecified COPD type (H)  Comment: by history; no current symptoms   Plan: continue Spiriva daily    Francy Swartz MD    "

## 2021-01-15 ENCOUNTER — HEALTH MAINTENANCE LETTER (OUTPATIENT)
Age: 81
End: 2021-01-15

## 2021-01-19 ENCOUNTER — TRANSFERRED RECORDS (OUTPATIENT)
Dept: HEALTH INFORMATION MANAGEMENT | Facility: CLINIC | Age: 81
End: 2021-01-19

## 2021-01-21 ENCOUNTER — RECORDS - HEALTHEAST (OUTPATIENT)
Dept: LAB | Facility: CLINIC | Age: 81
End: 2021-01-21

## 2021-01-21 LAB
ANION GAP SERPL CALCULATED.3IONS-SCNC: 13 MMOL/L (ref 5–18)
BASOPHILS # BLD AUTO: 0.1 THOU/UL (ref 0–0.2)
BASOPHILS NFR BLD AUTO: 1 % (ref 0–2)
BUN SERPL-MCNC: 9 MG/DL (ref 8–28)
CALCIUM SERPL-MCNC: 9.8 MG/DL (ref 8.5–10.5)
CARBAMAZEPINE SERPL-MCNC: 7.3 UG/ML (ref 4–12)
CHLORIDE BLD-SCNC: 102 MMOL/L (ref 98–107)
CO2 SERPL-SCNC: 18 MMOL/L (ref 22–31)
CREAT SERPL-MCNC: 0.62 MG/DL (ref 0.6–1.1)
EOSINOPHIL # BLD AUTO: 0.2 THOU/UL (ref 0–0.4)
EOSINOPHIL NFR BLD AUTO: 2 % (ref 0–6)
ERYTHROCYTE [DISTWIDTH] IN BLOOD BY AUTOMATED COUNT: 13.1 % (ref 11–14.5)
GFR SERPL CREATININE-BSD FRML MDRD: >60 ML/MIN/1.73M2
GLUCOSE BLD-MCNC: 84 MG/DL (ref 70–125)
HCT VFR BLD AUTO: 38.3 % (ref 35–47)
HGB BLD-MCNC: 13.5 G/DL (ref 12–16)
IMM GRANULOCYTES # BLD: 0 THOU/UL
IMM GRANULOCYTES NFR BLD: 0 %
LYMPHOCYTES # BLD AUTO: 3.3 THOU/UL (ref 0.8–4.4)
LYMPHOCYTES NFR BLD AUTO: 35 % (ref 20–40)
MCH RBC QN AUTO: 33.6 PG (ref 27–34)
MCHC RBC AUTO-ENTMCNC: 35.2 G/DL (ref 32–36)
MCV RBC AUTO: 95 FL (ref 80–100)
MONOCYTES # BLD AUTO: 0.8 THOU/UL (ref 0–0.9)
MONOCYTES NFR BLD AUTO: 9 % (ref 2–10)
NEUTROPHILS # BLD AUTO: 4.9 THOU/UL (ref 2–7.7)
NEUTROPHILS NFR BLD AUTO: 53 % (ref 50–70)
PLATELET # BLD AUTO: 340 THOU/UL (ref 140–440)
PMV BLD AUTO: 10.6 FL (ref 8.5–12.5)
POTASSIUM BLD-SCNC: 4.9 MMOL/L (ref 3.5–5)
RBC # BLD AUTO: 4.02 MILL/UL (ref 3.8–5.4)
SODIUM SERPL-SCNC: 133 MMOL/L (ref 136–145)
WBC: 9.3 THOU/UL (ref 4–11)

## 2021-01-30 ASSESSMENT — MIFFLIN-ST. JEOR: SCORE: 1188.92

## 2021-02-04 VITALS
DIASTOLIC BLOOD PRESSURE: 75 MMHG | HEART RATE: 60 BPM | SYSTOLIC BLOOD PRESSURE: 138 MMHG | WEIGHT: 173.4 LBS | TEMPERATURE: 97.5 F | HEIGHT: 61 IN | RESPIRATION RATE: 18 BRPM | BODY MASS INDEX: 32.74 KG/M2 | OXYGEN SATURATION: 93 %

## 2021-02-04 NOTE — PROGRESS NOTES
[place] [facility]  Chief Complaint   Patient presents with     jail Regulatory     [place] Medical Record Number:  [id number]  Place of Service where encounter took place:  [facility] ([facility]) [[id number]]    HPI:    Yoanna Phillips  is 80 year old (1940), who is being seen today for a federally mandated E/M visit.  HPI information obtained from: facility chart records, facility staff and patient report.     Today's concerns are:  Chronic obstructive pulmonary disease, unspecified COPD type (H)  -Managed, no concerns at this time. Denies CP/SOB.     Essential hypertension, benign  BP Readings from Last 3 Encounters:   01/30/21 138/75   12/11/20 (!) 146/73   10/03/20 138/66     Late onset Alzheimer's disease without behavioral disturbance (H)  Alert and oriented with intermittent confusion.   -Last BIMS 07/15    Seizure disorder (H)  -No recent seizure activity noted.     Primary osteoarthritis of right hip  Denies pain upon today's visit.       ALLERGIES:Aspirin  PAST MEDICAL HISTORY:   has a past medical history of Arthritis, Cognitive impairment (7/3/2012), COPD (chronic obstructive pulmonary disease) (H), Dyspnea on exertion, Epilepsy (H), Hypertension, Numbness and tingling, Pernicious anemia, Poor short term memory (5/20/2011), and Seizures (H). She also has no past medical history of PONV (postoperative nausea and vomiting).  PAST SURGICAL HISTORY:   has a past surgical history that includes NONSPECIFIC PROCEDURE (1965); Craniotomy (1965); Cholecystectomy; Splenectomy; Arthroplasty shoulder (6/25/2012); appendectomy; Arthroplasty hip (Right, 4/11/2016); Arthroplasty revision hip (Right, 5/14/2016); Arthroplasty hip (Left, 3/20/2017); and Closed reduction hip (Left, 5/22/2017).  FAMILY HISTORY: family history includes Arthritis in her mother; Heart Disease in her mother; Hypertension in her mother; Neurologic Disorder in her mother.  SOCIAL HISTORY:  reports that she quit  "smoking about 58 years ago. Her smoking use included cigarettes. She has a 5.00 pack-year smoking history. She has never used smokeless tobacco. She reports current alcohol use. She reports that she does not use drugs.    MEDICATIONS:  Current Outpatient Medications   Medication Sig Dispense Refill     CARBAMAZEPINE ER PO Take 300 mg by mouth 2 times daily       cyanocobalamin (VITAMIN B12) 1000 MCG/ML injection Inject 1,000 mcg into the muscle every day shift starting on the 25th and ending on the 25th every month       FOLIC ACID PO Take 1 mg by mouth daily       GABAPENTIN PO Take 600 mg by mouth 3 times daily        LOSARTAN POTASSIUM PO Take 50 mg by mouth daily       Nystatin POWD Apply to redness topically as needed for Rash QD AND Apply to groin topically every day and evening shift for Rash Until resolved.       tiotropium (SPIRIVA) 18 MCG capsule Inhale 18 mcg into the lungs daily       Case Management:  I have reviewed the care plan and MDS and do agree with the plan. Patient's desire to return to the community is not assessible due to cognitive impairment. Information reviewed:  Medications, vital signs, orders, and nursing notes.    ROS:  4 point ROS including Respiratory, CV, GI and , other than that noted in the HPI,  is negative    Vitals:  /75   Pulse 60   Temp 97.5  F (36.4  C)   Resp 18   Ht 1.549 m (5' 1\")   Wt 78.7 kg (173 lb 6.4 oz)   SpO2 93%   BMI 32.76 kg/m    Body mass index is 32.76 kg/m .  Exam:  GENERAL APPEARANCE:  Alert, in no distress  ENT:  Mouth and posterior oropharynx normal, moist mucous membranes, [place]  RESP:  respiratory effort and palpation of chest normal, lungs clear to auscultation , no respiratory distress  CV:  Palpation and auscultation of heart done , regular rate and rhythm, no murmur, rub, or gallop  M/S:   Gait and station normal  Digits and nails normal  SKIN:  Inspection of skin and subcutaneous tissue baseline, Palpation of skin and subcutaneous " tissue baseline  NEURO:   Cranial nerves 2-12 are normal tested and grossly at patient's baseline  PSYCH:  memory impaired , affect and mood normal    Lab/Diagnostic data:   Labs done in SNF are in [facility]. Please refer to them using EPIC/Care Everywhere. and Recent labs in EPIC reviewed by me today.     ASSESSMENT/PLAN  (J44.9) Chronic obstructive pulmonary disease, unspecified COPD type (H)  (primary encounter diagnosis)  Comment: Chronic, managed.   Plan:     No changes at this time. Monitor and update NP with changes.     (I10) Essential hypertension, benign  Comment: Chronic, managed and less than goal 150/90.   Plan:     Continue losartan 50 mg/day    Keep SBP> 130 mmHg and DBP > 65 mmHg (levels below these increase mortality as shown by standard studies and observations).     (G30.1,  F02.80) Late onset Alzheimer's disease without behavioral disturbance (H)  Comment: Chronic, progressive   Plan:     Continue board and care support with medication administration, meals and safety. Expect further decline with progression of diease.     (G40.909) Seizure disorder (H)  Comment: Chronic, no recent activity.   Plan:     Continue carbamazepine 300 mg/BID and gabapentin 600 mg TID    (M16.11) Primary osteoarthritis of right hip  Comment: Chronic, pain managed.   Plan:     Continue acetaminophen 650 mg PO PRN    Orders:  The current medical regimen is effective; continue present plan and medications.      Electronically signed by:  TANIYA Zaman CNP  [facility]

## 2021-02-04 NOTE — PROGRESS NOTES
[place] [facility]  Chief Complaint   Patient presents with     jail Regulatory     [place] Medical Record Number:  [id number]  Place of Service where encounter took place:  [facility] (S) [[id number]]    HPI:    Yoanna Phillips  is 80 year old (1940), who is being seen today for a federally mandated E/M visit.  HPI information obtained from: {[facility] HPI:758280}. Today's concerns are:  {[facility] DX:815306}    ALLERGIES:Aspirin  PAST MEDICAL HISTORY:   has a past medical history of Arthritis, Cognitive impairment (7/3/2012), COPD (chronic obstructive pulmonary disease) (H), Dyspnea on exertion, Epilepsy (H), Hypertension, Numbness and tingling, Pernicious anemia, Poor short term memory (5/20/2011), and Seizures (H). She also has no past medical history of PONV (postoperative nausea and vomiting).  PAST SURGICAL HISTORY:   has a past surgical history that includes NONSPECIFIC PROCEDURE (1965); Craniotomy (1965); Cholecystectomy; Splenectomy; Arthroplasty shoulder (6/25/2012); appendectomy; Arthroplasty hip (Right, 4/11/2016); Arthroplasty revision hip (Right, 5/14/2016); Arthroplasty hip (Left, 3/20/2017); and Closed reduction hip (Left, 5/22/2017).  FAMILY HISTORY: family history includes Arthritis in her mother; Heart Disease in her mother; Hypertension in her mother; Neurologic Disorder in her mother.  SOCIAL HISTORY:  reports that she quit smoking about 58 years ago. Her smoking use included cigarettes. She has a 5.00 pack-year smoking history. She has never used smokeless tobacco. She reports current alcohol use. She reports that she does not use drugs.    MEDICATIONS:  Current Outpatient Medications   Medication Sig Dispense Refill     CARBAMAZEPINE ER PO Take 300 mg by mouth 2 times daily       cyanocobalamin (VITAMIN B12) 1000 MCG/ML injection Inject 1,000 mcg into the muscle every day shift starting on the 25th and ending on the 25th every month       FOLIC ACID PO Take 1 mg by  "mouth daily       GABAPENTIN PO Take 600 mg by mouth 3 times daily        LOSARTAN POTASSIUM PO Take 50 mg by mouth daily       Nystatin POWD Apply to redness topically as needed for Rash QD AND Apply to groin topically every day and evening shift for Rash Until resolved.       tiotropium (SPIRIVA) 18 MCG capsule Inhale 18 mcg into the lungs daily       {Providers Please double check the med list (in the plan section >> meds & orders tab) and Discontinue any of the meds flagged by the TC to be discontinued}  ***  Case Management:  I have reviewed the care plan and MDS and do agree with the plan. Patient's desire to return to the community is {[facility] RETURN TO COMMUNITY:682906}. Information reviewed:  Medications, vital signs, orders, and nursing notes.    ROS:  {ROS [facility]:542346}    Vitals:  /75   Pulse 60   Temp 97.5  F (36.4  C)   Resp 18   Ht 1.549 m (5' 1\")   Wt 78.7 kg (173 lb 6.4 oz)   SpO2 93%   BMI 32.76 kg/m    Body mass index is 32.76 kg/m .  Exam:  {Nursing home physical exam :753821}    Lab/Diagnostic data:   {fgslab:500859}    ASSESSMENT/PLAN  {[facility] DX:180741}    {fgsorders:980361}  ***    {fgstime1:495037}    Electronically signed by:  Adrianna Ricketts MA***    {Providers Please double check the med list (in the plan section >> meds & orders tab) and Discontinue any of the meds flagged by the TC to be discontinued}    "

## 2021-02-05 ENCOUNTER — NURSING HOME VISIT (OUTPATIENT)
Dept: GERIATRICS | Facility: CLINIC | Age: 81
End: 2021-02-05
Payer: COMMERCIAL

## 2021-02-05 DIAGNOSIS — G40.909 SEIZURE DISORDER (H): ICD-10-CM

## 2021-02-05 DIAGNOSIS — G30.1 LATE ONSET ALZHEIMER'S DISEASE WITHOUT BEHAVIORAL DISTURBANCE (H): ICD-10-CM

## 2021-02-05 DIAGNOSIS — M16.11 PRIMARY OSTEOARTHRITIS OF RIGHT HIP: ICD-10-CM

## 2021-02-05 DIAGNOSIS — F02.80 LATE ONSET ALZHEIMER'S DISEASE WITHOUT BEHAVIORAL DISTURBANCE (H): ICD-10-CM

## 2021-02-05 DIAGNOSIS — I10 ESSENTIAL HYPERTENSION, BENIGN: ICD-10-CM

## 2021-02-05 DIAGNOSIS — J44.9 CHRONIC OBSTRUCTIVE PULMONARY DISEASE, UNSPECIFIED COPD TYPE (H): Primary | ICD-10-CM

## 2021-02-05 PROCEDURE — 99309 SBSQ NF CARE MODERATE MDM 30: CPT | Performed by: NURSE PRACTITIONER

## 2021-02-05 NOTE — LETTER
2/5/2021        RE: Yoanna Phillips  [address] [place] [address]        [facility]  Chief Complaint   Patient presents with     FDC Regulatory     [place] Medical Record Number:  [id number]  Place of Service where encounter took place:  [facility] ([facility]) [[id number]]    HPI:    Yoanna Phillips  is 80 year old (1940), who is being seen today for a federally mandated E/M visit.  HPI information obtained from: facility chart records, facility staff and patient report.     Today's concerns are:  Chronic obstructive pulmonary disease, unspecified COPD type (H)  -Managed, no concerns at this time. Denies CP/SOB.     Essential hypertension, benign  BP Readings from Last 3 Encounters:   01/30/21 138/75   12/11/20 (!) 146/73   10/03/20 138/66     Late onset Alzheimer's disease without behavioral disturbance (H)  Alert and oriented with intermittent confusion.   -Last BIMS 07/15    Seizure disorder (H)  -No recent seizure activity noted.     Primary osteoarthritis of right hip  Denies pain upon today's visit.       ALLERGIES:Aspirin  PAST MEDICAL HISTORY:   has a past medical history of Arthritis, Cognitive impairment (7/3/2012), COPD (chronic obstructive pulmonary disease) (H), Dyspnea on exertion, Epilepsy (H), Hypertension, Numbness and tingling, Pernicious anemia, Poor short term memory (5/20/2011), and Seizures (H). She also has no past medical history of PONV (postoperative nausea and vomiting).  PAST SURGICAL HISTORY:   has a past surgical history that includes NONSPECIFIC PROCEDURE (1965); Craniotomy (1965); Cholecystectomy; Splenectomy; Arthroplasty shoulder (6/25/2012); appendectomy; Arthroplasty hip (Right, 4/11/2016); Arthroplasty revision hip (Right, 5/14/2016); Arthroplasty hip (Left, 3/20/2017); and Closed reduction hip (Left, 5/22/2017).  FAMILY HISTORY: family history includes Arthritis in her mother; Heart Disease in her mother;  "Hypertension in her mother; Neurologic Disorder in her mother.  SOCIAL HISTORY:  reports that she quit smoking about 58 years ago. Her smoking use included cigarettes. She has a 5.00 pack-year smoking history. She has never used smokeless tobacco. She reports current alcohol use. She reports that she does not use drugs.    MEDICATIONS:  Current Outpatient Medications   Medication Sig Dispense Refill     CARBAMAZEPINE ER PO Take 300 mg by mouth 2 times daily       cyanocobalamin (VITAMIN B12) 1000 MCG/ML injection Inject 1,000 mcg into the muscle every day shift starting on the 25th and ending on the 25th every month       FOLIC ACID PO Take 1 mg by mouth daily       GABAPENTIN PO Take 600 mg by mouth 3 times daily        LOSARTAN POTASSIUM PO Take 50 mg by mouth daily       Nystatin POWD Apply to redness topically as needed for Rash QD AND Apply to groin topically every day and evening shift for Rash Until resolved.       tiotropium (SPIRIVA) 18 MCG capsule Inhale 18 mcg into the lungs daily       Case Management:  I have reviewed the care plan and MDS and do agree with the plan. Patient's desire to return to the community is not assessible due to cognitive impairment. Information reviewed:  Medications, vital signs, orders, and nursing notes.    ROS:  4 point ROS including Respiratory, CV, GI and , other than that noted in the HPI,  is negative    Vitals:  /75   Pulse 60   Temp 97.5  F (36.4  C)   Resp 18   Ht 1.549 m (5' 1\")   Wt 78.7 kg (173 lb 6.4 oz)   SpO2 93%   BMI 32.76 kg/m    Body mass index is 32.76 kg/m .  Exam:  GENERAL APPEARANCE:  Alert, in no distress  ENT:  Mouth and posterior oropharynx normal, moist mucous membranes, [place]  RESP:  respiratory effort and palpation of chest normal, lungs clear to auscultation , no respiratory distress  CV:  Palpation and auscultation of heart done , regular rate and rhythm, no murmur, rub, or gallop  M/S:   Gait and station normal  Digits and nails " normal  SKIN:  Inspection of skin and subcutaneous tissue baseline, Palpation of skin and subcutaneous tissue baseline  NEURO:   Cranial nerves 2-12 are normal tested and grossly at patient's baseline  PSYCH:  memory impaired , affect and mood normal    Lab/Diagnostic data:   Labs done in SNF are in [facility]. Please refer to them using [employer]/Care Everywhere. and Recent labs in [facility] reviewed by me today.     ASSESSMENT/PLAN  (J44.9) Chronic obstructive pulmonary disease, unspecified COPD type (H)  (primary encounter diagnosis)  Comment: Chronic, managed.   Plan:     No changes at this time. Monitor and update NP with changes.     (I10) Essential hypertension, benign  Comment: Chronic, managed and less than goal 150/90.   Plan:     Continue losartan 50 mg/day    Keep SBP> 130 mmHg and DBP > 65 mmHg (levels below these increase mortality as shown by standard studies and observations).     (G30.1,  F02.80) Late onset Alzheimer's disease without behavioral disturbance (H)  Comment: Chronic, progressive   Plan:     Continue board and care support with medication administration, meals and safety. Expect further decline with progression of diease.     (G40.909) Seizure disorder (H)  Comment: Chronic, no recent activity.   Plan:     Continue carbamazepine 300 mg/BID and gabapentin 600 mg TID    (M16.11) Primary osteoarthritis of right hip  Comment: Chronic, pain managed.   Plan:     Continue acetaminophen 650 mg PO PRN    Orders:  The current medical regimen is effective; continue present plan and medications.      Electronically signed by:  TANIYA Zaman [facility]           Sincerely,        Muna Del Castillo NP

## 2021-02-10 ENCOUNTER — CLINICAL UPDATE (OUTPATIENT)
Dept: PHARMACY | Facility: CLINIC | Age: 81
End: 2021-02-10
Payer: COMMERCIAL

## 2021-02-10 DIAGNOSIS — G30.1 LATE ONSET ALZHEIMER'S DISEASE WITHOUT BEHAVIORAL DISTURBANCE (H): ICD-10-CM

## 2021-02-10 DIAGNOSIS — J44.9 CHRONIC OBSTRUCTIVE PULMONARY DISEASE, UNSPECIFIED COPD TYPE (H): ICD-10-CM

## 2021-02-10 DIAGNOSIS — I10 ESSENTIAL HYPERTENSION, BENIGN: Primary | ICD-10-CM

## 2021-02-10 DIAGNOSIS — G40.909 SEIZURE DISORDER (H): ICD-10-CM

## 2021-02-10 DIAGNOSIS — F02.80 LATE ONSET ALZHEIMER'S DISEASE WITHOUT BEHAVIORAL DISTURBANCE (H): ICD-10-CM

## 2021-02-10 DIAGNOSIS — E63.9 NUTRITIONAL DEFICIENCY: ICD-10-CM

## 2021-02-10 PROCEDURE — 99207 PR NO CHARGE LOS: CPT | Performed by: PHARMACIST

## 2021-02-10 NOTE — PROGRESS NOTES
This patient's medication list and chart were reviewed as part of the service provided by [facility] and Geriatric Services.    Assessment/Recommendations:  1. (Supplements):  May benefit from restarting vitamin D3 1000u daily for weakness/fall/fracture prevention/likely low in vit D without supplementation.  Last vit D level on 6/23/20 = 31.8, and appears patient was on supplementation at the time.      Noted pt follows with neurology re seizures.  Continue to follow appropriate labs given Carbamazepine use (CBC, BMP, LFTs).  Note that Carbamazepine, as well as Losartan and Gabapentin may contribute to low Na; pt with h/o low Na and if continues to trend downward, may require dose adjustments if able.    Jeanie Bo, Pharm.D.,[facility]  Board Certified Geriatric Pharmacist  Medication Therapy Management Pharmacist  [phone]

## 2021-04-23 ENCOUNTER — NURSING HOME VISIT (OUTPATIENT)
Dept: GERIATRICS | Facility: CLINIC | Age: 81
End: 2021-04-23
Payer: COMMERCIAL

## 2021-04-23 VITALS
SYSTOLIC BLOOD PRESSURE: 121 MMHG | RESPIRATION RATE: 18 BRPM | HEART RATE: 79 BPM | OXYGEN SATURATION: 97 % | WEIGHT: 173.5 LBS | TEMPERATURE: 97.2 F | DIASTOLIC BLOOD PRESSURE: 58 MMHG | HEIGHT: 61 IN | BODY MASS INDEX: 32.76 KG/M2

## 2021-04-23 DIAGNOSIS — J44.9 CHRONIC OBSTRUCTIVE PULMONARY DISEASE, UNSPECIFIED COPD TYPE (H): ICD-10-CM

## 2021-04-23 DIAGNOSIS — R29.6 FREQUENT FALLS: ICD-10-CM

## 2021-04-23 DIAGNOSIS — G40.909 SEIZURE DISORDER (H): ICD-10-CM

## 2021-04-23 DIAGNOSIS — F02.80 LATE ONSET ALZHEIMER'S DISEASE WITHOUT BEHAVIORAL DISTURBANCE (H): ICD-10-CM

## 2021-04-23 DIAGNOSIS — G30.1 LATE ONSET ALZHEIMER'S DISEASE WITHOUT BEHAVIORAL DISTURBANCE (H): ICD-10-CM

## 2021-04-23 DIAGNOSIS — I10 ESSENTIAL HYPERTENSION, BENIGN: Primary | ICD-10-CM

## 2021-04-23 PROCEDURE — 99309 SBSQ NF CARE MODERATE MDM 30: CPT | Performed by: INTERNAL MEDICINE

## 2021-04-23 ASSESSMENT — MIFFLIN-ST. JEOR: SCORE: 1189.37

## 2021-04-23 NOTE — LETTER
4/23/2021        RE: Yoanna Phillips  [facility]  [address]  [facility] [address]        Yoanna Phillips is a 80 year old female seen April 23, 2021 at [facility] where she has resided for 4 years (admit 6/2017) seen to follow up dementia and sz disorder.    She is found on the floor of her room, beside her chair.   Her walker is by the door, states she fell over by the closet (not using her walker) and crawled to the chair to try and get up, but was unable to do so.  Denies any pain or injury from the fall.   Able to get up with assist of two persons.    Patient has past h/o seizures.  Currently on gabapentin and carbamazepine, and followed by Dr Fabiola Banks [facility]   Medications are managed by Dr Banks, but it is not clear when she was seen last.     Patient has low cognitive scores and has failed trial of living on her own, with recurrent falls and inability to seek help when she needs it.   Cognitive decline started >9 years ago. Now meets criteria for dementia dx.     Past Medical History:   Diagnosis Date     Arthritis     osteoarthritis     Cognitive impairment 7/3/2012     COPD (chronic obstructive pulmonary disease) (H)      Dyspnea on exertion      Epilepsy (H)     minor seizures 2x daily     Hypertension      Numbness and tingling     bilateral numbness     Pernicious anemia     resolved with vitamin replacement     Poor short term memory 5/20/2011     Seizures (H)    Dementia, late onset Alzheimer's type    S/p left TSA 2012    SH:  Previously lived with her son, house in [facility].   Has lived there all her life.     She has 4 children, 3 of them live locally and help out.    Her granddaughter is here frequently.     ROS:   BIMS 11/15  Wt Readings from Last 5 Encounters:   04/23/21 78.7 kg (173 lb 8 oz)   01/30/21 78.7 kg (173 lb 6.4 oz)   12/11/20 78 kg (171 lb 14.4 oz)   10/03/20 76.1 kg (167 lb 12.8 oz)   08/17/20 76.4 kg (168 lb 6.4 oz)     "  EXAM:  Pleasant, NAD  /58   Pulse 79   Temp 97.2  F (36.2  C)   Resp 18   Ht 1.549 m (5' 1\")   Wt 78.7 kg (173 lb 8 oz)   SpO2 97%   BMI 32.78 kg/m     VS after fall /85   HR 77  +Council  Neck supple without adenopathy  Lungs with decreased BS, no rales or wheeze  Heart RRR s1s2 @90   occ ectopy  Abd soft, NT, no distention, +BS  Ext without edema, no open areas.   Neuro: repetitive, no focal findings, no tremor  Psych: affect okay      1/21/2021:     Sodium 136 - 145 mmol/L 133Low     Potassium 3.5 - 5.0 mmol/L 4.9    Chloride 98 - 107 mmol/L 102    CO2 22 - 31 mmol/L 18Low     Anion Gap, Calculation 5 - 18 mmol/L 13    Glucose 70 - 125 mg/dL 84    Calcium 8.5 - 10.5 mg/dL 9.8    BUN 8 - 28 mg/dL 9    Creatinine 0.60 - 1.10 mg/dL 0.62    GFR MDRD Af Amer >60 mL/min/1.73m2 >60    GFR MDRD Non Af Amer >60 mL/min/1.73m2 >60      WBC 4.0 - 11.0 thou/uL 9.3    RBC 3.80 - 5.40 mill/uL 4.02    Hemoglobin 12.0 - 16.0 g/dL 13.5    Hematocrit 35.0 - 47.0 % 38.3    MCV 80 - 100 fL 95    MCH 27.0 - 34.0 pg 33.6    MCHC 32.0 - 36.0 g/dL 35.2    RDW 11.0 - 14.5 % 13.1    Platelets 140 - 440 thou/uL 340          IMP/PLAN:   (R29.6) Frequent falls  Comment: found on the floor again today.   She has had courses of PHYSICAL THERAPY but does not retain information, abandons her walker repeatedly    Plan: add Vit D 2000 units/day       (M15.0) Primary osteoarthritis involving multiple joints    Comment: intermittent pain, fall risk  Plan: acetaminophen available prn along with local measures.       (I10) Essential hypertension, benign  Comment:   BP Readings from Last 3 Encounters:   04/23/21 121/58   01/30/21 138/75   12/11/20 (!) 146/73      Plan: continue losartan 50 mg/day, follow BMP    (G30.1,  F02.80) Late onset Alzheimer's disease without behavioral disturbance  Comment: other than above, pt maintains a fair amount of functional independence, so appropriate for Board and Care.     Plan: Board and Care support " for meds, meals, activity, safety.       (G40.909) Seizure disorder (H)  Comment: longstanding    1/21/2021:     Carbamazepine 4.0 - 12.0 ug/mL 7.3      Plan: continue carbamazepine and gabapentin at current doses, with quarterly check of labs.    Follow up with Dr Fabiola Banks, [facility]:  [phone].   Pt needs appointment     (J44.9) Chronic obstructive pulmonary disease, unspecified COPD type (H)  Comment: by history; no current symptoms   Plan: continue Spiriva daily    Francy Swartz MD          Sincerely,        Francy Swartz MD

## 2021-05-01 NOTE — PROGRESS NOTES
"Yoanna Phillips is a 80 year old female seen April 23, 2021 at [facility] where she has resided for 4 years (admit 6/2017) seen to follow up dementia and sz disorder.    She is found on the floor of her room, beside her chair.   Her walker is by the door, states she fell over by the closet (not using her walker) and crawled to the chair to try and get up, but was unable to do so.  Denies any pain or injury from the fall.   Able to get up with assist of two persons.    Patient has past h/o seizures.  Currently on gabapentin and carbamazepine, and followed by Dr Fabiola Banks in [facility]   Medications are managed by Dr Banks, but it is not clear when she was seen last.     Patient has low cognitive scores and has failed trial of living on her own, with recurrent falls and inability to seek help when she needs it.   Cognitive decline started >9 years ago. Now meets criteria for dementia dx.     Past Medical History:   Diagnosis Date     Arthritis     osteoarthritis     Cognitive impairment 7/3/2012     COPD (chronic obstructive pulmonary disease) (H)      Dyspnea on exertion      Epilepsy (H)     minor seizures 2x daily     Hypertension      Numbness and tingling     bilateral numbness     Pernicious anemia     resolved with vitamin replacement     Poor short term memory 5/20/2011     Seizures (H)    Dementia, late onset Alzheimer's type    S/p left TSA 2012    SH:  Previously lived with her son, house in [facility].   Has lived there all her life.     She has 4 children, 3 of them live locally and help out.    Her granddaughter is here frequently.     ROS:   BIMS 11/15  Wt Readings from Last 5 Encounters:   04/23/21 78.7 kg (173 lb 8 oz)   01/30/21 78.7 kg (173 lb 6.4 oz)   12/11/20 78 kg (171 lb 14.4 oz)   10/03/20 76.1 kg (167 lb 12.8 oz)   08/17/20 76.4 kg (168 lb 6.4 oz)      EXAM:  Pleasant, NAD  /58   Pulse 79   Temp 97.2  F (36.2  C)   Resp 18   Ht 1.549 m (5' 1\")   Wt 78.7 kg " (173 lb 8 oz)   SpO2 97%   BMI 32.78 kg/m     VS after fall /85   HR 77  +Sycuan  Neck supple without adenopathy  Lungs with decreased BS, no rales or wheeze  Heart RRR s1s2 @90   occ ectopy  Abd soft, NT, no distention, +BS  Ext without edema, no open areas.   Neuro: repetitive, no focal findings, no tremor  Psych: affect okay      1/21/2021:     Sodium 136 - 145 mmol/L 133Low     Potassium 3.5 - 5.0 mmol/L 4.9    Chloride 98 - 107 mmol/L 102    CO2 22 - 31 mmol/L 18Low     Anion Gap, Calculation 5 - 18 mmol/L 13    Glucose 70 - 125 mg/dL 84    Calcium 8.5 - 10.5 mg/dL 9.8    BUN 8 - 28 mg/dL 9    Creatinine 0.60 - 1.10 mg/dL 0.62    GFR MDRD Af Amer >60 mL/min/1.73m2 >60    GFR MDRD Non Af Amer >60 mL/min/1.73m2 >60      WBC 4.0 - 11.0 thou/uL 9.3    RBC 3.80 - 5.40 mill/uL 4.02    Hemoglobin 12.0 - 16.0 g/dL 13.5    Hematocrit 35.0 - 47.0 % 38.3    MCV 80 - 100 fL 95    MCH 27.0 - 34.0 pg 33.6    MCHC 32.0 - 36.0 g/dL 35.2    RDW 11.0 - 14.5 % 13.1    Platelets 140 - 440 thou/uL 340          IMP/PLAN:   (R29.6) Frequent falls  Comment: found on the floor again today.   She has had courses of PHYSICAL THERAPY but does not retain information, abandons her walker repeatedly    Plan: add Vit D 2000 units/day       (M15.0) Primary osteoarthritis involving multiple joints    Comment: intermittent pain, fall risk  Plan: acetaminophen available prn along with local measures.       (I10) Essential hypertension, benign  Comment:   BP Readings from Last 3 Encounters:   04/23/21 121/58   01/30/21 138/75   12/11/20 (!) 146/73      Plan: continue losartan 50 mg/day, follow BMP    (G30.1,  F02.80) Late onset Alzheimer's disease without behavioral disturbance  Comment: other than above, pt maintains a fair amount of functional independence, so appropriate for Board and Care.     Plan: Board and Care support for meds, meals, activity, safety.       (G40.907) Seizure disorder (H)  Comment: longstanding    1/21/2021:      Carbamazepine 4.0 - 12.0 ug/mL 7.3      Plan: continue carbamazepine and gabapentin at current doses, with quarterly check of labs.    Follow up with Dr Fabiola Banks, [facility]:  [phone].   Pt needs appointment     (J44.9) Chronic obstructive pulmonary disease, unspecified COPD type (H)  Comment: by history; no current symptoms   Plan: continue Spiriva daily    Francy Swartz MD

## 2021-05-06 ENCOUNTER — RECORDS - HEALTHEAST (OUTPATIENT)
Dept: LAB | Facility: CLINIC | Age: 81
End: 2021-05-06

## 2021-05-06 LAB
ERYTHROCYTE [DISTWIDTH] IN BLOOD BY AUTOMATED COUNT: 13.4 % (ref 11–14.5)
HCT VFR BLD AUTO: 39.9 % (ref 35–47)
HGB BLD-MCNC: 13.4 G/DL (ref 12–16)
MCH RBC QN AUTO: 32.8 PG (ref 27–34)
MCHC RBC AUTO-ENTMCNC: 33.6 G/DL (ref 32–36)
MCV RBC AUTO: 98 FL (ref 80–100)
PLATELET # BLD AUTO: 322 THOU/UL (ref 140–440)
PMV BLD AUTO: 9.6 FL (ref 8.5–12.5)
RBC # BLD AUTO: 4.08 MILL/UL (ref 3.8–5.4)
WBC: 10.5 THOU/UL (ref 4–11)

## 2021-05-08 ASSESSMENT — MIFFLIN-ST. JEOR: SCORE: 1196.17

## 2021-05-13 ENCOUNTER — NURSING HOME VISIT (OUTPATIENT)
Dept: GERIATRICS | Facility: CLINIC | Age: 81
End: 2021-05-13
Payer: COMMERCIAL

## 2021-05-13 VITALS
DIASTOLIC BLOOD PRESSURE: 80 MMHG | HEIGHT: 61 IN | TEMPERATURE: 97.3 F | BODY MASS INDEX: 33.04 KG/M2 | SYSTOLIC BLOOD PRESSURE: 145 MMHG | HEART RATE: 73 BPM | RESPIRATION RATE: 17 BRPM | WEIGHT: 175 LBS | OXYGEN SATURATION: 97 %

## 2021-05-13 DIAGNOSIS — R05.9 COUGH: Primary | ICD-10-CM

## 2021-05-13 DIAGNOSIS — F02.80 LATE ONSET ALZHEIMER'S DISEASE WITHOUT BEHAVIORAL DISTURBANCE (H): ICD-10-CM

## 2021-05-13 DIAGNOSIS — G30.1 LATE ONSET ALZHEIMER'S DISEASE WITHOUT BEHAVIORAL DISTURBANCE (H): ICD-10-CM

## 2021-05-13 DIAGNOSIS — R29.6 FREQUENT FALLS: ICD-10-CM

## 2021-05-13 PROCEDURE — 99309 SBSQ NF CARE MODERATE MDM 30: CPT | Performed by: NURSE PRACTITIONER

## 2021-05-13 PROCEDURE — 99207 PR CDG-CODE CATEGORY CHANGED: CPT | Performed by: NURSE PRACTITIONER

## 2021-05-13 NOTE — PROGRESS NOTES
[facility]  [place] Medical Record Number:  [id number]  Place of Service where encounter took place:  [facility] () [[id number]]  Chief Complaint   Patient presents with     Nursing Home Acute     cough       HPI:    Yoanna Phillips  is a 80 year old (1940), who is being seen today for an episodic care visit.  HPI information obtained from: facility chart records, facility staff and patient report.     Today's concern is:    ICD-10-CM    1. Cough  R05    2. Frequent falls  R29.6    3. Late onset Alzheimer's disease without behavioral disturbance (H)  G30.1     F02.80      Resident has been having frequent mechanical falls. Staff thinks it is likely 2/2 worsening cognitive impairment. Has plans to transfer to [facility] Care. Today is complaining of productive cough and SOB. Congested and reports not feeling her self. Vital signs are stable. Poor appetite with little AM oral intake. Resting in bed.     BP Readings from Last 3 Encounters:   05/15/21 (!) 157/85   05/08/21 (!) 145/80   04/23/21 121/58     Pulse Readings from Last 4 Encounters:   05/15/21 79   05/08/21 73   04/23/21 79   01/30/21 60     Wt Readings from Last 4 Encounters:   05/15/21 78.2 kg (172 lb 6.4 oz)   05/08/21 79.4 kg (175 lb)   04/23/21 78.7 kg (173 lb 8 oz)   01/30/21 78.7 kg (173 lb 6.4 oz)         Past Medical and Surgical History reviewed in Epic today.    MEDICATIONS:    Current Outpatient Medications   Medication Sig Dispense Refill     CARBAMAZEPINE ER PO Take 300 mg by mouth 2 times daily       cyanocobalamin (VITAMIN B12) 1000 MCG/ML injection Inject 1,000 mcg into the muscle every day shift starting on the 25th and ending on the 25th every month       FOLIC ACID PO Take 1 mg by mouth daily       GABAPENTIN PO Take 600 mg by mouth 3 times daily        LOSARTAN POTASSIUM PO Take 50 mg by mouth daily       Nystatin POWD Apply to redness topically as needed for Rash QD AND Apply to groin  "topically every day and evening shift for Rash Until resolved.       tiotropium (SPIRIVA) 18 MCG capsule Inhale 18 mcg into the lungs daily           REVIEW OF SYSTEMS:  Unobtainable secondary to cognitive impairment.  and 4 point ROS including Respiratory, CV, GI and , other than that noted in the HPI,  is negative    Objective:  BP (!) 145/80   Pulse 73   Temp 97.3  F (36.3  C)   Resp 17   Ht 1.549 m (5' 1\")   Wt 79.4 kg (175 lb)   SpO2 97%   BMI 33.07 kg/m    Exam:  GENERAL APPEARANCE:  Alert  ENT:  Mouth and posterior oropharynx normal, moist mucous membranes, [place]  RESP:  crackles left lower lung lobe  CV:  Palpation and auscultation of heart done , regular rate and rhythm, no murmur, rub, or gallop  M/S:   Gait and station normal  Digits and nails normal  SKIN:  Inspection of skin and subcutaneous tissue baseline, Palpation of skin and subcutaneous tissue baseline  NEURO:   Cranial nerves 2-12 are normal tested and grossly at patient's baseline  PSYCH:  memory impaired , affect and mood normal    Labs:   Labs done in SNF are in [facility]. Please refer to them using [employer]/Care Everywhere. and Recent labs in [facility] reviewed by me today.     ASSESSMENT/PLAN:  (R05) Cough  (primary encounter diagnosis)  Comment: Acute with productive cough. Vital signs are stable. Crackles noted left lower lung.   Plan:   -Chest x-ray.  -Standing house cough syrup order.   -Will follow up tomorrow     (R29.6) Frequent falls  (G30.1,  F02.80) Late onset Alzheimer's disease without behavioral disturbance (H)  Comment: Chronic, progressive. Now with worsening cognition and frequent falls.   Plan:   -Resident needing memory care unit   -Physical therapy consult/treat due to weakness and balance concern.       Orders:  Board and Care memory care unit   Physical therapy consult   Chest x-ray 2 view     Electronically signed by:  TANIYA Zaman [facility]     "

## 2021-05-13 NOTE — LETTER
5/13/2021        RE: Yoanna Phillips  [facility]  [address]  [facility] [address]        [facility]  [place] Medical Record Number:  [id number]  Place of Service where encounter took place:  [facility] () [[id number]]  Chief Complaint   Patient presents with     Nursing Home Acute     cough       HPI:    Yoanna Phillips  is a 80 year old (1940), who is being seen today for an episodic care visit.  HPI information obtained from: facility chart records, facility staff and patient report.     Today's concern is:    ICD-10-CM    1. Cough  R05    2. Frequent falls  R29.6    3. Late onset Alzheimer's disease without behavioral disturbance (H)  G30.1     F02.80      Resident has been having frequent mechanical falls. Staff thinks it is likely 2/2 worsening cognitive impairment. Has plans to transfer to [facility] Care. Today is complaining of productive cough and SOB. Congested and reports not feeling her self. Vital signs are stable. Poor appetite with little AM oral intake. Resting in bed.     BP Readings from Last 3 Encounters:   05/15/21 (!) 157/85   05/08/21 (!) 145/80   04/23/21 121/58     Pulse Readings from Last 4 Encounters:   05/15/21 79   05/08/21 73   04/23/21 79   01/30/21 60     Wt Readings from Last 4 Encounters:   05/15/21 78.2 kg (172 lb 6.4 oz)   05/08/21 79.4 kg (175 lb)   04/23/21 78.7 kg (173 lb 8 oz)   01/30/21 78.7 kg (173 lb 6.4 oz)         Past Medical and Surgical History reviewed in Epic today.    MEDICATIONS:    Current Outpatient Medications   Medication Sig Dispense Refill     CARBAMAZEPINE ER PO Take 300 mg by mouth 2 times daily       cyanocobalamin (VITAMIN B12) 1000 MCG/ML injection Inject 1,000 mcg into the muscle every day shift starting on the 25th and ending on the 25th every month       FOLIC ACID PO Take 1 mg by mouth daily       GABAPENTIN PO Take 600 mg by mouth 3 times daily        LOSARTAN POTASSIUM  "PO Take 50 mg by mouth daily       Nystatin POWD Apply to redness topically as needed for Rash QD AND Apply to groin topically every day and evening shift for Rash Until resolved.       tiotropium (SPIRIVA) 18 MCG capsule Inhale 18 mcg into the lungs daily           REVIEW OF SYSTEMS:  Unobtainable secondary to cognitive impairment.  and 4 point ROS including Respiratory, CV, GI and , other than that noted in the HPI,  is negative    Objective:  BP (!) 145/80   Pulse 73   Temp 97.3  F (36.3  C)   Resp 17   Ht 1.549 m (5' 1\")   Wt 79.4 kg (175 lb)   SpO2 97%   BMI 33.07 kg/m    Exam:  GENERAL APPEARANCE:  Alert  ENT:  Mouth and posterior oropharynx normal, moist mucous membranes, [place]  RESP:  crackles left lower lung lobe  CV:  Palpation and auscultation of heart done , regular rate and rhythm, no murmur, rub, or gallop  M/S:   Gait and station normal  Digits and nails normal  SKIN:  Inspection of skin and subcutaneous tissue baseline, Palpation of skin and subcutaneous tissue baseline  NEURO:   Cranial nerves 2-12 are normal tested and grossly at patient's baseline  PSYCH:  memory impaired , affect and mood normal    Labs:   Labs done in SNF are in [place] [employer] Please refer to them using [employer] Everywhere. and Recent labs in [facility] reviewed by me today.     ASSESSMENT/PLAN:  (R05) Cough  (primary encounter diagnosis)  Comment: Acute with productive cough. Vital signs are stable. Crackles noted left lower lung.   Plan:   -Chest x-ray.  -Standing house cough syrup order.   -Will follow up tomorrow     (R29.6) Frequent falls  (G30.1,  F02.80) Late onset Alzheimer's disease without behavioral disturbance (H)  Comment: Chronic, progressive. Now with worsening cognition and frequent falls.   Plan:   -Resident needing memory care unit   -Physical therapy consult/treat due to weakness and balance concern.       Orders:  Board and Care memory care unit   Physical therapy consult   Chest x-ray 2 view "     Electronically signed by:  TANIYA Zaman CNP  [facility]           Sincerely,        Muna Del Castillo, NP

## 2021-05-15 ASSESSMENT — MIFFLIN-ST. JEOR: SCORE: 1184.38

## 2021-05-18 ENCOUNTER — ASSISTED LIVING VISIT (OUTPATIENT)
Dept: GERIATRICS | Facility: CLINIC | Age: 81
End: 2021-05-18
Payer: COMMERCIAL

## 2021-05-18 VITALS
SYSTOLIC BLOOD PRESSURE: 157 MMHG | RESPIRATION RATE: 16 BRPM | BODY MASS INDEX: 32.55 KG/M2 | HEIGHT: 61 IN | TEMPERATURE: 97.2 F | OXYGEN SATURATION: 97 % | HEART RATE: 79 BPM | WEIGHT: 172.4 LBS | DIASTOLIC BLOOD PRESSURE: 85 MMHG

## 2021-05-18 DIAGNOSIS — R29.6 FREQUENT FALLS: Primary | ICD-10-CM

## 2021-05-18 DIAGNOSIS — F02.80 LATE ONSET ALZHEIMER'S DISEASE WITHOUT BEHAVIORAL DISTURBANCE (H): ICD-10-CM

## 2021-05-18 DIAGNOSIS — G30.1 LATE ONSET ALZHEIMER'S DISEASE WITHOUT BEHAVIORAL DISTURBANCE (H): ICD-10-CM

## 2021-05-18 DIAGNOSIS — M16.11 PRIMARY OSTEOARTHRITIS OF RIGHT HIP: ICD-10-CM

## 2021-05-18 PROCEDURE — 99207 PR CDG-CODE CATEGORY CHANGED: CPT | Performed by: NURSE PRACTITIONER

## 2021-05-18 PROCEDURE — 99309 SBSQ NF CARE MODERATE MDM 30: CPT | Performed by: NURSE PRACTITIONER

## 2021-05-18 NOTE — PROGRESS NOTES
"[facility]  [place] Medical Record Number:  [id number]  Place of Service where encounter took place:  [facility] ([facility]) [[id number]]  Chief Complaint   Patient presents with     RECHECK       HPI:    Yoanna Phillips  is a 80 year old (1940), who is being seen today for an episodic care visit.  HPI information obtained from: facility chart records, facility staff and patient report.     Today's concern is:  Resident alert to self only. Had another fall today due to poor safety awareness. Is moving to secured memory care unit. Denies pain, lightheadedness and SOB.     BP Readings from Last 3 Encounters:   05/15/21 (!) 157/85   05/08/21 (!) 145/80   04/23/21 121/58     Pulse Readings from Last 4 Encounters:   05/15/21 79   05/08/21 73   04/23/21 79   01/30/21 60     Wt Readings from Last 4 Encounters:   05/15/21 78.2 kg (172 lb 6.4 oz)   05/08/21 79.4 kg (175 lb)   04/23/21 78.7 kg (173 lb 8 oz)   01/30/21 78.7 kg (173 lb 6.4 oz)       Past Medical and Surgical History reviewed in Epic today.    MEDICATIONS:    Current Outpatient Medications   Medication Sig Dispense Refill     CARBAMAZEPINE ER PO Take 300 mg by mouth 2 times daily       cyanocobalamin (VITAMIN B12) 1000 MCG/ML injection Inject 1,000 mcg into the muscle every day shift starting on the 25th and ending on the 25th every month       FOLIC ACID PO Take 1 mg by mouth daily       GABAPENTIN PO Take 600 mg by mouth 3 times daily        LOSARTAN POTASSIUM PO Take 50 mg by mouth daily       Nystatin POWD Apply to redness topically as needed for Rash QD AND Apply to groin topically every day and evening shift for Rash Until resolved.       tiotropium (SPIRIVA) 18 MCG capsule Inhale 18 mcg into the lungs daily           REVIEW OF SYSTEMS:  Unobtainable secondary to cognitive impairment.     Objective:  BP (!) 157/85   Pulse 79   Temp 97.2  F (36.2  C)   Resp 16   Ht 1.549 m (5' 1\")   Wt 78.2 kg (172 lb 6.4 oz)   SpO2 97%   BMI 32.57 " kg/m    Exam:  GENERAL APPEARANCE:  Alert, in no distress  ENT:  Mouth and posterior oropharynx normal, moist mucous membranes, [place]  RESP:  respiratory effort and palpation of chest normal, lungs clear to auscultation   CV:  Palpation and auscultation of heart done , regular rate and rhythm, no murmur, rub, or gallop  M/S:   Gait and station normal  Digits and nails normal  SKIN:  Inspection of skin and subcutaneous tissue baseline, Palpation of skin and subcutaneous tissue baseline  NEURO:   Cranial nerves 2-12 are normal tested and grossly at patient's baseline  PSYCH:  insight and judgement impaired, memory impaired , affect and mood normal    Labs:   Labs done in SNF are in [facility]. Please refer to them using [employer]Care Everywhere. and Recent labs in EPIC reviewed by me today.     ASSESSMENT/PLAN:  (R29.6) Frequent falls  (primary encounter diagnosis)  (M16.11) Primary osteoarthritis of right hip  (G30.1) Alzheimer's disease  Comment: Chronic progressing. Vital signs remain stable and resident did not have LOC with falls.   -Moving this week to memory care unit for additional support.   -CBC, BMP, Keppra level next lab day.   -Monitor and update NP with changes.     Orders:  1. Memory care unit this week  2.Keppra, CBC and BMP next lab day     Electronically signed by:  TANIYA Zaman CNP  [facility]

## 2021-05-23 ENCOUNTER — TELEPHONE (OUTPATIENT)
Dept: GERIATRICS | Facility: CLINIC | Age: 81
End: 2021-05-23

## 2021-05-24 NOTE — TELEPHONE ENCOUNTER
Reporting a fall with head strike.  First B/P 117/110  Second one 130-76    Reports pain 2/10, no bumps or open areas to head,     Continue to monitor and call if changes occur    Electronically signed by Natividad Larson RN, CNP

## 2021-05-25 ENCOUNTER — RECORDS - HEALTHEAST (OUTPATIENT)
Dept: LAB | Facility: CLINIC | Age: 81
End: 2021-05-25

## 2021-05-25 ENCOUNTER — TRANSFERRED RECORDS (OUTPATIENT)
Dept: HEALTH INFORMATION MANAGEMENT | Facility: CLINIC | Age: 81
End: 2021-05-25

## 2021-05-25 LAB
ANION GAP SERPL CALCULATED.3IONS-SCNC: 13 MMOL/L (ref 5–18)
ANION GAP SERPL CALCULATED.3IONS-SCNC: 13 MMOL/L (ref 5–18)
BUN SERPL-MCNC: 10 MG/DL (ref 8–28)
BUN SERPL-MCNC: 10 MG/DL (ref 8–28)
CALCIUM SERPL-MCNC: 10 MG/DL (ref 8.5–10.5)
CALCIUM SERPL-MCNC: 10 MG/DL (ref 8.5–10.5)
CARBAMAZEPINE SERPL-MCNC: 7.1 UG/ML (ref 4–12)
CHLORIDE BLD-SCNC: 104 MMOL/L (ref 98–107)
CHLORIDE SERPLBLD-SCNC: 104 MMOL/L (ref 98–107)
CO2 SERPL-SCNC: 22 MMOL/L (ref 22–31)
CO2 SERPL-SCNC: 22 MMOL/L (ref 22–31)
CREAT SERPL-MCNC: 0.68 MG/DL (ref 0.6–1.1)
CREAT SERPL-MCNC: 0.68 MG/DL (ref 0.6–1.1)
ERYTHROCYTE [DISTWIDTH] IN BLOOD BY AUTOMATED COUNT: 13.3 % (ref 11–14.5)
ERYTHROCYTE [DISTWIDTH] IN BLOOD BY AUTOMATED COUNT: 13.3 % (ref 11–14.5)
GFR SERPL CREATININE-BSD FRML MDRD: >60 ML/MIN/1.73M2
GFR SERPL CREATININE-BSD FRML MDRD: >60 ML/MIN/1.73M2
GLUCOSE BLD-MCNC: 88 MG/DL (ref 70–125)
GLUCOSE SERPL-MCNC: 88 MG/DL (ref 70–125)
HCT VFR BLD AUTO: 41.1 % (ref 35–47)
HCT VFR BLD AUTO: 41.1 % (ref 35–47)
HEMOGLOBIN: 13.7 G/DL (ref 12–16)
HGB BLD-MCNC: 13.7 G/DL (ref 12–16)
MCH RBC QN AUTO: 32.9 PG (ref 27–34)
MCH RBC QN AUTO: 32.9 PG (ref 27–34)
MCHC RBC AUTO-ENTMCNC: 33.3 G/DL (ref 32–36)
MCHC RBC AUTO-ENTMCNC: 33.3 G/DL (ref 32–36)
MCV RBC AUTO: 99 FL (ref 80–100)
MCV RBC AUTO: 99 FL (ref 80–100)
PLATELET # BLD AUTO: 331 THOU/UL (ref 140–440)
PLATELET # BLD AUTO: 331 THOU/UL (ref 140–440)
PMV BLD AUTO: 10.2 FL (ref 8.5–12.5)
POTASSIUM BLD-SCNC: 4.7 MMOL/L (ref 3.5–5)
POTASSIUM SERPL-SCNC: 4.7 MMOL/L (ref 3.5–5)
RBC # BLD AUTO: 4.16 MILL/UL (ref 3.8–5.4)
RBC # BLD AUTO: 4.16 MILL/UL (ref 3.8–5.4)
SODIUM SERPL-SCNC: 139 MMOL/L (ref 136–145)
SODIUM SERPL-SCNC: 139 MMOL/L (ref 136–145)
WBC # BLD AUTO: 9 THOU/UL (ref 4–11)
WBC: 9 THOU/UL (ref 4–11)

## 2021-05-27 DIAGNOSIS — R21 RASH: Primary | ICD-10-CM

## 2021-05-27 RX ORDER — NYSTATIN 100000 [USP'U]/G
1 POWDER TOPICAL 2 TIMES DAILY
COMMUNITY
Start: 2021-05-27 | End: 2021-05-27

## 2021-05-27 RX ORDER — NYSTATIN 100000 [USP'U]/G
POWDER TOPICAL 2 TIMES DAILY
Qty: 30 G | Refills: 1 | Status: SHIPPED | OUTPATIENT
Start: 2021-05-27 | End: 2021-10-11

## 2021-06-10 NOTE — PROGRESS NOTES
[facility]  Chief Complaint   Patient presents with     Annual Comprehensive Nursing Home     [facility] Care Coordination - Home Visit-Annual Assessment     [place] Medical Record Number:  [id number]  Place of Service where encounter took place:  [facility] ([facility]) [[id number]]    HPI:    Yoanna Phillips  is a 80 year old  (1940), who is being seen today for an annual comprehensive visit. HPI information obtained from: facility chart records, facility staff, patient report and [facility] Epic chart review.      Today's concerns are: Resident has history of falls and worsening cognition. Recently moved to [facility] memory care unit, adjusting well. Scored a 8/15 on recent BIMS assessment. No behaviors or changes in mood noted. Denies pain, CP, SOB or lightheadedness.     BP Readings from Last 3 Encounters:   05/15/21 (!) 157/85   05/08/21 (!) 145/80   04/23/21 121/58     Pulse Readings from Last 4 Encounters:   05/15/21 79   05/08/21 73   04/23/21 79   01/30/21 60     Wt Readings from Last 4 Encounters:   05/15/21 78.2 kg (172 lb 6.4 oz)   05/08/21 79.4 kg (175 lb)   04/23/21 78.7 kg (173 lb 8 oz)   01/30/21 78.7 kg (173 lb 6.4 oz)         ALLERGIES: Aspirin  PAST MEDICAL HISTORY:  has a past medical history of Arthritis, Cognitive impairment (7/3/2012), COPD (chronic obstructive pulmonary disease) (H), Dyspnea on exertion, Epilepsy (H), Hypertension, Numbness and tingling, Pernicious anemia, Poor short term memory (5/20/2011), and Seizures (H). She also has no past medical history of PONV (postoperative nausea and vomiting).  PAST SURGICAL HISTORY:  has a past surgical history that includes NONSPECIFIC PROCEDURE (1965); Craniotomy (1965); Cholecystectomy; Splenectomy; Arthroplasty shoulder (6/25/2012); appendectomy; Arthroplasty hip (Right, 4/11/2016); Arthroplasty revision hip (Right, 5/14/2016); Arthroplasty hip (Left, 3/20/2017); and Closed reduction hip (Left, 5/22/2017).  IMMUNIZATIONS:  Immunization  History   Administered Date(s) Administered     COVID-19,PF,Moderna 01/06/2021, 02/03/2021     FLU 6-35 months 08/24/2010     Flu, Unspecified 10/10/2018     Influenza (H1N1) 01/06/2010     Influenza (IIV3) PF 08/25/2011, 08/27/2015, 11/14/2016, 10/03/2017, 10/10/2018, 10/24/2019     Influenza Quad, Recombinant, p-free (RIV4) 10/14/2020     Influenza Vaccine Im 4yrs+ 4 Valent CCIIV4 10/24/2019     Pneumo Conj 13-V (2010&after) 06/16/2017     Pneumococcal 23 valent 05/04/1989, 03/20/1997, 03/05/2012     TD (ADULT, 7+) 03/20/1997, 06/18/2007     TDAP Vaccine (Adacel) 06/16/2017     Above immunizations pulled from [employer]. [facility] and facility records also reconciled. Outstanding information sent to  to update [employer].  Future immunizations are not needed at this point as all recommended immunizations are up to date.     Current Outpatient Medications   Medication Sig Dispense Refill     CARBAMAZEPINE ER PO Take 300 mg by mouth 2 times daily       cyanocobalamin (VITAMIN B12) 1000 MCG/ML injection Inject 1,000 mcg into the muscle every day shift starting on the 25th and ending on the 25th every month       FOLIC ACID PO Take 1 mg by mouth daily       GABAPENTIN PO Take 600 mg by mouth 3 times daily        LOSARTAN POTASSIUM PO Take 50 mg by mouth daily       nystatin (MYCOSTATIN) 398871 UNIT/GM external powder Apply topically 2 times daily 30 g 1     tiotropium (SPIRIVA) 18 MCG capsule Inhale 18 mcg into the lungs daily           Case Management:  I have reviewed the facility/SNF care plan/MDS, including the falls risk, nutrition and pain screening. I also reviewed the current immunizations, and preventive care. .Future cancer screening is not clinically indicated secondary to age/goals of care Patient's desire to return to the community is not assessible due to cognitive impairment. Current Level of Care is appropriate.    Advance Directive Discussion:    I reviewed the current advanced  directives as reflected in EPIC, the POLST and the facility chart, and verified the congruency of orders. I did not contacted the first party, no changes to plan of care made.  I did not due to cognitive impairment review the advance directives with the resident.     Team Discussion:  I communicated with the appropriate disciplines involved with the Plan of Care:   Nursing  ,    and Dietitian    Patient's goal is unobtainable secondary to cognitive impairment.  Information reviewed:  Medications, vital signs, orders, and nursing notes.    ROS:  Unobtainable secondary to cognitive impairment.     Vitals:  There were no vitals taken for this visit. There is no height or weight on file to calculate BMI.  Exam:  GENERAL APPEARANCE:  Alert, in no distress  ENT:  Mouth and posterior oropharynx normal, moist mucous membranes, [place]  RESP:  respiratory effort and palpation of chest normal, lungs clear to auscultation   CV:  Palpation and auscultation of heart done , regular rate and rhythm, no murmur, rub, or gallop  M/S:   Gait and station normal  Digits and nails normal  SKIN:  Inspection of skin and subcutaneous tissue baseline, Palpation of skin and subcutaneous tissue baseline  NEURO:   Cranial nerves 2-12 are normal tested and grossly at patient's baseline  PSYCH:  insight and judgement impaired, memory impaired , affect and mood normal     Lab/Diagnostic data:   Labs done in SNF are in [facility]. Please refer to them using [email or web address]/Care Everywhere. and Recent labs in [facility] reviewed by me today.     ASSESSMENT/PLAN  Primary osteoarthritis: Chronic, managed on PRN acetaminophen. No changes.     Late onset alzheimer's disease: Chronic, progressive. With recent move to memory care unit, adjusting well. Continue board and care assist with medication administration, meals and safety. Expect further decline with progression of disease.     Essential HTN: Managed, blood pressure less than goal 150/90. No change.  Keep SBP> 150 mmHg and DBP > 65 mmHg (levels below these increase mortality as shown by standard studies and observations).    Seizure disorder: Chronic, without recent activity. Continue carbamazepine, gabapentin. No further follow up with neurology given goals of care.     COPD: Chronic, without recent flare. Continue POC without change.     Vitamin D deficiency: No longer requiring supplement. Yearly draw.       The current medical regimen is effective; continue present plan and medications.      Electronically signed by:  TANIYA Zaman CNP  [facility]

## 2021-06-11 ENCOUNTER — ASSISTED LIVING VISIT (OUTPATIENT)
Dept: GERIATRICS | Facility: CLINIC | Age: 81
End: 2021-06-11
Payer: COMMERCIAL

## 2021-06-11 DIAGNOSIS — I10 ESSENTIAL HYPERTENSION, BENIGN: ICD-10-CM

## 2021-06-11 DIAGNOSIS — G40.909 SEIZURE DISORDER (H): ICD-10-CM

## 2021-06-11 DIAGNOSIS — M16.11 PRIMARY OSTEOARTHRITIS OF RIGHT HIP: Primary | ICD-10-CM

## 2021-06-11 DIAGNOSIS — J44.9 CHRONIC OBSTRUCTIVE PULMONARY DISEASE, UNSPECIFIED COPD TYPE (H): ICD-10-CM

## 2021-06-11 DIAGNOSIS — F02.80 LATE ONSET ALZHEIMER'S DISEASE WITHOUT BEHAVIORAL DISTURBANCE (H): ICD-10-CM

## 2021-06-11 DIAGNOSIS — G30.1 LATE ONSET ALZHEIMER'S DISEASE WITHOUT BEHAVIORAL DISTURBANCE (H): ICD-10-CM

## 2021-06-11 DIAGNOSIS — E55.9 VITAMIN D DEFICIENCY: ICD-10-CM

## 2021-06-11 PROCEDURE — 99207 PR CDG-CODE CATEGORY CHANGED: CPT | Performed by: NURSE PRACTITIONER

## 2021-06-11 PROCEDURE — 99318 PR ANNUAL NURSING FAC ASSESSMNT, STABLE: CPT | Performed by: NURSE PRACTITIONER

## 2021-06-28 ENCOUNTER — TELEPHONE (OUTPATIENT)
Dept: GERIATRICS | Facility: CLINIC | Age: 81
End: 2021-06-28

## 2021-06-28 NOTE — TELEPHONE ENCOUNTER
"[facility] NON-EMERGENT VOICEMAIL ENCOUNTER    Yoanna Phillips is a 81 year old  (1940), Voicemail Message received today regarding:   Nurse called to report that patient had a fall this morning.  Patient was noted to be laying on her left side in the bathroom by the door.  Walker noted to be by bed so it is assumed patient did not use her walker.  Patient stated that she fell on her \"seat\".  VS: 149/79, 67, 16, 97.8, and O2 97% on RA.  Staff assisted patient up off the floor and patient was able to walk with staff without difficulties.  No injuries noted.      Requests    FYI      Electronically signed by:   Alecia Raman RN    "

## 2021-08-11 ENCOUNTER — ASSISTED LIVING VISIT (OUTPATIENT)
Dept: GERIATRICS | Facility: CLINIC | Age: 81
End: 2021-08-11
Payer: COMMERCIAL

## 2021-08-11 VITALS
HEART RATE: 74 BPM | TEMPERATURE: 98.3 F | HEIGHT: 61 IN | DIASTOLIC BLOOD PRESSURE: 85 MMHG | WEIGHT: 168 LBS | RESPIRATION RATE: 18 BRPM | OXYGEN SATURATION: 96 % | SYSTOLIC BLOOD PRESSURE: 162 MMHG | BODY MASS INDEX: 31.72 KG/M2

## 2021-08-11 DIAGNOSIS — J44.9 CHRONIC OBSTRUCTIVE PULMONARY DISEASE, UNSPECIFIED COPD TYPE (H): ICD-10-CM

## 2021-08-11 DIAGNOSIS — F02.80 LATE ONSET ALZHEIMER'S DISEASE WITHOUT BEHAVIORAL DISTURBANCE (H): ICD-10-CM

## 2021-08-11 DIAGNOSIS — G40.909 SEIZURE DISORDER (H): ICD-10-CM

## 2021-08-11 DIAGNOSIS — R29.6 FREQUENT FALLS: Primary | ICD-10-CM

## 2021-08-11 DIAGNOSIS — I10 ESSENTIAL HYPERTENSION, BENIGN: ICD-10-CM

## 2021-08-11 DIAGNOSIS — G30.1 LATE ONSET ALZHEIMER'S DISEASE WITHOUT BEHAVIORAL DISTURBANCE (H): ICD-10-CM

## 2021-08-11 ASSESSMENT — MIFFLIN-ST. JEOR: SCORE: 1159.42

## 2021-08-11 NOTE — LETTER
8/11/2021        RE: Yoanna Phillips  [facility]  [address]  [facility] [address]        Yoanna Phillips is a 81 year old female seen August 11, 2021 at [facility] Memory Care unit where she has resided for 4 years (admit to [facility] 6/2017) seen to follow up dementia and sz disorder.    She is seen in her room sitting edge of bed.   She has been moved down to the third floor Memory Care unit because of unsafe behaviors and recurrent falls.   Now in a smaller room and she is not happy about that.  Most distressed about her desk not being here.    However B&C staff note she is adjusting well otherwise, and is more engaged in group activities.   Continues to go out with her family frequently as well.     Patient has past h/o seizures.  Currently on gabapentin and carbamazepine, and followed by Dr Fabiola Banks in [facility]   Medications are managed by Dr Banks, but it is not clear when she was seen last.     Patient has low cognitive scores and has failed trial of living on her own, with recurrent falls and inability to seek help when she needs it.   Cognitive decline started a decade ago, now meets criteria for dementia dx, Alzheimer's type with amnesia as most prominent feature.     Past Medical History:   Diagnosis Date     Arthritis     osteoarthritis     Cognitive impairment 7/3/2012     COPD (chronic obstructive pulmonary disease) (H)      Dyspnea on exertion      Epilepsy (H)     minor seizures 2x daily     Hypertension      Numbness and tingling     bilateral numbness     Pernicious anemia     resolved with vitamin replacement     Poor short term memory 5/20/2011     Seizures (H)    Dementia, late onset Alzheimer's type    S/p left TSA 2012    SH:  Previously lived with her son, house in [facility].   Has lived there all her life.     She has 4 children, 3 of them live locally and help out.    Her granddaughter is here frequently as well.     ROS:    "BIMS 11/15  >> now 8/15  Wt Readings from Last 5 Encounters:   08/11/21 76.2 kg (168 lb)   05/15/21 78.2 kg (172 lb 6.4 oz)   05/08/21 79.4 kg (175 lb)   04/23/21 78.7 kg (173 lb 8 oz)   01/30/21 78.7 kg (173 lb 6.4 oz)      EXAM:  Pleasant, NAD  BP (!) 162/85   Pulse 74   Temp 98.3  F (36.8  C)   Resp 18   Ht 1.549 m (5' 1\")   Wt 76.2 kg (168 lb)   SpO2 96%   BMI 31.74 kg/m     +[place]  Neck supple without adenopathy  Lungs with decreased BS, no rales or wheeze  Heart RRR s1s2 @90   occ ectopy  Abd soft, NT, no distention, +BS  Ext without edema, no open areas.   Neuro: repetitive, no focal findings, no tremor  Psych: affect okay      Last Comprehensive Metabolic Panel:  Sodium   Date Value Ref Range Status   05/25/2021 139 136 - 145 mmol/L Final   05/25/2021 139 136 - 145 mmol/L Final     Potassium   Date Value Ref Range Status   05/25/2021 4.7 3.5 - 5.0 mmol/L Final   05/25/2021 4.7 3.5 - 5.0 mmol/L Final     Chloride   Date Value Ref Range Status   05/25/2021 104 98 - 107 mmol/L Final   05/25/2021 104 98 - 107 mmol/L Final     Carbon Dioxide   Date Value Ref Range Status   05/25/2021 22 22 - 31 mmol/L Final     Carbon Dioxide (CO2)   Date Value Ref Range Status   05/25/2021 22 22 - 31 mmol/L Final     Anion Gap   Date Value Ref Range Status   05/25/2021 13 5 - 18 mmol/L Final   05/25/2021 13 5 - 18 mmol/L Final     Glucose   Date Value Ref Range Status   05/25/2021 88 70 - 125 mg/dL Final   05/25/2021 88 70 - 125 mg/dL Final     Urea Nitrogen   Date Value Ref Range Status   05/25/2021 10 8 - 28 mg/dL Final   05/25/2021 10 8 - 28 mg/dL Final     Creatinine   Date Value Ref Range Status   05/25/2021 0.68 0.60 - 1.10 mg/dL Final   05/25/2021 0.68 0.60 - 1.10 mg/dL Final     GFR Estimate   Date Value Ref Range Status   05/25/2021 >60 >60 mL/min/1.73m2 Final   05/25/2021 >60 >60 ml/min/1.73m2 Final     Calcium   Date Value Ref Range Status   05/25/2021 10.0 8.5 - 10.5 mg/dL Final   05/25/2021 10.0 8.5 - 10.5 " mg/dL Final     Lab Results   Component Value Date    WBC 9.0 05/25/2021      HGB 13.7 05/25/2021      MCV 99 05/25/2021       05/25/2021       IMP/PLAN:   (R29.6) Frequent falls  Comment: She has had courses of PHYSICAL THERAPY but does not retain information, abandons her walker repeatedly    Plan: Vit D 2000 units/day    Now on Memory Care unit for closer supervision     (M15.0) Primary osteoarthritis involving multiple joints    Comment: intermittent pain, fall risk  Plan: acetaminophen available prn along with local measures.       (I10) Essential hypertension, benign  Comment:   BP Readings from Last 3 Encounters:   08/11/21 (!) 162/85   05/15/21 (!) 157/85   05/08/21 (!) 145/80      Plan: remains on losartan 50 mg/day; with persistently high bps, would increase to bid    (G30.1,  F02.80) Late onset Alzheimer's disease without behavioral disturbance  Comment: functional decline  Plan: Board and Care Memory Care unit support for meds, meals, activity, safety and secure unit         (G40.909) Seizure disorder (H)  Comment: longstanding     5/25/21: Ref Range & Units 2 mo ago    Carbamazepine 4.0 - 12.0 ug/mL 7.1            Plan: continue carbamazepine 300 mg bid and gabapentin 600 mg tid, with quarterly check of labs.    Follow up with Dr Fabiola Banks, [facility]:  [phone].   Pt needs appointment     (J44.9) Chronic obstructive pulmonary disease, unspecified COPD type (H)  Comment: by history; no current symptoms   Plan: continue Spiriva daily    Francy Swartz MD          Sincerely,        Francy Swartz MD

## 2021-08-21 NOTE — PROGRESS NOTES
Yoanna Phillips is a 81 year old female seen August 11, 2021 at [facility] Memory Care unit where she has resided for 4 years (admit to [facility] 6/2017) seen to follow up dementia and sz disorder.    She is seen in her room sitting edge of bed.   She has been moved down to the third floor Memory Care unit because of unsafe behaviors and recurrent falls.   Now in a smaller room and she is not happy about that.  Most distressed about her desk not being here.    However B&C staff note she is adjusting well otherwise, and is more engaged in group activities.   Continues to go out with her family frequently as well.     Patient has past h/o seizures.  Currently on gabapentin and carbamazepine, and followed by Dr Fabiola Banks in the [facility]   Medications are managed by Dr Banks, but it is not clear when she was seen last.     Patient has low cognitive scores and has failed trial of living on her own, with recurrent falls and inability to seek help when she needs it.   Cognitive decline started a decade ago, now meets criteria for dementia dx, Alzheimer's type with amnesia as most prominent feature.     Past Medical History:   Diagnosis Date     Arthritis     osteoarthritis     Cognitive impairment 7/3/2012     COPD (chronic obstructive pulmonary disease) (H)      Dyspnea on exertion      Epilepsy (H)     minor seizures 2x daily     Hypertension      Numbness and tingling     bilateral numbness     Pernicious anemia     resolved with vitamin replacement     Poor short term memory 5/20/2011     Seizures (H)    Dementia, late onset Alzheimer's type    S/p left TSA 2012    SH:  Previously lived with her son, house in [facility].   Has lived there all her life.     She has 4 children, 3 of them live locally and help out.    Her granddaughter is here frequently as well.     ROS:   BIMS 11/15  >> now 8/15  Wt Readings from Last 5 Encounters:   08/11/21 76.2 kg (168 lb)   05/15/21 78.2 kg (172 lb 6.4 oz)  "  05/08/21 79.4 kg (175 lb)   04/23/21 78.7 kg (173 lb 8 oz)   01/30/21 78.7 kg (173 lb 6.4 oz)      EXAM:  Pleasant, NAD  BP (!) 162/85   Pulse 74   Temp 98.3  F (36.8  C)   Resp 18   Ht 1.549 m (5' 1\")   Wt 76.2 kg (168 lb)   SpO2 96%   BMI 31.74 kg/m     +[place]  Neck supple without adenopathy  Lungs with decreased BS, no rales or wheeze  Heart RRR s1s2 @90   occ ectopy  Abd soft, NT, no distention, +BS  Ext without edema, no open areas.   Neuro: repetitive, no focal findings, no tremor  Psych: affect okay      Last Comprehensive Metabolic Panel:  Sodium   Date Value Ref Range Status   05/25/2021 139 136 - 145 mmol/L Final   05/25/2021 139 136 - 145 mmol/L Final     Potassium   Date Value Ref Range Status   05/25/2021 4.7 3.5 - 5.0 mmol/L Final   05/25/2021 4.7 3.5 - 5.0 mmol/L Final     Chloride   Date Value Ref Range Status   05/25/2021 104 98 - 107 mmol/L Final   05/25/2021 104 98 - 107 mmol/L Final     Carbon Dioxide   Date Value Ref Range Status   05/25/2021 22 22 - 31 mmol/L Final     Carbon Dioxide (CO2)   Date Value Ref Range Status   05/25/2021 22 22 - 31 mmol/L Final     Anion Gap   Date Value Ref Range Status   05/25/2021 13 5 - 18 mmol/L Final   05/25/2021 13 5 - 18 mmol/L Final     Glucose   Date Value Ref Range Status   05/25/2021 88 70 - 125 mg/dL Final   05/25/2021 88 70 - 125 mg/dL Final     Urea Nitrogen   Date Value Ref Range Status   05/25/2021 10 8 - 28 mg/dL Final   05/25/2021 10 8 - 28 mg/dL Final     Creatinine   Date Value Ref Range Status   05/25/2021 0.68 0.60 - 1.10 mg/dL Final   05/25/2021 0.68 0.60 - 1.10 mg/dL Final     GFR Estimate   Date Value Ref Range Status   05/25/2021 >60 >60 mL/min/1.73m2 Final   05/25/2021 >60 >60 ml/min/1.73m2 Final     Calcium   Date Value Ref Range Status   05/25/2021 10.0 8.5 - 10.5 mg/dL Final   05/25/2021 10.0 8.5 - 10.5 mg/dL Final     Lab Results   Component Value Date    WBC 9.0 05/25/2021      HGB 13.7 05/25/2021      MCV 99 05/25/2021      "  05/25/2021       IMP/PLAN:   (R29.6) Frequent falls  Comment: She has had courses of PHYSICAL THERAPY but does not retain information, abandons her walker repeatedly    Plan: Vit D 2000 units/day    Now on Memory Care unit for closer supervision     (M15.0) Primary osteoarthritis involving multiple joints    Comment: intermittent pain, fall risk  Plan: acetaminophen available prn along with local measures.       (I10) Essential hypertension, benign  Comment:   BP Readings from Last 3 Encounters:   08/11/21 (!) 162/85   05/15/21 (!) 157/85   05/08/21 (!) 145/80      Plan: remains on losartan 50 mg/day; with persistently high bps, would increase to bid    (G30.1,  F02.80) Late onset Alzheimer's disease without behavioral disturbance  Comment: functional decline  Plan: Board and Care Memory Care unit support for meds, meals, activity, safety and secure unit         (G40.909) Seizure disorder (H)  Comment: longstanding     5/25/21: Ref Range & Units 2 mo ago    Carbamazepine 4.0 - 12.0 ug/mL 7.1            Plan: continue carbamazepine 300 mg bid and gabapentin 600 mg tid, with quarterly check of labs.    Follow up with Dr Fabiola Banks, [facility]:  [phone].   Pt needs appointment     (J44.9) Chronic obstructive pulmonary disease, unspecified COPD type (H)  Comment: by history; no current symptoms   Plan: continue Spiriva daily    Francy Swartz MD

## 2021-09-05 ENCOUNTER — HEALTH MAINTENANCE LETTER (OUTPATIENT)
Age: 81
End: 2021-09-05

## 2021-09-06 ENCOUNTER — HOSPITAL ENCOUNTER (EMERGENCY)
Facility: CLINIC | Age: 81
Discharge: HOME | End: 2021-09-07
Attending: EMERGENCY MEDICINE | Admitting: EMERGENCY MEDICINE
Payer: COMMERCIAL

## 2021-09-06 ENCOUNTER — APPOINTMENT (OUTPATIENT)
Dept: GENERAL RADIOLOGY | Facility: CLINIC | Age: 81
End: 2021-09-06
Attending: EMERGENCY MEDICINE
Payer: COMMERCIAL

## 2021-09-06 ENCOUNTER — APPOINTMENT (OUTPATIENT)
Dept: CT IMAGING | Facility: CLINIC | Age: 81
End: 2021-09-06
Attending: EMERGENCY MEDICINE
Payer: COMMERCIAL

## 2021-09-06 ENCOUNTER — TELEPHONE (OUTPATIENT)
Dept: GERIATRICS | Facility: CLINIC | Age: 81
End: 2021-09-06

## 2021-09-06 DIAGNOSIS — S09.90XA INJURY OF HEAD, INITIAL ENCOUNTER: ICD-10-CM

## 2021-09-06 DIAGNOSIS — S80.01XA CONTUSION OF RIGHT KNEE, INITIAL ENCOUNTER: ICD-10-CM

## 2021-09-06 DIAGNOSIS — S01.111A LACERATION OF RIGHT EYEBROW, INITIAL ENCOUNTER: ICD-10-CM

## 2021-09-06 DIAGNOSIS — R03.0 ELEVATED BLOOD PRESSURE READING: ICD-10-CM

## 2021-09-06 PROCEDURE — 12013 RPR F/E/E/N/L/M 2.6-5.0 CM: CPT

## 2021-09-06 PROCEDURE — 73562 X-RAY EXAM OF KNEE 3: CPT | Mod: RT

## 2021-09-06 PROCEDURE — 70450 CT HEAD/BRAIN W/O DYE: CPT

## 2021-09-06 PROCEDURE — 250N000013 HC RX MED GY IP 250 OP 250 PS 637: Performed by: EMERGENCY MEDICINE

## 2021-09-06 PROCEDURE — 99284 EMERGENCY DEPT VISIT MOD MDM: CPT | Mod: 25

## 2021-09-06 RX ORDER — ACETAMINOPHEN 325 MG/1
650 TABLET ORAL ONCE
Status: COMPLETED | OUTPATIENT
Start: 2021-09-06 | End: 2021-09-06

## 2021-09-06 RX ADMIN — ACETAMINOPHEN 650 MG: 325 TABLET, FILM COATED ORAL at 22:21

## 2021-09-06 ASSESSMENT — ENCOUNTER SYMPTOMS
WOUND: 1
MYALGIAS: 1

## 2021-09-07 VITALS
HEART RATE: 60 BPM | TEMPERATURE: 97.8 F | DIASTOLIC BLOOD PRESSURE: 86 MMHG | RESPIRATION RATE: 16 BRPM | SYSTOLIC BLOOD PRESSURE: 179 MMHG | OXYGEN SATURATION: 98 %

## 2021-09-07 NOTE — ED PROVIDER NOTES
History   Chief Complaint:  Fall       HPI   Yoanna Phillips is a 81 year old female who presents with a laceration on her right eyebrow after reaching for something and falling. No lose consciousness. She also has pain on her right knee. She came from the memory care unit. She is not on blood thinners.  Does not know her last tetanus.  Denies focal weakness, numbness, vision changes, headache, chest pain, abdominal pain.  Hard of hearing.      Review of Systems   Musculoskeletal: Positive for myalgias.   Skin: Positive for wound.   All other systems reviewed and are negative.    Allergies:  Aspirin    Medications:  Carbamazepine  Gabapentin  Mycostatin  Spiriva    Past Medical History:    Arthritis  COPD  Epilepsy  Hypertension  Anemia  Seizures  Alzheimer's  Hip dislocation  DJD    Past Surgical History:    Appendectomy  Arthroplasty  Cholecystectomy  Closed reduction hip  Craniotomy  Splenectomy    Family History:    Arthritis  Heart disease  Parkison's   Hypertension    Social History:  The patient presents alone    Physical Exam     Patient Vitals for the past 24 hrs:   BP Temp Temp src Pulse Resp SpO2   09/06/21 2315 -- -- -- -- -- 90 %   09/06/21 2300 (!) 179/86 -- -- 60 -- 98 %   09/06/21 2245 -- -- -- -- -- 98 %   09/06/21 2230 (!) 181/109 -- -- 73 -- 97 %   09/06/21 2215 -- -- -- -- -- 98 %   09/06/21 2200 (!) 190/93 -- -- 65 -- 100 %   09/06/21 2153 (!) 211/95 97.8  F (36.6  C) Oral 78 16 99 %       Physical Exam  Eyes:               Sclera white; Pupils are equal and round; EOMI w/o entrapment  ENT:                External ears and nares normal  CV:                  Rate as above with regular rhythm   Resp:               Breath sounds clear and equal bilaterally                          Non-labored, no retractions or accessory muscle use  GI:                   Abdomen is soft, non-tender, non-distended                          No rebound tenderness or peritoneal features  MS:                  Moves  all extremities                          LLE: swelling and tenderness at knee, maximal at proximal tibia  Skin:                Gaping R eyebrow laceration, abrasions R knee  Neuro:             Speech is normal and fluent. Hard of hearing.    Emergency Department Course     Imaging:  Head CT w/o Contrast:  1.  No acute intracranial process.   2.  Chronic intracranial changes described above.    XR Knee Right 3 Views:  Normal joint spaces and alignment. No fracture or joint effusion.    Reading per radiology.    Procedure:    Laceration Repair        LACERATION:  A simple clean 4.0 cm laceration.      LOCATION: right eyebrow      FUNCTION:  Distally sensation, circulation, motor and tendon function are intact.      ANESTHESIA:  Local using Lidocaine with Epi total of 3 mLs      PREPARATION:  Irrigation and Scrubbing with Normal Saline and Shur Clens      DEBRIDEMENT:  no debridement      CLOSURE:  Wound was closed with One Layer.  Skin closed with 7 x 5.0 Vicryl Rapide using interrupted sutures.    Emergency Department Course:    Reviewed:  I reviewed nursing notes, vitals, past medical history and care everywhere    Assessments:  2205 I obtained history and examined the patient as noted above.     0054 I rechecked the patient and explained findings.       Interventions:  2221 Tylenol 650 mg PO    Disposition:  The patient was discharged to home.       Impression & Plan     Medical Decision Making:  Yoanna Phillips is a 81 year old female presents for evaluation after fall in which she sustained a forehead lac. Head injury at her age can be associated with increased risk of intracranial bleeding and skull fracture. No neck pain or tenderness, cervical spine injury not suspected.  CT without intracranial abnormalities.  She also has abrasion, swelling, and pain on her right knee. Differential include fracture and dislocation. X rays are normal.   Tetanus has been confirmed to be up to date on chart review.  Lac  repair as above with absorbable sutures that do not require removal.  Is ambulatory on the knee.  Occult tibial fracture not suspected.  Appropriate for discharge.    Diagnosis:    ICD-10-CM    1. Injury of head, initial encounter  S09.90XA    2. Laceration of right eyebrow, initial encounter  S01.111A    3. Contusion of right knee, initial encounter  S80.01XA    4. Elevated blood pressure reading  R03.0        Scribe Disclosure:  I, Nilam Rico, am serving as a scribe at 10:10 PM on 9/6/2021 to document services personally performed by Lanny López,  based on my observations and the provider's statements to me.            Lanny López MD  09/07/21 0124

## 2021-09-07 NOTE — DISCHARGE INSTRUCTIONS
Use tylenol as needed for pain, 650mg every 6 hours.    Sutures are absorbable and do not require removal.

## 2021-09-07 NOTE — TELEPHONE ENCOUNTER
Staff called to report that they were sending Yoanna into the hospital due to a fall with a gash on her head and she hurt her right knee.    Ok that they sent her in to the ED.  Paramedics told staff they will take her to  hospital but due to her trauma she may end up at [facility]    Electronically signed by Natividad Larson RN, CNP

## 2021-09-07 NOTE — ED NOTES
Bed: ED13  Expected date:   Expected time:   Means of arrival:   Comments:  EMS HEMS 416 81F fall, head lac

## 2021-09-07 NOTE — ED NOTES
Report called to Nursing home. Spoke with nursing supervisor. Wheelchair called for patient.    Patient ambulated to the bathroom with a walker

## 2021-09-07 NOTE — ED NOTES
Patient connected to purewick, and instructed on device. Pt does not need anything else at this time.

## 2021-09-07 NOTE — ED TRIAGE NOTES
EMS arrival:    Patient in memory care unit.  Lives at [facility].  Reaching for something in closet, fell over.  No LOC.  Staff in hallway right by her.  Speech sounds slurry but it is normal for her.  BP 190s.  Laceration above right eyebrow.

## 2021-09-08 ENCOUNTER — ASSISTED LIVING VISIT (OUTPATIENT)
Dept: GERIATRICS | Facility: CLINIC | Age: 81
End: 2021-09-08
Payer: COMMERCIAL

## 2021-09-08 VITALS
HEART RATE: 80 BPM | TEMPERATURE: 97.6 F | SYSTOLIC BLOOD PRESSURE: 141 MMHG | DIASTOLIC BLOOD PRESSURE: 77 MMHG | RESPIRATION RATE: 16 BRPM | OXYGEN SATURATION: 95 %
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
## 2021-09-08 DIAGNOSIS — S01.01XS LACERATION OF SCALP WITHOUT FOREIGN BODY, SEQUELA: Primary | ICD-10-CM

## 2021-09-08 DIAGNOSIS — M25.561 ACUTE PAIN OF RIGHT KNEE: ICD-10-CM

## 2021-09-08 RX ORDER — NYSTATIN 100000 [USP'U]/G
POWDER TOPICAL 2 TIMES DAILY PRN
COMMUNITY
End: 2023-02-23

## 2021-09-08 RX ORDER — ACETAMINOPHEN 325 MG/1
650 TABLET ORAL EVERY 4 HOURS PRN
COMMUNITY
End: 2024-06-28

## 2021-09-08 NOTE — LETTER
9/8/2021        RE: Yoanna Chan [facility]  [address]  [facility] [address]        [facility]  PRIMARY CARE PROVIDER AND CLINIC:  Muna Del Castillo NP, [address] [place] [address] / [facility] [address]  Chief Complaint   Patient presents with     Hospital F/U      [place] Medical Record Number:  [id number]  Place of Service where encounter took place:  [facility] [[id number]]    Yoanna Phillips  is a 81 year old  (1940), admitted to the above facility from  [facility]. Hospital stay 9/6/21 through 9/7/21..   HPI:  Resident with recent fall- did not lose consciousness. Laceration right eye brown with sutures- no swelling or bleeding noted. Right knee remains sore, x-ray was negative. Up and ambulating hallway today with 2 wheeled walker.   -head CT neg     CODE STATUS/ADVANCE DIRECTIVES DISCUSSION:  Prior  CPR/Full code   ALLERGIES:   Allergies   Allergen Reactions     Aspirin Unknown      PAST MEDICAL HISTORY:   Past Medical History:   Diagnosis Date     Arthritis     osteoarthritis     Cognitive impairment 7/3/2012     COPD (chronic obstructive pulmonary disease) (H)      Dyspnea on exertion      Epilepsy (H)     minor seizures 2x daily     Hypertension      Numbness and tingling     bilateral numbness     Pernicious anemia     resolved with vitamin replacement     Poor short term memory 5/20/2011     Seizures (H)       PAST SURGICAL HISTORY:   has a past surgical history that includes NONSPECIFIC PROCEDURE (1965); Craniotomy (1965); Cholecystectomy; Splenectomy; Arthroplasty shoulder (6/25/2012); appendectomy; Arthroplasty hip (Right, 4/11/2016); Arthroplasty revision hip (Right, 5/14/2016); Arthroplasty hip (Left, 3/20/2017); and Closed reduction hip (Left, 5/22/2017).  FAMILY HISTORY: family history includes Arthritis in her mother; Heart Disease in her mother; Hypertension in her mother; Neurologic Disorder in her mother.  SOCIAL HISTORY:    reports that she quit smoking about 59 years ago. Her smoking use included cigarettes. She has a 5.00 pack-year smoking history. She has never used smokeless tobacco. She reports current alcohol use. She reports that she does not use drugs.  Patient's living condition: lives in an assisted living facility    Post Discharge Medication Reconciliation Status: discharge medications reconciled, continue medications without change  Current Outpatient Medications   Medication Sig     acetaminophen (TYLENOL) 325 MG tablet Take 650 mg by mouth every 4 hours as needed for mild pain     CARBAMAZEPINE ER PO Take 300 mg by mouth 2 times daily     cyanocobalamin (VITAMIN B12) 1000 MCG/ML injection Inject 1,000 mcg into the muscle every day shift starting on the 25th and ending on the 25th every month     FOLIC ACID PO Take 1 mg by mouth daily     GABAPENTIN PO Take 600 mg by mouth 3 times daily      LOSARTAN POTASSIUM PO Take 50 mg by mouth daily     nystatin (MYCOSTATIN) 812661 UNIT/GM external powder Apply topically 2 times daily as needed Apply to groin, under breast, topically as needed for redness     tiotropium (SPIRIVA) 18 MCG capsule Inhale 18 mcg into the lungs daily     nystatin (MYCOSTATIN) 485288 UNIT/GM external powder Apply topically 2 times daily (Patient not taking: Reported on 9/8/2021)     No current facility-administered medications for this visit.       ROS:  10 point ROS of systems including Constitutional, Eyes, Respiratory, Cardiovascular, Gastroenterology, Genitourinary, Integumentary, Musculoskeletal, Psychiatric were all negative except for pertinent positives noted in my HPI.    Vitals:  BP (!) 141/77   Pulse 80   Temp 97.6  F (36.4  C)   Resp 16   SpO2 95%   Exam:  GENERAL APPEARANCE:  Alert, in no distress  ENT:  Mouth and posterior oropharynx normal, moist mucous membranes, [place]  EYES:  EOM, conjunctivae, lids, pupils and irises normal  RESP:  respiratory effort and palpation of chest normal,  lungs clear to auscultation   CV:  Palpation and auscultation of heart done , regular rate and rhythm, no murmur, rub, or gallop  ABDOMEN:  normal bowel sounds, soft, nontender, no hepatosplenomegaly or other masses  M/S:   Gait and station normal  Digits and nails normal  SKIN:  laceration right eyebrow- sutures in place.   NEURO:   Cranial nerves 2-12 are normal tested and grossly at patient's baseline  PSYCH:  insight and judgement impaired, memory impaired , affect and mood normal    Lab/Diagnostic data:  Labs done in SNF are in [facility]. Please refer to them using [employer]/Care Everywhere. and Recent labs in [facility] reviewed by me today.     ASSESSMENT/PLAN:  (S01.01XS) Laceration of scalp without foreign body, sequela  (primary encounter diagnosis)  (W19.XXXD) Fall, subsequent encounter  Comment: Has had falls over 12 months, mechanical in nature and lack of safety awareness.   Plan:   -Laceration above eye C/D/I. Sutures are dissolvable no removal required.   -Continue LTC support with medication administration, meals and safety.     (M25.561) Acute pain of right knee  Comment: Acute, from recent fall. X-ray neg  Plan:   -Continue acetaminophen PRN        Orders:  The current medical regimen is effective; continue present plan and medications.         Electronically signed by:  TANIYA Zaman CNP  [facility]                       Sincerely,        Muna Del Castillo NP

## 2021-09-08 NOTE — PROGRESS NOTES
[facility] GERIATRIC SERVICES  PRIMARY CARE PROVIDER AND CLINIC:  Muna Del Castillo, CECILIO, [address] [place] [address] / [facility] [address]  Chief Complaint   Patient presents with     Hospital F/U      [facility] Medical Record Number:  [id number]  Place of Service where encounter took place:  [facility] [[id number]]    Yoanna Phillips  is a 81 year old  (1940), admitted to the above facility from  [facility]. Hospital stay 9/6/21 through 9/7/21..   HPI:  Resident with recent fall- did not lose consciousness. Laceration right eye brown with sutures- no swelling or bleeding noted. Right knee remains sore, x-ray was negative. Up and ambulating hallway today with 2 wheeled walker.   -head CT neg     CODE STATUS/ADVANCE DIRECTIVES DISCUSSION:  Prior  CPR/Full code   ALLERGIES:   Allergies   Allergen Reactions     Aspirin Unknown      PAST MEDICAL HISTORY:   Past Medical History:   Diagnosis Date     Arthritis     osteoarthritis     Cognitive impairment 7/3/2012     COPD (chronic obstructive pulmonary disease) (H)      Dyspnea on exertion      Epilepsy (H)     minor seizures 2x daily     Hypertension      Numbness and tingling     bilateral numbness     Pernicious anemia     resolved with vitamin replacement     Poor short term memory 5/20/2011     Seizures (H)       PAST SURGICAL HISTORY:   has a past surgical history that includes NONSPECIFIC PROCEDURE (1965); Craniotomy (1965); Cholecystectomy; Splenectomy; Arthroplasty shoulder (6/25/2012); appendectomy; Arthroplasty hip (Right, 4/11/2016); Arthroplasty revision hip (Right, 5/14/2016); Arthroplasty hip (Left, 3/20/2017); and Closed reduction hip (Left, 5/22/2017).  FAMILY HISTORY: family history includes Arthritis in her mother; Heart Disease in her mother; Hypertension in her mother; Neurologic Disorder in her mother.  SOCIAL HISTORY:   reports that she quit smoking about 59 years ago. Her smoking use included cigarettes. She has a 5.00 pack-year smoking  history. She has never used smokeless tobacco. She reports current alcohol use. She reports that she does not use drugs.  Patient's living condition: lives in an assisted living facility    Post Discharge Medication Reconciliation Status: discharge medications reconciled, continue medications without change  Current Outpatient Medications   Medication Sig     acetaminophen (TYLENOL) 325 MG tablet Take 650 mg by mouth every 4 hours as needed for mild pain     CARBAMAZEPINE ER PO Take 300 mg by mouth 2 times daily     cyanocobalamin (VITAMIN B12) 1000 MCG/ML injection Inject 1,000 mcg into the muscle every day shift starting on the 25th and ending on the 25th every month     FOLIC ACID PO Take 1 mg by mouth daily     GABAPENTIN PO Take 600 mg by mouth 3 times daily      LOSARTAN POTASSIUM PO Take 50 mg by mouth daily     nystatin (MYCOSTATIN) 621967 UNIT/GM external powder Apply topically 2 times daily as needed Apply to groin, under breast, topically as needed for redness     tiotropium (SPIRIVA) 18 MCG capsule Inhale 18 mcg into the lungs daily     nystatin (MYCOSTATIN) 831755 UNIT/GM external powder Apply topically 2 times daily (Patient not taking: Reported on 9/8/2021)     No current facility-administered medications for this visit.       ROS:  10 point ROS of systems including Constitutional, Eyes, Respiratory, Cardiovascular, Gastroenterology, Genitourinary, Integumentary, Musculoskeletal, Psychiatric were all negative except for pertinent positives noted in my HPI.    Vitals:  BP (!) 141/77   Pulse 80   Temp 97.6  F (36.4  C)   Resp 16   SpO2 95%   Exam:  GENERAL APPEARANCE:  Alert, in no distress  ENT:  Mouth and posterior oropharynx normal, moist mucous membranes, [place]  EYES:  EOM, conjunctivae, lids, pupils and irises normal  RESP:  respiratory effort and palpation of chest normal, lungs clear to auscultation   CV:  Palpation and auscultation of heart done , regular rate and rhythm, no murmur, rub, or  gallop  ABDOMEN:  normal bowel sounds, soft, nontender, no hepatosplenomegaly or other masses  M/S:   Gait and station normal  Digits and nails normal  SKIN:  laceration right eyebrow- sutures in place.   NEURO:   Cranial nerves 2-12 are normal tested and grossly at patient's baseline  PSYCH:  insight and judgement impaired, memory impaired , affect and mood normal    Lab/Diagnostic data:  Labs done in SNF are in [facility]. Please refer to them using [employer] [facility]/Care Everywhere. and Recent labs in [facility] reviewed by me today.     ASSESSMENT/PLAN:  (S01.01XS) Laceration of scalp without foreign body, sequela  (primary encounter diagnosis)  (W19.XXXD) Fall, subsequent encounter  Comment: Has had falls over 12 months, mechanical in nature and lack of safety awareness.   Plan:   -Laceration above eye C/D/I. Sutures are dissolvable no removal required.   -Continue LTC support with medication administration, meals and safety.     (M25.561) Acute pain of right knee  Comment: Acute, from recent fall. X-ray neg  Plan:   -Continue acetaminophen PRN        Orders:  The current medical regimen is effective; continue present plan and medications.         Electronically signed by:  TANIYA Zaman CNP  [facility]

## 2021-10-01 ENCOUNTER — ASSISTED LIVING VISIT (OUTPATIENT)
Dept: GERIATRICS | Facility: CLINIC | Age: 81
End: 2021-10-01
Payer: COMMERCIAL

## 2021-10-01 VITALS
OXYGEN SATURATION: 95 % | DIASTOLIC BLOOD PRESSURE: 70 MMHG | TEMPERATURE: 98 F | WEIGHT: 165.7 LBS | BODY MASS INDEX: 31.31 KG/M2 | SYSTOLIC BLOOD PRESSURE: 146 MMHG | HEART RATE: 65 BPM | RESPIRATION RATE: 18 BRPM

## 2021-10-01 DIAGNOSIS — F02.80 LATE ONSET ALZHEIMER'S DISEASE WITHOUT BEHAVIORAL DISTURBANCE (H): Primary | ICD-10-CM

## 2021-10-01 DIAGNOSIS — I10 ESSENTIAL HYPERTENSION, BENIGN: ICD-10-CM

## 2021-10-01 DIAGNOSIS — J44.9 CHRONIC OBSTRUCTIVE PULMONARY DISEASE, UNSPECIFIED COPD TYPE (H): ICD-10-CM

## 2021-10-01 DIAGNOSIS — E55.9 VITAMIN D DEFICIENCY: ICD-10-CM

## 2021-10-01 DIAGNOSIS — G30.1 LATE ONSET ALZHEIMER'S DISEASE WITHOUT BEHAVIORAL DISTURBANCE (H): Primary | ICD-10-CM

## 2021-10-01 NOTE — LETTER
10/1/2021        RE: Yoanna Chan [facility]  [address]  [facility] [address]        [place]  Chief Complaint   Patient presents with     long term Regulatory     [facility] Medical Record Number:  [id number]  Place of Service where encounter took place:  [facility] [[id number]]    HPI:    Yoanna Phililps  is 81 year old (1940), who is being seen today for a federally mandated E/M visit.  HPI information obtained from: facility chart records, facility staff and patient report.     Today's concerns are:  Late onset Alzheimer's disease: Alert to self only, short term memory loss as resident is unable to recall earlier events. Has been admitted to [facility] unit.   -BIMS 12/15.   -Last fall 9/7  -No behaviors noted by staff     Essential Hypertension:  Blood pressure range 101-151/70-81  Currently taking losartan 50 mg/day     Chronic Obstructive pulmonary disease:  -Without recent respiratory infection.   -Taking tiotropium     Vitamin D deficiency:  -No longer taking supplement   -Vitamin D level 31.8 6/2021       ALLERGIES:Aspirin  PAST MEDICAL HISTORY:   has a past medical history of Arthritis, Cognitive impairment (7/3/2012), COPD (chronic obstructive pulmonary disease) (H), Dyspnea on exertion, Epilepsy (H), Hypertension, Numbness and tingling, Pernicious anemia, Poor short term memory (5/20/2011), and Seizures (H). She also has no past medical history of PONV (postoperative nausea and vomiting).  PAST SURGICAL HISTORY:   has a past surgical history that includes NONSPECIFIC PROCEDURE (1965); Craniotomy (1965); Cholecystectomy; Splenectomy; Arthroplasty shoulder (6/25/2012); appendectomy; Arthroplasty hip (Right, 4/11/2016); Arthroplasty revision hip (Right, 5/14/2016); Arthroplasty hip (Left, 3/20/2017); and Closed reduction hip (Left, 5/22/2017).  FAMILY HISTORY: family history includes Arthritis in her mother; Heart Disease  in her mother; Hypertension in her mother; Neurologic Disorder in her mother.  SOCIAL HISTORY:  reports that she quit smoking about 59 years ago. Her smoking use included cigarettes. She has a 5.00 pack-year smoking history. She has never used smokeless tobacco. She reports current alcohol use. She reports that she does not use drugs.    MEDICATIONS:  Current Outpatient Medications   Medication Sig Dispense Refill     acetaminophen (TYLENOL) 325 MG tablet Take 650 mg by mouth every 4 hours as needed for mild pain       CARBAMAZEPINE ER PO Take 300 mg by mouth 2 times daily       cyanocobalamin (VITAMIN B12) 1000 MCG/ML injection Inject 1,000 mcg into the muscle every day shift starting on the 25th and ending on the 25th every month       FOLIC ACID PO Take 1 mg by mouth daily       GABAPENTIN PO Take 600 mg by mouth 3 times daily        LOSARTAN POTASSIUM PO Take 50 mg by mouth daily       nystatin (MYCOSTATIN) 518023 UNIT/GM external powder Apply topically 2 times daily as needed Apply to groin, under breast, topically as needed for redness       nystatin (MYCOSTATIN) 154717 UNIT/GM external powder Apply topically 2 times daily (Patient not taking: Reported on 9/8/2021) 30 g 1     tiotropium (SPIRIVA) 18 MCG capsule Inhale 18 mcg into the lungs daily           Case Management:  I have reviewed the care plan and MDS and do agree with the plan. Patient's desire to return to the community is not assessible due to cognitive impairment. Information reviewed:  Medications, vital signs, orders, and nursing notes.    ROS:  Unobtainable secondary to cognitive impairment.     Vitals:  BP (!) 146/70   Pulse 65   Temp 98  F (36.7  C)   Resp 18   Wt 75.2 kg (165 lb 11.2 oz)   SpO2 95%   BMI 31.31 kg/m    Body mass index is 31.31 kg/m .  Exam:  GENERAL APPEARANCE:  Alert, in no distress  ENT:  Mouth and posterior oropharynx normal, moist mucous membranes, [place]  RESP:  respiratory effort and palpation of chest normal, lungs  clear to auscultation   CV:  Palpation and auscultation of heart done , regular rate and rhythm, no murmur, rub, or gallop  M/S:   Gait and station normal  Digits and nails normal  SKIN:  Inspection of skin and subcutaneous tissue baseline, Palpation of skin and subcutaneous tissue baseline  NEURO:   Cranial nerves 2-12 are normal tested and grossly at patient's baseline  PSYCH:  insight and judgement impaired, memory impaired , affect and mood normal    Lab/Diagnostic data:   Labs done in SNF are in [facility]. Please refer to them using [facility]/Care Everywhere. and Recent labs in [facility] reviewed by me today.     ASSESSMENT/PLAN  (G30.1,  F02.80) Late onset Alzheimer's disease without behavioral disturbance (H)  (primary encounter diagnosis)  Chronic, progressive. Without weight loss, or behaviors. Fall 9/2021.   - No changes at this time   - Expect further decline with progression of disease.   - Continue board and care memory assist with medication administration, meals and safety.     (I10) Essential hypertension, benign  Chronic stable on current medication regimen  - Losartan 50 mg/day  - BMP yearly and PRN      (J44.9) Chronic obstructive pulmonary disease, unspecified COPD type (H)  Stable and without recent URI  - Tiotropium daily     (E55.9) Vitamin D deficiency  Last level normal, no longer taking supplement   - Vitamin D level next lab day      Orders:  1. Vitamin D level next lab day     Electronically signed by:  TANIYA Zaman [facility]           Sincerely,        Muna Del Castillo NP

## 2021-10-01 NOTE — PROGRESS NOTES
[place] [facility]  Chief Complaint   Patient presents with     half-way Regulatory     [place] Medical Record Number:  [id number]  Place of Service where encounter took place:  [facility] [[id number]]    HPI:    Yoanna Phillips  is 81 year old (1940), who is being seen today for a federally mandated E/M visit.  HPI information obtained from: facility chart records, facility staff and patient report.     Today's concerns are:  Late onset Alzheimer's disease: Alert to self only, short term memory loss as resident is unable to recall earlier events. Has been admitted to [facility] Memory Care unit.   -BIMS 12/15.   -Last fall 9/7  -No behaviors noted by staff     Essential Hypertension:  Blood pressure range 101-151/70-81  Currently taking losartan 50 mg/day     Chronic Obstructive pulmonary disease:  -Without recent respiratory infection.   -Taking tiotropium     Vitamin D deficiency:  -No longer taking supplement   -Vitamin D level 31.8 6/2021       ALLERGIES:Aspirin  PAST MEDICAL HISTORY:   has a past medical history of Arthritis, Cognitive impairment (7/3/2012), COPD (chronic obstructive pulmonary disease) (H), Dyspnea on exertion, Epilepsy (H), Hypertension, Numbness and tingling, Pernicious anemia, Poor short term memory (5/20/2011), and Seizures (H). She also has no past medical history of PONV (postoperative nausea and vomiting).  PAST SURGICAL HISTORY:   has a past surgical history that includes NONSPECIFIC PROCEDURE (1965); Craniotomy (1965); Cholecystectomy; Splenectomy; Arthroplasty shoulder (6/25/2012); appendectomy; Arthroplasty hip (Right, 4/11/2016); Arthroplasty revision hip (Right, 5/14/2016); Arthroplasty hip (Left, 3/20/2017); and Closed reduction hip (Left, 5/22/2017).  FAMILY HISTORY: family history includes Arthritis in her mother; Heart Disease in her mother; Hypertension in her mother; Neurologic Disorder in her mother.  SOCIAL HISTORY:  reports that she quit  smoking about 59 years ago. Her smoking use included cigarettes. She has a 5.00 pack-year smoking history. She has never used smokeless tobacco. She reports current alcohol use. She reports that she does not use drugs.    MEDICATIONS:  Current Outpatient Medications   Medication Sig Dispense Refill     acetaminophen (TYLENOL) 325 MG tablet Take 650 mg by mouth every 4 hours as needed for mild pain       CARBAMAZEPINE ER PO Take 300 mg by mouth 2 times daily       cyanocobalamin (VITAMIN B12) 1000 MCG/ML injection Inject 1,000 mcg into the muscle every day shift starting on the 25th and ending on the 25th every month       FOLIC ACID PO Take 1 mg by mouth daily       GABAPENTIN PO Take 600 mg by mouth 3 times daily        LOSARTAN POTASSIUM PO Take 50 mg by mouth daily       nystatin (MYCOSTATIN) 627343 UNIT/GM external powder Apply topically 2 times daily as needed Apply to groin, under breast, topically as needed for redness       nystatin (MYCOSTATIN) 194130 UNIT/GM external powder Apply topically 2 times daily (Patient not taking: Reported on 9/8/2021) 30 g 1     tiotropium (SPIRIVA) 18 MCG capsule Inhale 18 mcg into the lungs daily           Case Management:  I have reviewed the care plan and MDS and do agree with the plan. Patient's desire to return to the community is not assessible due to cognitive impairment. Information reviewed:  Medications, vital signs, orders, and nursing notes.    ROS:  Unobtainable secondary to cognitive impairment.     Vitals:  BP (!) 146/70   Pulse 65   Temp 98  F (36.7  C)   Resp 18   Wt 75.2 kg (165 lb 11.2 oz)   SpO2 95%   BMI 31.31 kg/m    Body mass index is 31.31 kg/m .  Exam:  GENERAL APPEARANCE:  Alert, in no distress  ENT:  Mouth and posterior oropharynx normal, moist mucous membranes, [place]  RESP:  respiratory effort and palpation of chest normal, lungs clear to auscultation   CV:  Palpation and auscultation of heart done , regular rate and rhythm, no murmur, rub, or  gallop  M/S:   Gait and station normal  Digits and nails normal  SKIN:  Inspection of skin and subcutaneous tissue baseline, Palpation of skin and subcutaneous tissue baseline  NEURO:   Cranial nerves 2-12 are normal tested and grossly at patient's baseline  PSYCH:  insight and judgement impaired, memory impaired , affect and mood normal    Lab/Diagnostic data:   Labs done in SNF are in [facility]. Please refer to them using EPIC/Care Everywhere. and Recent labs in [facility] reviewed by me today.     ASSESSMENT/PLAN  (G30.1,  F02.80) Late onset Alzheimer's disease without behavioral disturbance (H)  (primary encounter diagnosis)  Chronic, progressive. Without weight loss, or behaviors. Fall 9/2021.   - No changes at this time   - Expect further decline with progression of disease.   - Continue board and care memory assist with medication administration, meals and safety.     (I10) Essential hypertension, benign  Chronic stable on current medication regimen  - Losartan 50 mg/day  - BMP yearly and PRN      (J44.9) Chronic obstructive pulmonary disease, unspecified COPD type (H)  Stable and without recent URI  - Tiotropium daily     (E55.9) Vitamin D deficiency  Last level normal, no longer taking supplement   - Vitamin D level next lab day      Orders:  1. Vitamin D level next lab day     Electronically signed by:  TANIYA Zaman CNP  [place] [facility]

## 2021-11-11 ENCOUNTER — TELEPHONE (OUTPATIENT)
Dept: GERIATRICS | Facility: CLINIC | Age: 81
End: 2021-11-11
Payer: COMMERCIAL

## 2021-11-11 ENCOUNTER — LAB REQUISITION (OUTPATIENT)
Dept: LAB | Facility: CLINIC | Age: 81
End: 2021-11-11
Payer: COMMERCIAL

## 2021-11-11 DIAGNOSIS — E55.9 VITAMIN D DEFICIENCY, UNSPECIFIED: ICD-10-CM

## 2021-11-12 NOTE — TELEPHONE ENCOUNTER
"FGS Nurse Triage Telephone Note    Provider: TANIYA Cornejo CNP  Facility: [facility]            Facility Type:  [facility]    Caller: Leny  Call Back Number: [phone]    Allergies:    Allergies   Allergen Reactions     Aspirin Unknown        Reason for call: Pt fell walking from bathroom back to room, stated she tripped on something. She stated she landed on her R knee first then fell forward & bumped her forehead. She had red bump on forehead approx 1-11/2\" oblong, ice applied. Knee is red, no bump, ROM intact, denied pain, ice applied. VSS, neuro's intact. Not on a blood thinner. BP at first was 210/102, recheck after 15min 172/75.    Verbal Order/Direction given by Provider: Continue to monitor.    Provider giving Order:  TANIYA Cornejo CNP    Verbal Order given to: Leny.    Zuri Francois RN      "

## 2021-11-15 ENCOUNTER — TELEPHONE (OUTPATIENT)
Dept: GERIATRICS | Facility: CLINIC | Age: 81
End: 2021-11-15
Payer: COMMERCIAL

## 2021-11-15 LAB — DEPRECATED CALCIDIOL+CALCIFEROL SERPL-MC: 22 UG/L (ref 30–80)

## 2021-11-15 PROCEDURE — 36415 COLL VENOUS BLD VENIPUNCTURE: CPT | Mod: ORL | Performed by: NURSE PRACTITIONER

## 2021-11-15 PROCEDURE — P9604 ONE-WAY ALLOW PRORATED TRIP: HCPCS | Mod: ORL | Performed by: NURSE PRACTITIONER

## 2021-11-15 PROCEDURE — 82306 VITAMIN D 25 HYDROXY: CPT | Mod: ORL | Performed by: NURSE PRACTITIONER

## 2021-11-15 RX ORDER — VITAMIN B COMPLEX
TABLET ORAL DAILY
COMMUNITY
Start: 2021-11-15 | End: 2022-02-15 | Stop reason: DRUGHIGH

## 2021-11-15 NOTE — TELEPHONE ENCOUNTER
FGS Nurse Triage Telephone Note    Provider: TANIYA Cornejo CNP  Facility: [facility] Facility Type:  [facility]    Caller: Shelby  Call Back Number: [phone]    Allergies:    Allergies   Allergen Reactions     Aspirin Unknown        Reason for call: Nurse called to report Vitamin D3 level.      Verbal Order/Direction given by Provider: Start Vitamin D3 25 mcg po daily.      Provider giving Order:  Francy Swartz MD    Verbal Order given to: Quique Raman RN

## 2021-11-16 ENCOUNTER — ASSISTED LIVING VISIT (OUTPATIENT)
Dept: GERIATRICS | Facility: CLINIC | Age: 81
End: 2021-11-16
Payer: COMMERCIAL

## 2021-11-16 VITALS
SYSTOLIC BLOOD PRESSURE: 132 MMHG | WEIGHT: 166.1 LBS | DIASTOLIC BLOOD PRESSURE: 70 MMHG | OXYGEN SATURATION: 96 % | HEART RATE: 76 BPM | RESPIRATION RATE: 18 BRPM | TEMPERATURE: 97.6 F | BODY MASS INDEX: 31.38 KG/M2

## 2021-11-16 DIAGNOSIS — R29.6 FALLS FREQUENTLY: Primary | ICD-10-CM

## 2021-11-16 DIAGNOSIS — E55.9 VITAMIN D DEFICIENCY: ICD-10-CM

## 2021-11-16 DIAGNOSIS — G30.1 LATE ONSET ALZHEIMER'S DISEASE WITHOUT BEHAVIORAL DISTURBANCE (H): ICD-10-CM

## 2021-11-16 DIAGNOSIS — F02.80 LATE ONSET ALZHEIMER'S DISEASE WITHOUT BEHAVIORAL DISTURBANCE (H): ICD-10-CM

## 2021-11-16 NOTE — LETTER
11/16/2021        RE: Yoanna Phillips  [facility]  [address] [facility]ERIATR[facility]    Chief Complaint   Patient presents with     RECHECK     HPI:  Yoanna Phillips is a 81 year old  (1940), who is being seen today for an episodic care visit at: [facility] ([facility]) [[id number]]. Today's concern is: Resident had fall 11/11/2021 with head strike. Neuros intake and without n/v. Intermittently complaining of headache but d/t cognitive impairment, HPI difficult to obtain. No behaviors noted and mood at baseline.     BP Readings from Last 3 Encounters:   11/15/21 132/70   10/01/21 (!) 146/70   09/08/21 (!) 141/77     Pulse Readings from Last 4 Encounters:   11/15/21 76   10/01/21 65   09/08/21 80   09/07/21 60     Wt Readings from Last 4 Encounters:   11/11/21 75.3 kg (166 lb 1.6 oz)   10/01/21 75.2 kg (165 lb 11.2 oz)   08/11/21 76.2 kg (168 lb)   05/15/21 78.2 kg (172 lb 6.4 oz)          Allergies, and PMH/PSH reviewed in EPIC today.  REVIEW OF SYSTEMS:  Unobtainable secondary to cognitive impairment.     Objective:   /70   Pulse 76   Temp 97.6  F (36.4  C)   Resp 18   Wt 75.3 kg (166 lb 1.6 oz)   SpO2 96%   BMI 31.38 kg/m    GENERAL APPEARANCE:  Alert, in no distress  ENT:  Mouth and posterior oropharynx normal, moist mucous membranes, normal hearing acuity  RESP:  respiratory effort and palpation of chest normal, lungs clear to auscultation   CV:  Palpation and auscultation of heart done , regular rate and rhythm, no murmur, rub, or gallop  M/S:   Gait and station normal  Digits and nails normal  SKIN:  Inspection of skin and subcutaneous tissue baseline, Palpation of skin and subcutaneous tissue baseline  NEURO:   Cranial nerves 2-12 are normal tested and grossly at patient's baseline  PSYCH:  memory impaired , affect and mood normal    Labs done in SNF are in [facility]. Please refer to them using [employer]/Care Everywhere. and Recent  labs in EPIC reviewed by me today.     Assessment/Plan:  (R29.6) Falls frequently  (primary encounter diagnosis)  Comment: More frequent likely 2/2 safety awareness concerns.   Plan:   -Physical therapy. Continue to monitor neuros and update NP with concerns.     (G30.1,  F02.80) Late onset Alzheimer's disease without behavioral disturbance (H)  Comment: Chronic, progressive. Behaviors and mood stable.   Plan:   -Continue board and care support with medication administration, meals and safety.    -Expect further decline with progression of diease.     (E55.9) Vitamin D deficiency  Comment: Vitamin D level low 22 11/16  Plan:   Continue vitamin D 2500 units daily. Recheck vitamin D level in 8 weeks.         Orders:  1. Therapy orders due to frequent falls   2. Vitamin D level in 8 weeks.     Electronically signed by:   TANIYA Zaman [facility]             Sincerely,        Muna Del Castillo NP

## 2021-11-16 NOTE — PROGRESS NOTES
[facility]ERIATRIC SERVICES    Chief Complaint   Patient presents with     RECHECK     HPI:  Yoanna Phillips is a 81 year old  (1940), who is being seen today for an episodic care visit at: [facility] ([facility]) [[id number]]. Today's concern is: Resident had fall 11/11/2021 with head strike. Neuros intake and without n/v. Intermittently complaining of headache but d/t cognitive impairment, HPI difficult to obtain. No behaviors noted and mood at baseline.     BP Readings from Last 3 Encounters:   11/15/21 132/70   10/01/21 (!) 146/70   09/08/21 (!) 141/77     Pulse Readings from Last 4 Encounters:   11/15/21 76   10/01/21 65   09/08/21 80   09/07/21 60     Wt Readings from Last 4 Encounters:   11/11/21 75.3 kg (166 lb 1.6 oz)   10/01/21 75.2 kg (165 lb 11.2 oz)   08/11/21 76.2 kg (168 lb)   05/15/21 78.2 kg (172 lb 6.4 oz)          Allergies, and PMH/PSH reviewed in EPIC today.  REVIEW OF SYSTEMS:  Unobtainable secondary to cognitive impairment.     Objective:   /70   Pulse 76   Temp 97.6  F (36.4  C)   Resp 18   Wt 75.3 kg (166 lb 1.6 oz)   SpO2 96%   BMI 31.38 kg/m    GENERAL APPEARANCE:  Alert, in no distress  ENT:  Mouth and posterior oropharynx normal, moist mucous membranes, normal hearing acuity  RESP:  respiratory effort and palpation of chest normal, lungs clear to auscultation   CV:  Palpation and auscultation of heart done , regular rate and rhythm, no murmur, rub, or gallop  M/S:   Gait and station normal  Digits and nails normal  SKIN:  Inspection of skin and subcutaneous tissue baseline, Palpation of skin and subcutaneous tissue baseline  NEURO:   Cranial nerves 2-12 are normal tested and grossly at patient's baseline  PSYCH:  memory impaired , affect and mood normal    Labs done in SNF are in [place] EP[facility]. Please refer to them using EPIC/Care Everywhere. and Recent labs in EPIC reviewed by me today.     Assessment/Plan:  (R29.6) Falls frequently  (primary encounter diagnosis)  Comment:  More frequent likely 2/2 safety awareness concerns.   Plan:   -Physical therapy. Continue to monitor neuros and update NP with concerns.     (G30.1,  F02.80) Late onset Alzheimer's disease without behavioral disturbance (H)  Comment: Chronic, progressive. Behaviors and mood stable.   Plan:   -Continue board and care support with medication administration, meals and safety.    -Expect further decline with progression of diease.     (E55.9) Vitamin D deficiency  Comment: Vitamin D level low 22 11/16  Plan:   Continue vitamin D 2500 units daily. Recheck vitamin D level in 8 weeks.         Orders:  1. Therapy orders due to frequent falls   2. Vitamin D level in 8 weeks.     Electronically signed by:   TANIYA Zaman [facility]

## 2021-11-17 ENCOUNTER — TELEPHONE (OUTPATIENT)
Dept: GERIATRICS | Facility: CLINIC | Age: 81
End: 2021-11-17
Payer: COMMERCIAL

## 2021-11-17 NOTE — TELEPHONE ENCOUNTER
[place] [facility]IATRIC [facility] NON-EMERGENT VOICEMAIL ENCOUNTER    Yoanna Phillips is a 81 year old  (1940), Voicemail Message received today regarding:   Patient had a fall around midnight.  Patient got up to use the bathroom and fell to the ground.  No injuries sustained.  VSS.  Patient denies hitting her head.  Facility will continue to monitor per protocol.      Requests    FYI      Electronically signed by:   Alecia Raman RN

## 2021-11-29 ENCOUNTER — LAB REQUISITION (OUTPATIENT)
Dept: LAB | Facility: CLINIC | Age: 81
End: 2021-11-29
Payer: COMMERCIAL

## 2021-11-29 DIAGNOSIS — E55.9 VITAMIN D DEFICIENCY, UNSPECIFIED: ICD-10-CM

## 2021-12-06 ENCOUNTER — ASSISTED LIVING VISIT (OUTPATIENT)
Dept: GERIATRICS | Facility: CLINIC | Age: 81
End: 2021-12-06
Payer: COMMERCIAL

## 2021-12-06 VITALS
HEART RATE: 81 BPM | RESPIRATION RATE: 18 BRPM | WEIGHT: 168.9 LBS | SYSTOLIC BLOOD PRESSURE: 113 MMHG | BODY MASS INDEX: 31.89 KG/M2 | DIASTOLIC BLOOD PRESSURE: 63 MMHG | TEMPERATURE: 98 F | HEIGHT: 61 IN | OXYGEN SATURATION: 97 %

## 2021-12-06 DIAGNOSIS — G40.909 SEIZURE DISORDER (H): ICD-10-CM

## 2021-12-06 DIAGNOSIS — R29.6 FALLS FREQUENTLY: Primary | ICD-10-CM

## 2021-12-06 DIAGNOSIS — J44.9 CHRONIC OBSTRUCTIVE PULMONARY DISEASE, UNSPECIFIED COPD TYPE (H): ICD-10-CM

## 2021-12-06 DIAGNOSIS — F02.80 LATE ONSET ALZHEIMER'S DISEASE WITHOUT BEHAVIORAL DISTURBANCE (H): ICD-10-CM

## 2021-12-06 DIAGNOSIS — G30.1 LATE ONSET ALZHEIMER'S DISEASE WITHOUT BEHAVIORAL DISTURBANCE (H): ICD-10-CM

## 2021-12-06 DIAGNOSIS — I10 ESSENTIAL HYPERTENSION, BENIGN: ICD-10-CM

## 2021-12-06 ASSESSMENT — MIFFLIN-ST. JEOR: SCORE: 1163.51

## 2021-12-06 NOTE — LETTER
"    12/6/2021        RE: Yoanna Phillips  [facility]  [address] [address]        Yoanna Phillips is a 81 year old female seen December 6, 2021 at [facility] Memory Care unit where she has resided for 4 years (admit to [facility] 6/2017) seen to follow up dementia and recurrent falls.      She is seen in her room sitting edge of bed.  She stands up to cross the room and turn off the TV, almost falls.  Gait is ataxic and extremely unstable, and she does not use her walker, which is typical.      She has been moved down to the third floor Memory Care unit because of unsafe behaviors and recurrent falls.   Even though she moved down about 6 months ago she continues to complain about the size of her room.  Today states she plans to build an extension out \"so I can get a bigger room.\"       However [facility] staff note she is adjusting well otherwise, and is more engaged in group activities.   Continues to go out with her family frequently as well.     Patient has past h/o seizures.  Currently on gabapentin and carbamazepine, and followed by Dr Fabiola Banks in the [facility]   Medications are managed by Dr Banks, but it is not clear when she was seen last.     Patient has low cognitive scores and has failed trial of living on her own, with recurrent falls and inability to seek help when she needs it.   Cognitive decline started a decade ago, now meets criteria for dementia dx, Alzheimer's type with amnesia as most prominent feature.     Past Medical History:   Diagnosis Date     Arthritis     osteoarthritis     Cognitive impairment 7/3/2012     COPD (chronic obstructive pulmonary disease) (H)      Dyspnea on exertion      Epilepsy (H)     minor seizures 2x daily     Hypertension      Numbness and tingling     bilateral numbness     Pernicious anemia     resolved with vitamin replacement     Poor short term memory 5/20/2011     Seizures (H)    Dementia, late " "onset Alzheimer's type    S/p left TSA 2012    SH:  Previously lived with her son, house in [facility].   Has lived there all her life.     She has 4 children, 3 of them live locally and help out.    Her granddaughter is here frequently as well.     ROS:   BIMS 11/15  >> now 8/15  Wt Readings from Last 5 Encounters:   12/06/21 76.6 kg (168 lb 14.4 oz)   11/11/21 75.3 kg (166 lb 1.6 oz)   10/01/21 75.2 kg (165 lb 11.2 oz)   08/11/21 76.2 kg (168 lb)   05/15/21 78.2 kg (172 lb 6.4 oz)      EXAM:  Pleasant, NAD  /63   Pulse 81   Temp 98  F (36.7  C)   Resp 18   Ht 1.549 m (5' 1\")   Wt 76.6 kg (168 lb 14.4 oz)   SpO2 97%   BMI 31.91 kg/m     +[place]  Neck supple without adenopathy  Lungs with decreased BS, no rales or wheeze  Heart RRR s1s2 @90   occ ectopy  Abd soft, NT, no distention, +BS  Ext with 1+ edema, wearing tubi-   Neuro: repetitive, no focal findings, no tremor  Psych: affect okay      Labs reviewed  Vit D level 22 on 11/15/2021       IMP/PLAN:   (R29.6) Frequent falls  Comment: She has had courses of PHYSICAL THERAPY but does not retain information, abandons her walker repeatedly and remains a high fall risk   Plan: Vit D 25 mcg/day    Now on Memory Care unit for closer supervision     (M15.0) Primary osteoarthritis involving multiple joints    Comment: intermittent pain, fall risk  Plan: acetaminophen available prn along with local measures.       (I10) Essential hypertension, benign  Comment: some increase in LE Edema  BP Readings from Last 3 Encounters:   12/06/21 113/63   11/15/21 132/70   10/01/21 (!) 146/70      Plan: remains on losartan 50 mg/day  LE elevation, tubi- for compression, monitor for open areas    (G30.1,  F02.80) Late onset Alzheimer's disease without behavioral disturbance  Comment: functional decline  Plan: Board and Care Memory Care unit support for meds, meals, activity, safety and secure unit         (G40.909) Seizure disorder (H)  Comment: longstanding    Plan: " continue carbamazepine 300 mg bid and gabapentin 600 mg tid, with quarterly check of labs.    Follow up with Dr Fabiola Banks, [facility]:  [phone].   Pt needs appointment     (J44.9) Chronic obstructive pulmonary disease, unspecified COPD type (H)  Comment: by history; no current symptoms   Plan: continue Spiriva daily    Francy Swartz MD          Sincerely,        Francy Swartz MD

## 2021-12-11 NOTE — PROGRESS NOTES
"Yoanna Phillips is a 81 year old female seen December 6, 2021 at [facility] Memory Care unit where she has resided for 4 years (admit to [facility] 6/2017) seen to follow up dementia and recurrent falls.      She is seen in her room sitting edge of bed.  She stands up to cross the room and turn off the TV, almost falls.  Gait is ataxic and extremely unstable, and she does not use her walker, which is typical.      She has been moved down to the third floor Memory Care unit because of unsafe behaviors and recurrent falls.   Even though she moved down about 6 months ago she continues to complain about the size of her room.  Today states she plans to build an extension out \"so I can get a bigger room.\"       However [facility] staff note she is adjusting well otherwise, and is more engaged in group activities.   Continues to go out with her family frequently as well.     Patient has past h/o seizures.  Currently on gabapentin and carbamazepine, and followed by Dr Fabiola Banks in the [facility]   Medications are managed by Dr Banks, but it is not clear when she was seen last.     Patient has low cognitive scores and has failed trial of living on her own, with recurrent falls and inability to seek help when she needs it.   Cognitive decline started a decade ago, now meets criteria for dementia dx, Alzheimer's type with amnesia as most prominent feature.     Past Medical History:   Diagnosis Date     Arthritis     osteoarthritis     Cognitive impairment 7/3/2012     COPD (chronic obstructive pulmonary disease) (H)      Dyspnea on exertion      Epilepsy (H)     minor seizures 2x daily     Hypertension      Numbness and tingling     bilateral numbness     Pernicious anemia     resolved with vitamin replacement     Poor short term memory 5/20/2011     Seizures (H)    Dementia, late onset Alzheimer's type    S/p left TSA 2012    SH:  Previously lived with her son, house in [facility].   Has lived there all her " "life.     She has 4 children, 3 of them live locally and help out.    Her granddaughter is here frequently as well.     ROS:   BIMS 11/15  >> now 8/15  Wt Readings from Last 5 Encounters:   12/06/21 76.6 kg (168 lb 14.4 oz)   11/11/21 75.3 kg (166 lb 1.6 oz)   10/01/21 75.2 kg (165 lb 11.2 oz)   08/11/21 76.2 kg (168 lb)   05/15/21 78.2 kg (172 lb 6.4 oz)      EXAM:  Pleasant, NAD  /63   Pulse 81   Temp 98  F (36.7  C)   Resp 18   Ht 1.549 m (5' 1\")   Wt 76.6 kg (168 lb 14.4 oz)   SpO2 97%   BMI 31.91 kg/m     +[place]  Neck supple without adenopathy  Lungs with decreased BS, no rales or wheeze  Heart RRR s1s2 @90   occ ectopy  Abd soft, NT, no distention, +BS  Ext with 1+ edema, wearing tubi-   Neuro: repetitive, no focal findings, no tremor  Psych: affect okay      Labs reviewed  Vit D level 22 on 11/15/2021       IMP/PLAN:   (R29.6) Frequent falls  Comment: She has had courses of PHYSICAL THERAPY but does not retain information, abandons her walker repeatedly and remains a high fall risk   Plan: Vit D 25 mcg/day    Now on Memory Care unit for closer supervision     (M15.0) Primary osteoarthritis involving multiple joints    Comment: intermittent pain, fall risk  Plan: acetaminophen available prn along with local measures.       (I10) Essential hypertension, benign  Comment: some increase in LE Edema  BP Readings from Last 3 Encounters:   12/06/21 113/63   11/15/21 132/70   10/01/21 (!) 146/70      Plan: remains on losartan 50 mg/day  LE elevation, tubi- for compression, monitor for open areas    (G30.1,  F02.80) Late onset Alzheimer's disease without behavioral disturbance  Comment: functional decline  Plan: Board and Care Memory Care unit support for meds, meals, activity, safety and secure unit         (G40.909) Seizure disorder (H)  Comment: longstanding    Plan: continue carbamazepine 300 mg bid and gabapentin 600 mg tid, with quarterly check of labs.    Follow up with Dr Fabiola Bakns, " [facility]psy [facility]:  [phone].   Pt needs appointment     (J44.9) Chronic obstructive pulmonary disease, unspecified COPD type (H)  Comment: by history; no current symptoms   Plan: continue Spiriva daily    Francy Swartz MD

## 2022-01-24 PROCEDURE — 36415 COLL VENOUS BLD VENIPUNCTURE: CPT | Mod: ORL | Performed by: NURSE PRACTITIONER

## 2022-01-24 PROCEDURE — 82306 VITAMIN D 25 HYDROXY: CPT | Mod: ORL | Performed by: NURSE PRACTITIONER

## 2022-01-24 PROCEDURE — P9604 ONE-WAY ALLOW PRORATED TRIP: HCPCS | Mod: ORL | Performed by: NURSE PRACTITIONER

## 2022-01-25 LAB — DEPRECATED CALCIDIOL+CALCIFEROL SERPL-MC: 21 UG/L (ref 30–80)

## 2022-02-04 ENCOUNTER — CLINICAL UPDATE (OUTPATIENT)
Dept: PHARMACY | Facility: CLINIC | Age: 82
End: 2022-02-04
Payer: COMMERCIAL

## 2022-02-04 DIAGNOSIS — G40.909 SEIZURE DISORDER (H): ICD-10-CM

## 2022-02-04 DIAGNOSIS — E63.9 NUTRITIONAL DEFICIENCY: ICD-10-CM

## 2022-02-04 DIAGNOSIS — J44.9 CHRONIC OBSTRUCTIVE PULMONARY DISEASE, UNSPECIFIED COPD TYPE (H): ICD-10-CM

## 2022-02-04 DIAGNOSIS — F02.80 LATE ONSET ALZHEIMER'S DISEASE WITHOUT BEHAVIORAL DISTURBANCE (H): ICD-10-CM

## 2022-02-04 DIAGNOSIS — G30.1 LATE ONSET ALZHEIMER'S DISEASE WITHOUT BEHAVIORAL DISTURBANCE (H): ICD-10-CM

## 2022-02-04 DIAGNOSIS — I10 ESSENTIAL HYPERTENSION, BENIGN: Primary | ICD-10-CM

## 2022-02-04 PROCEDURE — 99207 PR NO CHARGE LOS: CPT | Performed by: PHARMACIST

## 2022-02-04 NOTE — PROGRESS NOTES
This patient's medication list and chart were reviewed as part of the service provided by [facility] and Geriatric Services.    Assessment/Recommendations:    Noted vitamin D 25mcg was added following vitamin D level = 22 on 11/15/21.  Level on 1/24/22 = 21.  Ensure patient has been receiving vitamin D, and if she has, consider increase to 50mcg daily and follow-up level in 8 weeks.    Per previous NP note, patient no longer following with neurology due to goals of care.  She continues on carbamazepine as well as gabapentin.  Appears she has not had LFTs checked for quite some time, and may benefit from this due to carbamazepine use as well as continued periodic monitoring of CBC, BMP, and carbamazepine levels as appropriate.  Previous notes state patient with ataxic gate, and h/o frequent falls.  Anticonvulsants may certainly be contributing to ataxia, falls, and may benefit from follow-up with neurology to see if reasonable to attempt small reduction of carbamazepine or gabapentin.  I am unsure when last seizure was.  If >3-5 years ago, may be appropriate to try reduction in dose if neurology agrees.    Jeanie Bo, Pharm.D.,[facility]  Board Certified Geriatric Pharmacist  Medication Therapy Management Pharmacist  [phone]

## 2022-02-10 ASSESSMENT — MIFFLIN-ST. JEOR: SCORE: 1162.14

## 2022-02-10 NOTE — PROGRESS NOTES
"[facility] GERIATRICS    Chief Complaint   Patient presents with     RECHECK     Routine     HPI:  Yoanna Phillips is a 81 year old  (1940), who is being seen today for an episodic care visit at: [facility] ([facility]) [[id number]]. Today's concern is: ***    Allergies, and PMH/PSH reviewed in [facility] today.  REVIEW OF SYSTEMS:  {eloqus52:315931}    Objective:   /76   Pulse 74   Temp 97.7  F (36.5  C)   Resp 17   Ht 1.549 m (5' 1\")   Wt 76.5 kg (168 lb 9.6 oz)   SpO2 97%   BMI 31.86 kg/m    {Nursing home physical exam :876495}    {fgslab:866473}    Assessment/Plan:  {S DX2:948146}    Orders:  {fgsorders:134823}  ***    Electronically signed by: Adrianna Ricketst MA ***    "

## 2022-02-15 ENCOUNTER — ASSISTED LIVING VISIT (OUTPATIENT)
Dept: GERIATRICS | Facility: CLINIC | Age: 82
End: 2022-02-15
Payer: COMMERCIAL

## 2022-02-15 VITALS
TEMPERATURE: 97.7 F | OXYGEN SATURATION: 97 % | SYSTOLIC BLOOD PRESSURE: 138 MMHG | WEIGHT: 168.6 LBS | BODY MASS INDEX: 31.83 KG/M2 | HEART RATE: 74 BPM | RESPIRATION RATE: 17 BRPM | DIASTOLIC BLOOD PRESSURE: 76 MMHG | HEIGHT: 61 IN

## 2022-02-15 DIAGNOSIS — I10 ESSENTIAL HYPERTENSION, BENIGN: ICD-10-CM

## 2022-02-15 DIAGNOSIS — G40.909 SEIZURE DISORDER (H): ICD-10-CM

## 2022-02-15 DIAGNOSIS — G30.1 LATE ONSET ALZHEIMER'S DISEASE WITHOUT BEHAVIORAL DISTURBANCE (H): Primary | ICD-10-CM

## 2022-02-15 DIAGNOSIS — E55.9 VITAMIN D DEFICIENCY: ICD-10-CM

## 2022-02-15 DIAGNOSIS — J44.9 CHRONIC OBSTRUCTIVE PULMONARY DISEASE, UNSPECIFIED COPD TYPE (H): ICD-10-CM

## 2022-02-15 DIAGNOSIS — F02.80 LATE ONSET ALZHEIMER'S DISEASE WITHOUT BEHAVIORAL DISTURBANCE (H): Primary | ICD-10-CM

## 2022-02-15 RX ORDER — CHOLECALCIFEROL (VITAMIN D3) 50 MCG
1 TABLET ORAL DAILY
Start: 2022-02-15

## 2022-02-15 NOTE — PROGRESS NOTES
"Chief Complaint: Routine Visit    HPI:    Yoanna Phillips is a 81 year old  (1940), who is being seen today for an episodic care visit at [facility].     Today's concern is:  Yoanna disoriented upon today's visit; reiterating same story. HPI difficult to obtain given degree of cognitive impairment. Denies CP, SOB or lightheadedness. BIMS 10/15. No behaviors or mood concerns per chart review.     BP Readings from Last 3 Encounters:   02/10/22 138/76   12/06/21 113/63   11/15/21 132/70     Wt Readings from Last 4 Encounters:   02/10/22 76.5 kg (168 lb 9.6 oz)   12/06/21 76.6 kg (168 lb 14.4 oz)   11/11/21 75.3 kg (166 lb 1.6 oz)   10/01/21 75.2 kg (165 lb 11.2 oz)     Pulse Readings from Last 4 Encounters:   02/10/22 74   12/06/21 81   11/15/21 76   10/01/21 65         REVIEW OF SYSTEMS:  4 point ROS including Respiratory, CV, GI and , other than that noted in the HPI,  is negative    /76   Pulse 74   Temp 97.7  F (36.5  C)   Resp 17   Ht 1.549 m (5' 1\")   Wt 76.5 kg (168 lb 9.6 oz)   SpO2 97%   BMI 31.86 kg/m    GENERAL APPEARANCE:  Alert,oriented x2 in no distress. Lungs CTA, S1/S2 without M/G/R. No edema. Abdomen is soft, +BTS. No focal neurological deficits.      Assessment/Plan:  1. Alzheimer's disease (G30.1)   Chronic, progressive. Expect further decline with progression of disease. Continue board and care support with medication administration, meals and safety.      2. Essential hypertension (I10)  [Losartan 50 mg/day]  Chronic, stable. Continue without change and adjustments as clinically indicated.     3. Vitamin D (E55.9)  Last vitamin D level 21  -Increase vitamin D 2000 units/day    4. COPD:  [Spiriva]  Chronic, stable     5. Seizure disorder (G40.909)  [Gabapentin 600 mg/TID, Carbamazepine 300/BID]  Chronic, stable. W/O recent documented activity noted.     Orders:  1. Increase vitamin D 2000 units/day  2. Vitamin D level 8 weeks  3. CBC, BMP, LFTs, TSH next lab " day      Electronically Signed by:  TANIYA Zaman [facility]

## 2022-02-15 NOTE — LETTER
"    2/15/2022        RE: Yoanna Phillips  [facility]  [address] [place] [address]        Chief Complaint: Routine Visit    HPI:    Yoanna Phillips is a 81 year old  (1940), who is being seen today for an episodic care visit at [facility].     Today's concern is:  Yoanna disoriented upon today's visit; reiterating same story. HPI difficult to obtain given degree of cognitive impairment. Denies CP, SOB or lightheadedness. BIMS 10/15. No behaviors or mood concerns per chart review.     BP Readings from Last 3 Encounters:   02/10/22 138/76   12/06/21 113/63   11/15/21 132/70     Wt Readings from Last 4 Encounters:   02/10/22 76.5 kg (168 lb 9.6 oz)   12/06/21 76.6 kg (168 lb 14.4 oz)   11/11/21 75.3 kg (166 lb 1.6 oz)   10/01/21 75.2 kg (165 lb 11.2 oz)     Pulse Readings from Last 4 Encounters:   02/10/22 74   12/06/21 81   11/15/21 76   10/01/21 65         REVIEW OF SYSTEMS:  4 point ROS including Respiratory, CV, GI and , other than that noted in the HPI,  is negative    /76   Pulse 74   Temp 97.7  F (36.5  C)   Resp 17   Ht 1.549 m (5' 1\")   Wt 76.5 kg (168 lb 9.6 oz)   SpO2 97%   BMI 31.86 kg/m    GENERAL APPEARANCE:  Alert,oriented x2 in no distress. Lungs CTA, S1/S2 without M/G/R. No edema. Abdomen is soft, +BTS. No focal neurological deficits.      Assessment/Plan:  1. Alzheimer's disease (G30.1)   Chronic, progressive. Expect further decline with progression of disease. Continue board and care support with medication administration, meals and safety.      2. Essential hypertension (I10)  [Losartan 50 mg/day]  Chronic, stable. Continue without change and adjustments as clinically indicated.     3. Vitamin D (E55.9)  Last vitamin D level 21  -Increase vitamin D 2000 units/day    4. COPD:  [Spiriva]  Chronic, stable     5. Seizure disorder (G40.909)  [Gabapentin 600 mg/TID, Carbamazepine 300/BID]  Chronic, stable. W/O recent documented activity noted. "     Orders:  1. Increase vitamin D 2000 units/day  2. Vitamin D level 8 weeks  3. CBC, BMP, LFTs, TSH next lab day      Electronically Signed by:  TANIYA Zaman [facility]           Sincerely,        Muna Del Castillo NP

## 2022-02-18 ENCOUNTER — LAB REQUISITION (OUTPATIENT)
Dept: LAB | Facility: CLINIC | Age: 82
End: 2022-02-18
Payer: COMMERCIAL

## 2022-02-18 DIAGNOSIS — D64.9 ANEMIA, UNSPECIFIED: ICD-10-CM

## 2022-02-18 DIAGNOSIS — N18.31 CHRONIC KIDNEY DISEASE, STAGE 3A (H): ICD-10-CM

## 2022-02-18 DIAGNOSIS — I10 ESSENTIAL (PRIMARY) HYPERTENSION: ICD-10-CM

## 2022-02-18 DIAGNOSIS — E03.9 HYPOTHYROIDISM, UNSPECIFIED: ICD-10-CM

## 2022-02-20 ENCOUNTER — HEALTH MAINTENANCE LETTER (OUTPATIENT)
Age: 82
End: 2022-02-20

## 2022-02-21 LAB
ALBUMIN SERPL-MCNC: 3.6 G/DL (ref 3.5–5)
ALP SERPL-CCNC: 119 U/L (ref 45–120)
ALT SERPL W P-5'-P-CCNC: <9 U/L (ref 0–45)
ANION GAP SERPL CALCULATED.3IONS-SCNC: 11 MMOL/L (ref 5–18)
AST SERPL W P-5'-P-CCNC: 14 U/L (ref 0–40)
BILIRUB DIRECT SERPL-MCNC: 0.1 MG/DL
BILIRUB SERPL-MCNC: 0.4 MG/DL (ref 0–1)
BUN SERPL-MCNC: 9 MG/DL (ref 8–28)
CALCIUM SERPL-MCNC: 10 MG/DL (ref 8.5–10.5)
CHLORIDE BLD-SCNC: 101 MMOL/L (ref 98–107)
CO2 SERPL-SCNC: 20 MMOL/L (ref 22–31)
CREAT SERPL-MCNC: 0.71 MG/DL (ref 0.6–1.1)
ERYTHROCYTE [DISTWIDTH] IN BLOOD BY AUTOMATED COUNT: 13.1 % (ref 10–15)
GFR SERPL CREATININE-BSD FRML MDRD: 84 ML/MIN/1.73M2
GLUCOSE BLD-MCNC: 107 MG/DL (ref 70–125)
HCT VFR BLD AUTO: 42.8 % (ref 35–47)
HGB BLD-MCNC: 14.2 G/DL (ref 11.7–15.7)
MCH RBC QN AUTO: 32.6 PG (ref 26.5–33)
MCHC RBC AUTO-ENTMCNC: 33.2 G/DL (ref 31.5–36.5)
MCV RBC AUTO: 98 FL (ref 78–100)
PLATELET # BLD AUTO: 346 10E3/UL (ref 150–450)
POTASSIUM BLD-SCNC: 4.4 MMOL/L (ref 3.5–5)
PROT SERPL-MCNC: 6.9 G/DL (ref 6–8)
RBC # BLD AUTO: 4.35 10E6/UL (ref 3.8–5.2)
SODIUM SERPL-SCNC: 132 MMOL/L (ref 136–145)
TSH SERPL DL<=0.005 MIU/L-ACNC: 1.04 UIU/ML (ref 0.3–5)
WBC # BLD AUTO: 8.5 10E3/UL (ref 4–11)

## 2022-02-21 PROCEDURE — 82248 BILIRUBIN DIRECT: CPT | Mod: ORL | Performed by: NURSE PRACTITIONER

## 2022-02-21 PROCEDURE — 85027 COMPLETE CBC AUTOMATED: CPT | Mod: ORL | Performed by: NURSE PRACTITIONER

## 2022-02-21 PROCEDURE — P9604 ONE-WAY ALLOW PRORATED TRIP: HCPCS | Mod: ORL | Performed by: NURSE PRACTITIONER

## 2022-02-21 PROCEDURE — 80053 COMPREHEN METABOLIC PANEL: CPT | Mod: ORL | Performed by: NURSE PRACTITIONER

## 2022-02-21 PROCEDURE — 36415 COLL VENOUS BLD VENIPUNCTURE: CPT | Mod: ORL | Performed by: NURSE PRACTITIONER

## 2022-02-21 PROCEDURE — 84443 ASSAY THYROID STIM HORMONE: CPT | Mod: ORL | Performed by: NURSE PRACTITIONER

## 2022-04-06 ENCOUNTER — ASSISTED LIVING VISIT (OUTPATIENT)
Dept: GERIATRICS | Facility: CLINIC | Age: 82
End: 2022-04-06
Payer: COMMERCIAL

## 2022-04-06 VITALS
HEART RATE: 72 BPM | DIASTOLIC BLOOD PRESSURE: 70 MMHG | WEIGHT: 167.4 LBS | RESPIRATION RATE: 18 BRPM | HEIGHT: 61 IN | OXYGEN SATURATION: 98 % | SYSTOLIC BLOOD PRESSURE: 128 MMHG | TEMPERATURE: 98 F | BODY MASS INDEX: 31.6 KG/M2

## 2022-04-06 DIAGNOSIS — R29.6 FALLS FREQUENTLY: ICD-10-CM

## 2022-04-06 DIAGNOSIS — F02.80 LATE ONSET ALZHEIMER'S DISEASE WITHOUT BEHAVIORAL DISTURBANCE (H): ICD-10-CM

## 2022-04-06 DIAGNOSIS — E55.9 VITAMIN D DEFICIENCY: Primary | ICD-10-CM

## 2022-04-06 DIAGNOSIS — G40.909 SEIZURE DISORDER (H): ICD-10-CM

## 2022-04-06 DIAGNOSIS — G30.1 LATE ONSET ALZHEIMER'S DISEASE WITHOUT BEHAVIORAL DISTURBANCE (H): ICD-10-CM

## 2022-04-06 DIAGNOSIS — I10 ESSENTIAL HYPERTENSION, BENIGN: ICD-10-CM

## 2022-04-06 NOTE — LETTER
4/6/2022        RE: Yoanna Phillips  [facility]  [address] [place] [address]        Yoanna Phillips is a 81 year old female seen April 6, 2022 at [facility] Memory Care unit where she has resided for almost 5 years (admit to [facility] 6/2017) seen to follow up dementia and recurrent falls.   Pt is seen on the unit up to chair.  Limited ability to give much history, reports feeling okay.         She was moved down to the third floor Memory Care unit because of unsafe behaviors and recurrent falls.   Gait is ataxic and extremely unstable, and she will frequently abandon her walker.  B&C staff note she is adjusting well otherwise, and will attend group activities.   Continues to go out with her family frequently as well.     Patient has past h/o seizures.  Currently on gabapentin and carbamazepine, and followed by Dr Fabiola Banks [facility] - Dr Banks has asked to manage medications, but not clear pt has seen her recently.       Patient has low cognitive scores and has failed trial of living on her own, with recurrent falls and inability to seek help when she needs it.   Cognitive decline started a decade ago, now meets criteria for dementia dx, Alzheimer's type with amnesia as most prominent feature.     Past Medical History:   Diagnosis Date     Arthritis     osteoarthritis     Cognitive impairment 7/3/2012     COPD (chronic obstructive pulmonary disease) (H)      Dyspnea on exertion      Epilepsy (H)     minor seizures 2x daily     Hypertension      Numbness and tingling     bilateral numbness     Pernicious anemia     resolved with vitamin replacement     Poor short term memory 5/20/2011     Seizures (H)    Dementia, late onset Alzheimer's type    S/p left TSA 2012    SH:  Previously lived with her son, [place] [facility] [place].   Has lived there all her life.     She has 4 children, 3 of them live locally and help out.    Her granddaughter is here  "frequently as well.     ROS:   BIMS 11/15  >> now 8/15  Admission weight was 164 lbs     Wt Readings from Last 5 Encounters:   04/06/22 75.9 kg (167 lb 6.4 oz)   02/10/22 76.5 kg (168 lb 9.6 oz)   12/06/21 76.6 kg (168 lb 14.4 oz)   11/11/21 75.3 kg (166 lb 1.6 oz)   10/01/21 75.2 kg (165 lb 11.2 oz)      EXAM:  Pleasant, NAD  /70   Pulse 72   Temp 98  F (36.7  C)   Resp 18   Ht 1.549 m (5' 1\")   Wt 75.9 kg (167 lb 6.4 oz)   SpO2 98%   BMI 31.63 kg/m     +[place]  Neck supple without adenopathy  Lungs with decreased BS, no rales or wheeze  Heart RRR s1s2 @90   occ ectopy  Abd soft, NT, no distention, +BS  Ext with 1+ edema, wearing tubi-   Neuro: repetitive, no focal findings, no tremor  Psych: affect okay      Last Comprehensive Metabolic Panel:  Sodium   Date Value Ref Range Status   02/21/2022 132 (L) 136 - 145 mmol/L Final     Potassium   Date Value Ref Range Status   02/21/2022 4.4 3.5 - 5.0 mmol/L Final     Carbon Dioxide (CO2)   Date Value Ref Range Status   02/21/2022 20 (L) 22 - 31 mmol/L Final     Glucose   Date Value Ref Range Status   02/21/2022 107 70 - 125 mg/dL Final     Urea Nitrogen   Date Value Ref Range Status   02/21/2022 9 8 - 28 mg/dL Final     Creatinine   Date Value Ref Range Status   02/21/2022 0.71 0.60 - 1.10 mg/dL Final     GFR Estimate   Date Value Ref Range Status   02/21/2022 84 >60 mL/min/1.73m2 Final     Calcium   Date Value Ref Range Status   02/21/2022 10.0 8.5 - 10.5 mg/dL Final     Lab Results   Component Value Date    AST 14 02/21/2022      ALBUMIN 3.6 02/21/2022      ALKPHOS 119 02/21/2022     Lab Results   Component Value Date    WBC 8.5 02/21/2022      HGB 14.2 02/21/2022      MCV 98 02/21/2022       02/21/2022     TSH   Date Value Ref Range Status   02/21/2022 1.04 0.30 - 5.00 uIU/mL Final   07/10/2018 1.21 0.30 - 5.00 uIU/mL Final        IMP/PLAN:   (E55.9) Vitamin D deficiency   (R29.6) Falls frequently  Comment: She has had courses of PHYSICAL THERAPY " but does not retain information, abandons her walker repeatedly and remains a high fall risk   Plan: Vit D increased to 50 mcg/day; dietary calcium for bone health.        (M15.0) Primary osteoarthritis involving multiple joints    Comment: intermittent pain, fall risk  Plan: acetaminophen available prn along with local measures.       (I10) Essential hypertension, benign  Comment: intermittent LE Edema  BP Readings from Last 3 Encounters:   04/06/22 128/70   02/10/22 138/76   12/06/21 113/63      Plan: remains on losartan 50 mg/day  LE elevation, tubi- for compression, monitor for open areas    (G30.1,  F02.80) Late onset Alzheimer's disease without behavioral disturbance  Comment: functional decline  Plan: Board and Care Memory Care unit support for meds, meals, activity, safety and secure unit         (G40.909) Seizure disorder (H)  Comment: longstanding    Plan: continue carbamazepine 300 mg bid and gabapentin 600 mg tid, with quarterly check of labs.    Follow up with Dr Fabiola Banks, [facility]:  [phone].   Pt needs appointment to follow up on these medications.   If no longer going out to clinic, could try GDR of gabapentin to try and help reduce falls       (J44.9) Chronic obstructive pulmonary disease, unspecified COPD type (H)  Comment: by history; no current symptoms   Plan: continue Spiriva daily    Francy Swartz MD          Sincerely,        Francy Swartz MD

## 2022-04-12 ENCOUNTER — LAB REQUISITION (OUTPATIENT)
Dept: LAB | Facility: CLINIC | Age: 82
End: 2022-04-12
Payer: COMMERCIAL

## 2022-04-12 DIAGNOSIS — N18.31 CHRONIC KIDNEY DISEASE, STAGE 3A (H): ICD-10-CM

## 2022-04-12 DIAGNOSIS — D64.9 ANEMIA, UNSPECIFIED: ICD-10-CM

## 2022-04-14 LAB — DEPRECATED CALCIDIOL+CALCIFEROL SERPL-MC: 28 UG/L (ref 20–75)

## 2022-04-14 PROCEDURE — P9603 ONE-WAY ALLOW PRORATED MILES: HCPCS | Mod: ORL | Performed by: NURSE PRACTITIONER

## 2022-04-14 PROCEDURE — 82306 VITAMIN D 25 HYDROXY: CPT | Mod: ORL | Performed by: NURSE PRACTITIONER

## 2022-04-14 PROCEDURE — 36415 COLL VENOUS BLD VENIPUNCTURE: CPT | Mod: ORL | Performed by: NURSE PRACTITIONER

## 2022-04-14 NOTE — PROGRESS NOTES
Yoanna Phillips is a 81 year old female seen April 6, 2022 at [facility] Memory Care unit where she has resided for almost 5 years (admit to [facility] 6/2017) seen to follow up dementia and recurrent falls.   Pt is seen on the unit up to chair.  Limited ability to give much history, reports feeling okay.         She was moved down to the third floor Memory Care unit because of unsafe behaviors and recurrent falls.   Gait is ataxic and extremely unstable, and she will frequently abandon her walker.  B&C staff note she is adjusting well otherwise, and will attend group activities.   Continues to go out with her family frequently as well.     Patient has past h/o seizures.  Currently on gabapentin and carbamazepine, and followed by Dr Fabiola Banks in [facility] - Dr Banks has asked to manage medications, but not clear pt has seen her recently.       Patient has low cognitive scores and has failed trial of living on her own, with recurrent falls and inability to seek help when she needs it.   Cognitive decline started a decade ago, now meets criteria for dementia dx, Alzheimer's type with amnesia as most prominent feature.     Past Medical History:   Diagnosis Date     Arthritis     osteoarthritis     Cognitive impairment 7/3/2012     COPD (chronic obstructive pulmonary disease) (H)      Dyspnea on exertion      Epilepsy (H)     minor seizures 2x daily     Hypertension      Numbness and tingling     bilateral numbness     Pernicious anemia     resolved with vitamin replacement     Poor short term memory 5/20/2011     Seizures (H)    Dementia, late onset Alzheimer's type    S/p left TSA 2012    SH:  Previously lived with her son, house in [facility].   Has lived there all her life.     She has 4 children, 3 of them live locally and help out.    Her granddaughter is here frequently as well.     ROS:   BIMS 11/15  >> now 8/15  Admission weight was 164 lbs     Wt Readings from Last 5 Encounters:  "  04/06/22 75.9 kg (167 lb 6.4 oz)   02/10/22 76.5 kg (168 lb 9.6 oz)   12/06/21 76.6 kg (168 lb 14.4 oz)   11/11/21 75.3 kg (166 lb 1.6 oz)   10/01/21 75.2 kg (165 lb 11.2 oz)      EXAM:  Pleasant, NAD  /70   Pulse 72   Temp 98  F (36.7  C)   Resp 18   Ht 1.549 m (5' 1\")   Wt 75.9 kg (167 lb 6.4 oz)   SpO2 98%   BMI 31.63 kg/m     +[place]  Neck supple without adenopathy  Lungs with decreased BS, no rales or wheeze  Heart RRR s1s2 @90   occ ectopy  Abd soft, NT, no distention, +BS  Ext with 1+ edema, wearing tubi-   Neuro: repetitive, no focal findings, no tremor  Psych: affect okay      Last Comprehensive Metabolic Panel:  Sodium   Date Value Ref Range Status   02/21/2022 132 (L) 136 - 145 mmol/L Final     Potassium   Date Value Ref Range Status   02/21/2022 4.4 3.5 - 5.0 mmol/L Final     Carbon Dioxide (CO2)   Date Value Ref Range Status   02/21/2022 20 (L) 22 - 31 mmol/L Final     Glucose   Date Value Ref Range Status   02/21/2022 107 70 - 125 mg/dL Final     Urea Nitrogen   Date Value Ref Range Status   02/21/2022 9 8 - 28 mg/dL Final     Creatinine   Date Value Ref Range Status   02/21/2022 0.71 0.60 - 1.10 mg/dL Final     GFR Estimate   Date Value Ref Range Status   02/21/2022 84 >60 mL/min/1.73m2 Final     Calcium   Date Value Ref Range Status   02/21/2022 10.0 8.5 - 10.5 mg/dL Final     Lab Results   Component Value Date    AST 14 02/21/2022      ALBUMIN 3.6 02/21/2022      ALKPHOS 119 02/21/2022     Lab Results   Component Value Date    WBC 8.5 02/21/2022      HGB 14.2 02/21/2022      MCV 98 02/21/2022       02/21/2022     TSH   Date Value Ref Range Status   02/21/2022 1.04 0.30 - 5.00 uIU/mL Final   07/10/2018 1.21 0.30 - 5.00 uIU/mL Final        IMP/PLAN:   (E55.9) Vitamin D deficiency   (R29.6) Falls frequently  Comment: She has had courses of PHYSICAL THERAPY but does not retain information, abandons her walker repeatedly and remains a high fall risk   Plan: Vit D increased to 50 " mcg/day; dietary calcium for bone health.        (M15.0) Primary osteoarthritis involving multiple joints    Comment: intermittent pain, fall risk  Plan: acetaminophen available prn along with local measures.       (I10) Essential hypertension, benign  Comment: intermittent LE Edema  BP Readings from Last 3 Encounters:   04/06/22 128/70   02/10/22 138/76   12/06/21 113/63      Plan: remains on losartan 50 mg/day  LE elevation, tubi- for compression, monitor for open areas    (G30.1,  F02.80) Late onset Alzheimer's disease without behavioral disturbance  Comment: functional decline  Plan: Board and Care Memory Care unit support for meds, meals, activity, safety and secure unit         (G40.909) Seizure disorder (H)  Comment: longstanding    Plan: continue carbamazepine 300 mg bid and gabapentin 600 mg tid, with quarterly check of labs.    Follow up with Dr Fabiola Banks, [facility]:  [phone].   Pt needs appointment to follow up on these medications.   If no longer going out to clinic, could try GDR of gabapentin to try and help reduce falls       (J44.9) Chronic obstructive pulmonary disease, unspecified COPD type (H)  Comment: by history; no current symptoms   Plan: continue Spiriva daily    Francy Swartz MD

## 2022-04-24 DIAGNOSIS — E53.9 B-COMPLEX DEFICIENCY: Primary | ICD-10-CM

## 2022-04-25 RX ORDER — CYANOCOBALAMIN 1000 UG/ML
INJECTION, SOLUTION INTRAMUSCULAR; SUBCUTANEOUS
Qty: 1 ML | Refills: 3 | Status: SHIPPED | OUTPATIENT
Start: 2022-04-25

## 2022-04-27 ENCOUNTER — PATIENT OUTREACH (OUTPATIENT)
Dept: GERIATRIC MEDICINE | Facility: CLINIC | Age: 82
End: 2022-04-27
Payer: COMMERCIAL

## 2022-04-27 NOTE — PROGRESS NOTES
[facility] Care Coordination Contact    Internal CC change effective 5/1/2022.  Mailed member CC Change letter.  Additional tasks to be completed by CMS include: update database & EPIC and create member files on [employer].    Tara Guadarrama  Case Management Specialist  [facility]  [phone]

## 2022-04-27 NOTE — LETTER
April 27, 2022    JOHN BEAN  [facility]  [address] [place] [id number]      Dear John:    As a member of [facility] ([facility] ([facility]), you are provided a care coordinator. I will be your new care coordinator as of 5/1/2022. I will be calling you soon to see how you are doing and determine your needs.    If you have any questions, please feel free to call me at [phone]. If you reach my voice mail, please leave a message and your phone number. If you are hearing impaired, please call the [place] Relay at 494 or [phone] ([email or web address] relay service).    I look forward to speaking with you soon.    Sincerely,    [email or web address]  Phone: [phone]      [facility] is a health plan that contracts with both Medicare and the [place] Medical Assistance (Medicaid) program to provide benefits of both programs to enrollees. Enrollment in [facility] depends on contract renewal.      [facility]  [id number] DHS Approved ([id number])  [id number] (11/18)

## 2022-05-16 ENCOUNTER — LAB REQUISITION (OUTPATIENT)
Dept: LAB | Facility: CLINIC | Age: 82
End: 2022-05-16
Payer: COMMERCIAL

## 2022-05-16 DIAGNOSIS — D64.9 ANEMIA, UNSPECIFIED: ICD-10-CM

## 2022-05-16 DIAGNOSIS — I10 ESSENTIAL (PRIMARY) HYPERTENSION: ICD-10-CM

## 2022-05-16 DIAGNOSIS — Z51.81 ENCOUNTER FOR THERAPEUTIC DRUG LEVEL MONITORING: ICD-10-CM

## 2022-05-18 ENCOUNTER — PATIENT OUTREACH (OUTPATIENT)
Dept: GERIATRIC MEDICINE | Facility: CLINIC | Age: 82
End: 2022-05-18

## 2022-05-18 LAB
ANION GAP SERPL CALCULATED.3IONS-SCNC: 8 MMOL/L (ref 5–18)
BUN SERPL-MCNC: 8 MG/DL (ref 8–28)
CALCIUM SERPL-MCNC: 10.6 MG/DL (ref 8.5–10.5)
CHLORIDE BLD-SCNC: 101 MMOL/L (ref 98–107)
CO2 SERPL-SCNC: 24 MMOL/L (ref 22–31)
CREAT SERPL-MCNC: 0.69 MG/DL (ref 0.6–1.1)
ERYTHROCYTE [DISTWIDTH] IN BLOOD BY AUTOMATED COUNT: 13.2 % (ref 10–15)
GFR SERPL CREATININE-BSD FRML MDRD: 86 ML/MIN/1.73M2
GLUCOSE BLD-MCNC: 124 MG/DL (ref 70–125)
HCT VFR BLD AUTO: 43.6 % (ref 35–47)
HGB BLD-MCNC: 14.7 G/DL (ref 11.7–15.7)
MCH RBC QN AUTO: 33 PG (ref 26.5–33)
MCHC RBC AUTO-ENTMCNC: 33.7 G/DL (ref 31.5–36.5)
MCV RBC AUTO: 98 FL (ref 78–100)
PLATELET # BLD AUTO: 336 10E3/UL (ref 150–450)
POTASSIUM BLD-SCNC: 3.9 MMOL/L (ref 3.5–5)
RBC # BLD AUTO: 4.45 10E6/UL (ref 3.8–5.2)
SODIUM SERPL-SCNC: 133 MMOL/L (ref 136–145)
VIT B12 SERPL-MCNC: 618 PG/ML (ref 213–816)
WBC # BLD AUTO: 7.4 10E3/UL (ref 4–11)

## 2022-05-18 PROCEDURE — P9604 ONE-WAY ALLOW PRORATED TRIP: HCPCS | Mod: ORL | Performed by: NURSE PRACTITIONER

## 2022-05-18 PROCEDURE — 36415 COLL VENOUS BLD VENIPUNCTURE: CPT | Mod: ORL | Performed by: NURSE PRACTITIONER

## 2022-05-18 PROCEDURE — 85027 COMPLETE CBC AUTOMATED: CPT | Mod: ORL | Performed by: NURSE PRACTITIONER

## 2022-05-18 PROCEDURE — 80048 BASIC METABOLIC PNL TOTAL CA: CPT | Mod: ORL | Performed by: NURSE PRACTITIONER

## 2022-05-18 PROCEDURE — 82607 VITAMIN B-12: CPT | Mod: ORL | Performed by: NURSE PRACTITIONER

## 2022-05-18 PROCEDURE — 80161 ASY CARBAMAZEPIN 10,11-EPXID: CPT | Mod: ORL | Performed by: NURSE PRACTITIONER

## 2022-05-18 ASSESSMENT — PATIENT HEALTH QUESTIONNAIRE - PHQ9: SUM OF ALL RESPONSES TO PHQ QUESTIONS 1-9: 0

## 2022-05-18 ASSESSMENT — ACTIVITIES OF DAILY LIVING (ADL)
DEPENDENT_IADLS:: CLEANING;COOKING;LAUNDRY;SHOPPING;MONEY MANAGEMENT;MEDICATION MANAGEMENT;MEAL PREPARATION;TRANSPORTATION

## 2022-05-18 NOTE — PROGRESS NOTES
[facility] Care Coordination Contact    [facility] Institutional Visit Assessment     Institutional visit for Health Risk Assessment with Yoanna Phillips completed on May 18, 2022    Type of residence:: Nursing home  Current living arrangement:: I live in a nursing home     Assessment completed with:: Patient, Care Team Member    Current Care Plan  Member currently receiving the following home care services:     Member currently receiving the following community resources: None    Medication Review  Medication reconciliation completed in Epic: No  Medication set-up & administration: RN set up weekly.  Nursing Home staff administers medications.  Medication Risk Assessment Medication (1 or more, place referral to MTM): Lacks understanding of medication regimen  MTM Referral Placed: No: Member is already followed by MTM. Will follow up with current MTM.    Mental/Behavioral Health   Depression Screening:      PHQ-9 Total Score: 0    Mental health DX:: No        Falls Assessment:   Fallen 2 or more times in the past year?: Yes   Any fall with injury in the past year?: No    ADL/IADL Dependencies:   Dependent ADLs:: Ambulation-walker, Bathing  Dependent IADLs:: Cleaning, Cooking, Laundry, Shopping, Money Management, Medication Management, Meal Preparation, Transportation    [facility] Health Plan sponsored benefits: Shared information re: Silver Sneakers/gym memberships, ASA, Calcium +D.    PCA Assessment completed at visit: Not Applicable     Elderly Waiver Eligibility: No-does not meet criteria    Care Plan & Recommendations: Member is currently in Memory Care at [facility].  She is aware that she forgets things but still will say that she would like a car so she could  her children who live in her old home. Said she gets bored and that there is no one to talk to because everyone there is old and has memory problems. Reports being happy where she is now except for the boredom. Spoke to daughter who  reports seeing member at least twice weekly and going outside in good weather.    See [facility] for detailed assessment information.    Follow-Up Plan: Member informed of future contact, plan to f/u with member with a 6 month telephone assessment.  Contact information shared with member and family, encouraged member to call with any questions or concerns at any time.    [place] care continuum providers: Please see Snapshot and Care Management Flowsheets for Specific details of care plan.    This CC note routed to PCP.

## 2022-05-23 ENCOUNTER — TELEPHONE (OUTPATIENT)
Dept: GERIATRICS | Facility: CLINIC | Age: 82
End: 2022-05-23
Payer: COMMERCIAL

## 2022-05-23 LAB
CARBAMAZEPINE EP SERPL-MCNC: 1 UG/ML
CARBAMAZEPINE SERPL-MCNC: 7 UG/ML

## 2022-05-23 NOTE — TELEPHONE ENCOUNTER
[facility] Geriatric[facility] Triage Nurse Telephone Encounter    Provider: Francy Swartz MD  Facility: [facility] Facility Type:  [facility]    Caller: Carmella   Call Back Number: [phone]    Allergies:    Allergies   Allergen Reactions     Aspirin Unknown        Reason for call: Pt found on floor next to her recliner on her left side, pt did say she hit her head on the heat register, pt has a hematoma on her left forehead 3 x 3cm and an abrasion/bruise on the left knee 3.3cm x 3cm. Pt does c/o headache. Neuro's WNL. No N &V, Vitals: BP:  177/83  P:: 59  R:: 18  SPO2: 97% R/A Temp.:  97. This happened @ 11am, pt did have her am meds @ 8am.        Verbal Order/Direction given by Provider: Monitor neuro's closely, ok to give losartan 50mg x1 now.    Provider giving Order:  Francy Swartz MD    Verbal Order given to: Carmella Dunham, RN

## 2022-06-23 ENCOUNTER — ASSISTED LIVING VISIT (OUTPATIENT)
Dept: GERIATRICS | Facility: CLINIC | Age: 82
End: 2022-06-23
Payer: COMMERCIAL

## 2022-06-23 VITALS
HEIGHT: 61 IN | HEART RATE: 67 BPM | OXYGEN SATURATION: 96 % | BODY MASS INDEX: 32.66 KG/M2 | DIASTOLIC BLOOD PRESSURE: 72 MMHG | SYSTOLIC BLOOD PRESSURE: 149 MMHG | RESPIRATION RATE: 18 BRPM | WEIGHT: 173 LBS | TEMPERATURE: 97.8 F

## 2022-06-23 DIAGNOSIS — J44.9 CHRONIC OBSTRUCTIVE PULMONARY DISEASE, UNSPECIFIED COPD TYPE (H): ICD-10-CM

## 2022-06-23 DIAGNOSIS — G30.1 LATE ONSET ALZHEIMER'S DISEASE WITHOUT BEHAVIORAL DISTURBANCE (H): Primary | ICD-10-CM

## 2022-06-23 DIAGNOSIS — I10 ESSENTIAL HYPERTENSION, BENIGN: ICD-10-CM

## 2022-06-23 DIAGNOSIS — F02.80 LATE ONSET ALZHEIMER'S DISEASE WITHOUT BEHAVIORAL DISTURBANCE (H): Primary | ICD-10-CM

## 2022-06-23 DIAGNOSIS — G40.909 SEIZURE DISORDER (H): ICD-10-CM

## 2022-06-23 DIAGNOSIS — U07.1 INFECTION DUE TO 2019 NOVEL CORONAVIRUS: ICD-10-CM
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
## 2022-07-20 ENCOUNTER — LAB REQUISITION (OUTPATIENT)
Dept: LAB | Facility: CLINIC | Age: 82
End: 2022-07-20
Payer: COMMERCIAL

## 2022-07-20 DIAGNOSIS — Z51.81 ENCOUNTER FOR THERAPEUTIC DRUG LEVEL MONITORING: ICD-10-CM

## 2022-07-21 PROCEDURE — 80156 ASSAY CARBAMAZEPINE TOTAL: CPT | Mod: ORL | Performed by: NURSE PRACTITIONER

## 2022-07-21 PROCEDURE — 36415 COLL VENOUS BLD VENIPUNCTURE: CPT | Mod: ORL | Performed by: NURSE PRACTITIONER

## 2022-07-21 PROCEDURE — P9604 ONE-WAY ALLOW PRORATED TRIP: HCPCS | Mod: ORL | Performed by: NURSE PRACTITIONER

## 2022-07-22 LAB — CARBAMAZEPINE SERPL-MCNC: 6.6 UG/ML (ref 4–12)

## 2022-08-08 ENCOUNTER — ASSISTED LIVING VISIT (OUTPATIENT)
Dept: GERIATRICS | Facility: CLINIC | Age: 82
End: 2022-08-08
Payer: COMMERCIAL

## 2022-08-08 VITALS
SYSTOLIC BLOOD PRESSURE: 135 MMHG | DIASTOLIC BLOOD PRESSURE: 76 MMHG | RESPIRATION RATE: 18 BRPM | HEART RATE: 73 BPM | BODY MASS INDEX: 31.87 KG/M2 | WEIGHT: 168.8 LBS | TEMPERATURE: 97.3 F | HEIGHT: 61 IN | OXYGEN SATURATION: 95 %

## 2022-08-08 DIAGNOSIS — J44.9 CHRONIC OBSTRUCTIVE PULMONARY DISEASE, UNSPECIFIED COPD TYPE (H): ICD-10-CM

## 2022-08-08 DIAGNOSIS — E55.9 VITAMIN D DEFICIENCY: ICD-10-CM

## 2022-08-08 DIAGNOSIS — G30.1 LATE ONSET ALZHEIMER'S DISEASE WITHOUT BEHAVIORAL DISTURBANCE (H): ICD-10-CM

## 2022-08-08 DIAGNOSIS — F02.80 LATE ONSET ALZHEIMER'S DISEASE WITHOUT BEHAVIORAL DISTURBANCE (H): ICD-10-CM

## 2022-08-08 DIAGNOSIS — I10 ESSENTIAL HYPERTENSION, BENIGN: ICD-10-CM

## 2022-08-08 DIAGNOSIS — R29.6 FALLS FREQUENTLY: ICD-10-CM

## 2022-08-08 DIAGNOSIS — G40.909 SEIZURE DISORDER (H): Primary | ICD-10-CM

## 2022-08-08 NOTE — LETTER
8/8/2022        RE: Yoanna Phillips  [facility]  [address] [place] [id number]        Yoanna Phillips is a 82 year old female seen August 8, 2022 at [facility] unit where she has resided for 5 years (admit to [facility] 6/2017) seen to follow up dementia and recurrent falls.   Pt is seen in her room up to chair.   She is sorting her clothes, has them stacked on the bed and is holding some hangers.  Can't say exactly what her plan is with this arrangement.   States she is feeling well.   Wishes she had a bigger room.   Stands to do something, abandons her walker.    May 2022 note from  indicating current level of confusion: Member is currently in Memory Care at [place].  She is aware that she forgets things but still will say that she would like a car so she could  her children who live in her old home. Said she gets bored and that there is no one to talk to because everyone there is old and has memory problems. Reports being happy where she is now except for the boredom. Spoke to daughter who reports seeing member at least twice weekly and going outside in good weather.      By chart review, pt was moved down to the third floor Memory Care unit because of unsafe behaviors and recurrent falls.   Gait is ataxic and extremely unstable, and she will frequently abandon her walker.  Does okay on this unit, visits with other residents or goes out with her family.     Patient has past h/o seizures.  Currently on gabapentin and carbamazepine, and followed by Dr Fabiola [facility] - Dr Banks has asked to manage medications, but not clear pt has seen her recently, probably not since onset of the pandemic.       Patient has low cognitive scores and has failed trial of living on her own, with recurrent falls and inability to seek help when she needs it.   Cognitive decline started a decade ago, now meets criteria for  "dementia dx, Alzheimer's type with amnesia as most prominent feature.     Past Medical History:   Diagnosis Date     Arthritis     osteoarthritis     Cognitive impairment 7/3/2012     COPD (chronic obstructive pulmonary disease) (H)      Dyspnea on exertion      Epilepsy (H)     minor seizures 2x daily     Hypertension      Numbness and tingling     bilateral numbness     Pernicious anemia     resolved with vitamin replacement     Poor short term memory 5/20/2011     Seizures (H)    Dementia, late onset Alzheimer's type    S/p left TSA 2012    SH:  Previously lived with her son, house in [facility].   Has lived there all her life.     She has 4 children, 3 of them live locally and help out.    Her granddaughter is here frequently as well.     ROS:   BIMS 11/15  >> now 8/15  Admission weight was 164 lbs     Wt Readings from Last 5 Encounters:   08/08/22 76.6 kg (168 lb 12.8 oz)   06/23/22 78.5 kg (173 lb)   04/06/22 75.9 kg (167 lb 6.4 oz)   02/10/22 76.5 kg (168 lb 9.6 oz)   12/06/21 76.6 kg (168 lb 14.4 oz)      EXAM:  Pleasant, NAD  /76   Pulse 73   Temp 97.3  F (36.3  C)   Resp 18   Ht 1.549 m (5' 1\")   Wt 76.6 kg (168 lb 12.8 oz)   SpO2 95%   BMI 31.89 kg/m     +[place]  Neck supple without adenopathy  Lungs with decreased BS, no rales or wheeze  Heart RRR s1s2 with occ ectopy  Abd soft, NT, no distention or guarding, +BS  Ext with trace 1+ edema  Neuro: confused, limited verbal skills, no focal findings, no tremor  Psych: affect okay, a little anxious       Last Comprehensive Metabolic Panel:  Sodium   Date Value Ref Range Status   05/18/2022 133 (L) 136 - 145 mmol/L Final     Potassium   Date Value Ref Range Status   05/18/2022 3.9 3.5 - 5.0 mmol/L Final     Carbon Dioxide (CO2)   Date Value Ref Range Status   05/18/2022 24 22 - 31 mmol/L Final     Glucose   Date Value Ref Range Status   05/18/2022 124 70 - 125 mg/dL Final     Urea Nitrogen   Date Value Ref Range Status   05/18/2022 8 8 - 28 mg/dL Final "     Creatinine   Date Value Ref Range Status   05/18/2022 0.69 0.60 - 1.10 mg/dL Final     GFR Estimate   Date Value Ref Range Status   05/18/2022 86 >60 mL/min/1.73m2 Final     Calcium   Date Value Ref Range Status   05/18/2022 10.6 (H) 8.5 - 10.5 mg/dL Final     Lab Results   Component Value Date    WBC 7.4 05/18/2022      HGB 14.7 05/18/2022      MCV 98 05/18/2022       05/18/2022          IMP/PLAN:   (E55.9) Vitamin D deficiency   (R29.6) Falls frequently  Comment: She has had courses of PHYSICAL THERAPY but does not retain information, abandons her walker repeatedly and remains a high fall risk   Vit D level up to 21>>>28 in April 2022, with replacement   Plan: Vit D 50 mcg/day; dietary calcium for bone health.       (M15.0) Primary osteoarthritis involving multiple joints    Comment: intermittent pain, fall risk  Plan: acetaminophen available prn along with local measures.       (I10) Essential hypertension, benign  Comment: intermittent LE Edema  BP Readings from Last 3 Encounters:   08/08/22 135/76   06/23/22 (!) 149/72   04/06/22 128/70      Plan:  losartan 50 mg/day  LE elevation, tubi- for compression, monitor for open areas    (G30.1,  F02.80) Late onset Alzheimer's disease without behavioral disturbance  Comment: functional decline, significant STML with disorientation  Plan: Board and Care Memory Care unit support for meds, meals, activity, safety and secure unit         (G40.909) Seizure disorder (H)  Comment: longstanding    Carbamazepine level 6.6 in July 2022     Plan: continue carbamazepine 300 mg bid and gabapentin 600 mg tid, with quarterly check of labs.    Follow up with Dr Fabiola Banks, [facility]:  [phone].   Pt needs appointment to follow up on these medications as she has indicated she intends to follow.   If no longer going out to clinic, could try GDR of gabapentin to try and help reduce falls       (J44.9) Chronic obstructive pulmonary disease, unspecified COPD type  (H)  Comment: by history; no current symptoms   Plan: continue Spiriva daily    Francy Swartz MD          Sincerely,        Francy Swartz MD

## 2022-08-17 ENCOUNTER — PATIENT OUTREACH (OUTPATIENT)
Dept: GERIATRIC MEDICINE | Facility: CLINIC | Age: 82
End: 2022-08-17

## 2022-08-17 NOTE — PROGRESS NOTES
No outreach completed.  CC updated program tasks and targets for [facility] launch.    Tamica Duckworth RN  [facility]  [phone]

## 2022-08-19 NOTE — PROGRESS NOTES
Yoanna Phillips is a 82 year old female seen August 8, 2022 at [facility] Memory Care unit where she has resided for 5 years (admit to [facility] 6/2017) seen to follow up dementia and recurrent falls.   Pt is seen in her room up to chair.   She is sorting her clothes, has them stacked on the bed and is holding some hangers.  Can't say exactly what her plan is with this arrangement.   States she is feeling well.   Wishes she had a bigger room.   Stands to do something, abandons her walker.    May 2022 note from  indicating current level of confusion: Member is currently in Memory Care at [place].  She is aware that she forgets things but still will say that she would like a car so she could  her children who live in her old home. Said she gets bored and that there is no one to talk to because everyone there is old and has memory problems. Reports being happy where she is now except for the boredom. Spoke to daughter who reports seeing member at least twice weekly and going outside in good weather.      By chart review, pt was moved down to the third floor Memory Care unit because of unsafe behaviors and recurrent falls.   Gait is ataxic and extremely unstable, and she will frequently abandon her walker.  Does okay on this unit, visits with other residents or goes out with her family.     Patient has past h/o seizures.  Currently on gabapentin and carbamazepine, and followed by Dr Fabiola [facility] - Dr Banks has asked to manage medications, but not clear pt has seen her recently, probably not since onset of the pandemic.       Patient has low cognitive scores and has failed trial of living on her own, with recurrent falls and inability to seek help when she needs it.   Cognitive decline started a decade ago, now meets criteria for dementia dx, Alzheimer's type with amnesia as most prominent feature.     Past Medical History:   Diagnosis Date     Arthritis   "   osteoarthritis     Cognitive impairment 7/3/2012     COPD (chronic obstructive pulmonary disease) (H)      Dyspnea on exertion      Epilepsy (H)     minor seizures 2x daily     Hypertension      Numbness and tingling     bilateral numbness     Pernicious anemia     resolved with vitamin replacement     Poor short term memory 5/20/2011     Seizures (H)    Dementia, late onset Alzheimer's type    S/p left TSA 2012    SH:  Previously lived with her son, house in [facility].   Has lived there all her life.     She has 4 children, 3 of them live locally and help out.    Her granddaughter is here frequently as well.     ROS:   BIMS 11/15  >> now 8/15  Admission weight was 164 lbs     Wt Readings from Last 5 Encounters:   08/08/22 76.6 kg (168 lb 12.8 oz)   06/23/22 78.5 kg (173 lb)   04/06/22 75.9 kg (167 lb 6.4 oz)   02/10/22 76.5 kg (168 lb 9.6 oz)   12/06/21 76.6 kg (168 lb 14.4 oz)      EXAM:  Pleasant, NAD  /76   Pulse 73   Temp 97.3  F (36.3  C)   Resp 18   Ht 1.549 m (5' 1\")   Wt 76.6 kg (168 lb 12.8 oz)   SpO2 95%   BMI 31.89 kg/m     +[place]  Neck supple without adenopathy  Lungs with decreased BS, no rales or wheeze  Heart RRR s1s2 with occ ectopy  Abd soft, NT, no distention or guarding, +BS  Ext with trace 1+ edema  Neuro: confused, limited verbal skills, no focal findings, no tremor  Psych: affect okay, a little anxious       Last Comprehensive Metabolic Panel:  Sodium   Date Value Ref Range Status   05/18/2022 133 (L) 136 - 145 mmol/L Final     Potassium   Date Value Ref Range Status   05/18/2022 3.9 3.5 - 5.0 mmol/L Final     Carbon Dioxide (CO2)   Date Value Ref Range Status   05/18/2022 24 22 - 31 mmol/L Final     Glucose   Date Value Ref Range Status   05/18/2022 124 70 - 125 mg/dL Final     Urea Nitrogen   Date Value Ref Range Status   05/18/2022 8 8 - 28 mg/dL Final     Creatinine   Date Value Ref Range Status   05/18/2022 0.69 0.60 - 1.10 mg/dL Final     GFR Estimate   Date Value Ref Range " Status   05/18/2022 86 >60 mL/min/1.73m2 Final     Calcium   Date Value Ref Range Status   05/18/2022 10.6 (H) 8.5 - 10.5 mg/dL Final     Lab Results   Component Value Date    WBC 7.4 05/18/2022      HGB 14.7 05/18/2022      MCV 98 05/18/2022       05/18/2022          IMP/PLAN:   (E55.9) Vitamin D deficiency   (R29.6) Falls frequently  Comment: She has had courses of PHYSICAL THERAPY but does not retain information, abandons her walker repeatedly and remains a high fall risk   Vit D level up to 21>>>28 in April 2022, with replacement   Plan: Vit D 50 mcg/day; dietary calcium for bone health.       (M15.0) Primary osteoarthritis involving multiple joints    Comment: intermittent pain, fall risk  Plan: acetaminophen available prn along with local measures.       (I10) Essential hypertension, benign  Comment: intermittent LE Edema  BP Readings from Last 3 Encounters:   08/08/22 135/76   06/23/22 (!) 149/72   04/06/22 128/70      Plan:  losartan 50 mg/day  LE elevation, tubi- for compression, monitor for open areas    (G30.1,  F02.80) Late onset Alzheimer's disease without behavioral disturbance  Comment: functional decline, significant STML with disorientation  Plan: Board and Care Memory Care unit support for meds, meals, activity, safety and secure unit         (G40.909) Seizure disorder (H)  Comment: longstanding    Carbamazepine level 6.6 in July 2022     Plan: continue carbamazepine 300 mg bid and gabapentin 600 mg tid, with quarterly check of labs.    Follow up with Dr Fabiola Banks, [facility]:  [phone].   Pt needs appointment to follow up on these medications as she has indicated she intends to follow.   If no longer going out to clinic, could try GDR of gabapentin to try and help reduce falls       (J44.9) Chronic obstructive pulmonary disease, unspecified COPD type (H)  Comment: by history; no current symptoms   Plan: continue Spiriva daily    Francy Swartz MD

## 2022-10-22 ENCOUNTER — HEALTH MAINTENANCE LETTER (OUTPATIENT)
Age: 82
End: 2022-10-22

## 2022-10-25 ENCOUNTER — ASSISTED LIVING VISIT (OUTPATIENT)
Dept: GERIATRICS | Facility: CLINIC | Age: 82
End: 2022-10-25
Payer: COMMERCIAL

## 2022-10-25 VITALS
SYSTOLIC BLOOD PRESSURE: 135 MMHG | HEIGHT: 61 IN | RESPIRATION RATE: 16 BRPM | DIASTOLIC BLOOD PRESSURE: 75 MMHG | OXYGEN SATURATION: 96 % | TEMPERATURE: 97.2 F | WEIGHT: 174 LBS | HEART RATE: 70 BPM | BODY MASS INDEX: 32.85 KG/M2

## 2022-10-25 DIAGNOSIS — G40.909 SEIZURE DISORDER (H): Primary | ICD-10-CM

## 2022-10-25 DIAGNOSIS — F02.80 LATE ONSET ALZHEIMER'S DISEASE WITHOUT BEHAVIORAL DISTURBANCE (H): ICD-10-CM

## 2022-10-25 DIAGNOSIS — G30.1 LATE ONSET ALZHEIMER'S DISEASE WITHOUT BEHAVIORAL DISTURBANCE (H): ICD-10-CM

## 2022-10-25 DIAGNOSIS — D64.9 ANEMIA, UNSPECIFIED TYPE: ICD-10-CM

## 2022-10-25 DIAGNOSIS — I10 ESSENTIAL HYPERTENSION, BENIGN: ICD-10-CM

## 2022-10-25 DIAGNOSIS — J44.9 CHRONIC OBSTRUCTIVE PULMONARY DISEASE, UNSPECIFIED COPD TYPE (H): ICD-10-CM

## 2022-10-25 NOTE — PROGRESS NOTES
"[facility] GERIATRICS  Chief Complaint   Patient presents with     [facility] Medical Record Number:  [id number]  Place of Service where encounter took place:  [facility] ([facility]) [[id number]]    HPI:    Yoanna Phillips  is 82 year old (1940), who is being seen today for a federally mandated E/M visit.     Past medical history includes    Hypertension    Cognitive impairment    Arthritis    COPD    Epilepsy    Anemia.       Today patient was seen in her room on her way to get a hair cut.  She states she is tired all the time because there is \"not enough to do\".   She is using a walker and has no other complaints. She denied chest pain, shortness of breath, dizziness, lightheadedness, and a poor appetite. Code status is full code.  In reviewing point click care weight slightly up in a year, blood pressure better controlled with losartan, pain controlled, heart rate controlled.            ALLERGIES:Aspirin  PAST MEDICAL HISTORY:   Past Medical History:   Diagnosis Date     Arthritis     osteoarthritis     Cognitive impairment 7/3/2012     COPD (chronic obstructive pulmonary disease) (H)      Dyspnea on exertion      Epilepsy (H)     minor seizures 2x daily     Hypertension      Numbness and tingling     bilateral numbness     Pernicious anemia     resolved with vitamin replacement     Poor short term memory 5/20/2011     Seizures (H)      PAST SURGICAL HISTORY:   has a past surgical history that includes NONSPECIFIC PROCEDURE (1965); Craniotomy (1965); Cholecystectomy; Splenectomy; Arthroplasty shoulder (6/25/2012); appendectomy; Arthroplasty hip (Right, 4/11/2016); Arthroplasty revision hip (Right, 5/14/2016); Arthroplasty hip (Left, 3/20/2017); and Closed reduction hip (Left, 5/22/2017).  FAMILY HISTORY: family history includes Arthritis in her mother; Heart Disease in her mother; Hypertension in her mother; Neurologic Disorder in her mother.  SOCIAL HISTORY:  reports that she " quit smoking about 60 years ago. Her smoking use included cigarettes. She has a 5.00 pack-year smoking history. She has never used smokeless tobacco. She reports current alcohol use. She reports that she does not use drugs.    MEDICATION reconciliation completed.          Review of your medicines          Accurate as of October 25, 2022  7:12 PM. If you have any questions, ask your nurse or doctor.            CONTINUE these medicines which have NOT CHANGED      Dose / Directions   acetaminophen 325 MG tablet  Commonly known as: TYLENOL      Dose: 650 mg  Take 650 mg by mouth every 4 hours as needed for mild pain  Refills: 0     CARBAMAZEPINE ER PO      Dose: 300 mg  Take 300 mg by mouth 2 times daily  Refills: 0     cyanocobalamin 1000 MCG/ML injection  Commonly known as: CYANOCOBALAMIN  Used for: B-complex deficiency      INJECT 1 mL INTRAMUSCULARLYON THE 25th OF EACH MONTH FOR VIT B DEFICIENCY  Quantity: 1 mL  Refills: 3     FOLIC ACID PO      Dose: 1 mg  Take 1 mg by mouth daily  Refills: 0     GABAPENTIN PO      Dose: 600 mg  Take 600 mg by mouth 3 times daily  Refills: 0     LOSARTAN POTASSIUM PO      Dose: 50 mg  Take 50 mg by mouth daily  Refills: 0     nystatin [id number] UNIT/GM external powder  Commonly known as: MYCOSTATIN      Apply topically 2 times daily as needed Apply to groin, under breast, topically as needed for redness  Refills: 0     tiotropium 18 MCG inhaled capsule  Commonly known as: SPIRIVA      Dose: 18 mcg  Inhale 18 mcg into the lungs daily  Refills: 0     vitamin D3 50 mcg (2000 units) tablet  Commonly known as: CHOLECALCIFEROL  Used for: Vitamin D deficiency      Dose: 1 tablet  Take 1 tablet (50 mcg) by mouth daily  Refills: 0             Case Management:  I have reviewed the care plan and MDS and do agree with the plan. Patient's desire to return to the community is present, but is not able due to care needs . Information reviewed:  Medications, vital signs, orders, and nursing  "notes.    ROS:  4 point ROS including Respiratory, CV, GI and , other than that noted in the HPI,  is negative    Vitals:  /75   Pulse 70   Temp 97.2  F (36.2  C)   Resp 16   Ht 1.549 m (5' 1\")   Wt 78.9 kg (174 lb)   SpO2 96%   BMI 32.88 kg/m    Body mass index is 32.88 kg/m .  Exam:  Physical Exam  Vitals and nursing note reviewed.   Constitutional:       General: She is not in acute distress.     Appearance: Normal appearance. She is not ill-appearing.   Cardiovascular:      Rate and Rhythm: Normal rate.      Heart sounds: Normal heart sounds.   Pulmonary:      Effort: Pulmonary effort is normal.      Breath sounds: Normal breath sounds.   Musculoskeletal:      Cervical back: Normal range of motion.      Right lower leg: No edema.      Left lower leg: No edema.   Neurological:      Mental Status: She is alert. Mental status is at baseline.   Psychiatric:         Mood and Affect: Mood normal.         Behavior: Behavior normal.         Lab/Diagnostic data:     Most Recent 3 CBC's:Recent Labs   Lab Test 05/18/22  0851 02/21/22  1328 05/25/21  1755   WBC 7.4 8.5 9.0   HGB 14.7 14.2 13.7   MCV 98 98 99    346 331     Most Recent 3 BMP's:Recent Labs   Lab Test 05/18/22  1250 02/21/22  1328 05/25/21  1755   * 132* 139   POTASSIUM 3.9 4.4 4.7   CHLORIDE 101 101 104   CO2 24 20* 22   BUN 8 9 10   CR 0.69 0.71 0.68   ANIONGAP 8 11 13   FELICE 10.6* 10.0 10.0    107 88     Most Recent 2 LFT's:Recent Labs   Lab Test 02/21/22  1328 03/12/20  0651   AST 14 15   ALT <9 9   ALKPHOS 119 101   BILITOTAL 0.4 0.3         ASSESSMENT/PLAN  (G40.909) Seizure disorder (H)  (primary encounter diagnosis)  Comment: No recent seizures while taking carbamazepine.   Plan: Continue with plan of care no changes at this time, adjustment as needed      (G30.1,  F02.80) Late onset Alzheimer's disease without behavioral disturbance (H)  Comment:  Condition is Chronic, progressive requiring 24-hour skilled nursing " care and would be a high risk of wandering if  were moved off the dementia care unit.   No behavioral issues or emotional distress. Expect further functional and cognitive decline.    Plan: Continue with plan of care no changes at this time, adjustment as needed      (J44.9) Chronic obstructive pulmonary disease, unspecified COPD type (H)  Comment: Chronic stable while taking Spiriva,.  Plan: Continue with plan of care no changes at this time, adjustment as needed      (I10) Essential hypertension, benign  Comment: stable while taking losartan  Plan: Continue with plan of care no changes at this time, adjustment as needed    (D62.9) anemia  Comment: Chronic, stable while taking folic acid and Vitamin B 12.   Plan: Continue with plan of care no changes at this time, adjustment as needed    Orders  No new orders        Electronically signed by:  CECILIO Zaman, APRN CNP     I was present with Babs Reynolds who participated in the service and in the documentation of this note. I have verified the history and personally performed the physical exam and medical decision making. I agree with the assessment and plan of care as documented in the note.

## 2022-10-25 NOTE — LETTER
"    10/25/2022        RE: Yoanna Chan [facility]  [address]  [facility] [address]        [facility] GERIATRICS  Chief Complaint   Patient presents with     [facility] Regulatory     [place] Medical Record Number:  [id number]  Place of Service where encounter took place:  [facility]) [[id number]]    HPI:    Yoanna Phillips  is 82 year old (1940), who is being seen today for a federally mandated E/M visit.     Past medical history includes    Hypertension    Cognitive impairment    Arthritis    COPD    Epilepsy    Anemia.       Today patient was seen in her room on her way to get a hair cut.  She states she is tired all the time because there is \"not enough to do\".   She is using a walker and has no other complaints. She denied chest pain, shortness of breath, dizziness, lightheadedness, and a poor appetite. Code status is full code.  In reviewing point click care weight slightly up in a year, blood pressure better controlled with losartan, pain controlled, heart rate controlled.            ALLERGIES:Aspirin  PAST MEDICAL HISTORY:   Past Medical History:   Diagnosis Date     Arthritis     osteoarthritis     Cognitive impairment 7/3/2012     COPD (chronic obstructive pulmonary disease) (H)      Dyspnea on exertion      Epilepsy (H)     minor seizures 2x daily     Hypertension      Numbness and tingling     bilateral numbness     Pernicious anemia     resolved with vitamin replacement     Poor short term memory 5/20/2011     Seizures (H)      PAST SURGICAL HISTORY:   has a past surgical history that includes NONSPECIFIC PROCEDURE (1965); Craniotomy (1965); Cholecystectomy; Splenectomy; Arthroplasty shoulder (6/25/2012); appendectomy; Arthroplasty hip (Right, 4/11/2016); Arthroplasty revision hip (Right, 5/14/2016); Arthroplasty hip (Left, 3/20/2017); and Closed reduction hip (Left, 5/22/2017).  FAMILY HISTORY: family history includes Arthritis in her mother; Heart " Disease in her mother; Hypertension in her mother; Neurologic Disorder in her mother.  SOCIAL HISTORY:  reports that she quit smoking about 60 years ago. Her smoking use included cigarettes. She has a 5.00 pack-year smoking history. She has never used smokeless tobacco. She reports current alcohol use. She reports that she does not use drugs.    MEDICATION reconciliation completed.          Review of your medicines          Accurate as of October 25, 2022  7:12 PM. If you have any questions, ask your nurse or doctor.            CONTINUE these medicines which have NOT CHANGED      Dose / Directions   acetaminophen 325 MG tablet  Commonly known as: TYLENOL      Dose: 650 mg  Take 650 mg by mouth every 4 hours as needed for mild pain  Refills: 0     CARBAMAZEPINE ER PO      Dose: 300 mg  Take 300 mg by mouth 2 times daily  Refills: 0     cyanocobalamin 1000 MCG/ML injection  Commonly known as: CYANOCOBALAMIN  Used for: B-complex deficiency      INJECT 1 mL INTRAMUSCULARLYON THE 25th OF EACH MONTH FOR VIT B DEFICIENCY  Quantity: 1 mL  Refills: 3     FOLIC ACID PO      Dose: 1 mg  Take 1 mg by mouth daily  Refills: 0     GABAPENTIN PO      Dose: 600 mg  Take 600 mg by mouth 3 times daily  Refills: 0     LOSARTAN POTASSIUM PO      Dose: 50 mg  Take 50 mg by mouth daily  Refills: 0     nystatin 280390 UNIT/GM external powder  Commonly known as: MYCOSTATIN      Apply topically 2 times daily as needed Apply to groin, under breast, topically as needed for redness  Refills: 0     tiotropium 18 MCG inhaled capsule  Commonly known as: SPIRIVA      Dose: 18 mcg  Inhale 18 mcg into the lungs daily  Refills: 0     vitamin D3 50 mcg (2000 units) tablet  Commonly known as: CHOLECALCIFEROL  Used for: Vitamin D deficiency      Dose: 1 tablet  Take 1 tablet (50 mcg) by mouth daily  Refills: 0             Case Management:  I have reviewed the care plan and MDS and do agree with the plan. Patient's desire to return to the community is  "present, but is not able due to care needs . Information reviewed:  Medications, vital signs, orders, and nursing notes.    ROS:  4 point ROS including Respiratory, CV, GI and , other than that noted in the HPI,  is negative    Vitals:  /75   Pulse 70   Temp 97.2  F (36.2  C)   Resp 16   Ht 1.549 m (5' 1\")   Wt 78.9 kg (174 lb)   SpO2 96%   BMI 32.88 kg/m    Body mass index is 32.88 kg/m .  Exam:  Physical Exam  Vitals and nursing note reviewed.   Constitutional:       General: She is not in acute distress.     Appearance: Normal appearance. She is not ill-appearing.   Cardiovascular:      Rate and Rhythm: Normal rate.      Heart sounds: Normal heart sounds.   Pulmonary:      Effort: Pulmonary effort is normal.      Breath sounds: Normal breath sounds.   Musculoskeletal:      Cervical back: Normal range of motion.      Right lower leg: No edema.      Left lower leg: No edema.   Neurological:      Mental Status: She is alert. Mental status is at baseline.   Psychiatric:         Mood and Affect: Mood normal.         Behavior: Behavior normal.         Lab/Diagnostic data:     Most Recent 3 CBC's:Recent Labs   Lab Test 05/18/22  0851 02/21/22  1328 05/25/21  1755   WBC 7.4 8.5 9.0   HGB 14.7 14.2 13.7   MCV 98 98 99    346 331     Most Recent 3 BMP's:Recent Labs   Lab Test 05/18/22  1250 02/21/22  1328 05/25/21  1755   * 132* 139   POTASSIUM 3.9 4.4 4.7   CHLORIDE 101 101 104   CO2 24 20* 22   BUN 8 9 10   CR 0.69 0.71 0.68   ANIONGAP 8 11 13   FELICE 10.6* 10.0 10.0    107 88     Most Recent 2 LFT's:Recent Labs   Lab Test 02/21/22  1328 03/12/20  0651   AST 14 15   ALT <9 9   ALKPHOS 119 101   BILITOTAL 0.4 0.3         ASSESSMENT/PLAN  (G40.909) Seizure disorder (H)  (primary encounter diagnosis)  Comment: No recent seizures while taking carbamazepine.   Plan: Continue with plan of care no changes at this time, adjustment as needed      (G30.1,  F02.80) Late onset Alzheimer's disease " without behavioral disturbance (H)  Comment:  Condition is Chronic, progressive requiring 24-hour skilled nursing care and would be a high risk of wandering if  were moved off the dementia care unit.   No behavioral issues or emotional distress. Expect further functional and cognitive decline.    Plan: Continue with plan of care no changes at this time, adjustment as needed      (J44.9) Chronic obstructive pulmonary disease, unspecified COPD type (H)  Comment: Chronic stable while taking Spiriva,.  Plan: Continue with plan of care no changes at this time, adjustment as needed      (I10) Essential hypertension, benign  Comment: stable while taking losartan  Plan: Continue with plan of care no changes at this time, adjustment as needed    (D62.9) anemia  Comment: Chronic, stable while taking folic acid and Vitamin B 12.   Plan: Continue with plan of care no changes at this time, adjustment as needed    Orders  No new orders        Electronically signed by:  CECILIO Zaman, TANIYA CNP     I was present with Basb Reynolds who participated in the service and in the documentation of this note. I have verified the history and personally performed the physical exam and medical decision making. I agree with the assessment and plan of care as documented in the note.               Sincerely,        Muna Del Castillo NP

## 2022-11-01 ENCOUNTER — PATIENT OUTREACH (OUTPATIENT)
Dept: GERIATRIC MEDICINE | Facility: CLINIC | Age: 82
End: 2022-11-01

## 2022-11-01 NOTE — PROGRESS NOTES
[facility] Care Coordination Contact    [facility] Six-Month Assessment    6 month assessment completed on 11-1-22 with LSW.    ER visits: No  Hospitalizations: No  TCU stays: No  Significant health status changes: no  Falls/Injuries: No  ADL/IADL changes: No    Reviewed Institutional Assessment and updated as needed.     Will see member in 6 months for an annual health risk assessment.   Encouraged member to call CC with any questions or concerns in the meantime.     Tamica Duckworth RN  [facility]  [phone]

## 2022-12-13 ENCOUNTER — LAB REQUISITION (OUTPATIENT)
Dept: LAB | Facility: CLINIC | Age: 82
End: 2022-12-13
Payer: COMMERCIAL

## 2022-12-13 DIAGNOSIS — D64.9 ANEMIA, UNSPECIFIED: ICD-10-CM

## 2022-12-13 DIAGNOSIS — I10 ESSENTIAL (PRIMARY) HYPERTENSION: ICD-10-CM

## 2022-12-13 DIAGNOSIS — E03.9 HYPOTHYROIDISM, UNSPECIFIED: ICD-10-CM

## 2022-12-14 LAB
ANION GAP SERPL CALCULATED.3IONS-SCNC: 11 MMOL/L (ref 7–15)
BUN SERPL-MCNC: 15.5 MG/DL (ref 8–23)
CALCIUM SERPL-MCNC: 10.6 MG/DL (ref 8.8–10.2)
CHLORIDE SERPL-SCNC: 101 MMOL/L (ref 98–107)
CREAT SERPL-MCNC: 0.67 MG/DL (ref 0.51–0.95)
DEPRECATED HCO3 PLAS-SCNC: 24 MMOL/L (ref 22–29)
ERYTHROCYTE [DISTWIDTH] IN BLOOD BY AUTOMATED COUNT: 13.5 % (ref 10–15)
GFR SERPL CREATININE-BSD FRML MDRD: 86 ML/MIN/1.73M2
GLUCOSE SERPL-MCNC: 71 MG/DL (ref 70–99)
HCT VFR BLD AUTO: 42.8 % (ref 35–47)
HGB BLD-MCNC: 13.9 G/DL (ref 11.7–15.7)
MCH RBC QN AUTO: 32.8 PG (ref 26.5–33)
MCHC RBC AUTO-ENTMCNC: 32.5 G/DL (ref 31.5–36.5)
MCV RBC AUTO: 101 FL (ref 78–100)
PLATELET # BLD AUTO: 319 10E3/UL (ref 150–450)
POTASSIUM SERPL-SCNC: 4.6 MMOL/L (ref 3.4–5.3)
RBC # BLD AUTO: 4.24 10E6/UL (ref 3.8–5.2)
SODIUM SERPL-SCNC: 136 MMOL/L (ref 136–145)
TSH SERPL DL<=0.005 MIU/L-ACNC: 0.8 UIU/ML (ref 0.3–4.2)
WBC # BLD AUTO: 9.1 10E3/UL (ref 4–11)

## 2022-12-14 PROCEDURE — 82306 VITAMIN D 25 HYDROXY: CPT | Mod: ORL | Performed by: INTERNAL MEDICINE

## 2022-12-14 PROCEDURE — 36415 COLL VENOUS BLD VENIPUNCTURE: CPT | Mod: ORL | Performed by: INTERNAL MEDICINE

## 2022-12-14 PROCEDURE — 85027 COMPLETE CBC AUTOMATED: CPT | Mod: ORL | Performed by: INTERNAL MEDICINE

## 2022-12-14 PROCEDURE — 80048 BASIC METABOLIC PNL TOTAL CA: CPT | Mod: ORL | Performed by: INTERNAL MEDICINE

## 2022-12-14 PROCEDURE — 84443 ASSAY THYROID STIM HORMONE: CPT | Mod: ORL | Performed by: INTERNAL MEDICINE

## 2022-12-14 PROCEDURE — P9604 ONE-WAY ALLOW PRORATED TRIP: HCPCS | Mod: ORL | Performed by: INTERNAL MEDICINE

## 2022-12-15 VITALS
DIASTOLIC BLOOD PRESSURE: 68 MMHG | OXYGEN SATURATION: 95 % | WEIGHT: 174.5 LBS | SYSTOLIC BLOOD PRESSURE: 153 MMHG | RESPIRATION RATE: 20 BRPM | HEART RATE: 79 BPM | HEIGHT: 61 IN | TEMPERATURE: 97 F | BODY MASS INDEX: 32.94 KG/M2

## 2022-12-15 LAB — DEPRECATED CALCIDIOL+CALCIFEROL SERPL-MC: 27 UG/L (ref 20–75)

## 2022-12-16 ENCOUNTER — ASSISTED LIVING VISIT (OUTPATIENT)
Dept: GERIATRICS | Facility: CLINIC | Age: 82
End: 2022-12-16
Payer: COMMERCIAL

## 2022-12-16 DIAGNOSIS — G30.1 LATE ONSET ALZHEIMER'S DISEASE WITHOUT BEHAVIORAL DISTURBANCE (H): ICD-10-CM

## 2022-12-16 DIAGNOSIS — J44.9 CHRONIC OBSTRUCTIVE PULMONARY DISEASE, UNSPECIFIED COPD TYPE (H): ICD-10-CM

## 2022-12-16 DIAGNOSIS — R29.6 FALLS FREQUENTLY: ICD-10-CM

## 2022-12-16 DIAGNOSIS — E55.9 VITAMIN D DEFICIENCY: ICD-10-CM

## 2022-12-16 DIAGNOSIS — G40.909 SEIZURE DISORDER (H): ICD-10-CM

## 2022-12-16 DIAGNOSIS — F02.80 LATE ONSET ALZHEIMER'S DISEASE WITHOUT BEHAVIORAL DISTURBANCE (H): ICD-10-CM

## 2022-12-16 DIAGNOSIS — I10 ESSENTIAL HYPERTENSION, BENIGN: Primary | ICD-10-CM

## 2022-12-16 NOTE — LETTER
"    12/16/2022        RE: Yoanna Phillips  [facility]  [address]  [facility] [address]        Yoanna Phillips is a 82 year old female seen December 16, 2022 at [facility] unit where she has resided for 5 and a half years (admit to [facility] 6/2017) seen to follow up dementia and recurrent falls.   Pt is seen in her room up ambulating with her walker, has abandoned it again and is wobbly on her feet.  Her biggest concern is that she \"wants to get out of this unit.\"   Has been unhappy since move to Memory Care unit last year.         By chart review, pt was moved down to the third floor Memory Care unit because of unsafe behaviors and recurrent falls.   Gait is ataxic and extremely unstable, and she will frequently abandon her walker.   Patient has past h/o seizures.  Currently on gabapentin and carbamazepine, and followed by Dr [facility] - Dr Banks has asked to manage medications, but not clear pt has seen her recently, probably not since onset of the pandemic.       Patient has low cognitive scores and has failed trial of living on her own, with recurrent falls and inability to seek help when she needs it.   Cognitive decline started a decade ago, now meets criteria for dementia dx, Alzheimer's type with amnesia as most prominent feature.     Past Medical History:   Diagnosis Date     Arthritis     osteoarthritis     Cognitive impairment 7/3/2012     COPD (chronic obstructive pulmonary disease) (H)      Dyspnea on exertion      Epilepsy (H)     minor seizures 2x daily     Hypertension      Numbness and tingling     bilateral numbness     Pernicious anemia     resolved with vitamin replacement     Poor short term memory 5/20/2011     Seizures (H)    Dementia, late onset Alzheimer's type    S/p left TSA 2012    SH:  Previously lived with her son, house in [place] [facility]   Has lived there all her life.     She has 4 children, 3 of them live " "locally and help out.    Her granddaughter is here frequently as well.     ROS:   BIMS 11/15  >> now 8/15  Admission weight was 164 lbs     Wt Readings from Last 5 Encounters:   12/15/22 79.2 kg (174 lb 8 oz)   10/25/22 78.9 kg (174 lb)   08/08/22 76.6 kg (168 lb 12.8 oz)   06/23/22 78.5 kg (173 lb)   04/06/22 75.9 kg (167 lb 6.4 oz)      EXAM:  Pleasant, NAD  BP (!) 153/68   Pulse 79   Temp 97  F (36.1  C)   Resp 20   Ht 1.549 m (5' 1\")   Wt 79.2 kg (174 lb 8 oz)   SpO2 95%   BMI 32.97 kg/m     +[place]  Neck supple without adenopathy  Lungs with decreased BS, no rales or wheeze  Heart RRR s1s2 with occ ectopy  Abd soft, NT, no distention or guarding, +BS  Ext with trace edema  Neuro: confused, wordfinding difficulty, no focal findings, no tremor  Psych: affect a little edgy, repetitive and not redirectable     Last Comprehensive Metabolic Panel:  Sodium   Date Value Ref Range Status   12/14/2022 136 136 - 145 mmol/L Final     Potassium   Date Value Ref Range Status   12/14/2022 4.6 3.4 - 5.3 mmol/L Final     Carbon Dioxide (CO2)   Date Value Ref Range Status   12/14/2022 24 22 - 29 mmol/L Final     Glucose   Date Value Ref Range Status   12/14/2022 71 70 - 99 mg/dL Final     Urea Nitrogen   Date Value Ref Range Status   12/14/2022 15.5 8.0 - 23.0 mg/dL Final     Creatinine   Date Value Ref Range Status   12/14/2022 0.67 0.51 - 0.95 mg/dL Final     GFR Estimate   Date Value Ref Range Status   12/14/2022 86 >60 mL/min/1.73m2 Final     Calcium   Date Value Ref Range Status   12/14/2022 10.6 (H) 8.8 - 10.2 mg/dL Final     Lab Results   Component Value Date    WBC 9.1 12/14/2022      HGB 13.9 12/14/2022       12/14/2022       12/14/2022     TSH   Date Value Ref Range Status   12/14/2022 0.80 0.30 - 4.20 uIU/mL Final   02/21/2022 1.04 0.30 - 5.00 uIU/mL Final   07/10/2018 1.21 0.30 - 5.00 uIU/mL Final        IMP/PLAN:   (E55.9) Vitamin D deficiency   (R29.6) Falls frequently  Comment: She has had " courses of PHYSICAL THERAPY but does not retain information, abandons her walker repeatedly and remains a high fall risk   Vit D level up to 21>>>28 in April 2022, most recently 27    Plan: Vit D 50 mcg/day; dietary calcium for bone health.   DEXA in 2015 showed osteopenia; could repeat and consider treatment with bisphosphonate or other modality if patient /family interested      (M15.0) Primary osteoarthritis involving multiple joints    Comment: no pain reported today    Plan: acetaminophen available prn along with local measures.       (I10) Essential hypertension, benign  Comment: intermittent LE Edema  BP Readings from Last 3 Encounters:   12/15/22 (!) 153/68   10/25/22 135/75   08/08/22 135/76      Plan:  losartan 50 mg/day  LE elevation, tubi- for compression, monitor for open areas    (G30.1,  F02.80) Late onset Alzheimer's disease without behavioral disturbance  Comment: functional decline, significant STML with disorientation  Plan: Board and Care support for meds, meals, activity, safety     Reviewed with facility staff, plan is to move pt off Memory Care when appropriate room is available.            (G40.909) Seizure disorder (H)  Comment: longstanding    Carbamazepine level 6.6 in July 2022     Plan: continue carbamazepine 300 mg bid and gabapentin 600 mg tid, with quarterly check of labs.    Follow up with Dr Fabiola Banks, [facility]:  [phone].   Pt needs appointment to follow up on these medications as she has indicated she intends to follow.   Phone number given to [facility] to help with appointment.       (J44.9) Chronic obstructive pulmonary disease, unspecified COPD type (H)  Comment: by history; no current symptoms   Plan: continue Spiriva daily    Francy Swartz MD          Sincerely,        Francy Swartz MD

## 2023-01-01 NOTE — PROGRESS NOTES
"Yoanna Phillips is a 82 year old female seen December 16, 2022 at [facility] unit where she has resided for 5 and a half years (admit to [facility] 6/2017) seen to follow up dementia and recurrent falls.   Pt is seen in her room up ambulating with her walker, has abandoned it again and is wobbly on her feet.  Her biggest concern is that she \"wants to get out of this unit.\"   Has been unhappy since move to Memory Care unit last year.         By chart review, pt was moved down to the third floor Memory Care unit because of unsafe behaviors and recurrent falls.   Gait is ataxic and extremely unstable, and she will frequently abandon her walker.   Patient has past h/o seizures.  Currently on gabapentin and carbamazepine, and followed by Dr [facility] - Dr Banks has asked to manage medications, but not clear pt has seen her recently, probably not since onset of the pandemic.       Patient has low cognitive scores and has failed trial of living on her own, with recurrent falls and inability to seek help when she needs it.   Cognitive decline started a decade ago, now meets criteria for dementia dx, Alzheimer's type with amnesia as most prominent feature.     Past Medical History:   Diagnosis Date     Arthritis     osteoarthritis     Cognitive impairment 7/3/2012     COPD (chronic obstructive pulmonary disease) (H)      Dyspnea on exertion      Epilepsy (H)     minor seizures 2x daily     Hypertension      Numbness and tingling     bilateral numbness     Pernicious anemia     resolved with vitamin replacement     Poor short term memory 5/20/2011     Seizures (H)    Dementia, late onset Alzheimer's type    S/p left TSA 2012    SH:  Previously lived with her son, house in [facility].   Has lived there all her life.     She has 4 children, 3 of them live locally and help out.    Her granddaughter is here frequently as well.     ROS:   BIMS 11/15  >> now 8/15  Admission " "weight was 164 lbs     Wt Readings from Last 5 Encounters:   12/15/22 79.2 kg (174 lb 8 oz)   10/25/22 78.9 kg (174 lb)   08/08/22 76.6 kg (168 lb 12.8 oz)   06/23/22 78.5 kg (173 lb)   04/06/22 75.9 kg (167 lb 6.4 oz)      EXAM:  Pleasant, NAD  BP (!) 153/68   Pulse 79   Temp 97  F (36.1  C)   Resp 20   Ht 1.549 m (5' 1\")   Wt 79.2 kg (174 lb 8 oz)   SpO2 95%   BMI 32.97 kg/m     +[place]  Neck supple without adenopathy  Lungs with decreased BS, no rales or wheeze  Heart RRR s1s2 with occ ectopy  Abd soft, NT, no distention or guarding, +BS  Ext with trace edema  Neuro: confused, wordfinding difficulty, no focal findings, no tremor  Psych: affect a little edgy, repetitive and not redirectable     Last Comprehensive Metabolic Panel:  Sodium   Date Value Ref Range Status   12/14/2022 136 136 - 145 mmol/L Final     Potassium   Date Value Ref Range Status   12/14/2022 4.6 3.4 - 5.3 mmol/L Final     Carbon Dioxide (CO2)   Date Value Ref Range Status   12/14/2022 24 22 - 29 mmol/L Final     Glucose   Date Value Ref Range Status   12/14/2022 71 70 - 99 mg/dL Final     Urea Nitrogen   Date Value Ref Range Status   12/14/2022 15.5 8.0 - 23.0 mg/dL Final     Creatinine   Date Value Ref Range Status   12/14/2022 0.67 0.51 - 0.95 mg/dL Final     GFR Estimate   Date Value Ref Range Status   12/14/2022 86 >60 mL/min/1.73m2 Final     Calcium   Date Value Ref Range Status   12/14/2022 10.6 (H) 8.8 - 10.2 mg/dL Final     Lab Results   Component Value Date    WBC 9.1 12/14/2022      HGB 13.9 12/14/2022       12/14/2022       12/14/2022     TSH   Date Value Ref Range Status   12/14/2022 0.80 0.30 - 4.20 uIU/mL Final   02/21/2022 1.04 0.30 - 5.00 uIU/mL Final   07/10/2018 1.21 0.30 - 5.00 uIU/mL Final        IMP/PLAN:   (E55.9) Vitamin D deficiency   (R29.6) Falls frequently  Comment: She has had courses of PHYSICAL THERAPY but does not retain information, abandons her walker repeatedly and remains a high fall risk "   Vit D level up to 21>>>28 in April 2022, most recently 27    Plan: Vit D 50 mcg/day; dietary calcium for bone health.   DEXA in 2015 showed osteopenia; could repeat and consider treatment with bisphosphonate or other modality if patient /family interested      (M15.0) Primary osteoarthritis involving multiple joints    Comment: no pain reported today    Plan: acetaminophen available prn along with local measures.       (I10) Essential hypertension, benign  Comment: intermittent LE Edema  BP Readings from Last 3 Encounters:   12/15/22 (!) 153/68   10/25/22 135/75   08/08/22 135/76      Plan:  losartan 50 mg/day  LE elevation, tubi- for compression, monitor for open areas    (G30.1,  F02.80) Late onset Alzheimer's disease without behavioral disturbance  Comment: functional decline, significant STML with disorientation  Plan: Board and Care support for meds, meals, activity, safety     Reviewed with facility staff, plan is to move pt off Memory Care when appropriate room is available.            (G40.901) Seizure disorder (H)  Comment: longstanding    Carbamazepine level 6.6 in July 2022     Plan: continue carbamazepine 300 mg bid and gabapentin 600 mg tid, with quarterly check of labs.    Follow up with Dr Fabiola Banks, [facility]:  [phone].   Pt needs appointment to follow up on these medications as she has indicated she intends to follow.   Phone number given to [facility] to help with appointment.       (J44.9) Chronic obstructive pulmonary disease, unspecified COPD type (H)  Comment: by history; no current symptoms   Plan: continue Spiriva daily    Francy Swartz MD

## 2023-02-17 ENCOUNTER — PATIENT OUTREACH (OUTPATIENT)
Dept: GERIATRIC MEDICINE | Facility: CLINIC | Age: 83
End: 2023-02-17
Payer: COMMERCIAL

## 2023-02-17 NOTE — PROGRESS NOTES
Encounter opened due to Regulatory [employer] Update to open FVP Program.    Johnna Tsai  Care Management Specialist Manager  [facility]  [phone]

## 2023-02-17 NOTE — PROGRESS NOTES
Encounter opened due to Regulatory [employer] Update to close FVP Program.    Johnna Tsai  Care Management Specialist Manager  [facility]  [phone]

## 2023-02-23 ENCOUNTER — ASSISTED LIVING VISIT (OUTPATIENT)
Dept: GERIATRICS | Facility: CLINIC | Age: 83
End: 2023-02-23
Payer: COMMERCIAL

## 2023-02-23 VITALS
RESPIRATION RATE: 18 BRPM | OXYGEN SATURATION: 96 % | TEMPERATURE: 97.7 F | SYSTOLIC BLOOD PRESSURE: 134 MMHG | HEART RATE: 84 BPM | WEIGHT: 170.2 LBS | DIASTOLIC BLOOD PRESSURE: 73 MMHG | BODY MASS INDEX: 32.13 KG/M2 | HEIGHT: 61 IN

## 2023-02-23 DIAGNOSIS — D64.9 ANEMIA, UNSPECIFIED TYPE: ICD-10-CM

## 2023-02-23 DIAGNOSIS — R29.6 FALLS FREQUENTLY: ICD-10-CM

## 2023-02-23 DIAGNOSIS — E55.9 VITAMIN D DEFICIENCY: ICD-10-CM

## 2023-02-23 DIAGNOSIS — M15.0 PRIMARY OSTEOARTHRITIS INVOLVING MULTIPLE JOINTS: ICD-10-CM

## 2023-02-23 DIAGNOSIS — R60.0 LOWER EXTREMITY EDEMA: ICD-10-CM

## 2023-02-23 DIAGNOSIS — G40.909 SEIZURE DISORDER (H): ICD-10-CM

## 2023-02-23 DIAGNOSIS — I10 ESSENTIAL HYPERTENSION, BENIGN: Primary | ICD-10-CM

## 2023-02-23 DIAGNOSIS — G30.1 LATE ONSET ALZHEIMER'S DISEASE WITHOUT BEHAVIORAL DISTURBANCE (H): ICD-10-CM

## 2023-02-23 DIAGNOSIS — J44.9 CHRONIC OBSTRUCTIVE PULMONARY DISEASE, UNSPECIFIED COPD TYPE (H): ICD-10-CM

## 2023-02-23 DIAGNOSIS — F02.80 LATE ONSET ALZHEIMER'S DISEASE WITHOUT BEHAVIORAL DISTURBANCE (H): ICD-10-CM

## 2023-02-23 DIAGNOSIS — Z00.00 ENCOUNTER FOR MEDICARE ANNUAL WELLNESS EXAM: ICD-10-CM

## 2023-02-23 PROCEDURE — 99349 HOME/RES VST EST MOD MDM 40: CPT | Performed by: NURSE PRACTITIONER

## 2023-02-23 NOTE — PATIENT INSTRUCTIONS
Patient Education   Personalized Prevention Plan  You are due for the preventive services outlined below.  Your care team is available to assist you in scheduling these services.  If you have already completed any of these items, please share that information with your care team to update in your medical record.  Health Maintenance Due   Topic Date Due     COPD Action Plan  Never done     Zoster (Shingles) Vaccine (1 of 2) Never done     Annual Wellness Visit  06/18/2008     Discuss Advance Care Planning  07/05/2021     PHQ-2 (once per calendar year)  01/01/2023

## 2023-02-23 NOTE — LETTER
2/23/2023        RE: Yoanna Chan [facility]  [address]  [facility] [place] [address]        [facility] GERIATRICS  Chief Complaint   Patient presents with     [facility] Regulatory     Wellness Visit     [place] Medical Record Number:  [id number]  Place of Service where encounter took place:  [facility] ([facility] [[id number]]    HPI:    Yoanna Phillips  is 82 year old (1940), who is being seen today for a federally mandated E/M visit.     Past medical history includes    Vitamin D deficiency     Osteoarthritis    Hypertension    Alzheimer's dementia    Arthritis    COPD    Epilepsy-  On gabapentin and carbamazepine, and followed by Dr Fabiola [facility]     Anemia.     Cataracts    Hard of hearing     On 12/2022, pt saw Dr. Swartz and she was unhappy with staying on the memory care unit.  However she had unsafe behaviors and recurrent falls.  Her gait was noted to be ataxic and extremely unstable and frequently abandoned her walker.      On 2/16/2023, she had a fall in the bathroom while not using her walker    Today patient was seen in her room.  She denied chest pain, shortness of breath, dizziness, lightheadedness, and a poor appetite.   Nursing has no concerns. BIMS=14/15 (score 13 to 15 suggests the patient is cognitively intact) PHQ9=1/27.   Patient needs limited assistance with ADLs, uses a walker.    Her appetite is fair and consumes a regular diet.  Per nursing, skin is intact. Code status is full code.    In reviewing point click care VS stable.        ALLERGIES:Aspirin  PAST MEDICAL HISTORY:   Past Medical History:   Diagnosis Date     Arthritis     osteoarthritis     Cognitive impairment 7/3/2012     COPD (chronic obstructive pulmonary disease) (H)      Dyspnea on exertion      Epilepsy (H)     minor seizures 2x daily     Hypertension      Numbness and tingling     bilateral numbness     Pernicious anemia     resolved with vitamin replacement     Poor  short term memory 5/20/2011     Seizures (H)      PAST SURGICAL HISTORY:   has a past surgical history that includes NONSPECIFIC PROCEDURE (1965); Craniotomy (1965); Cholecystectomy; Splenectomy; Arthroplasty shoulder (6/25/2012); appendectomy; Arthroplasty hip (Right, 4/11/2016); Arthroplasty revision hip (Right, 5/14/2016); Arthroplasty hip (Left, 3/20/2017); and Closed reduction hip (Left, 5/22/2017).  FAMILY HISTORY: family history includes Arthritis in her mother; Heart Disease in her mother; Hypertension in her mother; Neurologic Disorder in her mother.  SOCIAL HISTORY:  reports that she quit smoking about 60 years ago. Her smoking use included cigarettes. She has a 5.00 pack-year smoking history. She has never used smokeless tobacco. She reports current alcohol use. She reports that she does not use drugs.    MEDICATIONS:  MED REC REQUIRED  Post Medication Reconciliation Status:  Discharge medications reconciled, continue medications without change           Review of your medicines          Accurate as of February 23, 2023  6:36 AM. If you have any questions, ask your nurse or doctor.            CONTINUE these medicines which have NOT CHANGED      Dose / Directions   acetaminophen 325 MG tablet  Commonly known as: TYLENOL      Dose: 650 mg  Take 650 mg by mouth every 4 hours as needed for mild pain  Refills: 0     CARBAMAZEPINE ER PO      Dose: 300 mg  Take 300 mg by mouth 2 times daily  Refills: 0     cyanocobalamin 1000 MCG/ML injection  Commonly known as: CYANOCOBALAMIN  Used for: B-complex deficiency      INJECT 1 mL INTRAMUSCULARLYON THE 25th OF EACH MONTH FOR VIT B DEFICIENCY  Quantity: 1 mL  Refills: 3     FOLIC ACID PO      Dose: 1 mg  Take 1 mg by mouth daily  Refills: 0     GABAPENTIN PO      Dose: 600 mg  Take 600 mg by mouth 3 times daily  Refills: 0     LOSARTAN POTASSIUM PO      Dose: 50 mg  Take 50 mg by mouth daily  Refills: 0     nystatin 918229 UNIT/GM external powder  Commonly known as:  "MYCOSTATIN      Apply topically 2 times daily as needed Apply to groin, under breast, topically as needed for redness  Refills: 0     tiotropium 18 MCG inhaled capsule  Commonly known as: SPIRIVA      Dose: 18 mcg  Inhale 18 mcg into the lungs daily  Refills: 0     vitamin D3 50 mcg (2000 units) tablet  Commonly known as: CHOLECALCIFEROL  Used for: Vitamin D deficiency      Dose: 1 tablet  Take 1 tablet (50 mcg) by mouth daily  Refills: 0             Case Management:  I have reviewed the care plan and do agree with the plan. Patient's desire to return to the community is present, but is not able due to care needs . Information reviewed:  Medications, vital signs, orders, and nursing notes.    ROS:  4 point ROS including Respiratory, CV, GI and , other than that noted in the HPI,  is negative    Vitals:  /73   Pulse 84   Temp 97.7  F (36.5  C)   Resp 18   Ht 1.549 m (5' 1\")   Wt 77.2 kg (170 lb 3.2 oz)   SpO2 96%   BMI 32.16 kg/m    Body mass index is 32.16 kg/m .  Exam:  GENERAL APPEARANCE:  Alert, in no distress, morbidly obese  RESP:  lungs clear to auscultation , no respiratory distress  CV:  Palpation and auscultation of heart done , regular rate and rhythm, no murmur, rub, or gallop, peripheral edema 2+ in bilateral LE   PSYCH:  normal insight, judgement and memory, speech slightly slurred    Lab/Diagnostic data:     Most Recent 3 CBC's:Recent Labs   Lab Test 12/14/22  1030 05/18/22  0851 02/21/22  1328   WBC 9.1 7.4 8.5   HGB 13.9 14.7 14.2   * 98 98    336 346     Most Recent 3 BMP's:Recent Labs   Lab Test 12/14/22  1030 05/18/22  1250 02/21/22  1328    133* 132*   POTASSIUM 4.6 3.9 4.4   CHLORIDE 101 101 101   CO2 24 24 20*   BUN 15.5 8 9   CR 0.67 0.69 0.71   ANIONGAP 11 8 11   FELICE 10.6* 10.6* 10.0   GLC 71 124 107     Most Recent 2 LFT's:Recent Labs   Lab Test 02/21/22  1328 03/12/20  0651   AST 14 15   ALT <9 9   ALKPHOS 119 101   BILITOTAL 0.4 0.3       Most Recent TSH " and T4:Recent Labs   Lab Test 12/14/22  1030   TSH 0.80     ASSESSMENT/PLAN    (G40.909) Seizure disorder (H)  (primary encounter diagnosis)  Comment: No recent seizures while taking carbamazepine. Carbamazepine level 6.6 in July 2022     Plan:     Recommend a follow up with Dr Fabiola Banks, [facility]:  [phone].       check carbamazepine level      (M15.0) Primary osteoarthritis involving multiple joints    Comment: Chronic, stable.  no pain reported today uses a walker.  Has acetaminophen available prn   Plan: Continue with plan of care no changes at this time, adjustment as needed     (G30.1,  F02.80) Late onset Alzheimer's disease without behavioral disturbance (H)  Comment:  Condition is Chronic, progressive requiring 24-hour skilled nursing care.   No behavioral issues or emotional distress. Expect further functional and cognitive decline.    Plan: Continue with plan of care no changes at this time, adjustment as needed        (J44.9) Chronic obstructive pulmonary disease, unspecified COPD type (H)  Comment: Chronic stable while taking Spiriva,.  Plan: Continue with plan of care no changes at this time, adjustment as needed      (E55.9) Vitamin D deficiency   (R29.6) Falls frequently  Comment: Chronic.  Has had 1 fall in the past 2 months.   She has had courses of PHYSICAL THERAPY but does not retain information, abandons her walker repeatedly and remains a high fall risk.   Recent Vit D level up to 27 while taking Vit D 50 mcg/day; dietary calcium for bone health.   DEXA in 2015 showed osteopenia  Plan: Continue with plan of care no changes at this time, adjustment as needed      (I10) Essential hypertension, benign  (R60.) Lower extremity edema  Comment: stable while taking losartan, LE elevation, tubi- for compression  Plan: lymphedema to evaluate and treat.      (D62.9) anemia  Comment: Chronic, stable while taking folic acid and Vitamin B 12.   Plan: Continue with plan of care no changes at  this time, adjustment as needed     Orders     lymphedema to evaluate and treat.     Recommend a follow up with Dr Fabiola Banks, [facility]:  [phone].       check carbamazepine level    Discuss code status with family - left message with Daughter Jeanie      Electronically signed by:  TANIYA Mejia CNP            Sincerely,        TANIYA Mejia CNP

## 2023-02-23 NOTE — PROGRESS NOTES
[facility] GERIATRICS  Chief Complaint   Patient presents with     prison Regulatory     Wellness Visit     [place] Medical Record Number:  [id number]  Place of Service where encounter took place:  [facility] [[id number]]    HPI:    Yoanna Phillips  is 82 year old (1940), who is being seen today for a federally mandated E/M visit.     Past medical history includes    Vitamin D deficiency     Osteoarthritis    Hypertension    Alzheimer's dementia    Arthritis    COPD    Epilepsy-  On gabapentin and carbamazepine, and followed by Dr Fabiola Banks [facility]     Anemia.     Cataracts    Hard of hearing     On 12/2022, pt saw Dr. Swartz and she was unhappy with staying on the memory care unit.  However she had unsafe behaviors and recurrent falls.  Her gait was noted to be ataxic and extremely unstable and frequently abandoned her walker.      On 2/16/2023, she had a fall in the bathroom while not using her walker    Today patient was seen in her room.  She denied chest pain, shortness of breath, dizziness, lightheadedness, and a poor appetite.   Nursing has no concerns. BIMS=14/15 (score 13 to 15 suggests the patient is cognitively intact) PHQ9=1/27.   Patient needs limited assistance with ADLs, uses a walker.    Her appetite is fair and consumes a regular diet.  Per nursing, skin is intact. Code status is full code.    In reviewing point click care VS stable.        ALLERGIES:Aspirin  PAST MEDICAL HISTORY:   Past Medical History:   Diagnosis Date     Arthritis     osteoarthritis     Cognitive impairment 7/3/2012     COPD (chronic obstructive pulmonary disease) (H)      Dyspnea on exertion      Epilepsy (H)     minor seizures 2x daily     Hypertension      Numbness and tingling     bilateral numbness     Pernicious anemia     resolved with vitamin replacement     Poor short term memory 5/20/2011     Seizures (H)      PAST SURGICAL HISTORY:   has a past surgical history that includes  NONSPECIFIC PROCEDURE (1965); Craniotomy (1965); Cholecystectomy; Splenectomy; Arthroplasty shoulder (6/25/2012); appendectomy; Arthroplasty hip (Right, 4/11/2016); Arthroplasty revision hip (Right, 5/14/2016); Arthroplasty hip (Left, 3/20/2017); and Closed reduction hip (Left, 5/22/2017).  FAMILY HISTORY: family history includes Arthritis in her mother; Heart Disease in her mother; Hypertension in her mother; Neurologic Disorder in her mother.  SOCIAL HISTORY:  reports that she quit smoking about 60 years ago. Her smoking use included cigarettes. She has a 5.00 pack-year smoking history. She has never used smokeless tobacco. She reports current alcohol use. She reports that she does not use drugs.    MEDICATIONS:  MED REC REQUIRED  Post Medication Reconciliation Status:  Discharge medications reconciled, continue medications without change           Review of your medicines          Accurate as of February 23, 2023  6:36 AM. If you have any questions, ask your nurse or doctor.            CONTINUE these medicines which have NOT CHANGED      Dose / Directions   acetaminophen 325 MG tablet  Commonly known as: TYLENOL      Dose: 650 mg  Take 650 mg by mouth every 4 hours as needed for mild pain  Refills: 0     CARBAMAZEPINE ER PO      Dose: 300 mg  Take 300 mg by mouth 2 times daily  Refills: 0     cyanocobalamin 1000 MCG/ML injection  Commonly known as: CYANOCOBALAMIN  Used for: B-complex deficiency      INJECT 1 mL INTRAMUSCULARLYON THE 25th OF EACH MONTH FOR VIT B DEFICIENCY  Quantity: 1 mL  Refills: 3     FOLIC ACID PO      Dose: 1 mg  Take 1 mg by mouth daily  Refills: 0     GABAPENTIN PO      Dose: 600 mg  Take 600 mg by mouth 3 times daily  Refills: 0     LOSARTAN POTASSIUM PO      Dose: 50 mg  Take 50 mg by mouth daily  Refills: 0     nystatin 043525 UNIT/GM external powder  Commonly known as: MYCOSTATIN      Apply topically 2 times daily as needed Apply to groin, under breast, topically as needed for  "redness  Refills: 0     tiotropium 18 MCG inhaled capsule  Commonly known as: SPIRIVA      Dose: 18 mcg  Inhale 18 mcg into the lungs daily  Refills: 0     vitamin D3 50 mcg (2000 units) tablet  Commonly known as: CHOLECALCIFEROL  Used for: Vitamin D deficiency      Dose: 1 tablet  Take 1 tablet (50 mcg) by mouth daily  Refills: 0             Case Management:  I have reviewed the care plan and do agree with the plan. Patient's desire to return to the community is present, but is not able due to care needs . Information reviewed:  Medications, vital signs, orders, and nursing notes.    ROS:  4 point ROS including Respiratory, CV, GI and , other than that noted in the HPI,  is negative    Vitals:  /73   Pulse 84   Temp 97.7  F (36.5  C)   Resp 18   Ht 1.549 m (5' 1\")   Wt 77.2 kg (170 lb 3.2 oz)   SpO2 96%   BMI 32.16 kg/m    Body mass index is 32.16 kg/m .  Exam:  GENERAL APPEARANCE:  Alert, in no distress, morbidly obese  RESP:  lungs clear to auscultation , no respiratory distress  CV:  Palpation and auscultation of heart done , regular rate and rhythm, no murmur, rub, or gallop, peripheral edema 2+ in bilateral LE   PSYCH:  normal insight, judgement and memory, speech slightly slurred    Lab/Diagnostic data:     Most Recent 3 CBC's:Recent Labs   Lab Test 12/14/22  1030 05/18/22  0851 02/21/22  1328   WBC 9.1 7.4 8.5   HGB 13.9 14.7 14.2   * 98 98    336 346     Most Recent 3 BMP's:Recent Labs   Lab Test 12/14/22  1030 05/18/22  1250 02/21/22  1328    133* 132*   POTASSIUM 4.6 3.9 4.4   CHLORIDE 101 101 101   CO2 24 24 20*   BUN 15.5 8 9   CR 0.67 0.69 0.71   ANIONGAP 11 8 11   FELICE 10.6* 10.6* 10.0   GLC 71 124 107     Most Recent 2 LFT's:Recent Labs   Lab Test 02/21/22  1328 03/12/20  0651   AST 14 15   ALT <9 9   ALKPHOS 119 101   BILITOTAL 0.4 0.3       Most Recent TSH and T4:Recent Labs   Lab Test 12/14/22  1030   TSH 0.80     ASSESSMENT/PLAN    (G40.909) Seizure disorder (H)  " (primary encounter diagnosis)  Comment: No recent seizures while taking carbamazepine. Carbamazepine level 6.6 in July 2022     Plan:     Recommend a follow up with Dr Fabiola Banks, [facility]:  [phone].       check carbamazepine level      (M15.0) Primary osteoarthritis involving multiple joints    Comment: Chronic, stable.  no pain reported today uses a walker.  Has acetaminophen available prn   Plan: Continue with plan of care no changes at this time, adjustment as needed     (G30.1,  F02.80) Late onset Alzheimer's disease without behavioral disturbance (H)  Comment:  Condition is Chronic, progressive requiring 24-hour skilled nursing care.   No behavioral issues or emotional distress. Expect further functional and cognitive decline.    Plan: Continue with plan of care no changes at this time, adjustment as needed        (J44.9) Chronic obstructive pulmonary disease, unspecified COPD type (H)  Comment: Chronic stable while taking Spiriva,.  Plan: Continue with plan of care no changes at this time, adjustment as needed      (E55.9) Vitamin D deficiency   (R29.6) Falls frequently  Comment: Chronic.  Has had 1 fall in the past 2 months.   She has had courses of PHYSICAL THERAPY but does not retain information, abandons her walker repeatedly and remains a high fall risk.   Recent Vit D level up to 27 while taking Vit D 50 mcg/day; dietary calcium for bone health.   DEXA in 2015 showed osteopenia  Plan: Continue with plan of care no changes at this time, adjustment as needed      (I10) Essential hypertension, benign  (R60.) Lower extremity edema  Comment: stable while taking losartan, LE elevation, tubi- for compression  Plan: lymphedema to evaluate and treat.      (D62.9) anemia  Comment: Chronic, stable while taking folic acid and Vitamin B 12.   Plan: Continue with plan of care no changes at this time, adjustment as needed     Orders     lymphedema to evaluate and treat.     Recommend a follow up with  Dr Fabiola Banks, [facility]:  [phone].       check carbamazepine level    Discuss code status with family - left message with Daughter Jeanie      Electronically signed by:  TANIYA Mejia CNP

## 2023-02-27 ENCOUNTER — PATIENT OUTREACH (OUTPATIENT)
Dept: GERIATRIC MEDICINE | Facility: CLINIC | Age: 83
End: 2023-02-27
Payer: COMMERCIAL

## 2023-02-27 ASSESSMENT — ACTIVITIES OF DAILY LIVING (ADL)
DEPENDENT_IADLS:: CLEANING;COOKING;LAUNDRY;SHOPPING;MEAL PREPARATION;MEDICATION MANAGEMENT;MONEY MANAGEMENT;TRANSPORTATION;INCONTINENCE

## 2023-02-27 ASSESSMENT — PATIENT HEALTH QUESTIONNAIRE - PHQ9: SUM OF ALL RESPONSES TO PHQ QUESTIONS 1-9: 2

## 2023-02-27 NOTE — PROGRESS NOTES
[facility] Care Coordination Contact    [facility] Institutional Assessment     Institutional Assessment for Health Risk Assessment with Yoanna Phillips completed on February 27, 2023 at [facility]    Type of residence:: Nursing home  Current living arrangement:: I live in a nursing home     Assessment completed with:: Patient, Care Team Member, Children      Mental/Behavioral Health   Depression Screening: Said she is content to be where she is.     PHQ-9 Total Score: 2    Mental health DX:: No        Falls Assessment:   Fallen 2 or more times in the past year?: Yes, will get up without her walker at times   Any fall with injury in the past year?: No    ADL/IADL Dependencies:   Dependent ADLs:: Ambulation-walker, Bathing, Toileting  Dependent IADLs:: Cleaning, Cooking, Laundry, Shopping, Meal Preparation, Medication Management, Money Management, Transportation, Incontinence      Care Plan & Recommendations: Member moved from the memory care floor down to the regular floor.  She is confused but pleasantly so.  Said she was 72 years old and likes to walk to her house which is 7 blocks away. Is independent in dressing and grooming and most of the time toileting.  Today said she was incontinent of bowel in the early morning for unknown reasons.  Has good appetite. Said she does not watch much TV except for the news and is selective about taking part in activities offered by the facility.   Discussed options/opportunities for transitions.    See Institutional Care Plan for detailed assessment information.    Obtained a copy of the facility care plan and MDS from facility electronic records. Requested of group home social worker to put this care coordinator on care conference attendee list.    Placed the Health Plan facility face sheet in the member's facility chart.    Follow-Up Plan: Member informed of future contact, plan to f/u with member with a 6 month assessment, attend 1 care conference  annually, and will follow any hospitalizations or transitions. Care Coordinator contact information shared with member/family and facility, and encouraged to call this care coordinator with any questions or concerns at any time.     [place] care continuum providers: Please see Snapshot and Care Management Flowsheets for Specific details of care plan.    This CC note routed to PCP.    Tamica Duckworth RN  [facility]  [phone]

## 2023-04-01 ENCOUNTER — HEALTH MAINTENANCE LETTER (OUTPATIENT)
Age: 83
End: 2023-04-01

## 2023-04-18 ENCOUNTER — ASSISTED LIVING VISIT (OUTPATIENT)
Dept: GERIATRICS | Facility: CLINIC | Age: 83
End: 2023-04-18
Payer: COMMERCIAL

## 2023-04-18 ENCOUNTER — LAB REQUISITION (OUTPATIENT)
Dept: LAB | Facility: CLINIC | Age: 83
End: 2023-04-18
Payer: COMMERCIAL

## 2023-04-18 VITALS
TEMPERATURE: 98 F | HEART RATE: 75 BPM | DIASTOLIC BLOOD PRESSURE: 59 MMHG | RESPIRATION RATE: 18 BRPM | BODY MASS INDEX: 32.62 KG/M2 | HEIGHT: 61 IN | WEIGHT: 172.8 LBS | OXYGEN SATURATION: 96 % | SYSTOLIC BLOOD PRESSURE: 144 MMHG

## 2023-04-18 DIAGNOSIS — G40.909 SEIZURE DISORDER (H): Primary | ICD-10-CM

## 2023-04-18 DIAGNOSIS — E55.9 VITAMIN D DEFICIENCY: ICD-10-CM

## 2023-04-18 DIAGNOSIS — Z51.81 ENCOUNTER FOR THERAPEUTIC DRUG LEVEL MONITORING: ICD-10-CM

## 2023-04-18 DIAGNOSIS — I10 ESSENTIAL HYPERTENSION, BENIGN: ICD-10-CM

## 2023-04-18 DIAGNOSIS — F02.80 LATE ONSET ALZHEIMER'S DISEASE WITHOUT BEHAVIORAL DISTURBANCE (H): ICD-10-CM

## 2023-04-18 DIAGNOSIS — I10 ESSENTIAL (PRIMARY) HYPERTENSION: ICD-10-CM

## 2023-04-18 DIAGNOSIS — R29.6 FALLS FREQUENTLY: ICD-10-CM

## 2023-04-18 DIAGNOSIS — J44.9 CHRONIC OBSTRUCTIVE PULMONARY DISEASE, UNSPECIFIED COPD TYPE (H): ICD-10-CM

## 2023-04-18 DIAGNOSIS — M15.0 PRIMARY OSTEOARTHRITIS INVOLVING MULTIPLE JOINTS: ICD-10-CM

## 2023-04-18 DIAGNOSIS — D64.9 ANEMIA, UNSPECIFIED: ICD-10-CM

## 2023-04-18 DIAGNOSIS — G30.1 LATE ONSET ALZHEIMER'S DISEASE WITHOUT BEHAVIORAL DISTURBANCE (H): ICD-10-CM

## 2023-04-18 PROCEDURE — 99349 HOME/RES VST EST MOD MDM 40: CPT | Performed by: INTERNAL MEDICINE

## 2023-04-18 NOTE — LETTER
"    4/18/2023        RE: Yoanna Phillips  [facility]  [address]  [place] [address]        Yoanna Phillips is a 82 year old female seen April 18, 2023 at [facility] where she has resided for 5 and a half years (admit to [facility] [place] 6/2017) seen to follow up dementia and recurrent falls.  Pt is seen on the unit ambulating with FWW.  Had her hair done this morning.   She denies any pain, \"just tired.\"      Staff notes she was out to lunch with her family yesterday, but pt does not recall this.    Her most recent fall was 3/20, found by her recliner.  No injuries  She has lost her hearing aids and does not want replacements.           By chart review, pt was moved down to the third floor Memory Care unit because of unsafe behaviors and recurrent falls.   Gait is ataxic and extremely unstable, and she will frequently abandon her walker. However she did not like the confines of the secure unit, so she has been moved down to second floor.     Patient has past h/o seizures.  Currently on gabapentin and carbamazepine, and followed by Dr Fabiola Banks in [facility] - Dr Banks has asked to manage medications, but not clear pt has seen her recently, probably not since onset of the pandemic.       Patient has low cognitive scores and has failed trial of living on her own, with recurrent falls and inability to seek help when she needs it.   Cognitive decline started a decade ago, now meets criteria for dementia dx, Alzheimer's type with amnesia as most prominent feature.     Past Medical History:   Diagnosis Date     Arthritis     osteoarthritis     Cognitive impairment 7/3/2012     COPD (chronic obstructive pulmonary disease) (H)      Dyspnea on exertion      Epilepsy (H)     minor seizures 2x daily     Hypertension      Numbness and tingling     bilateral numbness     Pernicious anemia     resolved with vitamin replacement     Poor short term memory 5/20/2011     Seizures " "(H)    Dementia, late onset Alzheimer's type    S/p left [facility] 2012    SH:  Previously lived with her son, house in [facility].   Has lived there all her life.     She has 2 sons and 2 daughters (Kay and Jeanie), 3 of her children live locally        ROS:   BIMS 11/15  >> now 8/15  Admission weight was 164 lbs     Wt Readings from Last 5 Encounters:   04/18/23 78.4 kg (172 lb 12.8 oz)   02/23/23 77.2 kg (170 lb 3.2 oz)   12/15/22 79.2 kg (174 lb 8 oz)   10/25/22 78.9 kg (174 lb)   08/08/22 76.6 kg (168 lb 12.8 oz)      EXAM:  Pleasant, NAD  BP (!) 144/59   Pulse 75   Temp 98  F (36.7  C)   Resp 18   Ht 1.549 m (5' 1\")   Wt 78.4 kg (172 lb 12.8 oz)   SpO2 96%   BMI 32.65 kg/m     +[place]  Neck supple without adenopathy  Lungs with decreased BS, few crackles right base   Heart RRR s1, prom s2 with occ ectopy  Abd soft, NT, no distention or guarding, +BS  Ext with 1+ edema, wearing tubi-     Neuro: confused, wordfinding difficulty, no focal findings, no tremor  Psych: affect okay    Labs reviewed, unremarkable in December 2022         IMP/PLAN:   (E55.9) Vitamin D deficiency   (R29.6) Falls frequently  Comment: She has had courses of PHYSICAL THERAPY but does not retain information, abandons her walker repeatedly and remains a high fall risk   Vit D level up to 21>>>28 range over past two years       Plan: Vit D 50 mcg/day; dietary calcium for bone health.     DEXA in 2015 showed osteopenia; could repeat and consider treatment with bisphosphonate or other modality if patient /family interested      (M15.0) Primary osteoarthritis involving multiple joints    Comment: no pain reported today    Plan: acetaminophen available prn along with local measures.       (I10) Essential hypertension, benign  Comment: intermittent LE Edema  BP Readings from Last 3 Encounters:   04/18/23 (!) 144/59   02/23/23 134/73   12/15/22 (!) 153/68      Plan:  losartan 50 mg/day  LE elevation, tubi- for compression, monitor for open " areas    (G30.1,  F02.80) Late onset Alzheimer's disease without behavioral disturbance  Comment: functional decline, significant STML with disorientation  Plan: Board and Care support for meds, meals, activity, safety     Reviewed with facility staff, plan is to move pt off Memory Care when appropriate room is available.            (G40.443) Seizure disorder (H)  Comment: longstanding    Carbamazepine level 6.6 in July 2022     Plan: continue carbamazepine 300 mg bid and gabapentin 600 mg tid, with quarterly check of labs.    No recent seizures so, because of fatigue and falls will decrease gabapentin dose to 300 mg tid and see how she does     Follow up with Dr Fabiola Banks, [facility] has indicated she intends to follow, but not clear when pt seen there last.      (J44.9) Chronic obstructive pulmonary disease, unspecified COPD type (H)  Comment: by history; no current symptoms   Plan: continue Spiriva daily    Francy Swartz MD          Sincerely,        Francy Swartz MD

## 2023-04-19 LAB
ANION GAP SERPL CALCULATED.3IONS-SCNC: 14 MMOL/L (ref 7–15)
BUN SERPL-MCNC: 11.6 MG/DL (ref 8–23)
CALCIUM SERPL-MCNC: 10.2 MG/DL (ref 8.8–10.2)
CARBAMAZEPINE SERPL-MCNC: 7.9 UG/ML (ref 4–12)
CHLORIDE SERPL-SCNC: 103 MMOL/L (ref 98–107)
CREAT SERPL-MCNC: 0.62 MG/DL (ref 0.51–0.95)
DEPRECATED HCO3 PLAS-SCNC: 20 MMOL/L (ref 22–29)
GFR SERPL CREATININE-BSD FRML MDRD: 88 ML/MIN/1.73M2
GLUCOSE SERPL-MCNC: 86 MG/DL (ref 70–99)
POTASSIUM SERPL-SCNC: 4 MMOL/L (ref 3.4–5.3)
SODIUM SERPL-SCNC: 137 MMOL/L (ref 136–145)

## 2023-04-19 PROCEDURE — 80048 BASIC METABOLIC PNL TOTAL CA: CPT | Mod: ORL | Performed by: NURSE PRACTITIONER

## 2023-04-19 PROCEDURE — 36415 COLL VENOUS BLD VENIPUNCTURE: CPT | Mod: ORL | Performed by: NURSE PRACTITIONER

## 2023-04-19 PROCEDURE — P9604 ONE-WAY ALLOW PRORATED TRIP: HCPCS | Mod: ORL | Performed by: NURSE PRACTITIONER

## 2023-04-19 PROCEDURE — 80156 ASSAY CARBAMAZEPINE TOTAL: CPT | Mod: ORL | Performed by: NURSE PRACTITIONER

## 2023-04-19 PROCEDURE — 85027 COMPLETE CBC AUTOMATED: CPT | Mod: ORL | Performed by: NURSE PRACTITIONER

## 2023-04-20 LAB
ERYTHROCYTE [DISTWIDTH] IN BLOOD BY AUTOMATED COUNT: 13.6 % (ref 10–15)
HCT VFR BLD AUTO: 40.2 % (ref 35–47)
HGB BLD-MCNC: 13.4 G/DL (ref 11.7–15.7)
MCH RBC QN AUTO: 33.2 PG (ref 26.5–33)
MCHC RBC AUTO-ENTMCNC: 33.3 G/DL (ref 31.5–36.5)
MCV RBC AUTO: 100 FL (ref 78–100)
PLATELET # BLD AUTO: 271 10E3/UL (ref 150–450)
RBC # BLD AUTO: 4.04 10E6/UL (ref 3.8–5.2)
WBC # BLD AUTO: 7.9 10E3/UL (ref 4–11)

## 2023-05-02 RX ORDER — GABAPENTIN 300 MG/1
300 CAPSULE ORAL 3 TIMES DAILY
Start: 2023-05-02 | End: 2023-10-14

## 2023-05-02 NOTE — PROGRESS NOTES
"Yoanna Phillips is a 82 year old female seen April 18, 2023 at [facility] where she has resided for 5 and a half years (admit to [facility] 6/2017) seen to follow up dementia and recurrent falls.  Pt is seen on the unit ambulating with FWW.  Had her hair done this morning.   She denies any pain, \"just tired.\"      Staff notes she was out to lunch with her family yesterday, but pt does not recall this.    Her most recent fall was 3/20, found by her recliner.  No injuries  She has lost her hearing aids and does not want replacements.           By chart review, pt was moved down to the third floor Memory Care unit because of unsafe behaviors and recurrent falls.   Gait is ataxic and extremely unstable, and she will frequently abandon her walker. However she did not like the confines of the secure unit, so she has been moved down to second floor.     Patient has past h/o seizures.  Currently on gabapentin and carbamazepine, and followed by Dr Fabiola Banks in [facility] - Dr Banks has asked to manage medications, but not clear pt has seen her recently, probably not since onset of the pandemic.       Patient has low cognitive scores and has failed trial of living on her own, with recurrent falls and inability to seek help when she needs it.   Cognitive decline started a decade ago, now meets criteria for dementia dx, Alzheimer's type with amnesia as most prominent feature.     Past Medical History:   Diagnosis Date     Arthritis     osteoarthritis     Cognitive impairment 7/3/2012     COPD (chronic obstructive pulmonary disease) (H)      Dyspnea on exertion      Epilepsy (H)     minor seizures 2x daily     Hypertension      Numbness and tingling     bilateral numbness     Pernicious anemia     resolved with vitamin replacement     Poor short term memory 5/20/2011     Seizures (H)    Dementia, late onset Alzheimer's type    S/p left [facility] 2012    SH:  Previously lived with her son, [place]   Has lived there all her life.     She has 2 sons and 2 daughters (Kay and Jeanie), 3 of her children live locally        ROS:   BIMS 11/15  >> now 8/15  Admission weight was 164 lbs     Wt Readings from Last 5 Encounters:   04/18/23 78.4 kg (172 lb 12.8 oz)   02/23/23 77.2 kg (170 lb 3.2 oz)   12/15/22 79.2 kg (174 lb 8 oz)   10/25/22 78.9 kg (174 lb)   08/08/22 76.6 kg (168 lb 12.8 oz)      EXAM:  Pleasant, NAD  BP (!) 144/59   Pulse 75   Temp 98  F (36.7  C)   Resp 18   Ht 1.549 m (5' 1\")   Wt 78.4 kg (172 lb 12.8 oz)   SpO2 96%   BMI 32.65 kg/m     +[place]  Neck supple without adenopathy  Lungs with decreased BS, few crackles right base   Heart RRR s1, prom s2 with occ ectopy  Abd soft, NT, no distention or guarding, +BS  Ext with 1+ edema, wearing tubi-     Neuro: confused, wordfinding difficulty, no focal findings, no tremor  Psych: affect okay    Labs reviewed, unremarkable in December 2022         IMP/PLAN:   (E55.9) Vitamin D deficiency   (R29.6) Falls frequently  Comment: She has had courses of PHYSICAL THERAPY but does not retain information, abandons her walker repeatedly and remains a high fall risk   Vit D level up to 21>>>28 range over past two years       Plan: Vit D 50 mcg/day; dietary calcium for bone health.     DEXA in 2015 showed osteopenia; could repeat and consider treatment with bisphosphonate or other modality if patient /family interested      (M15.0) Primary osteoarthritis involving multiple joints    Comment: no pain reported today    Plan: acetaminophen available prn along with local measures.       (I10) Essential hypertension, benign  Comment: intermittent LE Edema  BP Readings from Last 3 Encounters:   04/18/23 (!) 144/59   02/23/23 134/73   12/15/22 (!) 153/68      Plan:  losartan 50 mg/day  LE elevation, tubi- for compression, monitor for open areas    (G30.1,  F02.80) Late onset Alzheimer's disease without behavioral disturbance  Comment: functional decline, " significant STML with disorientation  Plan: Board and Care support for meds, meals, activity, safety     Reviewed with facility staff, plan is to move pt off Memory Care when appropriate room is available.            (G40.775) Seizure disorder (H)  Comment: longstanding    Carbamazepine level 6.6 in July 2022     Plan: continue carbamazepine 300 mg bid and gabapentin 600 mg tid, with quarterly check of labs.    No recent seizures so, because of fatigue and falls will decrease gabapentin dose to 300 mg tid and see how she does     Follow up with Dr Fabiola Banks, [facility] has indicated she intends to follow, but not clear when pt seen there last.      (J44.9) Chronic obstructive pulmonary disease, unspecified COPD type (H)  Comment: by history; no current symptoms   Plan: continue Spiriva daily    Francy Swartz MD

## 2023-06-12 ENCOUNTER — TELEPHONE (OUTPATIENT)
Dept: GERIATRICS | Facility: CLINIC | Age: 83
End: 2023-06-12
Payer: COMMERCIAL

## 2023-06-12 NOTE — TELEPHONE ENCOUNTER
[facility] Triage Nurse Telephone Encounter    Provider: TANIYA Patiño CNP  Facility: [facility] Facility Type:  LTC    Caller: Devang  Call Back Number: [phone]    Allergies:    Allergies   Allergen Reactions     Aspirin Unknown        Reason for call: Nurse is reporting that patient was found kathy down on the floor at 0530 today.  She was screaming for help.  She also had her left hand under her and her walker was out in front of her.  She did report hitting her head, but it is unclear where on the head she hit it and what she hit it on.  VS are stable and neuros are per baseline.        Verbal Order/Direction given by Provider: Continue to monitor per protocol doing VS and neurochecks.  Update provider team with any changes.        Provider giving Order:  TANIYA Patiño CNP, RN

## 2023-06-12 NOTE — TELEPHONE ENCOUNTER
Pt had a fall today.    Landed on her anterior side with left arm under her.  Was yelling for help.   Not on OAC.   neuros and VS at baseline  No hospitalization at this time.     TANIYA Mejia CNP on 6/12/2023 at 3:26 PM

## 2023-06-20 ENCOUNTER — ASSISTED LIVING VISIT (OUTPATIENT)
Dept: GERIATRICS | Facility: CLINIC | Age: 83
End: 2023-06-20
Payer: COMMERCIAL

## 2023-06-20 VITALS
HEIGHT: 61 IN | SYSTOLIC BLOOD PRESSURE: 116 MMHG | RESPIRATION RATE: 18 BRPM | TEMPERATURE: 97.5 F | HEART RATE: 72 BPM | BODY MASS INDEX: 31.74 KG/M2 | DIASTOLIC BLOOD PRESSURE: 68 MMHG | OXYGEN SATURATION: 93 % | WEIGHT: 168.1 LBS

## 2023-06-20 DIAGNOSIS — M15.0 PRIMARY OSTEOARTHRITIS INVOLVING MULTIPLE JOINTS: ICD-10-CM

## 2023-06-20 DIAGNOSIS — G30.1 LATE ONSET ALZHEIMER'S DISEASE WITHOUT BEHAVIORAL DISTURBANCE (H): ICD-10-CM

## 2023-06-20 DIAGNOSIS — D64.9 ANEMIA, UNSPECIFIED TYPE: ICD-10-CM

## 2023-06-20 DIAGNOSIS — I10 ESSENTIAL HYPERTENSION, BENIGN: ICD-10-CM

## 2023-06-20 DIAGNOSIS — R29.6 FALLS FREQUENTLY: ICD-10-CM

## 2023-06-20 DIAGNOSIS — J44.9 CHRONIC OBSTRUCTIVE PULMONARY DISEASE, UNSPECIFIED COPD TYPE (H): ICD-10-CM

## 2023-06-20 DIAGNOSIS — G40.909 SEIZURE DISORDER (H): Primary | ICD-10-CM

## 2023-06-20 DIAGNOSIS — R60.0 LOWER EXTREMITY EDEMA: ICD-10-CM

## 2023-06-20 DIAGNOSIS — E55.9 VITAMIN D DEFICIENCY: ICD-10-CM

## 2023-06-20 DIAGNOSIS — F02.80 LATE ONSET ALZHEIMER'S DISEASE WITHOUT BEHAVIORAL DISTURBANCE (H): ICD-10-CM

## 2023-06-20 PROCEDURE — 99349 HOME/RES VST EST MOD MDM 40: CPT | Performed by: NURSE PRACTITIONER

## 2023-06-20 NOTE — LETTER
6/20/2023        RE: [facility]  [address]  [facility] [address]        [facility]  Chief Complaint   Patient presents with     half-way [facility] Medical Record Number:  [id number]  Place of Service where encounter took place:  [facility]) [[id number]]    HPI:    Yoanna Phillips  is 83 year old (1940), who is being seen today for a federally mandated E/M visit.     Past medical includes    Vitamin D deficiency     Osteoarthritis    Hypertension    Alzheimer's dementia    Arthritis    COPD    Epilepsy-  On gabapentin and carbamazepine, and followed by Dr [facility]ola [facility]     Anemia.     Cataracts    Hard of hearing      She did have a fall recently    Today patient was seen in her room lying in her recliner.  She denies any problems.   Nursing has no concerns. BIMS=11/15  (8 to 12 suggests moderately impaired) PHQ9=2/27.    In reviewing point click care, vs stable.     ALLERGIES:Aspirin  PAST MEDICAL HISTORY:   Past Medical History:   Diagnosis Date     Arthritis     osteoarthritis     Cognitive impairment 7/3/2012     COPD (chronic obstructive pulmonary disease) (H)      Dyspnea on exertion      Epilepsy (H)     minor seizures 2x daily     Hypertension      Numbness and tingling     bilateral numbness     Pernicious anemia     resolved with vitamin replacement     Poor short term memory 5/20/2011     Seizures (H)      PAST SURGICAL HISTORY:   has a past surgical history that includes NONSPECIFIC PROCEDURE (1965); Craniotomy (1965); Cholecystectomy; Splenectomy; Arthroplasty shoulder (6/25/2012); appendectomy; Arthroplasty hip (Right, 4/11/2016); Arthroplasty revision hip (Right, 5/14/2016); Arthroplasty hip (Left, 3/20/2017); and Closed reduction hip (Left, 5/22/2017).  FAMILY HISTORY: family history includes Arthritis in her mother; Heart Disease in her mother; Hypertension in her mother; Neurologic Disorder in  her mother.  SOCIAL HISTORY:  reports that she quit smoking about 61 years ago. Her smoking use included cigarettes. She has a 5.00 pack-year smoking history. She has never used smokeless tobacco. She reports current alcohol use. She reports that she does not use drugs.    MEDICATIONS:  MED REC REQUIRED  Post Medication Reconciliation Status:  Discharge medications reconciled, continue medications without change           Review of your medicines          Accurate as of June 20, 2023  1:30 PM. If you have any questions, ask your nurse or doctor.            CONTINUE these medicines which have NOT CHANGED      Dose / Directions   acetaminophen 325 MG tablet  Commonly known as: TYLENOL      Dose: 650 mg  Take 650 mg by mouth every 4 hours as needed for mild pain  Refills: 0     CARBAMAZEPINE ER PO      Dose: 300 mg  Take 300 mg by mouth 2 times daily  Refills: 0     cyanocobalamin 1000 MCG/ML injection  Commonly known as: CYANOCOBALAMIN  Used for: B-complex deficiency      INJECT 1 mL INTRAMUSCULARLYON THE 25th OF EACH MONTH FOR VIT B DEFICIENCY  Quantity: 1 mL  Refills: 3     FOLIC ACID PO      Dose: 1 mg  Take 1 mg by mouth daily  Refills: 0     gabapentin 300 MG capsule  Commonly known as: NEURONTIN  Used for: Seizure disorder (H)      Dose: 300 mg  Take 1 capsule (300 mg) by mouth 3 times daily  Refills: 0     LOSARTAN POTASSIUM PO      Dose: 50 mg  Take 50 mg by mouth daily  Refills: 0     tiotropium 18 MCG inhaled capsule  Commonly known as: SPIRIVA      Dose: 18 mcg  Inhale 18 mcg into the lungs daily  Refills: 0     vitamin D3 50 mcg (2000 units) tablet  Commonly known as: CHOLECALCIFEROL  Used for: Vitamin D deficiency      Dose: 1 tablet  Take 1 tablet (50 mcg) by mouth daily  Refills: 0             Case Management:  I have reviewed the care plan and MDS and do agree with the plan. Patient's desire to return to the community is not present. Information reviewed:  Medications, vital signs, orders, and nursing  "notes.    ROS:  4 point ROS including Respiratory, CV, GI and , other than that noted in the HPI,  is negative    Vitals:  /68   Pulse 72   Temp 97.5  F (36.4  C)   Resp 18   Ht 1.549 m (5' 1\")   Wt 76.2 kg (168 lb 1.6 oz)   SpO2 93%   BMI 31.76 kg/m    Body mass index is 31.76 kg/m .  Exam:  GENERAL APPEARANCE:  Alert, in no distress  RESP:  lungs clear to auscultation , no respiratory distress  CV:  Palpation and auscultation of heart done , rate-normal  PSYCH:  insight and judgement impaired, memory impaired     Lab/Diagnostic data:     Most Recent 3 CBC's:  Recent Labs   Lab Test 04/19/23  1012 12/14/22  1030 05/18/22  0851   WBC 7.9 9.1 7.4   HGB 13.4 13.9 14.7    101* 98    319 336     Most Recent 3 BMP's:  Recent Labs   Lab Test 04/19/23  1012 12/14/22  1030 05/18/22  1250    136 133*   POTASSIUM 4.0 4.6 3.9   CHLORIDE 103 101 101   CO2 20* 24 24   BUN 11.6 15.5 8   CR 0.62 0.67 0.69   ANIONGAP 14 11 8   FELICE 10.2 10.6* 10.6*   GLC 86 71 124     Most Recent 2 LFT's:  Recent Labs   Lab Test 02/21/22  1328 03/12/20  0651   AST 14 15   ALT <9 9   ALKPHOS 119 101   BILITOTAL 0.4 0.3       Most Recent TSH and T4:  Recent Labs   Lab Test 12/14/22  1030   TSH 0.80       ASSESSMENT/PLAN    (G40.909) Seizure disorder (H)  (primary encounter diagnosis)  Comment: No recent seizures while taking carbamazepine. Carbamazepine level 7.9 in 4/2023     Plan:     Recommend a follow up with Dr Fabiola Banks, [facility]:  [phone].         (M15.0) Primary osteoarthritis involving multiple joints    Comment: Chronic, stable.  no pain reported today uses a walker.  Has acetaminophen available prn   Plan: Continue with plan of care no changes at this time, adjustment as needed     (G30.1,  F02.80) Late onset Alzheimer's disease without behavioral disturbance (H)  Comment:  Condition is Chronic, progressive requiring 24-hour skilled nursing care.   No behavioral issues or emotional " distress. Expect further functional and cognitive decline.    Plan: Continue with plan of care no changes at this time, adjustment as needed        (J44.9) Chronic obstructive pulmonary disease, unspecified COPD type (H)  Comment: Chronic stable while taking Spiriva,.  Plan: Continue with plan of care no changes at this time, adjustment as needed      (E55.9) Vitamin D deficiency   (R29.6) Falls frequently  Comment: Chronic. Has history of falls.   Recent Vit D level up to 27 while taking Vit D 50 mcg/day; dietary calcium for bone health.   DEXA in 2015 showed osteopenia  Plan: Continue with plan of care no changes at this time, adjustment as needed        (I10) Essential hypertension, benign  (R60.) Lower extremity edema  Comment: stable while taking losartan, LE elevation, tubi- for compression  Plan: Continue with plan of care no changes at this time, adjustment as needed        (D62.9) anemia  Comment: Chronic, stable while taking folic acid and Vitamin B 12.   Plan: Continue with plan of care no changes at this time, adjustment as needed     Orders.   No new orders      Electronically signed by:  TANIYA Mejia CNP            Sincerely,        Babs Reynolds, TANIYA CNP

## 2023-08-23 NOTE — LETTER
10/6/2020        RE: Yoanna [facility]  [address] [place] [address]        [facility]  Chief Complaint   Patient presents with     detention Regulatory     Wellness Visit     [facility] Medical Record Number:  [id number]  Place of Service where encounter took place[facility] [[id number][facility]    HPI:    Yoanna Phillips  is 80 year old (1940), who is being seen today for a federally mandated E/M visit.  HPI information obtained from: facility chart records, facility staff and patient report.     Today's concerns are:  1. Chronic obstructive pulmonary disease, unspecified COPD type (H)  No recent flares. Resident feels her breathing is at baseline. No concerns at this time. Takes spiriva 18 mcg/day.     2. Essential hypertension, benign  BP Readings from Last 3 Encounters:   10/03/20 138/66   08/17/20 132/79   06/18/20 (!) 141/59   Denies CP, SOB or lightheadedness. Takes losartan 50 mg/day.     3. Late onset Alzheimer's disease without behavioral disturbance (H)  BIMS 12/15. Alert and oriented x3 upon today's visit. No behavior or mood concerns per staff.     4. Seizure disorder (H)  No recent activity. Taking gabapentin and carbamazepine.     5. Primary osteoarthritis of right hip  Denies pain, recently requested acetaminophen to be disconnected.       ALLERGIES:Aspirin  PAST MEDICAL HISTORY:   has a past medical history of Arthritis, Cognitive impairment (7/3/2012), COPD (chronic obstructive pulmonary disease) (H), Dyspnea on exertion, Epilepsy (H), Hypertension, Numbness and tingling, Pernicious anemia, Poor short term memory (5/20/2011), and Seizures (H). She also has no past medical history of PONV (postoperative nausea and vomiting).  PAST SURGICAL HISTORY:   has a past surgical history that includes NONSPECIFIC PROCEDURE (1965); Craniotomy (1965); Cholecystectomy; Splenectomy; Arthroplasty shoulder (6/25/2012); appendectomy; Arthroplasty hip  POC discussed with pt, pt verbalized understanding   "(Right, 4/11/2016); Arthroplasty revision hip (Right, 5/14/2016); Arthroplasty hip (Left, 3/20/2017); and Closed reduction hip (Left, 5/22/2017).  FAMILY HISTORY: family history includes Arthritis in her mother; Heart Disease in her mother; Hypertension in her mother; Neurologic Disorder in her mother.  SOCIAL HISTORY:  reports that she quit smoking about 58 years ago. Her smoking use included cigarettes. She has a 5.00 pack-year smoking history. She has never used smokeless tobacco. She reports current alcohol use. She reports that she does not use drugs.    MEDICATIONS:  Current Outpatient Medications   Medication Sig Dispense Refill     acetaminophen (TYLENOL) 325 MG tablet Take 650 mg by mouth every 6 hours as needed for pain       CARBAMAZEPINE ER PO Take 300 mg by mouth 2 times daily       cyanocobalamin (VITAMIN B12) 1000 MCG/ML injection Inject 1,000 mcg into the muscle every day shift starting on the 25th and ending on the 25th every month       FOLIC ACID PO Take 1 mg by mouth daily       GABAPENTIN PO Take 600 mg by mouth 3 times daily        LOSARTAN POTASSIUM PO Take 50 mg by mouth daily       Nystatin POWD Apply to redness topically as needed for Rash QD AND Apply to groin topically every day and evening shift for Rash Until resolved.       tiotropium (SPIRIVA) 18 MCG capsule Inhale 18 mcg into the lungs daily       VITAMIN D, CHOLECALCIFEROL, PO Take 1,000 Units by mouth daily           Case Management:  I have reviewed the care plan and MDS and do agree with the plan. Patient's desire to return to the community is present, but is not able due to care needs . Information reviewed:  Medications, vital signs, orders, and nursing notes.    ROS:  4 point ROS including Respiratory, CV, GI and , other than that noted in the HPI,  is negative    Vitals:  /66   Pulse 63   Temp 97.4  F (36.3  C)   Resp 17   Ht 1.549 m (5' 1\")   Wt 76.1 kg (167 lb 12.8 oz)   SpO2 97%   BMI 31.71 kg/m    Body mass " index is 31.71 kg/m .  Exam:  GENERAL APPEARANCE:  Alert, in no distress  ENT:  Mouth and posterior oropharynx normal, moist mucous membranes  RESP:  respiratory effort and palpation of chest normal, lungs clear to auscultation   CV:  Palpation and auscultation of heart done , regular rate and rhythm, no murmur, rub, or gallop  SKIN:  Inspection of skin and subcutaneous tissue baseline, Palpation of skin and subcutaneous tissue baseline  NEURO:   Cranial nerves 2-12 are normal tested and grossly at patient's baseline    Lab/Diagnostic data:   Labs done in SNF are in [facility]. Please refer to them using [facility] Everywhere. and Recent labs in [facility] reviewed by me today.             ASSESSMENT/PLAN  (J44.9) Chronic obstructive pulmonary disease, unspecified COPD type (H)  (primary encounter diagnosis)  Comment: Chronic, stable. Without recent illness or flare.   Plan: Continue without change at this time, may utilize prednisone w/flare     (I10) Essential hypertension, benign  Comment: Chronic, blood pressure less than goal 150/90 on current regimen.   Plan: No change at this time and adjustments as clinically indicated. Keep SBP> 130 mmHg and DBP > 65 mmHg (levels below these increase mortality as shown by standard studies and observations).       (G30.1,  F02.80) Late onset Alzheimer's disease without behavioral disturbance (H)  Comment: Stable at this time, no concerns per staff. Alert and oriented x3  Plan:   -Continue board and care support with medication administration, meals and safety.     (G40.909) Seizure disorder (H)  Comment: No recent activity   Plan: Continue gabapentin and carbamazepine as ordered. Follow up with neurology as planned.     (M16.11) Primary osteoarthritis of right hip  Comment: Denies pain.   Plan:   -Monitor, not taking medications at this time.     The current medical regimen is effective; continue present plan and medications.    Electronically signed by:  TANIYA Zaman  CNP  [facility]           Sincerely,        Muna Del Castillo, NP

## 2023-08-25 ENCOUNTER — ASSISTED LIVING VISIT (OUTPATIENT)
Dept: GERIATRICS | Facility: CLINIC | Age: 83
End: 2023-08-25
Payer: COMMERCIAL

## 2023-08-25 VITALS
HEART RATE: 63 BPM | BODY MASS INDEX: 32.02 KG/M2 | OXYGEN SATURATION: 96 % | WEIGHT: 169.6 LBS | HEIGHT: 61 IN | TEMPERATURE: 97.9 F | RESPIRATION RATE: 20 BRPM | DIASTOLIC BLOOD PRESSURE: 79 MMHG | SYSTOLIC BLOOD PRESSURE: 135 MMHG

## 2023-08-25 DIAGNOSIS — F02.80 LATE ONSET ALZHEIMER'S DISEASE WITHOUT BEHAVIORAL DISTURBANCE (H): ICD-10-CM

## 2023-08-25 DIAGNOSIS — R29.6 FALLS FREQUENTLY: ICD-10-CM

## 2023-08-25 DIAGNOSIS — M15.0 PRIMARY OSTEOARTHRITIS INVOLVING MULTIPLE JOINTS: ICD-10-CM

## 2023-08-25 DIAGNOSIS — G30.1 LATE ONSET ALZHEIMER'S DISEASE WITHOUT BEHAVIORAL DISTURBANCE (H): ICD-10-CM

## 2023-08-25 DIAGNOSIS — J44.9 CHRONIC OBSTRUCTIVE PULMONARY DISEASE, UNSPECIFIED COPD TYPE (H): Primary | ICD-10-CM

## 2023-08-25 DIAGNOSIS — G40.909 SEIZURE DISORDER (H): ICD-10-CM

## 2023-08-25 PROCEDURE — 99348 HOME/RES VST EST LOW MDM 30: CPT | Performed by: INTERNAL MEDICINE

## 2023-08-25 NOTE — LETTER
8/25/2023        RE: Yoanna Phillips  [facility]  [address] [facility] [address]        No notes on file      Sincerely,        Francy Swartz MD

## 2023-08-29 ENCOUNTER — CLINICAL UPDATE (OUTPATIENT)
Dept: PHARMACY | Facility: CLINIC | Age: 83
End: 2023-08-29
Payer: COMMERCIAL

## 2023-08-29 DIAGNOSIS — I10 ESSENTIAL HYPERTENSION, BENIGN: Primary | ICD-10-CM

## 2023-08-29 DIAGNOSIS — G30.1 LATE ONSET ALZHEIMER'S DISEASE WITHOUT BEHAVIORAL DISTURBANCE (H): ICD-10-CM

## 2023-08-29 DIAGNOSIS — M81.0 OSTEOPOROSIS: ICD-10-CM

## 2023-08-29 DIAGNOSIS — J44.9 CHRONIC OBSTRUCTIVE PULMONARY DISEASE, UNSPECIFIED COPD TYPE (H): ICD-10-CM

## 2023-08-29 DIAGNOSIS — E63.9 NUTRITIONAL DEFICIENCY: ICD-10-CM

## 2023-08-29 DIAGNOSIS — F02.80 LATE ONSET ALZHEIMER'S DISEASE WITHOUT BEHAVIORAL DISTURBANCE (H): ICD-10-CM

## 2023-08-29 DIAGNOSIS — G40.909 SEIZURE DISORDER (H): ICD-10-CM

## 2023-08-29 PROCEDURE — 99207 PR NO CHARGE LOS: CPT | Performed by: PHARMACIST

## 2023-08-29 NOTE — PROGRESS NOTES
This patient's medication list and chart were reviewed as part of the service provided by [facility].    Assessment/Recommendations:    Noted gabapentin was appropriately reduced from 600mg three times daily to 300mg three times daily on 4/18/23 due to sedation, falls, no recent seizures.  Appears per chart review that patient has tolerated dose reduction.  Is also on carbamazepine ER 300mg twice daily.  Previously followed by neurology, and per a previous encounter, no longer following with neurology due to goals of care.  Anticonvulsants can of course significantly increase risk of ataxia, falls.  If remains seizure free, in future, may be of benefit to continue with gradual dose reduction of gabapentin.    Due to continued carbamazepine use, continue with periodic monitoring of CBC, renal and hepatic function, sodium, eye exam, carbamazepine level, thyroid labs.  Please ensure hepatic function checked with next labs (BMP was checked in 4/2023, not CMP).    Of note, previously on alendronate (started 3/2015) following DEXA indicating osteoporosis per chart review.  Unclear when or why this was discontinued.  Agree with MD, that due to high fall/fracture risk, and if aligns with patient/family goals of care, could consider recheck BMD/DEXA for baseline.  If patient tolerated alendronate previously(and if she can follow instructions for use), could restart alendronate 70mg weekly.  Would recheck calcium and vitamin D prior to potential initiation as well.        Estimated Creatinine Clearance: 63.3 mL/min (based on SCr of 0.62 mg/dL).    Jeanie Bo, Pharm.D.,[facility]  Board Certified Geriatric Pharmacist  Medication Therapy Management Pharmacist  [phone]

## 2023-09-04 ENCOUNTER — PATIENT OUTREACH (OUTPATIENT)
Dept: GERIATRIC MEDICINE | Facility: CLINIC | Age: 83
End: 2023-09-04
Payer: COMMERCIAL

## 2023-09-04 NOTE — PROGRESS NOTES
[facility] Care Coordination Contact    [facility] Six-Month Assessment    6 month assessment completed on 8-30-23 with Fabiola SANTOS.    ER visits: No  Hospitalizations: No  TCU stays: No  Significant health status changes: no, has been seizure free. No concerns.  Falls/Injuries: No  ADL/IADL changes: No    Reviewed Institutional Assessment and updated as needed.     Will see member in 6 months for an annual health risk assessment.   Encouraged member to call CC with any questions or concerns in the meantime.      Tamica Duckworth RN  [facility]  [phone]

## 2023-09-14 NOTE — PROGRESS NOTES
Yoanna Phillips is a 83 year old female seen August 25, 2023 at [facility] where she has resided for 6 years (admit to [facility] 6/2017) seen to follow up dementia and recurrent falls.  She is seen in her room, dozing in her recliner.   No new concerns per patient or nursing staff.        By chart review, pt was moved down to the third floor Memory Care unit because of unsafe behaviors and recurrent falls.   Gait is ataxic and extremely unstable, and she will frequently abandon her walker. However she did not like the confines of the secure unit, so she has been moved down to second floor.     Patient has past h/o seizures.  Currently on gabapentin and carbamazepine, and followed by Dr Fabiola [facility] - last seen in June 2023    Patient has low cognitive scores and has failed trial of living on her own, with recurrent falls and inability to seek help when she needs it.  Cognitive decline started a decade ago, now meets criteria for dementia dx, Alzheimer's type with amnesia as most prominent feature.     Past Medical History:   Diagnosis Date    Arthritis     osteoarthritis    Cognitive impairment 7/3/2012    COPD (chronic obstructive pulmonary disease) (H)     Dyspnea on exertion     Epilepsy (H)     minor seizures 2x daily    Hypertension     Numbness and tingling     bilateral numbness    Pernicious anemia     resolved with vitamin replacement    Poor short term memory 5/20/2011    Seizures (H)    Dementia, late onset Alzheimer's type    S/p left TSA 2012    SH:  Previously lived with her son, house in [facility].   Has lived there all her life.     She has 2 sons and 2 daughters (Kay and Jeanie), 3 of her children live locally        ROS:   BIMS 8-11/15  She has lost her hearing aids and does not want replacements.       Admission weight was 164 lbs     Wt Readings from Last 5 Encounters:   08/25/23 76.9 kg (169 lb 9.6 oz)   06/20/23 76.2 kg (168 lb 1.6 oz)   04/18/23 78.4 kg  "(172 lb 12.8 oz)   02/23/23 77.2 kg (170 lb 3.2 oz)   12/15/22 79.2 kg (174 lb 8 oz)      EXAM:  Pleasant, NAD  /79   Pulse 63   Temp 97.9  F (36.6  C)   Resp 20   Ht 1.549 m (5' 1\")   Wt 76.9 kg (169 lb 9.6 oz)   SpO2 96%   BMI 32.05 kg/m     +[place]  Neck supple without adenopathy  Lungs with decreased BS, few crackles right base   Heart RRR s1, prom s2 with occ ectopy  Abd soft, NT, no distention or guarding, +BS  Ext with trace edema, wearing tubi-       Lab Results   Component Value Date     04/19/2023    POTASSIUM 4.0 04/19/2023    CHLORIDE 103 04/19/2023    CO2 20 (L) 04/19/2023    ANIONGAP 14 04/19/2023    GLC 86 04/19/2023    BUN 11.6 04/19/2023    CR 0.62 04/19/2023    GFRESTIMATED 88 04/19/2023    FELICE 10.2 04/19/2023     Lab Results   Component Value Date    WBC 7.9 04/19/2023      HGB 13.4 04/19/2023       04/19/2023       04/19/2023              IMP/PLAN:   (E55.9) Vitamin D deficiency   (R29.6) Falls frequently  Comment: She has had courses of PHYSICAL THERAPY but does not retain information, abandons her walker repeatedly and remains a high fall risk   Vit D level up to 21>>>28 range over past two years       Plan: Vit D 50 mcg/day; dietary calcium for bone health.     DEXA in 2015 showed osteopenia; could repeat and consider treatment with bisphosphonate or other modality if patient /family interested      (M15.0) Primary osteoarthritis involving multiple joints    Comment: no pain reported today    Plan: acetaminophen available prn along with local measures.       (I10) Essential hypertension, benign  Comment: intermittent LE Edema  BP Readings from Last 3 Encounters:   08/25/23 135/79   06/20/23 116/68   04/18/23 (!) 144/59      Plan:  losartan 50 mg/day  LE elevation, tubi- for compression, monitor for open areas    (G30.1,  F02.80) Late onset Alzheimer's disease without behavioral disturbance  Comment: functional decline, significant STML with " disorientation and recurrent falls  Plan: Board and Care support for meds, meals, activity, safety             (G40.909) Seizure disorder (H)  Comment: longstanding    Carbamazepine level 7.9 in April      Plan: continue carbamazepine 300 mg bid and gabapentin 300 mg tid, with quarterly check of labs.   Follow up with Dr Fabiola [facility]     (J44.9) Chronic obstructive pulmonary disease, unspecified COPD type (H)  Comment: by history; no current symptoms   Plan: continue Spiriva daily    Francy Swartz MD     no

## 2023-10-11 ENCOUNTER — ASSISTED LIVING VISIT (OUTPATIENT)
Dept: GERIATRICS | Facility: CLINIC | Age: 83
End: 2023-10-11
Payer: COMMERCIAL

## 2023-10-11 VITALS
OXYGEN SATURATION: 95 % | TEMPERATURE: 98.2 F | BODY MASS INDEX: 30.47 KG/M2 | HEIGHT: 61 IN | RESPIRATION RATE: 16 BRPM | WEIGHT: 161.4 LBS | HEART RATE: 80 BPM | SYSTOLIC BLOOD PRESSURE: 103 MMHG | DIASTOLIC BLOOD PRESSURE: 58 MMHG

## 2023-10-11 DIAGNOSIS — G40.909 SEIZURE DISORDER (H): ICD-10-CM

## 2023-10-11 DIAGNOSIS — G30.1 LATE ONSET ALZHEIMER'S DISEASE WITHOUT BEHAVIORAL DISTURBANCE (H): ICD-10-CM

## 2023-10-11 DIAGNOSIS — I10 ESSENTIAL HYPERTENSION, BENIGN: ICD-10-CM

## 2023-10-11 DIAGNOSIS — M15.0 PRIMARY OSTEOARTHRITIS INVOLVING MULTIPLE JOINTS: ICD-10-CM

## 2023-10-11 DIAGNOSIS — J06.9 VIRAL UPPER RESPIRATORY TRACT INFECTION: Primary | ICD-10-CM

## 2023-10-11 DIAGNOSIS — F02.80 LATE ONSET ALZHEIMER'S DISEASE WITHOUT BEHAVIORAL DISTURBANCE (H): ICD-10-CM

## 2023-10-11 DIAGNOSIS — E55.9 VITAMIN D DEFICIENCY: ICD-10-CM

## 2023-10-11 DIAGNOSIS — R29.6 FALLS FREQUENTLY: ICD-10-CM

## 2023-10-11 DIAGNOSIS — D64.9 ANEMIA, UNSPECIFIED TYPE: ICD-10-CM

## 2023-10-11 DIAGNOSIS — J44.9 CHRONIC OBSTRUCTIVE PULMONARY DISEASE, UNSPECIFIED COPD TYPE (H): ICD-10-CM

## 2023-10-11 PROCEDURE — 99349 HOME/RES VST EST MOD MDM 40: CPT | Performed by: NURSE PRACTITIONER

## 2023-10-11 NOTE — PROGRESS NOTES
"[facility] GERIATRICS  No chief complaint on file.    [place] Medical Record Number:  [id number]  Place of Service where encounter took place:  No question data found.    HPI:    Yoanna Phillips  is a 83 year old  (1940), who is being seen today for an annual comprehensive visit. HPI information obtained from: facility chart records, facility staff, patient report, and [facility] chart review.     Past medical includes  Vitamin D deficiency   Osteoarthritis  Hypertension  Alzheimer's dementia  Arthritis  COPD  Epilepsy-  On gabapentin and carbamazepine, and followed by Dr Fab[facility]ola [facility]   Anemia.   Cataracts  Hard of hearing    Today patient was seen in her room and she was not feeling well.  She has a \"cold\" but denies chest pain, shortness of breath, dizziness, lightheadedness, and a poor appetite.   BIMS=11/15 (score 8 to 12 suggests moderately impaired) PHQ9=2/27.   Patient needs cuing for assistance with ADLs, uses a 4 wheeled walker.    Her appetite is good and consumes a regular diet.  Per nursing, skin is intact. Code status is DNR/DNI.     In reviewing point click care weight and VS are stable.   Had a fall in September      ALLERGIES: Aspirin  PAST MEDICAL HISTORY:   Past Medical History:   Diagnosis Date    Arthritis     osteoarthritis    Cognitive impairment 7/3/2012    COPD (chronic obstructive pulmonary disease) (H)     Dyspnea on exertion     Epilepsy (H)     minor seizures 2x daily    Hypertension     Numbness and tingling     bilateral numbness    Pernicious anemia     resolved with vitamin replacement    Poor short term memory 5/20/2011    Seizures (H)       PAST SURGICAL HISTORY:  has a past surgical history that includes NONSPECIFIC PROCEDURE (1965); Craniotomy (1965); Cholecystectomy; Splenectomy; Arthroplasty shoulder (6/25/2012); appendectomy; Arthroplasty hip (Right, 4/11/2016); Arthroplasty revision hip (Right, 5/14/2016); Arthroplasty hip (Left, " 3/20/2017); and Closed reduction hip (Left, 5/22/2017).      Current Outpatient Medications:     acetaminophen (TYLENOL) 325 MG tablet, Take 650 mg by mouth every 4 hours as needed for mild pain, Disp: , Rfl:     CARBAMAZEPINE ER PO, Take 300 mg by mouth 2 times daily, Disp: , Rfl:     cyanocobalamin (CYANOCOBALAMIN) 1000 MCG/ML injection, INJECT 1 mL INTRAMUSCULARLYON THE 25th OF EACH MONTH FOR VIT B DEFICIENCY, Disp: 1 mL, Rfl: 3    FOLIC ACID PO, Take 1 mg by mouth daily, Disp: , Rfl:     gabapentin (NEURONTIN) 300 MG capsule, Take 1 capsule (300 mg) by mouth 3 times daily, Disp: , Rfl:     LOSARTAN POTASSIUM PO, Take 50 mg by mouth daily, Disp: , Rfl:     tiotropium (SPIRIVA) 18 MCG capsule, Inhale 18 mcg into the lungs daily, Disp: , Rfl:     vitamin D3 (CHOLECALCIFEROL) 50 mcg (2000 units) tablet, Take 1 tablet (50 mcg) by mouth daily, Disp: , Rfl:      MED REC REQUIRED  Post Medication Reconciliation Status:  Discharge medications reconciled, continue medications without change    Case Management:  I have reviewed the facility/SNF care plan/MDS, including the falls risk, nutrition and pain screening. I also reviewed the current immunizations, and preventive care.. Future cancer screening is not clinically indicated secondary to age/goals of care. Patient's desire to return to the community is not present.     Advance Directive Discussion:    I reviewed the current advanced directives as reflected in EPIC, the POLST and the facility chart, and verified the congruency of orders. I did not contact the first party and discussed the plan of care. I did not due to cognitive impairment review the advance directives with the resident.     Team Discussion:  I communicated with the appropriate disciplines involved with the Plan of Care: Nursing  .   Patient's goal is: pain control and comfort.  Information reviewed: Medications, vital signs, orders, and nursing notes.    ROS:  4 point ROS including Respiratory, CV, GI  and , other than that noted in the HPI,  is negative    Vitals:  There were no vitals taken for this visit. There is no height or weight on file to calculate BMI.  Exam:  GENERAL APPEARANCE:  Alert, in no distress  RESP:  lungs clear to auscultation , no respiratory distress, nasal congestion  CV:  Palpation and auscultation of heart done , rate-normal  PSYCH:  insight and judgement impaired, memory impaired     Lab/Diagnostic data:   Most Recent 3 CBC's:  Recent Labs   Lab Test 04/19/23  1012 12/14/22  1030 05/18/22  0851   WBC 7.9 9.1 7.4   HGB 13.4 13.9 14.7    101* 98    319 336     Most Recent 3 BMP's:  Recent Labs   Lab Test 04/19/23  1012 12/14/22  1030 05/18/22  1250    136 133*   POTASSIUM 4.0 4.6 3.9   CHLORIDE 103 101 101   CO2 20* 24 24   BUN 11.6 15.5 8   CR 0.62 0.67 0.69   ANIONGAP 14 11 8   FELICE 10.2 10.6* 10.6*   GLC 86 71 124     Most Recent 2 LFT's:  Recent Labs   Lab Test 02/21/22  1328 03/12/20  0651   AST 14 15   ALT <9 9   ALKPHOS 119 101   BILITOTAL 0.4 0.3     Most Recent Cholesterol Panel:No lab results found.  Most Recent TSH and T4:  Recent Labs   Lab Test 12/14/22  1030   TSH 0.80     Most Recent Hemoglobin A1c:  Recent Labs   Lab Test 07/10/18  0700   A1C 5.4       ASSESSMENT/PLAN  (J06.9) Viral upper respiratory tract infection  (primary encounter diagnosis)  Comment: pt having upper respiratory symptoms with congestion and cough  Plan: fluticasone (FLONASE) 50 MCG/ACT nasal spray,         guaiFENesin (ROBITUSSIN) 20 mg/mL liquid      G40.909) Seizure disorder (H)  (primary encounter diagnosis)  Comment: No recent seizures while taking carbamazepine. Carbamazepine level 7.9 in 4/2023.  Had follow up with Dr. Banks in 6/2023 with no new orders     Plan:   Follow up with Dr. Banks as ordered by her.           (M15.0) Primary osteoarthritis involving multiple joints    Comment: Chronic, stable.  no pain reported today uses a walker.  Has acetaminophen available prn    Plan: Continue with plan of care no changes at this time, adjustment as needed     (G30.1,  F02.80) Late onset Alzheimer's disease without behavioral disturbance (H)  Comment:  Condition is Chronic, progressive requiring 24-hour skilled nursing care.   No behavioral issues or emotional distress. Expect further functional and cognitive decline.    Plan: Continue with plan of care no changes at this time, adjustment as needed        (J44.9) Chronic obstructive pulmonary disease, unspecified COPD type (H)  Comment: Chronic stable while taking Spiriva,.  Plan: Continue with plan of care no changes at this time, adjustment as needed      (E55.9) Vitamin D deficiency   (R29.6) Falls frequently  Comment: Chronic. Has history of falls.   Recent Vit D level up to 27 while taking Vit D 50 mcg/day; dietary calcium for bone health.   DEXA in 2015 showed osteopenia  Plan: Continue with plan of care no changes at this time, adjustment as needed   DEXA in 2015 showed osteopenia; could repeat and consider treatment with bisphosphonate or other modality if patient /family interested      (I10) Essential hypertension, benign  Comment: stable while taking losartan, LE elevation, tubi- for compression  Plan: Continue with plan of care no changes at this time, adjustment as needed     (D64.9)  Comment: chronic.  Hgb normal.  Taking folic acid and Vitamin B12  Plan: Continue with plan of care no changes at this time, adjustment as needed      Electronically signed by:  TANIYA Mejia CNP

## 2023-10-11 NOTE — LETTER
"    10/11/2023        RE: Yoanna Phillips  [facility]  [address]  [facility] [address]        [facility] GERIATRICS  No chief complaint on file.    [place] Medical Record Number:  [id number]  Place of Service where encounter took place:  No question data found.    HPI:    Yoanna Phillips  is a 83 year old  (1940), who is being seen today for an annual comprehensive visit. HPI information obtained from: facility chart records, facility staff, patient report, and [facility] chart review.     Past medical includes  Vitamin D deficiency   Osteoarthritis  Hypertension  Alzheimer's dementia  Arthritis  COPD  Epilepsy-  On gabapentin and carbamazepine, and followed by Dr Fabiola Banks [facility]   Anemia.   Cataracts  Hard of hearing    Today patient was seen in her room and she was not feeling well.  She has a \"cold\" but denies chest pain, shortness of breath, dizziness, lightheadedness, and a poor appetite.   BIMS=11/15 (score 8 to 12 suggests moderately impaired) PHQ9=2/27.   Patient needs cuing for assistance with ADLs, uses a 4 wheeled walker.    Her appetite is good and consumes a regular diet.  Per nursing, skin is intact. Code status is DNR/DNI.     In reviewing point click care weight and VS are stable.   Had a fall in September      ALLERGIES: Aspirin  PAST MEDICAL HISTORY:   Past Medical History:   Diagnosis Date     Arthritis     osteoarthritis     Cognitive impairment 7/3/2012     COPD (chronic obstructive pulmonary disease) (H)      Dyspnea on exertion      Epilepsy (H)     minor seizures 2x daily     Hypertension      Numbness and tingling     bilateral numbness     Pernicious anemia     resolved with vitamin replacement     Poor short term memory 5/20/2011     Seizures (H)       PAST SURGICAL HISTORY:  has a past surgical history that includes NONSPECIFIC PROCEDURE (1965); Craniotomy (1965); Cholecystectomy; Splenectomy; Arthroplasty shoulder (6/25/2012); " appendectomy; Arthroplasty hip (Right, 4/11/2016); Arthroplasty revision hip (Right, 5/14/2016); Arthroplasty hip (Left, 3/20/2017); and Closed reduction hip (Left, 5/22/2017).      Current Outpatient Medications:      acetaminophen (TYLENOL) 325 MG tablet, Take 650 mg by mouth every 4 hours as needed for mild pain, Disp: , Rfl:      CARBAMAZEPINE ER PO, Take 300 mg by mouth 2 times daily, Disp: , Rfl:      cyanocobalamin (CYANOCOBALAMIN) 1000 MCG/ML injection, INJECT 1 mL INTRAMUSCULARLYON THE 25th OF EACH MONTH FOR VIT B DEFICIENCY, Disp: 1 mL, Rfl: 3     FOLIC ACID PO, Take 1 mg by mouth daily, Disp: , Rfl:      gabapentin (NEURONTIN) 300 MG capsule, Take 1 capsule (300 mg) by mouth 3 times daily, Disp: , Rfl:      LOSARTAN POTASSIUM PO, Take 50 mg by mouth daily, Disp: , Rfl:      tiotropium (SPIRIVA) 18 MCG capsule, Inhale 18 mcg into the lungs daily, Disp: , Rfl:      vitamin D3 (CHOLECALCIFEROL) 50 mcg (2000 units) tablet, Take 1 tablet (50 mcg) by mouth daily, Disp: , Rfl:      MED REC REQUIRED  Post Medication Reconciliation Status:  Discharge medications reconciled, continue medications without change    Case Management:  I have reviewed the facility/SNF care plan/MDS, including the falls risk, nutrition and pain screening. I also reviewed the current immunizations, and preventive care.. Future cancer screening is not clinically indicated secondary to age/goals of care. Patient's desire to return to the community is not present.     Advance Directive Discussion:    I reviewed the current advanced directives as reflected in EPIC, the POLST and the facility chart, and verified the congruency of orders. I did not contact the first party and discussed the plan of care. I did not due to cognitive impairment review the advance directives with the resident.     Team Discussion:  I communicated with the appropriate disciplines involved with the Plan of Care: Nursing  .   Patient's goal is: pain control and  comfort.  Information reviewed: Medications, vital signs, orders, and nursing notes.    ROS:  4 point ROS including Respiratory, CV, GI and , other than that noted in the HPI,  is negative    Vitals:  There were no vitals taken for this visit. There is no height or weight on file to calculate BMI.  Exam:  GENERAL APPEARANCE:  Alert, in no distress  RESP:  lungs clear to auscultation , no respiratory distress, nasal congestion  CV:  Palpation and auscultation of heart done , rate-normal  PSYCH:  insight and judgement impaired, memory impaired     Lab/Diagnostic data:   Most Recent 3 CBC's:  Recent Labs   Lab Test 04/19/23  1012 12/14/22  1030 05/18/22  0851   WBC 7.9 9.1 7.4   HGB 13.4 13.9 14.7    101* 98    319 336     Most Recent 3 BMP's:  Recent Labs   Lab Test 04/19/23  1012 12/14/22  1030 05/18/22  1250    136 133*   POTASSIUM 4.0 4.6 3.9   CHLORIDE 103 101 101   CO2 20* 24 24   BUN 11.6 15.5 8   CR 0.62 0.67 0.69   ANIONGAP 14 11 8   FELICE 10.2 10.6* 10.6*   GLC 86 71 124     Most Recent 2 LFT's:  Recent Labs   Lab Test 02/21/22  1328 03/12/20  0651   AST 14 15   ALT <9 9   ALKPHOS 119 101   BILITOTAL 0.4 0.3     Most Recent Cholesterol Panel:No lab results found.  Most Recent TSH and T4:  Recent Labs   Lab Test 12/14/22  1030   TSH 0.80     Most Recent Hemoglobin A1c:  Recent Labs   Lab Test 07/10/18  0700   A1C 5.4       ASSESSMENT/PLAN  (J06.9) Viral upper respiratory tract infection  (primary encounter diagnosis)  Comment: pt having upper respiratory symptoms with congestion and cough  Plan: fluticasone (FLONASE) 50 MCG/ACT nasal spray,         guaiFENesin (ROBITUSSIN) 20 mg/mL liquid      G40.909) Seizure disorder (H)  (primary encounter diagnosis)  Comment: No recent seizures while taking carbamazepine. Carbamazepine level 7.9 in 4/2023.  Had follow up with Dr. Banks in 6/2023 with no new orders     Plan:   Follow up with Dr. Banks as ordered by her.           (M15.0) Primary  osteoarthritis involving multiple joints    Comment: Chronic, stable.  no pain reported today uses a walker.  Has acetaminophen available prn   Plan: Continue with plan of care no changes at this time, adjustment as needed     (G30.1,  F02.80) Late onset Alzheimer's disease without behavioral disturbance (H)  Comment:  Condition is Chronic, progressive requiring 24-hour skilled nursing care.   No behavioral issues or emotional distress. Expect further functional and cognitive decline.    Plan: Continue with plan of care no changes at this time, adjustment as needed        (J44.9) Chronic obstructive pulmonary disease, unspecified COPD type (H)  Comment: Chronic stable while taking Spiriva,.  Plan: Continue with plan of care no changes at this time, adjustment as needed      (E55.9) Vitamin D deficiency   (R29.6) Falls frequently  Comment: Chronic. Has history of falls.   Recent Vit D level up to 27 while taking Vit D 50 mcg/day; dietary calcium for bone health.   DEXA in 2015 showed osteopenia  Plan: Continue with plan of care no changes at this time, adjustment as needed   DEXA in 2015 showed osteopenia; could repeat and consider treatment with bisphosphonate or other modality if patient /family interested      (I10) Essential hypertension, benign  Comment: stable while taking losartan, LE elevation, tubi- for compression  Plan: Continue with plan of care no changes at this time, adjustment as needed     (D64.9)  Comment: chronic.  Hgb normal.  Taking folic acid and Vitamin B12  Plan: Continue with plan of care no changes at this time, adjustment as needed      Electronically signed by:  TANIYA Mejia CNP          Sincerely,        TANIYA Mejia CNP

## 2023-10-13 ENCOUNTER — LAB REQUISITION (OUTPATIENT)
Dept: LAB | Facility: CLINIC | Age: 83
End: 2023-10-13
Payer: COMMERCIAL

## 2023-10-13 DIAGNOSIS — R05.9 COUGH, UNSPECIFIED: ICD-10-CM

## 2023-10-13 LAB
FLUAV RNA SPEC QL NAA+PROBE: NEGATIVE
FLUBV RNA RESP QL NAA+PROBE: NEGATIVE
RSV RNA SPEC NAA+PROBE: NEGATIVE
SARS-COV-2 RNA RESP QL NAA+PROBE: NEGATIVE

## 2023-10-13 PROCEDURE — 87637 SARSCOV2&INF A&B&RSV AMP PRB: CPT | Mod: ORL | Performed by: NURSE PRACTITIONER

## 2023-10-14 RX ORDER — FLUTICASONE PROPIONATE 50 MCG
1 SPRAY, SUSPENSION (ML) NASAL DAILY
Start: 2023-10-14 | End: 2023-10-14

## 2023-10-14 RX ORDER — GABAPENTIN 300 MG/1
300 CAPSULE ORAL 3 TIMES DAILY
Start: 2023-10-14 | End: 2024-06-28

## 2023-10-14 RX ORDER — FLUTICASONE PROPIONATE 50 MCG
1 SPRAY, SUSPENSION (ML) NASAL DAILY PRN
Start: 2023-10-14 | End: 2024-04-30

## 2023-10-14 RX ORDER — GUAIFENESIN 200 MG/10ML
200 LIQUID ORAL EVERY 4 HOURS PRN
Start: 2023-10-14 | End: 2024-05-01

## 2023-12-12 ENCOUNTER — LAB REQUISITION (OUTPATIENT)
Dept: LAB | Facility: CLINIC | Age: 83
End: 2023-12-12
Payer: COMMERCIAL

## 2023-12-12 ENCOUNTER — ASSISTED LIVING VISIT (OUTPATIENT)
Dept: GERIATRICS | Facility: CLINIC | Age: 83
End: 2023-12-12
Payer: COMMERCIAL

## 2023-12-12 VITALS
RESPIRATION RATE: 18 BRPM | SYSTOLIC BLOOD PRESSURE: 151 MMHG | BODY MASS INDEX: 32.1 KG/M2 | TEMPERATURE: 97.7 F | WEIGHT: 170 LBS | HEIGHT: 61 IN | OXYGEN SATURATION: 96 % | DIASTOLIC BLOOD PRESSURE: 76 MMHG | HEART RATE: 67 BPM

## 2023-12-12 DIAGNOSIS — K64.4 EXTERNAL HEMORRHOIDS: ICD-10-CM

## 2023-12-12 DIAGNOSIS — J44.9 CHRONIC OBSTRUCTIVE PULMONARY DISEASE, UNSPECIFIED COPD TYPE (H): ICD-10-CM

## 2023-12-12 DIAGNOSIS — I10 ESSENTIAL HYPERTENSION, BENIGN: ICD-10-CM

## 2023-12-12 DIAGNOSIS — F02.80 LATE ONSET ALZHEIMER'S DISEASE WITHOUT BEHAVIORAL DISTURBANCE (H): ICD-10-CM

## 2023-12-12 DIAGNOSIS — I10 ESSENTIAL (PRIMARY) HYPERTENSION: ICD-10-CM

## 2023-12-12 DIAGNOSIS — G40.909 SEIZURE DISORDER (H): ICD-10-CM

## 2023-12-12 DIAGNOSIS — D64.9 ANEMIA, UNSPECIFIED: ICD-10-CM

## 2023-12-12 DIAGNOSIS — K92.1 HEMATOCHEZIA: Primary | ICD-10-CM

## 2023-12-12 DIAGNOSIS — G30.1 LATE ONSET ALZHEIMER'S DISEASE WITHOUT BEHAVIORAL DISTURBANCE (H): ICD-10-CM

## 2023-12-12 PROCEDURE — 99349 HOME/RES VST EST MOD MDM 40: CPT | Performed by: INTERNAL MEDICINE

## 2023-12-12 NOTE — LETTER
12/12/2023        RE: Yoanna Phillips  [facility]  [address] [facility] [address]        No notes on file      Sincerely,        Francy Swartz MD

## 2023-12-13 LAB
ALBUMIN SERPL BCG-MCNC: 3.6 G/DL (ref 3.5–5.2)
ALP SERPL-CCNC: 112 U/L (ref 40–150)
ALT SERPL W P-5'-P-CCNC: 9 U/L (ref 0–50)
ANION GAP SERPL CALCULATED.3IONS-SCNC: 10 MMOL/L (ref 7–15)
AST SERPL W P-5'-P-CCNC: 16 U/L (ref 0–45)
BILIRUB SERPL-MCNC: <0.2 MG/DL
BUN SERPL-MCNC: 10 MG/DL (ref 8–23)
CALCIUM SERPL-MCNC: 10.2 MG/DL (ref 8.8–10.2)
CHLORIDE SERPL-SCNC: 104 MMOL/L (ref 98–107)
CREAT SERPL-MCNC: 0.64 MG/DL (ref 0.51–0.95)
DEPRECATED HCO3 PLAS-SCNC: 23 MMOL/L (ref 22–29)
EGFRCR SERPLBLD CKD-EPI 2021: 87 ML/MIN/1.73M2
ERYTHROCYTE [DISTWIDTH] IN BLOOD BY AUTOMATED COUNT: 13.6 % (ref 10–15)
GLUCOSE SERPL-MCNC: 96 MG/DL (ref 70–99)
HCT VFR BLD AUTO: 39.4 % (ref 35–47)
HGB BLD-MCNC: 13.3 G/DL (ref 11.7–15.7)
MCH RBC QN AUTO: 33.2 PG (ref 26.5–33)
MCHC RBC AUTO-ENTMCNC: 33.8 G/DL (ref 31.5–36.5)
MCV RBC AUTO: 98 FL (ref 78–100)
PLATELET # BLD AUTO: 305 10E3/UL (ref 150–450)
POTASSIUM SERPL-SCNC: 4.6 MMOL/L (ref 3.4–5.3)
PROT SERPL-MCNC: 6.7 G/DL (ref 6.4–8.3)
RBC # BLD AUTO: 4.01 10E6/UL (ref 3.8–5.2)
SODIUM SERPL-SCNC: 137 MMOL/L (ref 135–145)
WBC # BLD AUTO: 9.8 10E3/UL (ref 4–11)

## 2023-12-13 PROCEDURE — P9604 ONE-WAY ALLOW PRORATED TRIP: HCPCS | Mod: ORL | Performed by: NURSE PRACTITIONER

## 2023-12-13 PROCEDURE — 85027 COMPLETE CBC AUTOMATED: CPT | Mod: ORL | Performed by: NURSE PRACTITIONER

## 2023-12-13 PROCEDURE — 36415 COLL VENOUS BLD VENIPUNCTURE: CPT | Mod: ORL | Performed by: NURSE PRACTITIONER

## 2023-12-13 PROCEDURE — 80053 COMPREHEN METABOLIC PANEL: CPT | Mod: ORL | Performed by: NURSE PRACTITIONER

## 2024-01-01 NOTE — PROGRESS NOTES
Yoanna Phillips is a 83 year old female seen December 12, 2023 at [facility] where she has resided for 6 and a half years (admit to [facility] 6/2017) seen to follow up recent report of hematochezia.  Pt states she had some bleeding in the toilet last night.   Nursing staff reports a smear of dried blood on the bathroom floor.   Pt is hemodynamically stable, denies any associated GI symptoms       By chart review, pt was moved down to the third floor Memory Care unit because of unsafe behaviors and recurrent falls.   Gait is ataxic and extremely unstable, and she will frequently abandon her walker. However she did not like the confines of the secure unit, so she has been moved down to second floor.     Patient has past h/o seizures.  Currently on gabapentin and carbamazepine, and followed by Dr Fabiola [facility] - last seen in June 2023    Patient has low cognitive scores and has failed trial of living on her own, with recurrent falls and inability to seek help when she needs it.  Cognitive decline started a decade ago, now meets criteria for dementia dx, Alzheimer's type with amnesia as most prominent feature.     Past Medical History:   Diagnosis Date    Arthritis     osteoarthritis    Cognitive impairment 7/3/2012    COPD (chronic obstructive pulmonary disease) (H)     Dyspnea on exertion     Epilepsy (H)     minor seizures 2x daily    Hypertension     Numbness and tingling     bilateral numbness    Pernicious anemia     resolved with vitamin replacement    Poor short term memory 5/20/2011    Seizures (H)    Dementia, late onset Alzheimer's type    S/p left TSA 2012    SH:  Previously lived with her son, house in [facility] that she lived in all her life.     She has 2 sons and 2 daughters (Kay and Jeanie), 3 of her children live locally        ROS:   BIMS 8-11/15  She has lost her hearing aids and does not want replacements.       Admission weight was 164 lbs     Wt Readings from  "Last 5 Encounters:   12/12/23 77.1 kg (170 lb)   10/11/23 73.2 kg (161 lb 6.4 oz)   08/25/23 76.9 kg (169 lb 9.6 oz)   06/20/23 76.2 kg (168 lb 1.6 oz)   04/18/23 78.4 kg (172 lb 12.8 oz)      EXAM:  Pleasant, NAD  BP (!) 151/76   Pulse 67   Temp 97.7  F (36.5  C)   Resp 18   Ht 1.549 m (5' 1\")   Wt 77.1 kg (170 lb)   SpO2 96%   BMI 32.12 kg/m     +[place]  Neck supple without adenopathy  Lungs with decreased BS, few crackles right base   Heart RRR s1, prom s2 with occ ectopy  Abd soft, NT, no distention or guarding, +BS  +external hemorrhoid, no other lesions seen   No blood in underwear or bathroom.  Ext with trace edema, wearing tubi-     Neuro: limited history, repetitive  Psych: affect okay, pleasant      Labs reviewed, last check in April      Lab Results   Component Value Date    WBC 7.9 04/19/2023      HGB 13.4 04/19/2023       04/19/2023       04/19/2023         IMP/PLAN:   (K92.1) Hematochezia   (K64.4) External hemorrhoids  Comment: hemorrhoid seen on exam and no evidence of ongoing bleeding     Plan: check hgb   Add Senna daily, fluids   Can use Preparation H for any related hemorrhoidal symptoms   Reviewed with daughter Kay and son-in-law who are present today.   If her hgb is low, could consider GI referral but unlikely that pt would be a good candidate for endoscopy.          (E55.9) Vitamin D deficiency   (R29.6) Falls frequently  Comment: She has had courses of PHYSICAL THERAPY but does not retain information, abandons her walker repeatedly and remains a high fall risk   Vit D level up to 21>>>28 range with treatment       Plan: Vit D 50 mcg/day; dietary calcium for bone health.     DEXA in 2015 showed osteopenia; could repeat and consider treatment with bisphosphonate or other modality if patient /family interested   Alternatively, could restart weekly alendronate     (I10) Essential hypertension, benign  Comment: intermittent LE Edema  BP Readings from Last 3 Encounters: "   12/12/23 (!) 151/76   10/11/23 103/58   08/25/23 135/79      Plan:  losartan 50 mg/day  LE elevation, tubi- for compression, monitor for open areas    (G30.1,  F02.80) Late onset Alzheimer's disease without behavioral disturbance  Comment: functional decline, significant STML with disorientation and recurrent falls  Plan: Board and Care support for meds, meals, activity, safety             (G40.909) Seizure disorder (H)  Comment: longstanding, no recent sz    Carbamazepine level 7.9 in April      Seen by Dr Banks in June, no changes made   Plan: continue carbamazepine 300 mg bid and gabapentin 300 mg tid, with quarterly check of labs to include CMP, CBC, TSH and carbamazepine level   Agree with PharmD, would consider GDR of gabapentin to reduce fall risk.     (J44.9) Chronic obstructive pulmonary disease, unspecified COPD type (H)  Comment: by history; no current symptoms   Plan: continue Spiriva daily    Francy Swartz MD

## 2024-01-29 ENCOUNTER — PATIENT OUTREACH (OUTPATIENT)
Dept: GERIATRIC MEDICINE | Facility: CLINIC | Age: 84
End: 2024-01-29
Payer: COMMERCIAL

## 2024-01-29 ASSESSMENT — PATIENT HEALTH QUESTIONNAIRE - PHQ9: SUM OF ALL RESPONSES TO PHQ QUESTIONS 1-9: 0

## 2024-01-29 ASSESSMENT — ACTIVITIES OF DAILY LIVING (ADL)
DEPENDENT_IADLS:: CLEANING;COOKING;LAUNDRY;SHOPPING;MEAL PREPARATION;MEDICATION MANAGEMENT;MONEY MANAGEMENT;TRANSPORTATION
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
## 2024-01-31 ENCOUNTER — PATIENT OUTREACH (OUTPATIENT)
Dept: GERIATRIC MEDICINE | Facility: CLINIC | Age: 84
End: 2024-01-31
Payer: COMMERCIAL

## 2024-01-31 NOTE — LETTER
January 31, 2024      [facility]  [address]  [place] [address]      Dear John:    At [facility], we re dedicated to improving your health and wellness. Enclosed is the Care Plan developed with you on 1/29/2024. Please review the Care Plan carefully.    As a reminder, during your visit we talked about:  Ways to manage your physical and mental health  Using health care to maintain and improve your health   Your preventive care needs     Remember to contact your care coordinator if you:  Are hospitalized, or plan to be hospitalized   Have a fall    Have a change in your physical or mental health  Need help finding support or services    If you have questions, or don t agree with your Care Plan, call me at [phone]. You can also call me if your needs change. TTY users, call the [place] [employer] at (429) or 1-934.595.3890 (qqhskr-al-trwwxa relay service).    Sincerely,    [email or web address]  Phone: [phone]      [facility] ([facility]) is a health plan that contracts with both Medicare and the [place] Medical Assistance (Medicaid) program to provide benefits of both programs to enrollees. Enrollment in [facility] depends on contract renewal.    [id number] accepted    [id number] (07/2022)

## 2024-01-31 NOTE — PROGRESS NOTES
[facility] Care Coordination Contact    Received after visit chart from care coordinator.  Completed following tasks: Mailed [facility] POC Letter to member/rep with Support Plan & Signature Page    Radha Centeno  Care Management Specialist  [facility]  [phone]

## 2024-02-06 ENCOUNTER — LAB REQUISITION (OUTPATIENT)
Dept: LAB | Facility: CLINIC | Age: 84
End: 2024-02-06
Payer: COMMERCIAL

## 2024-02-06 DIAGNOSIS — R05.9 COUGH, UNSPECIFIED: ICD-10-CM

## 2024-02-06 PROCEDURE — 87637 SARSCOV2&INF A&B&RSV AMP PRB: CPT | Mod: ORL | Performed by: INTERNAL MEDICINE

## 2024-02-13 ENCOUNTER — LAB REQUISITION (OUTPATIENT)
Dept: LAB | Facility: CLINIC | Age: 84
End: 2024-02-13
Payer: COMMERCIAL

## 2024-02-13 DIAGNOSIS — R05.9 COUGH, UNSPECIFIED: ICD-10-CM

## 2024-02-13 PROCEDURE — 87637 SARSCOV2&INF A&B&RSV AMP PRB: CPT | Mod: ORL | Performed by: NURSE PRACTITIONER

## 2024-02-14 ENCOUNTER — DOCUMENTATION ONLY (OUTPATIENT)
Dept: GERIATRICS | Facility: CLINIC | Age: 84
End: 2024-02-14

## 2024-02-20 ENCOUNTER — ASSISTED LIVING VISIT (OUTPATIENT)
Dept: GERIATRICS | Facility: CLINIC | Age: 84
End: 2024-02-20
Payer: COMMERCIAL

## 2024-02-20 ENCOUNTER — LAB REQUISITION (OUTPATIENT)
Dept: LAB | Facility: CLINIC | Age: 84
End: 2024-02-20
Payer: COMMERCIAL

## 2024-02-20 VITALS
WEIGHT: 162.8 LBS | DIASTOLIC BLOOD PRESSURE: 76 MMHG | HEART RATE: 77 BPM | TEMPERATURE: 98 F | HEIGHT: 61 IN | RESPIRATION RATE: 18 BRPM | BODY MASS INDEX: 30.73 KG/M2 | OXYGEN SATURATION: 96 % | SYSTOLIC BLOOD PRESSURE: 150 MMHG

## 2024-02-20 DIAGNOSIS — Z51.81 ENCOUNTER FOR THERAPEUTIC DRUG LEVEL MONITORING: ICD-10-CM

## 2024-02-20 DIAGNOSIS — E55.9 VITAMIN D DEFICIENCY: ICD-10-CM

## 2024-02-20 DIAGNOSIS — J44.9 CHRONIC OBSTRUCTIVE PULMONARY DISEASE, UNSPECIFIED COPD TYPE (H): ICD-10-CM

## 2024-02-20 DIAGNOSIS — M15.0 PRIMARY OSTEOARTHRITIS INVOLVING MULTIPLE JOINTS: ICD-10-CM

## 2024-02-20 DIAGNOSIS — R29.6 FALLS FREQUENTLY: ICD-10-CM

## 2024-02-20 DIAGNOSIS — K64.4 EXTERNAL HEMORRHOIDS: Primary | ICD-10-CM

## 2024-02-20 DIAGNOSIS — D64.9 ANEMIA, UNSPECIFIED TYPE: ICD-10-CM

## 2024-02-20 DIAGNOSIS — E03.9 HYPOTHYROIDISM, UNSPECIFIED: ICD-10-CM

## 2024-02-20 DIAGNOSIS — G30.1 LATE ONSET ALZHEIMER'S DISEASE WITHOUT BEHAVIORAL DISTURBANCE (H): ICD-10-CM

## 2024-02-20 DIAGNOSIS — D64.9 ANEMIA, UNSPECIFIED: ICD-10-CM

## 2024-02-20 DIAGNOSIS — G40.909 SEIZURE DISORDER (H): ICD-10-CM

## 2024-02-20 DIAGNOSIS — M85.80 OSTEOPENIA, UNSPECIFIED LOCATION: ICD-10-CM

## 2024-02-20 DIAGNOSIS — R05.9 COUGH, UNSPECIFIED: ICD-10-CM

## 2024-02-20 DIAGNOSIS — I10 ESSENTIAL HYPERTENSION, BENIGN: ICD-10-CM

## 2024-02-20 DIAGNOSIS — R09.81 NASAL CONGESTION: ICD-10-CM

## 2024-02-20 DIAGNOSIS — F02.80 LATE ONSET ALZHEIMER'S DISEASE WITHOUT BEHAVIORAL DISTURBANCE (H): ICD-10-CM

## 2024-02-20 PROCEDURE — 99349 HOME/RES VST EST MOD MDM 40: CPT | Performed by: NURSE PRACTITIONER

## 2024-02-20 PROCEDURE — 87637 SARSCOV2&INF A&B&RSV AMP PRB: CPT | Mod: ORL | Performed by: NURSE PRACTITIONER

## 2024-02-20 RX ORDER — NYSTATIN 100000 [USP'U]/G
POWDER TOPICAL 2 TIMES DAILY
COMMUNITY

## 2024-02-20 RX ORDER — AMOXICILLIN 250 MG
2 CAPSULE ORAL DAILY
COMMUNITY

## 2024-02-20 RX ORDER — IPRATROPIUM BROMIDE AND ALBUTEROL SULFATE 2.5; .5 MG/3ML; MG/3ML
1 SOLUTION RESPIRATORY (INHALATION) EVERY 6 HOURS PRN
COMMUNITY
End: 2024-06-28

## 2024-02-20 RX ORDER — GUAIFENESIN 600 MG/1
1200 TABLET, EXTENDED RELEASE ORAL 2 TIMES DAILY
COMMUNITY
End: 2024-06-28

## 2024-02-20 RX ORDER — ALENDRONATE SODIUM 70 MG/1
70 TABLET ORAL
Start: 2024-02-20

## 2024-02-20 NOTE — LETTER
2/20/2024        RE: [facility]  [address] [place] [address]        [facility] GERIATRICS  Chief Complaint   Patient presents with     [facility] Medical Record Number:  [id number]  Place of Service where encounter took place:  [facility]) [[id number]]    HPI:    Yoanna Phillips  is 83 year old (1940), who is being seen today for a federally mandated E/M visit.    Past medical includes  Vitamin D deficiency   Osteoarthritis  Hypertension  Alzheimer's dementia  Arthritis  COPD  Epilepsy-  On gabapentin and carbamazepine, and followed by Dr [facility]ola [facility]   Anemia.   Cataracts  Hard of hearing     Today patient was seen in her room.  she has been taking cough medicine prn, duoneb and has nasal congestion.  Her lungs are clear.  She is also complaining of loose stools  She denies chest pain, shortness of breath, dizziness, lightheadedness, and a poor appetite.   BIMS=10/15 (score 8 to 12 suggests moderately impaired) PHQ9=3/27.   Patient needs cuing for assistance with ADLs, uses a 4 wheeled walker.    Her appetite is good and consumes a regular diet.  Per nursing, skin is intact. Code status is DNR/DNI.     In reviewing point click care VS are stable.   she has lost 10# in a year.   She follows with [facility]         ALLERGIES:Aspirin  PAST MEDICAL HISTORY:   Past Medical History:   Diagnosis Date     Arthritis     osteoarthritis     Cognitive impairment 7/3/2012     COPD (chronic obstructive pulmonary disease) (H)      Dyspnea on exertion      Epilepsy (H)     minor seizures 2x daily     Hypertension      Numbness and tingling     bilateral numbness     Pernicious anemia     resolved with vitamin replacement     Poor short term memory 5/20/2011     Seizures (H)      PAST SURGICAL HISTORY:   has a past surgical history that includes NONSPECIFIC PROCEDURE (1965); Craniotomy (1965); Cholecystectomy; Splenectomy;  Arthroplasty shoulder (6/25/2012); appendectomy; Arthroplasty hip (Right, 4/11/2016); Arthroplasty revision hip (Right, 5/14/2016); Arthroplasty hip (Left, 3/20/2017); and Closed reduction hip (Left, 5/22/2017).  FAMILY HISTORY: family history includes Arthritis in her mother; Heart Disease in her mother; Hypertension in her mother; Neurologic Disorder in her mother.  SOCIAL HISTORY:  reports that she quit smoking about 61 years ago. Her smoking use included cigarettes. She has a 5 pack-year smoking history. She has never used smokeless tobacco. She reports current alcohol use. She reports that she does not use drugs.    MEDICATIONS:  MED REC REQUIRED  Post Medication Reconciliation Status:  Discharge medications reconciled and changed, see notes/orders         Review of your medicines            Accurate as of February 20, 2024 12:42 AM. If you have any questions, ask your nurse or doctor.                CONTINUE these medicines which have NOT CHANGED        Dose / Directions   acetaminophen 325 MG tablet  Commonly known as: TYLENOL      Dose: 650 mg  Take 650 mg by mouth every 4 hours as needed for mild pain  Refills: 0     CARBAMAZEPINE ER PO      Dose: 300 mg  Take 300 mg by mouth 2 times daily  Refills: 0     cyanocobalamin 1000 MCG/ML injection  Commonly known as: CYANOCOBALAMIN  Used for: B-complex deficiency      INJECT 1 mL INTRAMUSCULARLYON THE 25th OF EACH MONTH FOR VIT B DEFICIENCY  Quantity: 1 mL  Refills: 3     fluticasone 50 MCG/ACT nasal spray  Commonly known as: FLONASE  Used for: Viral upper respiratory tract infection      Dose: 1 spray  Spray 1 spray into both nostrils daily as needed for allergies or rhinitis  Refills: 0     FOLIC ACID PO      Dose: 1 mg  Take 1 mg by mouth daily  Refills: 0     gabapentin 300 MG capsule  Commonly known as: NEURONTIN  Used for: Seizure disorder (H)      Dose: 300 mg  Take 1 capsule (300 mg) by mouth 3 times daily for 199 days  Refills: 0     guaiFENesin 20 mg/mL  "liquid  Commonly known as: ROBITUSSIN  Used for: Viral upper respiratory tract infection      Dose: 200 mg  Take 10 mLs (200 mg) by mouth every 4 hours as needed for cough  Refills: 0     LOSARTAN POTASSIUM PO      Dose: 50 mg  Take 50 mg by mouth daily  Refills: 0     tiotropium 18 MCG inhaled capsule  Commonly known as: SPIRIVA      Dose: 18 mcg  Inhale 18 mcg into the lungs daily  Refills: 0     vitamin D3 50 mcg (2000 units) tablet  Commonly known as: CHOLECALCIFEROL  Used for: Vitamin D deficiency      Dose: 1 tablet  Take 1 tablet (50 mcg) by mouth daily  Refills: 0               Case Management:  I have reviewed the care plan and MDS and do agree with the plan. Patient's desire to return to the community is not present. Information reviewed:  Medications, vital signs, orders, and nursing notes.    ROS:  4 point ROS including Respiratory, CV, GI and , other than that noted in the HPI,  is negative    Vitals:  BP (!) 150/76   Pulse 77   Temp 98  F (36.7  C)   Resp 18   Ht 1.549 m (5' 1\")   Wt 73.8 kg (162 lb 12.8 oz)   SpO2 96%   BMI 30.76 kg/m    Body mass index is 30.76 kg/m .  Exam:  GENERAL APPEARANCE:  Alert, in no distress, morbidly obese  RESP:  respiratory effort and palpation of chest normal, lungs clear to auscultation , nasal congestion  CV:  Palpation and auscultation of heart done , rate-normal  PSYCH:  insight and judgement impaired, affect and mood normal    Lab/Diagnostic data:   Most Recent 3 CBC's:  Recent Labs   Lab Test 12/13/23  0716 04/19/23  1012 12/14/22  1030   WBC 9.8 7.9 9.1   HGB 13.3 13.4 13.9   MCV 98 100 101*    271 319     Most Recent 3 BMP's:  Recent Labs   Lab Test 12/13/23  0716 04/19/23  1012 12/14/22  1030    137 136   POTASSIUM 4.6 4.0 4.6   CHLORIDE 104 103 101   CO2 23 20* 24   BUN 10.0 11.6 15.5   CR 0.64 0.62 0.67   ANIONGAP 10 14 11   FELICE 10.2 10.2 10.6*   GLC 96 86 71     Most Recent 2 LFT's:  Recent Labs   Lab Test 12/13/23  0716 02/21/22  1328 "   AST 16 14   ALT 9 <9   ALKPHOS 112 119   BILITOTAL <0.2 0.4         ASSESSMENT/PLAN          (K64.4) External hemorrhoids  Comment: in 12/2023, pt jackie examined pt and hemorrhoids were seen on exam with no evidence of ongoing bleeding     HGB has been stable  Taking Senna S daily,  Plan:  Continue with plan of care no changes at this time, adjustment as needed       G40.909) Seizure disorder (H)  (primary encounter diagnosis)  Comment: No recent seizures while taking carbamazepine.   Carbamazepine level 7.9 in 4/2023.  Had follow up with Dr. Banks in 6/2023 with no new orders     Plan:   Follow up with Dr. Banks as ordered by her.     Follow CBC, renal and hepatic function, sodium, eye exam, carbamazepine level, thyroid labs      (M15.0) Primary osteoarthritis involving multiple joints    Comment: Chronic, stable.  no pain reported today uses a walker.  Has acetaminophen available prn   Plan: Continue with plan of care no changes at this time, adjustment as needed     (G30.1,  F02.80) Late onset Alzheimer's disease without behavioral disturbance (H)  Comment:  Condition is Chronic, progressive requiring 24-hour skilled nursing care.  Last BIMS was 10/15 indidicated moderate cogntive impairment.    No behavioral issues or emotional distress. Expect further functional and cognitive decline.    Plan: Continue with plan of care no changes at this time, adjustment as needed        (J44.9) Chronic obstructive pulmonary disease, unspecified COPD type (H)  (R09.81) nasal congestion  Comment: Chronic   taking Spiriva  Having congestion.  DuoNebs started twice daily  Plan:   Hold current Flonase order x 14 days  Start Flonase 1 spray per nostril daily x 14 days  Continue guaifenesin 600 mg twice daily  Continue DuoNebs  If not better in 1 week consider prednisone 20 mg daily x 7 days        (E55.9) Vitamin D deficiency   (R29.6) Falls frequently  (R85.80) osteopenia, unspecified location  Comment: Chronic. Has history of  falls.     Recent Vit D level up to 27 while taking Vit D 50 mcg/day; dietary calcium for bone health.     DEXA in 2015 showed osteopenia  Plan:   Vitmain D level   Start alendronate 70 mg by mouth every 7 days     (I10) Essential hypertension, benign  Comment: stable while taking losartan  BMP WNL  LE elevation, tubi- for compression  Plan: Continue with plan of care no changes at this time, adjustment as needed     (D64.9) anemia, unspecified type  Comment: chronic.  Hgb normal.  Taking folic acid and Vitamin B12  Plan: Continue with plan of care no changes at this time, adjustment as needed    Electronically signed by:      TANIYA Mejia CNP on 2/20/2024 at 1:18 PM             Sincerely,        TANIYA Mejia CNP

## 2024-02-20 NOTE — PROGRESS NOTES
[facility] GERIATRICS  Chief Complaint   Patient presents with    [facility]     [place] Medical Record Number:  [id number]  Place of Service where encounter took place:  [facility]) [[id number]]    HPI:    Yoanna Phillips  is 83 year old (1940), who is being seen today for a federally mandated E/M visit.    Past medical includes  Vitamin D deficiency   Osteoarthritis  Hypertension  Alzheimer's dementia  Arthritis  COPD  Epilepsy-  On gabapentin and carbamazepine, and [facility]   Anemia.   Cataracts  Hard of hearing     Today patient was seen in her room.  she has been taking cough medicine prn, duoneb and has nasal congestion.  Her lungs are clear.  She is also complaining of loose stools  She denies chest pain, shortness of breath, dizziness, lightheadedness, and a poor appetite.   BIMS=10/15 (score 8 to 12 suggests moderately impaired) PHQ9=3/27.   Patient needs cuing for assistance with ADLs, uses a 4 wheeled walker.    Her appetite is good and consumes a regular diet.  Per nursing, skin is intact. Code status is DNR/DNI.     In reviewing point click care VS are stable.   she has lost 10# in a year.   She follows with [facility]         ALLERGIES:Aspirin  PAST MEDICAL HISTORY:   Past Medical History:   Diagnosis Date    Arthritis     osteoarthritis    Cognitive impairment 7/3/2012    COPD (chronic obstructive pulmonary disease) (H)     Dyspnea on exertion     Epilepsy (H)     minor seizures 2x daily    Hypertension     Numbness and tingling     bilateral numbness    Pernicious anemia     resolved with vitamin replacement    Poor short term memory 5/20/2011    Seizures (H)      PAST SURGICAL HISTORY:   has a past surgical history that includes NONSPECIFIC PROCEDURE (1965); Craniotomy (1965); Cholecystectomy; Splenectomy; Arthroplasty shoulder (6/25/2012); appendectomy; Arthroplasty hip (Right, 4/11/2016); Arthroplasty revision hip (Right, 5/14/2016);  Arthroplasty hip (Left, 3/20/2017); and Closed reduction hip (Left, 5/22/2017).  FAMILY HISTORY: family history includes Arthritis in her mother; Heart Disease in her mother; Hypertension in her mother; Neurologic Disorder in her mother.  SOCIAL HISTORY:  reports that she quit smoking about 61 years ago. Her smoking use included cigarettes. She has a 5 pack-year smoking history. She has never used smokeless tobacco. She reports current alcohol use. She reports that she does not use drugs.    MEDICATIONS:  MED REC REQUIRED  Post Medication Reconciliation Status:  Discharge medications reconciled and changed, see notes/orders         Review of your medicines            Accurate as of February 20, 2024 12:42 AM. If you have any questions, ask your nurse or doctor.                CONTINUE these medicines which have NOT CHANGED        Dose / Directions   acetaminophen 325 MG tablet  Commonly known as: TYLENOL      Dose: 650 mg  Take 650 mg by mouth every 4 hours as needed for mild pain  Refills: 0     CARBAMAZEPINE ER PO      Dose: 300 mg  Take 300 mg by mouth 2 times daily  Refills: 0     cyanocobalamin 1000 MCG/ML injection  Commonly known as: CYANOCOBALAMIN  Used for: B-complex deficiency      INJECT 1 mL INTRAMUSCULARLYON THE 25th OF EACH MONTH FOR VIT B DEFICIENCY  Quantity: 1 mL  Refills: 3     fluticasone 50 MCG/ACT nasal spray  Commonly known as: FLONASE  Used for: Viral upper respiratory tract infection      Dose: 1 spray  Spray 1 spray into both nostrils daily as needed for allergies or rhinitis  Refills: 0     FOLIC ACID PO      Dose: 1 mg  Take 1 mg by mouth daily  Refills: 0     gabapentin 300 MG capsule  Commonly known as: NEURONTIN  Used for: Seizure disorder (H)      Dose: 300 mg  Take 1 capsule (300 mg) by mouth 3 times daily for 199 days  Refills: 0     guaiFENesin 20 mg/mL liquid  Commonly known as: ROBITUSSIN  Used for: Viral upper respiratory tract infection      Dose: 200 mg  Take 10 mLs (200 mg) by  "mouth every 4 hours as needed for cough  Refills: 0     LOSARTAN POTASSIUM PO      Dose: 50 mg  Take 50 mg by mouth daily  Refills: 0     tiotropium 18 MCG inhaled capsule  Commonly known as: SPIRIVA      Dose: 18 mcg  Inhale 18 mcg into the lungs daily  Refills: 0     vitamin D3 50 mcg (2000 units) tablet  Commonly known as: CHOLECALCIFEROL  Used for: Vitamin D deficiency      Dose: 1 tablet  Take 1 tablet (50 mcg) by mouth daily  Refills: 0               Case Management:  I have reviewed the care plan and MDS and do agree with the plan. Patient's desire to return to the community is not present. Information reviewed:  Medications, vital signs, orders, and nursing notes.    ROS:  4 point ROS including Respiratory, CV, GI and , other than that noted in the HPI,  is negative    Vitals:  BP (!) 150/76   Pulse 77   Temp 98  F (36.7  C)   Resp 18   Ht 1.549 m (5' 1\")   Wt 73.8 kg (162 lb 12.8 oz)   SpO2 96%   BMI 30.76 kg/m    Body mass index is 30.76 kg/m .  Exam:  GENERAL APPEARANCE:  Alert, in no distress, morbidly obese  RESP:  respiratory effort and palpation of chest normal, lungs clear to auscultation , nasal congestion  CV:  Palpation and auscultation of heart done , rate-normal  PSYCH:  insight and judgement impaired, affect and mood normal    Lab/Diagnostic data:   Most Recent 3 CBC's:  Recent Labs   Lab Test 12/13/23  0716 04/19/23  1012 12/14/22  1030   WBC 9.8 7.9 9.1   HGB 13.3 13.4 13.9   MCV 98 100 101*    271 319     Most Recent 3 BMP's:  Recent Labs   Lab Test 12/13/23  0716 04/19/23  1012 12/14/22  1030    137 136   POTASSIUM 4.6 4.0 4.6   CHLORIDE 104 103 101   CO2 23 20* 24   BUN 10.0 11.6 15.5   CR 0.64 0.62 0.67   ANIONGAP 10 14 11   FELICE 10.2 10.2 10.6*   GLC 96 86 71     Most Recent 2 LFT's:  Recent Labs   Lab Test 12/13/23  0716 02/21/22  1328   AST 16 14   ALT 9 <9   ALKPHOS 112 119   BILITOTAL <0.2 0.4         ASSESSMENT/PLAN          (K64.4) External " hemorrhoids  Comment: in 12/2023, pt jackie examined pt and hemorrhoids were seen on exam with no evidence of ongoing bleeding     HGB has been stable  Taking Senna S daily,  Plan:  Continue with plan of care no changes at this time, adjustment as needed       G40.909) Seizure disorder (H)  (primary encounter diagnosis)  Comment: No recent seizures while taking carbamazepine.   Carbamazepine level 7.9 in 4/2023.  Had follow up with Dr. Banks in 6/2023 with no new orders     Plan:   Follow up with Dr. Banks as ordered by her.     Follow CBC, renal and hepatic function, sodium, eye exam, carbamazepine level, thyroid labs      (M15.0) Primary osteoarthritis involving multiple joints    Comment: Chronic, stable.  no pain reported today uses a walker.  Has acetaminophen available prn   Plan: Continue with plan of care no changes at this time, adjustment as needed     (G30.1,  F02.80) Late onset Alzheimer's disease without behavioral disturbance (H)  Comment:  Condition is Chronic, progressive requiring 24-hour skilled nursing care.  Last BIMS was 10/15 indidicated moderate cogntive impairment.    No behavioral issues or emotional distress. Expect further functional and cognitive decline.    Plan: Continue with plan of care no changes at this time, adjustment as needed        (J44.9) Chronic obstructive pulmonary disease, unspecified COPD type (H)  (R09.81) nasal congestion  Comment: Chronic   taking Spiriva  Having congestion.  DuoNebs started twice daily  Plan:   Hold current Flonase order x 14 days  Start Flonase 1 spray per nostril daily x 14 days  Continue guaifenesin 600 mg twice daily  Continue DuoNebs  If not better in 1 week consider prednisone 20 mg daily x 7 days        (E55.9) Vitamin D deficiency   (R29.6) Falls frequently  (R85.80) osteopenia, unspecified location  Comment: Chronic. Has history of falls.     Recent Vit D level up to 27 while taking Vit D 50 mcg/day; dietary calcium for bone health.     DEXA in  2015 showed osteopenia  Plan:   Vitmain D level   Start alendronate 70 mg by mouth every 7 days     (I10) Essential hypertension, benign  Comment: stable while taking losartan  BMP WNL  LE elevation, tubi- for compression  Plan: Continue with plan of care no changes at this time, adjustment as needed     (D64.9) anemia, unspecified type  Comment: chronic.  Hgb normal.  Taking folic acid and Vitamin B12  Plan: Continue with plan of care no changes at this time, adjustment as needed    Electronically signed by:      TANIYA Mejia CNP on 2/20/2024 at 1:18 PM

## 2024-02-21 LAB
ANION GAP SERPL CALCULATED.3IONS-SCNC: 12 MMOL/L (ref 7–15)
BUN SERPL-MCNC: 8.2 MG/DL (ref 8–23)
CALCIUM SERPL-MCNC: 10.7 MG/DL (ref 8.8–10.2)
CARBAMAZEPINE SERPL-MCNC: 9.3 UG/ML (ref 4–12)
CHLORIDE SERPL-SCNC: 103 MMOL/L (ref 98–107)
CREAT SERPL-MCNC: 0.64 MG/DL (ref 0.51–0.95)
DEPRECATED HCO3 PLAS-SCNC: 23 MMOL/L (ref 22–29)
EGFRCR SERPLBLD CKD-EPI 2021: 87 ML/MIN/1.73M2
FOLATE SERPL-MCNC: 26.5 NG/ML (ref 4.6–34.8)
GLUCOSE SERPL-MCNC: 87 MG/DL (ref 70–99)
POTASSIUM SERPL-SCNC: 4.2 MMOL/L (ref 3.4–5.3)
SODIUM SERPL-SCNC: 138 MMOL/L (ref 135–145)
TSH SERPL DL<=0.005 MIU/L-ACNC: 1.23 UIU/ML (ref 0.3–4.2)
VIT B12 SERPL-MCNC: 503 PG/ML (ref 232–1245)
VIT D+METAB SERPL-MCNC: 33 NG/ML (ref 20–50)

## 2024-02-21 PROCEDURE — 82746 ASSAY OF FOLIC ACID SERUM: CPT | Mod: ORL | Performed by: NURSE PRACTITIONER

## 2024-02-21 PROCEDURE — 80048 BASIC METABOLIC PNL TOTAL CA: CPT | Mod: ORL | Performed by: NURSE PRACTITIONER

## 2024-02-21 PROCEDURE — 82306 VITAMIN D 25 HYDROXY: CPT | Mod: ORL | Performed by: NURSE PRACTITIONER

## 2024-02-21 PROCEDURE — 82607 VITAMIN B-12: CPT | Mod: ORL | Performed by: NURSE PRACTITIONER

## 2024-02-21 PROCEDURE — 36415 COLL VENOUS BLD VENIPUNCTURE: CPT | Mod: ORL | Performed by: NURSE PRACTITIONER

## 2024-02-21 PROCEDURE — P9604 ONE-WAY ALLOW PRORATED TRIP: HCPCS | Mod: ORL | Performed by: NURSE PRACTITIONER

## 2024-02-21 PROCEDURE — 80156 ASSAY CARBAMAZEPINE TOTAL: CPT | Mod: ORL | Performed by: NURSE PRACTITIONER

## 2024-02-21 PROCEDURE — 84443 ASSAY THYROID STIM HORMONE: CPT | Mod: ORL | Performed by: NURSE PRACTITIONER

## 2024-02-24 ENCOUNTER — TELEPHONE (OUTPATIENT)
Dept: GERIATRICS | Facility: CLINIC | Age: 84
End: 2024-02-24
Payer: COMMERCIAL

## 2024-02-25 ENCOUNTER — TRANSFERRED RECORDS (OUTPATIENT)
Dept: HEALTH INFORMATION MANAGEMENT | Facility: CLINIC | Age: 84
End: 2024-02-25
Payer: COMMERCIAL

## 2024-02-25 NOTE — TELEPHONE ENCOUNTER
Resident fell onto knees and c/o of left knee pain that nursing requesting x-ray to the knee.    Ok to get x-ray of left knee pain    TANIYA Hylton CNP

## 2024-02-27 ENCOUNTER — LAB REQUISITION (OUTPATIENT)
Dept: LAB | Facility: CLINIC | Age: 84
End: 2024-02-27
Payer: COMMERCIAL

## 2024-02-27 DIAGNOSIS — R05.9 COUGH, UNSPECIFIED: ICD-10-CM

## 2024-02-27 PROCEDURE — 87637 SARSCOV2&INF A&B&RSV AMP PRB: CPT | Mod: ORL | Performed by: INTERNAL MEDICINE

## 2024-02-28 ENCOUNTER — ASSISTED LIVING VISIT (OUTPATIENT)
Dept: GERIATRICS | Facility: CLINIC | Age: 84
End: 2024-02-28
Payer: COMMERCIAL

## 2024-02-28 VITALS
HEIGHT: 61 IN | OXYGEN SATURATION: 96 % | HEART RATE: 70 BPM | WEIGHT: 164.1 LBS | BODY MASS INDEX: 30.98 KG/M2 | RESPIRATION RATE: 20 BRPM | TEMPERATURE: 97.3 F | DIASTOLIC BLOOD PRESSURE: 76 MMHG | SYSTOLIC BLOOD PRESSURE: 149 MMHG
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
## 2024-02-28 DIAGNOSIS — M25.562 ACUTE PAIN OF LEFT KNEE: ICD-10-CM

## 2024-02-28 DIAGNOSIS — J44.9 CHRONIC OBSTRUCTIVE PULMONARY DISEASE, UNSPECIFIED COPD TYPE (H): Primary | ICD-10-CM

## 2024-02-28 DIAGNOSIS — Z79.899 USES NEBULIZER AND INHALER AT HOME: ICD-10-CM

## 2024-02-28 DIAGNOSIS — R09.81 NASAL CONGESTION: ICD-10-CM

## 2024-02-28 PROCEDURE — 99349 HOME/RES VST EST MOD MDM 40: CPT | Performed by: NURSE PRACTITIONER

## 2024-02-28 NOTE — PROGRESS NOTES
[facility] GERIATRICS      Visit Type: Respiratory Problems     Facility:   [facility] ([facility]) [[id number]]         History of Present Illness: Yoanna Phillips is a 83 year old female     Past medical includes  Vitamin D deficiency   Osteoarthritis  Hypertension  Alzheimer's dementia  Arthritis  COPD  Epilepsy-  On gabapentin and carbamazepine, and followed by Dr [facility]ola [facility]   Anemia.   Cataracts  Hard of hearing    On 2/20/2024, patient was seen having a cough and nasal congestion.  She was started on guaifenesin BID and prn liquid, flonase BID, Tylenol TID, duonebs    On 2/24/2024, patient fell onto her knees and is complaining of left knee pain.  An x-ray revealed no acute changes.    Today she was sitting in her room eating lunch   Still coughing  Vs stable        Current Outpatient Medications   Medication Sig Dispense Refill    alendronate (FOSAMAX) 70 MG tablet Take 1 tablet (70 mg) by mouth every 7 days      CARBAMAZEPINE ER PO Take 300 mg by mouth 2 times daily      cyanocobalamin (CYANOCOBALAMIN) 1000 MCG/ML injection INJECT 1 mL INTRAMUSCULARLYON THE 25th OF EACH MONTH FOR VIT B DEFICIENCY 1 mL 3    fluticasone (FLONASE) 50 MCG/ACT nasal spray Spray 1 spray into both nostrils daily as needed for allergies or rhinitis      FOLIC ACID PO Take 1 mg by mouth daily      gabapentin (NEURONTIN) 300 MG capsule Take 1 capsule (300 mg) by mouth 3 times daily for 199 days      guaiFENesin (MUCINEX) 600 MG 12 hr tablet Take 1,200 mg by mouth 2 times daily      guaiFENesin (ROBITUSSIN) 20 mg/mL liquid Take 10 mLs (200 mg) by mouth every 4 hours as needed for cough      ipratropium - albuterol 0.5 mg/2.5 mg/3 mL (DUONEB) 0.5-2.5 (3) MG/3ML neb solution Take 1 vial by nebulization 3 times daily      LOSARTAN POTASSIUM PO Take 50 mg by mouth daily      nystatin (MYCOSTATIN) 490184 UNIT/GM external powder Apply topically 2 times daily      tiotropium (SPIRIVA) 18 MCG capsule Inhale 18 mcg into  "the lungs daily      vitamin D3 (CHOLECALCIFEROL) 50 mcg (2000 units) tablet Take 1 tablet (50 mcg) by mouth daily      acetaminophen (TYLENOL) 325 MG tablet Take 650 mg by mouth every 4 hours as needed for mild pain      senna-docusate (SENOKOT-S/PERICOLACE) 8.6-50 MG tablet Take 2 tablets by mouth daily         ALLERGIES:Aspirin    Vitals:  BP (!) 149/76   Pulse 70   Temp 97.3  F (36.3  C)   Resp 20   Ht 1.549 m (5' 1\")   Wt 74.4 kg (164 lb 1.6 oz)   SpO2 96%   BMI 31.01 kg/m    Body mass index is 31.01 kg/m .    Review of Systems   All other systems reviewed and are negative.         Physical Exam  Vitals and nursing note reviewed.   Constitutional:       Appearance: She is obese.   Pulmonary:      Effort: Pulmonary effort is normal.      Breath sounds: Rhonchi present.   Neurological:      Mental Status: She is alert.           Labs:     Most Recent 3 CBC's:  Recent Labs   Lab Test 12/13/23  0716 04/19/23  1012 12/14/22  1030   WBC 9.8 7.9 9.1   HGB 13.3 13.4 13.9   MCV 98 100 101*    271 319     Most Recent 3 BMP's:  Recent Labs   Lab Test 02/21/24  0704 12/13/23  0716 04/19/23  1012    137 137   POTASSIUM 4.2 4.6 4.0   CHLORIDE 103 104 103   CO2 23 23 20*   BUN 8.2 10.0 11.6   CR 0.64 0.64 0.62   ANIONGAP 12 10 14   FELICE 10.7* 10.2 10.2   GLC 87 96 86             Assessment/Plan:     Chronic obstructive pulmonary disease, unspecified COPD type (H)  Nasal congestion  Uses nebulizer and inhaler at home  Comment: Chronic   taking Spiriva  Congestion improved.  Personally reviewed the labs from 2/21/2024 which revealed stable BMP, normal TSH, normal folate, normal B12.  I reviewed her infectious disease panel and she was negative for influenza, RSV and COVID  Plan:  Flonase  prn on hold  Receiving flonase daily   Continue guaifenesin 600 mg twice daily  Stop Duo neb daily and change to 4x per day as needed  If not better consider prednisone 20 mg daily x 7 days      (W19.XXXD) fall, subsequent " encounter.  (M25.562) acute pain of left knee  Comment: Patient had a fall in her had acute left knee pain  I personally reviewed the x-ray which showed no acute findings  Plan:  Continue with plan of care no changes at this time, adjustment as needed      Electronically signed by:    TANIYA Mejia CNP on 2/28/2024 at 2:09 PM      MEDICATIONS:  MED REC REQUIRED  Post Medication Reconciliation Status:  Medication reconciliation previously completed during another office visit

## 2024-02-28 NOTE — LETTER
2/28/2024        RE: [facility]  [address]  [facility] [address]         [facility] [place] [facility]      Visit Type: Respiratory Problems     Facility:   [facility] ([facility]) [[id number]]         History of Present Illness: Yoanna Phillips is a 83 year old female     Past medical includes  Vitamin D deficiency   Osteoarthritis  Hypertension  Alzheimer's dementia  Arthritis  COPD  Epilepsy-  On gabapentin and carbamazepine, and followed by Dr Fab[facility]ola [facility]   Anemia.   Cataracts  Hard of hearing    On 2/20/2024, patient was seen having a cough and nasal congestion.  She was started on guaifenesin BID and prn liquid, flonase BID, Tylenol TID, duonebs    On 2/24/2024, patient fell onto her knees and is complaining of left knee pain.  An x-ray revealed no acute changes.    Today she was sitting in her room eating lunch   Still coughing  Vs stable        Current Outpatient Medications   Medication Sig Dispense Refill     alendronate (FOSAMAX) 70 MG tablet Take 1 tablet (70 mg) by mouth every 7 days       CARBAMAZEPINE ER PO Take 300 mg by mouth 2 times daily       cyanocobalamin (CYANOCOBALAMIN) 1000 MCG/ML injection INJECT 1 mL INTRAMUSCULARLYON THE 25th OF EACH MONTH FOR VIT B DEFICIENCY 1 mL 3     fluticasone (FLONASE) 50 MCG/ACT nasal spray Spray 1 spray into both nostrils daily as needed for allergies or rhinitis       FOLIC ACID PO Take 1 mg by mouth daily       gabapentin (NEURONTIN) 300 MG capsule Take 1 capsule (300 mg) by mouth 3 times daily for 199 days       guaiFENesin (MUCINEX) 600 MG 12 hr tablet Take 1,200 mg by mouth 2 times daily       guaiFENesin (ROBITUSSIN) 20 mg/mL liquid Take 10 mLs (200 mg) by mouth every 4 hours as needed for cough       ipratropium - albuterol 0.5 mg/2.5 mg/3 mL (DUONEB) 0.5-2.5 (3) MG/3ML neb solution Take 1 vial by nebulization 3 times daily       LOSARTAN POTASSIUM PO Take 50 mg by mouth daily       nystatin  "(MYCOSTATIN) 179152 UNIT/GM external powder Apply topically 2 times daily       tiotropium (SPIRIVA) 18 MCG capsule Inhale 18 mcg into the lungs daily       vitamin D3 (CHOLECALCIFEROL) 50 mcg (2000 units) tablet Take 1 tablet (50 mcg) by mouth daily       acetaminophen (TYLENOL) 325 MG tablet Take 650 mg by mouth every 4 hours as needed for mild pain       senna-docusate (SENOKOT-S/PERICOLACE) 8.6-50 MG tablet Take 2 tablets by mouth daily         ALLERGIES:Aspirin    Vitals:  BP (!) 149/76   Pulse 70   Temp 97.3  F (36.3  C)   Resp 20   Ht 1.549 m (5' 1\")   Wt 74.4 kg (164 lb 1.6 oz)   SpO2 96%   BMI 31.01 kg/m    Body mass index is 31.01 kg/m .    Review of Systems   All other systems reviewed and are negative.         Physical Exam  Vitals and nursing note reviewed.   Constitutional:       Appearance: She is obese.   Pulmonary:      Effort: Pulmonary effort is normal.      Breath sounds: Rhonchi present.   Neurological:      Mental Status: She is alert.           Labs:     Most Recent 3 CBC's:  Recent Labs   Lab Test 12/13/23  0716 04/19/23  1012 12/14/22  1030   WBC 9.8 7.9 9.1   HGB 13.3 13.4 13.9   MCV 98 100 101*    271 319     Most Recent 3 BMP's:  Recent Labs   Lab Test 02/21/24  0704 12/13/23  0716 04/19/23  1012    137 137   POTASSIUM 4.2 4.6 4.0   CHLORIDE 103 104 103   CO2 23 23 20*   BUN 8.2 10.0 11.6   CR 0.64 0.64 0.62   ANIONGAP 12 10 14   FELICE 10.7* 10.2 10.2   GLC 87 96 86             Assessment/Plan:     Chronic obstructive pulmonary disease, unspecified COPD type (H)  Nasal congestion  Uses nebulizer and inhaler at home  Comment: Chronic   taking Spiriva  Congestion improved.  Personally reviewed the labs from 2/21/2024 which revealed stable BMP, normal TSH, normal folate, normal B12.  I reviewed her infectious disease panel and she was negative for influenza, RSV and COVID  Plan:  Flonase  prn on hold  Receiving flonase daily   Continue guaifenesin 600 mg twice daily  Stop " Duo neb daily and change to 4x per day as needed  If not better consider prednisone 20 mg daily x 7 days      (W19.XXXD) fall, subsequent encounter.  (M25.562) acute pain of left knee  Comment: Patient had a fall in her had acute left knee pain  I personally reviewed the x-ray which showed no acute findings  Plan:  Continue with plan of care no changes at this time, adjustment as needed      Electronically signed by:    TANIYA Mejia CNP on 2/28/2024 at 2:09 PM      MEDICATIONS:  MED REC REQUIRED  Post Medication Reconciliation Status:  Medication reconciliation previously completed during another office visit                  Sincerely,        TANIYA Mejia CNP

## 2024-03-01 ENCOUNTER — ASSISTED LIVING VISIT (OUTPATIENT)
Dept: GERIATRICS | Facility: CLINIC | Age: 84
End: 2024-03-01
Payer: COMMERCIAL

## 2024-03-01 VITALS
SYSTOLIC BLOOD PRESSURE: 127 MMHG | BODY MASS INDEX: 31.08 KG/M2 | OXYGEN SATURATION: 94 % | WEIGHT: 164.6 LBS | TEMPERATURE: 97.6 F | HEART RATE: 90 BPM | HEIGHT: 61 IN | DIASTOLIC BLOOD PRESSURE: 59 MMHG | RESPIRATION RATE: 20 BRPM

## 2024-03-01 DIAGNOSIS — Z79.899 USES NEBULIZER AND INHALER AT HOME: ICD-10-CM

## 2024-03-01 DIAGNOSIS — R09.81 NASAL CONGESTION: ICD-10-CM

## 2024-03-01 DIAGNOSIS — J44.9 CHRONIC OBSTRUCTIVE PULMONARY DISEASE, UNSPECIFIED COPD TYPE (H): Primary | ICD-10-CM

## 2024-03-01 PROCEDURE — 99349 HOME/RES VST EST MOD MDM 40: CPT | Performed by: NURSE PRACTITIONER

## 2024-03-01 NOTE — LETTER
"    3/1/2024        RE: Yoanna Phillips  [facility]  [address] [facility] [address]        [facility] GERIATRICS    Chief Complaint   Patient presents with     RECHECK     HPI:  Yoanna Phillips is a 83 year old  (1940), who is being seen today for an episodic care visit at: [facility] ([facility]) [[id number]].     Past medical includes  Vitamin D deficiency   Osteoarthritis  Hypertension  Alzheimer's dementia  Arthritis  COPD  Epilepsy-  On gabapentin and carbamazepine, and followed by Dr Fab[facility]ola [facility]   Anemia.   Cataracts  Hard of hearing     On 2/20/2024, patient was seen having a cough and nasal congestion.  She was started on guaifenesin BID and prn liquid, flonase BID, Tylenol TID, duonebs     On 2/24/2024, patient fell onto her knees and is complaining of left knee pain.  An x-ray revealed no acute changes.       On 2/28/2024, patient's DuoNeb was changed from 4 times per day to as needed    Today she states she is no concerns with breathing.  Nursing has no concerns with worsening breathing.  She is sitting comfortably in her room    Allergies, and PMH/PSH reviewed in [facility] today.  REVIEW OF SYSTEMS:  4 point ROS including Respiratory, CV, GI and , other than that noted in the HPI,  is negative    Objective:   /59   Pulse 90   Temp 97.6  F (36.4  C)   Resp 20   Ht 1.549 m (5' 1\")   Wt 74.7 kg (164 lb 9.6 oz)   SpO2 94%   BMI 31.10 kg/m    GENERAL APPEARANCE:  Alert, in no distress  RESP:  lungs clear to auscultation , no respiratory distress  CV:  Palpation and auscultation of heart done , rate-normal  PSYCH:  memory impaired , affect and mood normal    Most Recent 3 CBC's:  Recent Labs   Lab Test 12/13/23  0716 04/19/23  1012 12/14/22  1030   WBC 9.8 7.9 9.1   HGB 13.3 13.4 13.9   MCV 98 100 101*    271 319     Most Recent 3 BMP's:  Recent Labs   Lab Test 02/21/24  0704 12/13/23  0716 04/19/23  1012    137 137   POTASSIUM 4.2 4.6 4.0 "   CHLORIDE 103 104 103   CO2 23 23 20*   BUN 8.2 10.0 11.6   CR 0.64 0.64 0.62   ANIONGAP 12 10 14   FELICE 10.7* 10.2 10.2   GLC 87 96 86       Assessment/Plan:  Chronic obstructive pulmonary disease, unspecified COPD type (H)  Nasal congestion  Uses nebulizer and inhaler at home  Comment: Chronic   taking Spiriva  Congestion improved.  Personally reviewed the labs from 2/21/2024 which revealed stable BMP, normal TSH, normal folate, normal B12.  I reviewed her infectious disease panel and she was negative for influenza, RSV and COVID  Does not seem to be affected by changing DuoNeb from 4 times daily to as needed.  Plan:  Continue with plan of care no changes at this time, adjustment as needed  If not better consider prednisone 20 mg daily x 7 days    MED REC REQUIRED  Post Medication Reconciliation Status:  Medication reconciliation previously completed during another office visit      Electronically signed by:     TANIYA Mejia CNP          Sincerely,        TANIYA Mejia CNP

## 2024-03-01 NOTE — PROGRESS NOTES
"[facility] GERIATRICS    Chief Complaint   Patient presents with    RECHECK     HPI:  Yoanna Phillips is a 83 year old  (1940), who is being seen today for an episodic care visit at: [facility] ([facility]) [[id number]].     Past medical includes  Vitamin D deficiency   Osteoarthritis  Hypertension  Alzheimer's dementia  Arthritis  COPD  Epilepsy-  On gabapentin and carbamazepine, and followed by Dr [facility]   Anemia.   Cataracts  Hard of hearing     On 2/20/2024, patient was seen having a cough and nasal congestion.  She was started on guaifenesin BID and prn liquid, flonase BID, Tylenol TID, duonebs     On 2/24/2024, patient fell onto her knees and is complaining of left knee pain.  An x-ray revealed no acute changes.       On 2/28/2024, patient's DuoNeb was changed from 4 times per day to as needed    Today she states she is no concerns with breathing.  Nursing has no concerns with worsening breathing.  She is sitting comfortably in her room    Allergies, and PMH/PSH reviewed in [facility] today.  REVIEW OF SYSTEMS:  4 point ROS including Respiratory, CV, GI and , other than that noted in the HPI,  is negative    Objective:   /59   Pulse 90   Temp 97.6  F (36.4  C)   Resp 20   Ht 1.549 m (5' 1\")   Wt 74.7 kg (164 lb 9.6 oz)   SpO2 94%   BMI 31.10 kg/m    GENERAL APPEARANCE:  Alert, in no distress  RESP:  lungs clear to auscultation , no respiratory distress  CV:  Palpation and auscultation of heart done , rate-normal  PSYCH:  memory impaired , affect and mood normal    Most Recent 3 CBC's:  Recent Labs   Lab Test 12/13/23  0716 04/19/23  1012 12/14/22  1030   WBC 9.8 7.9 9.1   HGB 13.3 13.4 13.9   MCV 98 100 101*    271 319     Most Recent 3 BMP's:  Recent Labs   Lab Test 02/21/24  0704 12/13/23  0716 04/19/23  1012    137 137   POTASSIUM 4.2 4.6 4.0   CHLORIDE 103 104 103   CO2 23 23 20*   BUN 8.2 10.0 11.6   CR 0.64 0.64 0.62   ANIONGAP 12 10 14   FELICE 10.7* " 10.2 10.2   GLC 87 96 86       Assessment/Plan:  Chronic obstructive pulmonary disease, unspecified COPD type (H)  Nasal congestion  Uses nebulizer and inhaler at home  Comment: Chronic   taking Spiriva  Congestion improved.  Personally reviewed the labs from 2/21/2024 which revealed stable BMP, normal TSH, normal folate, normal B12.  I reviewed her infectious disease panel and she was negative for influenza, RSV and COVID  Does not seem to be affected by changing DuoNeb from 4 times daily to as needed.  Plan:  Continue with plan of care no changes at this time, adjustment as needed  If not better consider prednisone 20 mg daily x 7 days    MED REC REQUIRED  Post Medication Reconciliation Status:  Medication reconciliation previously completed during another office visit      Electronically signed by:     TANIYA Mejia CNP

## 2024-03-05 ENCOUNTER — LAB REQUISITION (OUTPATIENT)
Dept: LAB | Facility: CLINIC | Age: 84
End: 2024-03-05
Payer: COMMERCIAL

## 2024-03-05 DIAGNOSIS — R05.9 COUGH, UNSPECIFIED: ICD-10-CM

## 2024-03-05 PROCEDURE — 87637 SARSCOV2&INF A&B&RSV AMP PRB: CPT | Mod: ORL | Performed by: NURSE PRACTITIONER

## 2024-03-06 DIAGNOSIS — J44.9 CHRONIC OBSTRUCTIVE PULMONARY DISEASE, UNSPECIFIED COPD TYPE (H): Primary | ICD-10-CM

## 2024-03-06 RX ORDER — TIOTROPIUM BROMIDE 18 UG/1
CAPSULE ORAL; RESPIRATORY (INHALATION)
Qty: 30 CAPSULE | Refills: 11 | Status: SHIPPED | OUTPATIENT
Start: 2024-03-06

## 2024-04-01 NOTE — PROGRESS NOTES
Take Zofran as needed for nausea and drink plenty of fluids at home and start Metamucil therapy regularly for bowel health avoiding things out of a box and eating more fresh fruits and vegetables.   [facility] GERIATRICS  Chief Complaint   Patient presents with     penitentiary Regulatory     [place] Medical Record Number:  [id number]  Place of Service where encounter took place:  [facility] ([facility]) [[id number]]    HPI:    Yoanna Phillips  is 83 year old (1940), who is being seen today for a federally mandated E/M visit.     Past medical includes    Vitamin D deficiency     Osteoarthritis    Hypertension    Alzheimer's dementia    Arthritis    COPD    Epilepsy-  On gabapentin and carbamazepine, and followed by Dr Fabiola [facility]     Anemia.     Cataracts    Hard of hearing      She did have a fall recently    Today patient was seen in her room lying in her recliner.  She denies any problems.   Nursing has no concerns. BIMS=11/15  (8 to 12 suggests moderately impaired) PHQ9=2/27.    In reviewing point click care, vs stable.     ALLERGIES:Aspirin  PAST MEDICAL HISTORY:   Past Medical History:   Diagnosis Date     Arthritis     osteoarthritis     Cognitive impairment 7/3/2012     COPD (chronic obstructive pulmonary disease) (H)      Dyspnea on exertion      Epilepsy (H)     minor seizures 2x daily     Hypertension      Numbness and tingling     bilateral numbness     Pernicious anemia     resolved with vitamin replacement     Poor short term memory 5/20/2011     Seizures (H)      PAST SURGICAL HISTORY:   has a past surgical history that includes NONSPECIFIC PROCEDURE (1965); Craniotomy (1965); Cholecystectomy; Splenectomy; Arthroplasty shoulder (6/25/2012); appendectomy; Arthroplasty hip (Right, 4/11/2016); Arthroplasty revision hip (Right, 5/14/2016); Arthroplasty hip (Left, 3/20/2017); and Closed reduction hip (Left, 5/22/2017).  FAMILY HISTORY: family history includes Arthritis in her mother; Heart Disease in her mother; Hypertension in her mother; Neurologic Disorder in her mother.  SOCIAL HISTORY:  reports that she quit smoking about 61 years ago. Her smoking use included  cigarettes. She has a 5.00 pack-year smoking history. She has never used smokeless tobacco. She reports current alcohol use. She reports that she does not use drugs.    MEDICATIONS:  MED REC REQUIRED  Post Medication Reconciliation Status:  Discharge medications reconciled, continue medications without change           Review of your medicines          Accurate as of June 20, 2023  1:30 PM. If you have any questions, ask your nurse or doctor.            CONTINUE these medicines which have NOT CHANGED      Dose / Directions   acetaminophen 325 MG tablet  Commonly known as: TYLENOL      Dose: 650 mg  Take 650 mg by mouth every 4 hours as needed for mild pain  Refills: 0     CARBAMAZEPINE ER PO      Dose: 300 mg  Take 300 mg by mouth 2 times daily  Refills: 0     cyanocobalamin 1000 MCG/ML injection  Commonly known as: CYANOCOBALAMIN  Used for: B-complex deficiency      INJECT 1 mL INTRAMUSCULARLYON THE 25th OF EACH MONTH FOR VIT B DEFICIENCY  Quantity: 1 mL  Refills: 3     FOLIC ACID PO      Dose: 1 mg  Take 1 mg by mouth daily  Refills: 0     gabapentin 300 MG capsule  Commonly known as: NEURONTIN  Used for: Seizure disorder (H)      Dose: 300 mg  Take 1 capsule (300 mg) by mouth 3 times daily  Refills: 0     LOSARTAN POTASSIUM PO      Dose: 50 mg  Take 50 mg by mouth daily  Refills: 0     tiotropium 18 MCG inhaled capsule  Commonly known as: SPIRIVA      Dose: 18 mcg  Inhale 18 mcg into the lungs daily  Refills: 0     vitamin D3 50 mcg (2000 units) tablet  Commonly known as: CHOLECALCIFEROL  Used for: Vitamin D deficiency      Dose: 1 tablet  Take 1 tablet (50 mcg) by mouth daily  Refills: 0             Case Management:  I have reviewed the care plan and MDS and do agree with the plan. Patient's desire to return to the community is not present. Information reviewed:  Medications, vital signs, orders, and nursing notes.    ROS:  4 point ROS including Respiratory, CV, GI and , other than that noted in the HPI,  is  "negative    Vitals:  /68   Pulse 72   Temp 97.5  F (36.4  C)   Resp 18   Ht 1.549 m (5' 1\")   Wt 76.2 kg (168 lb 1.6 oz)   SpO2 93%   BMI 31.76 kg/m    Body mass index is 31.76 kg/m .  Exam:  GENERAL APPEARANCE:  Alert, in no distress  RESP:  lungs clear to auscultation , no respiratory distress  CV:  Palpation and auscultation of heart done , rate-normal  PSYCH:  insight and judgement impaired, memory impaired     Lab/Diagnostic data:     Most Recent 3 CBC's:  Recent Labs   Lab Test 04/19/23  1012 12/14/22  1030 05/18/22  0851   WBC 7.9 9.1 7.4   HGB 13.4 13.9 14.7    101* 98    319 336     Most Recent 3 BMP's:  Recent Labs   Lab Test 04/19/23  1012 12/14/22  1030 05/18/22  1250    136 133*   POTASSIUM 4.0 4.6 3.9   CHLORIDE 103 101 101   CO2 20* 24 24   BUN 11.6 15.5 8   CR 0.62 0.67 0.69   ANIONGAP 14 11 8   FELICE 10.2 10.6* 10.6*   GLC 86 71 124     Most Recent 2 LFT's:  Recent Labs   Lab Test 02/21/22  1328 03/12/20  0651   AST 14 15   ALT <9 9   ALKPHOS 119 101   BILITOTAL 0.4 0.3       Most Recent TSH and T4:  Recent Labs   Lab Test 12/14/22  1030   TSH 0.80       ASSESSMENT/PLAN    (G40.909) Seizure disorder (H)  (primary encounter diagnosis)  Comment: No recent seizures while taking carbamazepine. Carbamazepine level 7.9 in 4/2023     Plan:     Recommend a follow up with Dr Fabiola Banks, [facility]:  [phone].         (M15.0) Primary osteoarthritis involving multiple joints    Comment: Chronic, stable.  no pain reported today uses a walker.  Has acetaminophen available prn   Plan: Continue with plan of care no changes at this time, adjustment as needed     (G30.1,  F02.80) Late onset Alzheimer's disease without behavioral disturbance (H)  Comment:  Condition is Chronic, progressive requiring 24-hour skilled nursing care.   No behavioral issues or emotional distress. Expect further functional and cognitive decline.    Plan: Continue with plan of care no changes at this " time, adjustment as needed        (J44.9) Chronic obstructive pulmonary disease, unspecified COPD type (H)  Comment: Chronic stable while taking Spiriva,.  Plan: Continue with plan of care no changes at this time, adjustment as needed      (E55.9) Vitamin D deficiency   (R29.6) Falls frequently  Comment: Chronic. Has history of falls.   Recent Vit D level up to 27 while taking Vit D 50 mcg/day; dietary calcium for bone health.   DEXA in 2015 showed osteopenia  Plan: Continue with plan of care no changes at this time, adjustment as needed        (I10) Essential hypertension, benign  (R60.) Lower extremity edema  Comment: stable while taking losartan, LE elevation, tubi- for compression  Plan: Continue with plan of care no changes at this time, adjustment as needed        (D62.9) anemia  Comment: Chronic, stable while taking folic acid and Vitamin B 12.   Plan: Continue with plan of care no changes at this time, adjustment as needed     Orders.   No new orders      Electronically signed by:  TANIYA Mejia CNP

## 2024-04-24 ENCOUNTER — ASSISTED LIVING VISIT (OUTPATIENT)
Dept: GERIATRICS | Facility: CLINIC | Age: 84
End: 2024-04-24
Payer: COMMERCIAL

## 2024-04-24 VITALS
SYSTOLIC BLOOD PRESSURE: 160 MMHG | HEART RATE: 63 BPM | RESPIRATION RATE: 18 BRPM | OXYGEN SATURATION: 96 % | TEMPERATURE: 98.2 F | WEIGHT: 165 LBS | DIASTOLIC BLOOD PRESSURE: 82 MMHG | HEIGHT: 61 IN | BODY MASS INDEX: 31.15 KG/M2

## 2024-04-24 DIAGNOSIS — I10 ESSENTIAL HYPERTENSION, BENIGN: ICD-10-CM

## 2024-04-24 DIAGNOSIS — M85.80 OSTEOPENIA, UNSPECIFIED LOCATION: ICD-10-CM

## 2024-04-24 DIAGNOSIS — F02.80 LATE ONSET ALZHEIMER'S DISEASE WITHOUT BEHAVIORAL DISTURBANCE (H): ICD-10-CM

## 2024-04-24 DIAGNOSIS — R29.6 FALLS FREQUENTLY: ICD-10-CM

## 2024-04-24 DIAGNOSIS — G30.1 LATE ONSET ALZHEIMER'S DISEASE WITHOUT BEHAVIORAL DISTURBANCE (H): ICD-10-CM

## 2024-04-24 DIAGNOSIS — G40.909 SEIZURE DISORDER (H): ICD-10-CM

## 2024-04-24 DIAGNOSIS — J44.9 CHRONIC OBSTRUCTIVE PULMONARY DISEASE, UNSPECIFIED COPD TYPE (H): Primary | ICD-10-CM

## 2024-04-24 DIAGNOSIS — K64.4 EXTERNAL HEMORRHOIDS: ICD-10-CM

## 2024-04-24 PROCEDURE — 99349 HOME/RES VST EST MOD MDM 40: CPT | Performed by: INTERNAL MEDICINE

## 2024-04-24 NOTE — LETTER
4/24/2024        RE: Yoanna Phillips  [facility]  [address]  [facility] [address]        Yoanna Phillips is a 83 year old female seen April 24, 2024 at [facility] where she has resided for 6 and a half years (admit to [facility] 6/2017) seen to follow up progressive dementia    She is seen on the unit up to chair.  Quiet, and does not give any history today  Reviewed with nursing staff, no new concerns.    She had a respiratory infection with coughing in February.  Viral panel negative.    Eventually recovered with symptomatic tx.      By chart review, pt was moved down to the third floor Memory Care unit because of unsafe behaviors and recurrent falls.   Gait is ataxic and extremely unstable, and she will frequently abandon her walker. However she did not like the confines of the secure unit, so she has been moved down to second floor.     Patient has past h/o seizures.  Currently on gabapentin and carbamazepine, and followed by Dr Fabiola [facility] - last seen in June 2023    Patient has low cognitive scores and has failed trial of living on her own, with recurrent falls and inability to seek help when she needs it.  Cognitive decline started a decade ago, now meets criteria for dementia dx, Alzheimer's type with amnesia as most prominent feature.     Past Medical History:   Diagnosis Date     Arthritis     osteoarthritis     Cognitive impairment 7/3/2012     COPD (chronic obstructive pulmonary disease) (H)      Dyspnea on exertion      Epilepsy (H)     minor seizures 2x daily     Hypertension      Numbness and tingling     bilateral numbness     Pernicious anemia     resolved with vitamin replacement     Poor short term memory 5/20/2011     Seizures (H)    Dementia, late onset Alzheimer's type    S/p left TSA 2012    SH:  Previously lived with her son, house in [place] that she lived in all her life.     She has 2 sons and 2 daughters (Kay and Jeanie), 3  "of her children live locally        ROS:   BIMS 8-11/15   PHQ9 3/27   She has lost her hearing aids and does not want replacements.     Needs cuing for all ADLs, ambulatory with 4WW       Admission weight was 164 lbs     Wt Readings from Last 5 Encounters:   04/24/24 74.8 kg (165 lb)   03/01/24 74.7 kg (164 lb 9.6 oz)   02/28/24 74.4 kg (164 lb 1.6 oz)   02/20/24 73.8 kg (162 lb 12.8 oz)   12/12/23 77.1 kg (170 lb)      EXAM:  NAD  BP (!) 160/82   Pulse 63   Temp 98.2  F (36.8  C)   Resp 18   Ht 1.549 m (5' 1\")   Wt 74.8 kg (165 lb)   SpO2 96%   BMI 31.18 kg/m     +[place]  Neck supple without adenopathy  Lungs with decreased BS, few crackles right base   Heart RRR s1, prom s2 with occ ectopy  Abd soft, NT, no distention or guarding, +BS  Neuro: limited history, repetitive  Psych: affect flat       Lab Results   Component Value Date     02/21/2024    POTASSIUM 4.2 02/21/2024    CHLORIDE 103 02/21/2024    CO2 23 02/21/2024    ANIONGAP 12 02/21/2024    GLC 87 02/21/2024    BUN 8.2 02/21/2024    CR 0.64 02/21/2024    GFRESTIMATED 87 02/21/2024    FELICE 10.7 (H) 02/21/2024     Lab Results   Component Value Date    AST 16 12/13/2023      ALBUMIN 3.6 12/13/2023      ALKPHOS 112 12/13/2023     Lab Results   Component Value Date    WBC 9.8 12/13/2023      HGB 13.3 12/13/2023      MCV 98 12/13/2023       12/13/2023       IMP/PLAN:   (E55.9) Vitamin D deficiency   (R29.6) Falls frequently  Comment: She has had courses of PHYSICAL THERAPY but does not retain information, abandons her walker and remains a high fall risk   H/o osteopenia and vitamin D deficiency, improved with tx    Plan: Vit D 50 mcg/day; dietary calcium for bone health.   Alendronate 70 mg/ week     (I10) Essential hypertension, benign  Comment: intermittent LE Edema  BP Readings from Last 3 Encounters:   04/24/24 (!) 160/82   03/01/24 127/59   02/28/24 (!) 149/76      Plan:  losartan 50 mg/day  Follow bps and adjust dose as needed     LE " elevation, tubi- for compression, monitor for open areas    (G30.1,  F02.80) Late onset Alzheimer's disease without behavioral disturbance  Comment: functional decline, significant STML with disorientation and recurrent falls  Plan: Board and Care support for meds, meals, activity, safety             (G40.909) Seizure disorder (H)  Comment: longstanding, no recent sz    Seen by Dr Banks in June 2023, no changes made   Plan: continue carbamazepine 300 mg bid, with quarterly check of labs to include CMP, CBC, TSH and carbamazepine level   Agree with PharmD, would consider GDR of gabapentin to reduce fall risk.     (J44.9) Chronic obstructive pulmonary disease, unspecified COPD type (H)  Comment: recovered from February exacerbation   Plan: continue Spiriva daily, prn Duonebs    (K64.4) External hemorrhoids  Comment: single episode of hematochezia   Hgb stable   Plan: Senna daily, fluids   Can use Preparation H for any related hemorrhoidal symptoms   Reviewed with daughter Kay and son-in-law in December     If her hgb is low, could consider GI referral but unlikely that pt would be a good candidate for endoscopy.         Francy Swartz MD        Sincerely,        Francy Swartz MD

## 2024-05-04 NOTE — PROGRESS NOTES
Yoanna Phillips is a 83 year old female seen April 24, 2024 at [facility] where she has resided for 6 and a half years (admit to [facility] 6/2017) seen to follow up progressive dementia    She is seen on the unit up to chair.  Quiet, and does not give any history today  Reviewed with nursing staff, no new concerns.    She had a respiratory infection with coughing in February.  Viral panel negative.    Eventually recovered with symptomatic tx.      By chart review, pt was moved down to the third floor Memory Care unit because of unsafe behaviors and recurrent falls.   Gait is ataxic and extremely unstable, and she will frequently abandon her walker. However she did not like the confines of the secure unit, so she has been moved down to second floor.     Patient has past h/o seizures.  Currently on gabapentin and carbamazepine, and followed by Dr Fabiola [facility] - last seen in June 2023    Patient has low cognitive scores and has failed trial of living on her own, with recurrent falls and inability to seek help when she needs it.  Cognitive decline started a decade ago, now meets criteria for dementia dx, Alzheimer's type with amnesia as most prominent feature.     Past Medical History:   Diagnosis Date    Arthritis     osteoarthritis    Cognitive impairment 7/3/2012    COPD (chronic obstructive pulmonary disease) (H)     Dyspnea on exertion     Epilepsy (H)     minor seizures 2x daily    Hypertension     Numbness and tingling     bilateral numbness    Pernicious anemia     resolved with vitamin replacement    Poor short term memory 5/20/2011    Seizures (H)    Dementia, late onset Alzheimer's type    S/p left TSA 2012    SH:  [place] [facility] [place] her [place].     She has 2 sons and 2 daughters (Kay and Jeanie), 3 of her children live locally        ROS:   BIMS 8-11/15   PHQ9 3/27   She has lost her hearing aids and does not want  "replacements.     Needs cuing for all ADLs, ambulatory with 4WW       Admission weight was 164 lbs     Wt Readings from Last 5 Encounters:   04/24/24 74.8 kg (165 lb)   03/01/24 74.7 kg (164 lb 9.6 oz)   02/28/24 74.4 kg (164 lb 1.6 oz)   02/20/24 73.8 kg (162 lb 12.8 oz)   12/12/23 77.1 kg (170 lb)      EXAM:  NAD  BP (!) 160/82   Pulse 63   Temp 98.2  F (36.8  C)   Resp 18   Ht 1.549 m (5' 1\")   Wt 74.8 kg (165 lb)   SpO2 96%   BMI 31.18 kg/m     +[place]  Neck supple without adenopathy  Lungs with decreased BS, few crackles right base   Heart RRR s1, prom s2 with occ ectopy  Abd soft, NT, no distention or guarding, +BS  Neuro: limited history, repetitive  Psych: affect flat       Lab Results   Component Value Date     02/21/2024    POTASSIUM 4.2 02/21/2024    CHLORIDE 103 02/21/2024    CO2 23 02/21/2024    ANIONGAP 12 02/21/2024    GLC 87 02/21/2024    BUN 8.2 02/21/2024    CR 0.64 02/21/2024    GFRESTIMATED 87 02/21/2024    FELICE 10.7 (H) 02/21/2024     Lab Results   Component Value Date    AST 16 12/13/2023      ALBUMIN 3.6 12/13/2023      ALKPHOS 112 12/13/2023     Lab Results   Component Value Date    WBC 9.8 12/13/2023      HGB 13.3 12/13/2023      MCV 98 12/13/2023       12/13/2023       IMP/PLAN:   (E55.9) Vitamin D deficiency   (R29.6) Falls frequently  Comment: She has had courses of PHYSICAL THERAPY but does not retain information, abandons her walker and remains a high fall risk   H/o osteopenia and vitamin D deficiency, improved with tx    Plan: Vit D 50 mcg/day; dietary calcium for bone health.   Alendronate 70 mg/ week     (I10) Essential hypertension, benign  Comment: intermittent LE Edema  BP Readings from Last 3 Encounters:   04/24/24 (!) 160/82   03/01/24 127/59   02/28/24 (!) 149/76      Plan:  losartan 50 mg/day  Follow bps and adjust dose as needed     LE elevation, tubi- for compression, monitor for open areas    (G30.1,  F02.80) Late onset Alzheimer's disease without " behavioral disturbance  Comment: functional decline, significant STML with disorientation and recurrent falls  Plan: Board and Care support for meds, meals, activity, safety             (G40.909) Seizure disorder (H)  Comment: longstanding, no recent sz    Seen by Dr Banks in June 2023, no changes made   Plan: continue carbamazepine 300 mg bid, with quarterly check of labs to include CMP, CBC, TSH and carbamazepine level   Agree with PharmD, would consider GDR of gabapentin to reduce fall risk.     (J44.9) Chronic obstructive pulmonary disease, unspecified COPD type (H)  Comment: recovered from February exacerbation   Plan: continue Spiriva daily, prn Duonebs    (K64.4) External hemorrhoids  Comment: single episode of hematochezia   Hgb stable   Plan: Senna daily, fluids   Can use Preparation H for any related hemorrhoidal symptoms   Reviewed with daughter Kay and son-in-law in December     If her hgb is low, could consider GI referral but unlikely that pt would be a good candidate for endoscopy.         Francy Swartz MD

## 2024-05-06 ENCOUNTER — TRANSFERRED RECORDS (OUTPATIENT)
Dept: HEALTH INFORMATION MANAGEMENT | Facility: CLINIC | Age: 84
End: 2024-05-06
Payer: COMMERCIAL

## 2024-05-23 ENCOUNTER — PATIENT OUTREACH (OUTPATIENT)
Dept: GERIATRIC MEDICINE | Facility: CLINIC | Age: 84
End: 2024-05-23
Payer: COMMERCIAL

## 2024-05-23 NOTE — LETTER
May 23, 2024    JOHN RODRIGUEZCAROL  [facility]  [address]  [facility] [address]      Dear John:    As a member of [facility] ([facility]) ([facility]), you are provided a care coordinator. I will be your new care coordinator as of 6/1/2024. I will be calling you soon to see how you are doing and determine your needs.    If you have any questions, please feel free to call me at [phone]. If you reach my voice mail, please leave a message and your phone number. If you are hearing impaired, please call the [place] Relay at 510 or [phone] ([email or web address] relay service).    I look forward to speaking with you soon.    Sincerely,    Eleonora Burton RN, N  [phone]  [email or web address] is a health plan that contracts with both Medicare and the [place] Medical Assistance (Medicaid) program to provide benefits of both programs to enrollees. Enrollment in [facility] depends on contract renewal.      [facility]  [id number] DHS Approved ([id number])  [id number] (11/18)

## 2024-05-23 NOTE — PROGRESS NOTES
[facility] Care Coordination Contact    Internal CC change effective 6/1/2024.  Mailed member CC Change letter.  Additional tasks to be completed by CMS include: update database & EPIC, and move member file.    Tara Guadarrama  Case Management Specialist  [facility]  [phone]

## 2024-06-02 ENCOUNTER — HEALTH MAINTENANCE LETTER (OUTPATIENT)
Age: 84
End: 2024-06-02

## 2024-06-10 ENCOUNTER — LAB REQUISITION (OUTPATIENT)
Dept: LAB | Facility: CLINIC | Age: 84
End: 2024-06-10
Payer: COMMERCIAL

## 2024-06-10 DIAGNOSIS — D64.9 ANEMIA, UNSPECIFIED: ICD-10-CM

## 2024-06-10 DIAGNOSIS — I10 ESSENTIAL (PRIMARY) HYPERTENSION: ICD-10-CM

## 2024-06-12 ENCOUNTER — OFFICE VISIT (OUTPATIENT)
Dept: GERIATRICS | Facility: CLINIC | Age: 84
End: 2024-06-12
Payer: COMMERCIAL

## 2024-06-12 VITALS
DIASTOLIC BLOOD PRESSURE: 69 MMHG | HEART RATE: 76 BPM | TEMPERATURE: 97.3 F | SYSTOLIC BLOOD PRESSURE: 122 MMHG | BODY MASS INDEX: 31.87 KG/M2 | OXYGEN SATURATION: 97 % | RESPIRATION RATE: 18 BRPM | WEIGHT: 168.8 LBS | HEIGHT: 61 IN

## 2024-06-12 DIAGNOSIS — I10 ESSENTIAL HYPERTENSION, BENIGN: ICD-10-CM

## 2024-06-12 DIAGNOSIS — J44.9 CHRONIC OBSTRUCTIVE PULMONARY DISEASE, UNSPECIFIED COPD TYPE (H): ICD-10-CM

## 2024-06-12 DIAGNOSIS — G30.1 LATE ONSET ALZHEIMER'S DISEASE WITHOUT BEHAVIORAL DISTURBANCE (H): ICD-10-CM

## 2024-06-12 DIAGNOSIS — M15.0 PRIMARY OSTEOARTHRITIS INVOLVING MULTIPLE JOINTS: ICD-10-CM

## 2024-06-12 DIAGNOSIS — R09.81 NASAL CONGESTION: ICD-10-CM

## 2024-06-12 DIAGNOSIS — F02.80 LATE ONSET ALZHEIMER'S DISEASE WITHOUT BEHAVIORAL DISTURBANCE (H): ICD-10-CM

## 2024-06-12 DIAGNOSIS — H25.9 SENILE CATARACT OF LEFT EYE, UNSPECIFIED AGE-RELATED CATARACT TYPE: ICD-10-CM

## 2024-06-12 DIAGNOSIS — M85.80 OSTEOPENIA, UNSPECIFIED LOCATION: ICD-10-CM

## 2024-06-12 DIAGNOSIS — Z01.818 PREOP GENERAL PHYSICAL EXAM: Primary | ICD-10-CM

## 2024-06-12 DIAGNOSIS — D64.9 ANEMIA, UNSPECIFIED TYPE: ICD-10-CM

## 2024-06-12 DIAGNOSIS — E55.9 VITAMIN D DEFICIENCY: ICD-10-CM

## 2024-06-12 DIAGNOSIS — G40.909 SEIZURE DISORDER (H): ICD-10-CM

## 2024-06-12 LAB
ANION GAP SERPL CALCULATED.3IONS-SCNC: 11 MMOL/L (ref 7–15)
BUN SERPL-MCNC: 6 MG/DL (ref 8–23)
CALCIUM SERPL-MCNC: 10.3 MG/DL (ref 8.8–10.2)
CHLORIDE SERPL-SCNC: 98 MMOL/L (ref 98–107)
CREAT SERPL-MCNC: 0.58 MG/DL (ref 0.51–0.95)
DEPRECATED HCO3 PLAS-SCNC: 24 MMOL/L (ref 22–29)
EGFRCR SERPLBLD CKD-EPI 2021: 89 ML/MIN/1.73M2
ERYTHROCYTE [DISTWIDTH] IN BLOOD BY AUTOMATED COUNT: 13.3 % (ref 10–15)
GLUCOSE SERPL-MCNC: 101 MG/DL (ref 70–99)
HCT VFR BLD AUTO: 43.5 % (ref 35–47)
HGB BLD-MCNC: 14.4 G/DL (ref 11.7–15.7)
MCH RBC QN AUTO: 33 PG (ref 26.5–33)
MCHC RBC AUTO-ENTMCNC: 33.1 G/DL (ref 31.5–36.5)
MCV RBC AUTO: 100 FL (ref 78–100)
PLATELET # BLD AUTO: 356 10E3/UL (ref 150–450)
POTASSIUM SERPL-SCNC: 4.6 MMOL/L (ref 3.4–5.3)
RBC # BLD AUTO: 4.36 10E6/UL (ref 3.8–5.2)
SODIUM SERPL-SCNC: 133 MMOL/L (ref 135–145)
WBC # BLD AUTO: 9.6 10E3/UL (ref 4–11)

## 2024-06-12 PROCEDURE — 99349 HOME/RES VST EST MOD MDM 40: CPT | Performed by: NURSE PRACTITIONER

## 2024-06-12 PROCEDURE — 80048 BASIC METABOLIC PNL TOTAL CA: CPT | Mod: ORL | Performed by: NURSE PRACTITIONER

## 2024-06-12 PROCEDURE — 36415 COLL VENOUS BLD VENIPUNCTURE: CPT | Mod: ORL | Performed by: NURSE PRACTITIONER

## 2024-06-12 PROCEDURE — 85027 COMPLETE CBC AUTOMATED: CPT | Mod: ORL | Performed by: NURSE PRACTITIONER

## 2024-06-12 PROCEDURE — P9604 ONE-WAY ALLOW PRORATED TRIP: HCPCS | Mod: ORL | Performed by: NURSE PRACTITIONER
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
## 2024-06-28 ENCOUNTER — ASSISTED LIVING VISIT (OUTPATIENT)
Dept: GERIATRICS | Facility: CLINIC | Age: 84
End: 2024-06-28
Payer: COMMERCIAL

## 2024-06-28 VITALS
HEART RATE: 58 BPM | WEIGHT: 169 LBS | SYSTOLIC BLOOD PRESSURE: 146 MMHG | HEIGHT: 61 IN | TEMPERATURE: 97.9 F | RESPIRATION RATE: 20 BRPM | OXYGEN SATURATION: 96 % | BODY MASS INDEX: 31.91 KG/M2 | DIASTOLIC BLOOD PRESSURE: 62 MMHG

## 2024-06-28 DIAGNOSIS — E55.9 VITAMIN D DEFICIENCY: ICD-10-CM

## 2024-06-28 DIAGNOSIS — K64.4 EXTERNAL HEMORRHOIDS: ICD-10-CM

## 2024-06-28 DIAGNOSIS — G40.909 SEIZURE DISORDER (H): Primary | ICD-10-CM

## 2024-06-28 DIAGNOSIS — G30.1 LATE ONSET ALZHEIMER'S DISEASE WITHOUT BEHAVIORAL DISTURBANCE (H): ICD-10-CM

## 2024-06-28 DIAGNOSIS — M85.80 OSTEOPENIA, UNSPECIFIED LOCATION: ICD-10-CM

## 2024-06-28 DIAGNOSIS — J44.9 CHRONIC OBSTRUCTIVE PULMONARY DISEASE, UNSPECIFIED COPD TYPE (H): ICD-10-CM

## 2024-06-28 DIAGNOSIS — M15.0 PRIMARY OSTEOARTHRITIS INVOLVING MULTIPLE JOINTS: ICD-10-CM

## 2024-06-28 DIAGNOSIS — I10 ESSENTIAL HYPERTENSION, BENIGN: ICD-10-CM

## 2024-06-28 DIAGNOSIS — F02.80 LATE ONSET ALZHEIMER'S DISEASE WITHOUT BEHAVIORAL DISTURBANCE (H): ICD-10-CM

## 2024-06-28 PROCEDURE — 99349 HOME/RES VST EST MOD MDM 40: CPT | Performed by: NURSE PRACTITIONER

## 2024-06-28 RX ORDER — GUAIFENESIN 600 MG/1
1200 TABLET, EXTENDED RELEASE ORAL 2 TIMES DAILY PRN
Status: SHIPPED
Start: 2024-06-28 | End: 2024-08-27

## 2024-06-28 RX ORDER — MOXIFLOXACIN 5 MG/ML
1 SOLUTION/ DROPS OPHTHALMIC 4 TIMES DAILY
COMMUNITY
End: 2024-07-08

## 2024-06-28 RX ORDER — PREDNISOLONE ACETATE 10 MG/ML
1 SUSPENSION/ DROPS OPHTHALMIC 4 TIMES DAILY
COMMUNITY
End: 2024-07-08

## 2024-06-28 NOTE — PROGRESS NOTES
[facility] GERIATRICS  Chief Complaint   Patient presents with    skilled nursing Regulatory     [place] Medical Record Number:  [id number]  Place of Service where encounter took place:  [facility] ([facility]) [[id number]]    HPI:    Yoanna Phillips  is 84 year old (1940), who is being seen today for a federally mandated E/M visit. T    Past medical includes  Vitamin D deficiency   Osteoarthritis  Hypertension  Alzheimer's dementia  Arthritis  COPD  Epilepsy-  On gabapentin and carbamazepine, and [facility]   Anemia.   Cataracts  Hard of hearing    In June 2024 had cataract surgery     Today patient was seen in her room.  she has been taking cough medicine prn, duoneb and has nasal congestion.  Her lungs are clear.  She is also complaining of loose stools  She denies chest pain, shortness of breath, dizziness, lightheadedness, and a poor appetite.   BIMS=10/15 (score 8 to 12 suggests moderately impaired) PHQ9=1/27.   Patient needs cuing for assistance with ADLs, uses a 4 wheeled walker.    Her appetite is good and consumes a regular diet.  Per nursing, skin is intact. Code status is DNR/DNI.     In reviewing point click care VS are stable.   she has lost 10# in a year.   She follows with [facility]         ALLERGIES:Aspirin  PAST MEDICAL HISTORY:   Past Medical History:   Diagnosis Date    Arthritis     osteoarthritis    Cognitive impairment 7/3/2012    COPD (chronic obstructive pulmonary disease) (H)     Dyspnea on exertion     Epilepsy (H)     minor seizures 2x daily    Hypertension     Numbness and tingling     bilateral numbness    Pernicious anemia     resolved with vitamin replacement    Poor short term memory 5/20/2011    Seizures (H)      PAST SURGICAL HISTORY:   has a past surgical history that includes NONSPECIFIC PROCEDURE (1965); Craniotomy (1965); Cholecystectomy; Splenectomy; Arthroplasty shoulder (6/25/2012); appendectomy; Arthroplasty hip (Right, 4/11/2016);  Arthroplasty revision hip (Right, 5/14/2016); Arthroplasty hip (Left, 3/20/2017); and Closed reduction hip (Left, 5/22/2017).  FAMILY HISTORY: family history includes Arthritis in her mother; Heart Disease in her mother; Hypertension in her mother; Neurologic Disorder in her mother.  SOCIAL HISTORY:  reports that she quit smoking about 62 years ago. Her smoking use included cigarettes. She started smoking about 72 years ago. She has a 5 pack-year smoking history. She has never used smokeless tobacco. She reports current alcohol use. She reports that she does not use drugs.    MEDICATIONS:  MED REC REQUIRED  Post Medication Reconciliation Status:  Discharge medications reconciled and changed, see notes/orders         Review of your medicines            Accurate as of June 28, 2024  7:30 AM. If you have any questions, ask your nurse or doctor.                CONTINUE these medicines which have NOT CHANGED        Dose / Directions   alendronate 70 MG tablet  Commonly known as: FOSAMAX  Used for: Osteopenia, unspecified location      Dose: 70 mg  Take 1 tablet (70 mg) by mouth every 7 days  Refills: 0     CARBAMAZEPINE ER PO      Dose: 300 mg  Take 300 mg by mouth 2 times daily  Refills: 0     cyanocobalamin 1000 MCG/ML injection  Commonly known as: CYANOCOBALAMIN  Used for: B-complex deficiency      INJECT 1 mL INTRAMUSCULARLYON THE 25th OF EACH MONTH FOR VIT B DEFICIENCY  Quantity: 1 mL  Refills: 3     FOLIC ACID PO      Dose: 1 mg  Take 1 mg by mouth daily  Refills: 0     gabapentin 300 MG capsule  Commonly known as: NEURONTIN  Used for: Seizure disorder (H)      Dose: 300 mg  Take 1 capsule (300 mg) by mouth 3 times daily for 199 days  Refills: 0     guaiFENesin 600 MG 12 hr tablet  Commonly known as: MUCINEX      Dose: 1,200 mg  Take 1,200 mg by mouth 2 times daily  Refills: 0     LOSARTAN POTASSIUM PO      Dose: 50 mg  Take 50 mg by mouth daily  Refills: 0     moxifloxacin 0.5 % ophthalmic solution  Commonly known  "as: VIGAMOX      Dose: 1 drop  Place 1 drop Into the left eye 4 times daily  Refills: 0     nystatin 191222 UNIT/GM external powder  Commonly known as: MYCOSTATIN      Apply topically 2 times daily  Refills: 0     prednisoLONE acetate 1 % ophthalmic suspension  Commonly known as: PRED FORTE      Dose: 1 drop  Place 1 drop Into the left eye 4 times daily  Refills: 0     senna-docusate 8.6-50 MG tablet  Commonly known as: SENOKOT-S/PERICOLACE      Dose: 2 tablet  Take 2 tablets by mouth daily  Refills: 0     Spiriva HandiHaler 18 MCG inhaled capsule  Used for: Chronic obstructive pulmonary disease, unspecified COPD type (H)  Generic drug: tiotropium      INHALE CONTENTS OF 1 CAPSULE VIA HANDIHALER ONCE DAILY  Quantity: 30 capsule  Refills: 11     vitamin D3 50 mcg (2000 units) tablet  Commonly known as: CHOLECALCIFEROL  Used for: Vitamin D deficiency      Dose: 1 tablet  Take 1 tablet (50 mcg) by mouth daily  Refills: 0            STOP taking      acetaminophen 325 MG tablet  Commonly known as: TYLENOL        ipratropium - albuterol 0.5 mg/2.5 mg/3 mL 0.5-2.5 (3) MG/3ML neb solution  Commonly known as: DUONEB                  Case Management:  I have reviewed the care plan and MDS and do agree with the plan. Patient's desire to return to the community is not present. Information reviewed:  Medications, vital signs, orders, and nursing notes.    ROS:  4 point ROS including Respiratory, CV, GI and , other than that noted in the HPI,  is negative    Vitals:  BP (!) 146/62   Pulse 58   Temp 97.9  F (36.6  C)   Resp 20   Ht 1.549 m (5' 1\")   Wt 76.7 kg (169 lb)   SpO2 96%   BMI 31.93 kg/m    Body mass index is 31.93 kg/m .  Exam:  GENERAL APPEARANCE:  Alert, in no distress, morbidly obese  RESP:  respiratory effort and palpation of chest normal, lungs clear to auscultation , nasal congestion  CV:  Palpation and auscultation of heart done , rate-normal  PSYCH:  insight and judgement impaired, affect and mood " normal    Lab/Diagnostic data:   Most Recent 3 CBC's:  Recent Labs   Lab Test 06/12/24  0746 12/13/23  0716 04/19/23  1012   WBC 9.6 9.8 7.9   HGB 14.4 13.3 13.4    98 100    305 271     Most Recent 3 BMP's:  Recent Labs   Lab Test 06/12/24  0746 02/21/24  0704 12/13/23  0716   * 138 137   POTASSIUM 4.6 4.2 4.6   CHLORIDE 98 103 104   CO2 24 23 23   BUN 6.0* 8.2 10.0   CR 0.58 0.64 0.64   ANIONGAP 11 12 10   FELICE 10.3* 10.7* 10.2   * 87 96     Most Recent 2 LFT's:  Recent Labs   Lab Test 12/13/23  0716 02/21/22  1328   AST 16 14   ALT 9 <9   ALKPHOS 112 119   BILITOTAL <0.2 0.4     Most Recent TSH and T4:  Recent Labs   Lab Test 02/21/24  0704   TSH 1.23     Most Recent Hemoglobin A1c:  Recent Labs   Lab Test 07/10/18  0700   A1C 5.4       ASSESSMENT/PLAN       (K64.4) External hemorrhoids  Comment: in 12/2023, pt jackie examined pt and hemorrhoids were seen on exam with no evidence of ongoing bleeding     HGB has been stable  Taking Senna S daily,  Plan:  Continue with plan of care no changes at this time, adjustment as needed        G40.909) Seizure disorder (H)  (primary encounter diagnosis)  Comment: No recent seizures while taking carbamazepine.   Carbamazepine level 7.9 in 4/2023.  Had follow up with Dr. Berg in 6/2023 with no new orders     Plan:   Follow up with Dr. Berg on 6/28  Follow CBC, renal and hepatic function, sodium, eye exam, carbamazepine level, thyroid labs in 4 months if not done soon by Dr. berg     (M15.0) Primary osteoarthritis involving multiple joints    Comment: Chronic, stable.  no pain reported today uses a walker.    Plan: Continue with plan of care no changes at this time, adjustment as needed     (G30.1,  F02.80) Late onset Alzheimer's disease without behavioral disturbance (H)  Comment:  Condition is Chronic, progressive requiring 24-hour skilled nursing care.  Last BIMS was 10/15 indicated moderate cognitive impairment.    No behavioral issues or  emotional distress. Expect further functional and cognitive decline.    Plan: Continue with plan of care no changes at this time, adjustment as needed        (J44.9) Chronic obstructive pulmonary disease, unspecified COPD type (H)  Comment: Chronic   taking Spiriva  Plan:   Discontinue guaifenesin 600 mg twice daily and change to PRN           (E55.9) Vitamin D deficiency   (R85.80) osteopenia, unspecified location  Comment: Chronic. Has history of falls.     Recent Vit D level up to 27 while taking Vit D 50 mcg/day; dietary calcium for bone health.     DEXA in 2015 showed osteopenia  Takes Vitamin D level and alendronate 70 mg by mouth every 7 days  Comment: Continue with plan of care no changes at this time, adjustment as needed       (I10) Essential hypertension, benign  Comment: stable while taking losartan  BMP WNL  Plan: Continue with plan of care no changes at this time, adjustment as needed           Electronically signed by:      TANIYA Mejia CNP on 6/28/2024 at 2:17 PM

## 2024-06-28 NOTE — LETTER
6/28/2024      [facility]  [address]  [facility] [address]        [facility] GERIATRICS  Chief Complaint   Patient presents with     shelter [facility] Medical Record Number:  [id number]  Place of Service where encounter took place:  [facility] ([facility]) [[id number]]    HPI:    Yoanna Phillips  is 84 year old (1940), who is being seen today for a federally mandated E/M visit. T    Past medical includes  Vitamin D deficiency   Osteoarthritis  Hypertension  Alzheimer's dementia  Arthritis  COPD  Epilepsy-  On gabapentin and carbamazepine, and followed by [facility]   Anemia.   Cataracts  Hard of hearing    In June 2024 had cataract surgery     Today patient was seen in her room.  she has been taking cough medicine prn, duoneb and has nasal congestion.  Her lungs are clear.  She is also complaining of loose stools  She denies chest pain, shortness of breath, dizziness, lightheadedness, and a poor appetite.   BIMS=10/15 (score 8 to 12 suggests moderately impaired) PHQ9=1/27.   Patient needs cuing for assistance with ADLs, uses a 4 wheeled walker.    Her appetite is good and consumes a regular diet.  Per nursing, skin is intact. Code status is DNR/DNI.     In reviewing point click care VS are stable.   she has lost 10# in a year.   She follows with [facility]         ALLERGIES:Aspirin  PAST MEDICAL HISTORY:   Past Medical History:   Diagnosis Date     Arthritis     osteoarthritis     Cognitive impairment 7/3/2012     COPD (chronic obstructive pulmonary disease) (H)      Dyspnea on exertion      Epilepsy (H)     minor seizures 2x daily     Hypertension      Numbness and tingling     bilateral numbness     Pernicious anemia     resolved with vitamin replacement     Poor short term memory 5/20/2011     Seizures (H)      PAST SURGICAL HISTORY:   has a past surgical history that includes NONSPECIFIC PROCEDURE (1965); Craniotomy (1965);  Cholecystectomy; Splenectomy; Arthroplasty shoulder (6/25/2012); appendectomy; Arthroplasty hip (Right, 4/11/2016); Arthroplasty revision hip (Right, 5/14/2016); Arthroplasty hip (Left, 3/20/2017); and Closed reduction hip (Left, 5/22/2017).  FAMILY HISTORY: family history includes Arthritis in her mother; Heart Disease in her mother; Hypertension in her mother; Neurologic Disorder in her mother.  SOCIAL HISTORY:  reports that she quit smoking about 62 years ago. Her smoking use included cigarettes. She started smoking about 72 years ago. She has a 5 pack-year smoking history. She has never used smokeless tobacco. She reports current alcohol use. She reports that she does not use drugs.    MEDICATIONS:  MED REC REQUIRED  Post Medication Reconciliation Status:  Discharge medications reconciled and changed, see notes/orders         Review of your medicines            Accurate as of June 28, 2024  7:30 AM. If you have any questions, ask your nurse or doctor.                CONTINUE these medicines which have NOT CHANGED        Dose / Directions   alendronate 70 MG tablet  Commonly known as: FOSAMAX  Used for: Osteopenia, unspecified location      Dose: 70 mg  Take 1 tablet (70 mg) by mouth every 7 days  Refills: 0     CARBAMAZEPINE ER PO      Dose: 300 mg  Take 300 mg by mouth 2 times daily  Refills: 0     cyanocobalamin 1000 MCG/ML injection  Commonly known as: CYANOCOBALAMIN  Used for: B-complex deficiency      INJECT 1 mL INTRAMUSCULARLYON THE 25th OF EACH MONTH FOR VIT B DEFICIENCY  Quantity: 1 mL  Refills: 3     FOLIC ACID PO      Dose: 1 mg  Take 1 mg by mouth daily  Refills: 0     gabapentin 300 MG capsule  Commonly known as: NEURONTIN  Used for: Seizure disorder (H)      Dose: 300 mg  Take 1 capsule (300 mg) by mouth 3 times daily for 199 days  Refills: 0     guaiFENesin 600 MG 12 hr tablet  Commonly known as: MUCINEX      Dose: 1,200 mg  Take 1,200 mg by mouth 2 times daily  Refills: 0     LOSARTAN POTASSIUM  "PO      Dose: 50 mg  Take 50 mg by mouth daily  Refills: 0     moxifloxacin 0.5 % ophthalmic solution  Commonly known as: VIGAMOX      Dose: 1 drop  Place 1 drop Into the left eye 4 times daily  Refills: 0     nystatin 380545 UNIT/GM external powder  Commonly known as: MYCOSTATIN      Apply topically 2 times daily  Refills: 0     prednisoLONE acetate 1 % ophthalmic suspension  Commonly known as: PRED FORTE      Dose: 1 drop  Place 1 drop Into the left eye 4 times daily  Refills: 0     senna-docusate 8.6-50 MG tablet  Commonly known as: SENOKOT-S/PERICOLACE      Dose: 2 tablet  Take 2 tablets by mouth daily  Refills: 0     Spiriva HandiHaler 18 MCG inhaled capsule  Used for: Chronic obstructive pulmonary disease, unspecified COPD type (H)  Generic drug: tiotropium      INHALE CONTENTS OF 1 CAPSULE VIA HANDIHALER ONCE DAILY  Quantity: 30 capsule  Refills: 11     vitamin D3 50 mcg (2000 units) tablet  Commonly known as: CHOLECALCIFEROL  Used for: Vitamin D deficiency      Dose: 1 tablet  Take 1 tablet (50 mcg) by mouth daily  Refills: 0            STOP taking      acetaminophen 325 MG tablet  Commonly known as: TYLENOL        ipratropium - albuterol 0.5 mg/2.5 mg/3 mL 0.5-2.5 (3) MG/3ML neb solution  Commonly known as: DUONEB                  Case Management:  I have reviewed the care plan and MDS and do agree with the plan. Patient's desire to return to the community is not present. Information reviewed:  Medications, vital signs, orders, and nursing notes.    ROS:  4 point ROS including Respiratory, CV, GI and , other than that noted in the HPI,  is negative    Vitals:  BP (!) 146/62   Pulse 58   Temp 97.9  F (36.6  C)   Resp 20   Ht 1.549 m (5' 1\")   Wt 76.7 kg (169 lb)   SpO2 96%   BMI 31.93 kg/m    Body mass index is 31.93 kg/m .  Exam:  GENERAL APPEARANCE:  Alert, in no distress, morbidly obese  RESP:  respiratory effort and palpation of chest normal, lungs clear to auscultation , nasal congestion  CV:  " Palpation and auscultation of heart done , rate-normal  PSYCH:  insight and judgement impaired, affect and mood normal    Lab/Diagnostic data:   Most Recent 3 CBC's:  Recent Labs   Lab Test 06/12/24  0746 12/13/23  0716 04/19/23  1012   WBC 9.6 9.8 7.9   HGB 14.4 13.3 13.4    98 100    305 271     Most Recent 3 BMP's:  Recent Labs   Lab Test 06/12/24  0746 02/21/24  0704 12/13/23  0716   * 138 137   POTASSIUM 4.6 4.2 4.6   CHLORIDE 98 103 104   CO2 24 23 23   BUN 6.0* 8.2 10.0   CR 0.58 0.64 0.64   ANIONGAP 11 12 10   FELICE 10.3* 10.7* 10.2   * 87 96     Most Recent 2 LFT's:  Recent Labs   Lab Test 12/13/23  0716 02/21/22  1328   AST 16 14   ALT 9 <9   ALKPHOS 112 119   BILITOTAL <0.2 0.4     Most Recent TSH and T4:  Recent Labs   Lab Test 02/21/24  0704   TSH 1.23     Most Recent Hemoglobin A1c:  Recent Labs   Lab Test 07/10/18  0700   A1C 5.4       ASSESSMENT/PLAN       (K64.4) External hemorrhoids  Comment: in 12/2023, pt jackie examined pt and hemorrhoids were seen on exam with no evidence of ongoing bleeding     HGB has been stable  Taking Senna S daily,  Plan:  Continue with plan of care no changes at this time, adjustment as needed        G40.909) Seizure disorder (H)  (primary encounter diagnosis)  Comment: No recent seizures while taking carbamazepine.   Carbamazepine level 7.9 in 4/2023.  Had follow up with Dr. Berg in 6/2023 with no new orders     Plan:   Follow up with Dr. Berg on 6/28  Follow CBC, renal and hepatic function, sodium, eye exam, carbamazepine level, thyroid labs in 4 months if not done soon by Dr. berg     (M15.0) Primary osteoarthritis involving multiple joints    Comment: Chronic, stable.  no pain reported today uses a walker.    Plan: Continue with plan of care no changes at this time, adjustment as needed     (G30.1,  F02.80) Late onset Alzheimer's disease without behavioral disturbance (H)  Comment:  Condition is Chronic, progressive requiring 24-hour skilled  nursing care.  Last BIMS was 10/15 indicated moderate cognitive impairment.    No behavioral issues or emotional distress. Expect further functional and cognitive decline.    Plan: Continue with plan of care no changes at this time, adjustment as needed        (J44.9) Chronic obstructive pulmonary disease, unspecified COPD type (H)  Comment: Chronic   taking Spiriva  Plan:   Discontinue guaifenesin 600 mg twice daily and change to PRN           (E55.9) Vitamin D deficiency   (R85.80) osteopenia, unspecified location  Comment: Chronic. Has history of falls.     Recent Vit D level up to 27 while taking Vit D 50 mcg/day; dietary calcium for bone health.     DEXA in 2015 showed osteopenia  Takes Vitamin D level and alendronate 70 mg by mouth every 7 days  Comment: Continue with plan of care no changes at this time, adjustment as needed       (I10) Essential hypertension, benign  Comment: stable while taking losartan  BMP WNL  Plan: Continue with plan of care no changes at this time, adjustment as needed           Electronically signed by:      TANIYA Mejia CNP on 6/28/2024 at 2:17 PM             Sincerely,        TANIYA Mejia CNP

## 2024-07-22 ENCOUNTER — PATIENT OUTREACH (OUTPATIENT)
Dept: GERIATRIC MEDICINE | Facility: CLINIC | Age: 84
End: 2024-07-22
Payer: COMMERCIAL

## 2024-07-22 NOTE — PLAN OF CARE
Problem: Goal Outcome Summary  Goal: Goal Outcome Summary  Outcome: Improving  Pt was A & O to self. Lungs sound clear, bowel sounds active, cms intact. Received tylenol for pain. Bae in place. IV fluid infusing. Up with 2 and walker. Immobilizer on. Will continue to monitor.        Spoke to patient, feels like she has flu and has not felt this way before. Offered appointment with Dr. Lewis tomorrow 07/23/24. Patient scheduled.

## 2024-07-23 NOTE — PROGRESS NOTES
[facility] Care Coordination Contact  [facility] Six-Month Assessment    6 month assessment completed on 07/22/24 with Yoanna.    ER visits: No  Hospitalizations: No  TCU stays:  NA lives in Long Term Care Setting  Significant health status changes: Health remained stable.  No change in condition noted through chart review or interview with patient.  Falls/Injuries: No  ADL/IADL changes: No    Reviewed Institutional Assessment and updated as needed.     Will see member in 6 months for an annual health risk assessment.   Encouraged member to call CC with any questions or concerns in the meantime.     Eleonora GONZALEZN/PHN [facility]  Long Term Care/ Care Coordinator  [address]   [address]  [place] [address]  [email or web address]   [email or web address]     Cell: [phone]  Office: [phone]  Fax: [phone]

## 2024-07-24 ENCOUNTER — TELEPHONE (OUTPATIENT)
Dept: GERIATRICS | Facility: CLINIC | Age: 84
End: 2024-07-24
Payer: COMMERCIAL

## 2024-07-24 NOTE — TELEPHONE ENCOUNTER
[facility] NON-EMERGENT ENCOUNTER    Yoanna Phillips is a 84 year old  (1940), call received today regarding:   Unwitnessed fall    Disposition  Nurse called to report that patient had a fall around 6 am.  Patient stood up and fell forward landing on the ground.  Patient sustained a bump to her forehead and bruising to the bridge of her nose.  No other injuries noted.  Neuro's and vitals initiated and all stable.  Nurse will continue to monitor per facility protocol and update with any changes.  Patient is not on any blood thinners.      Requests  FYI      Electronically signed by:   Alecia Raman RN

## 2024-08-13 ENCOUNTER — ASSISTED LIVING VISIT (OUTPATIENT)
Dept: GERIATRICS | Facility: CLINIC | Age: 84
End: 2024-08-13
Payer: COMMERCIAL

## 2024-08-13 VITALS
HEART RATE: 78 BPM | BODY MASS INDEX: 32.34 KG/M2 | RESPIRATION RATE: 18 BRPM | SYSTOLIC BLOOD PRESSURE: 177 MMHG | HEIGHT: 61 IN | DIASTOLIC BLOOD PRESSURE: 86 MMHG | TEMPERATURE: 97.2 F | WEIGHT: 171.3 LBS | OXYGEN SATURATION: 95 %

## 2024-08-13 DIAGNOSIS — F02.80 LATE ONSET ALZHEIMER'S DISEASE WITHOUT BEHAVIORAL DISTURBANCE (H): ICD-10-CM

## 2024-08-13 DIAGNOSIS — G40.909 SEIZURE DISORDER (H): ICD-10-CM

## 2024-08-13 DIAGNOSIS — R29.6 FALLS FREQUENTLY: Primary | ICD-10-CM

## 2024-08-13 DIAGNOSIS — J44.9 CHRONIC OBSTRUCTIVE PULMONARY DISEASE, UNSPECIFIED COPD TYPE (H): ICD-10-CM

## 2024-08-13 DIAGNOSIS — G30.1 LATE ONSET ALZHEIMER'S DISEASE WITHOUT BEHAVIORAL DISTURBANCE (H): ICD-10-CM

## 2024-08-13 DIAGNOSIS — I10 ESSENTIAL HYPERTENSION, BENIGN: ICD-10-CM

## 2024-08-13 DIAGNOSIS — E55.9 VITAMIN D DEFICIENCY: ICD-10-CM

## 2024-08-13 PROCEDURE — 99349 HOME/RES VST EST MOD MDM 40: CPT | Performed by: INTERNAL MEDICINE

## 2024-08-27 NOTE — PROGRESS NOTES
"Yoanna Phillips is a 84 year old female seen August 13, 2024 at [facility] where she has resided for 7 years (admit to [facility] 6/2017) seen to follow up progressive dementia and falls   Pt is seen in her room up to recliner.   States she is feeling well, denies pain.   \"I have fallen 3 times and they won't let me forget it.\"    Nursing staff reports pt continues to have frequent falls, despite close watching.   Abandons her walker.     Pt had cataract surgery in June         By chart review, a year ago pt was moved down to the third floor Memory Care unit because of unsafe behaviors and recurrent falls.   Gait is ataxic and extremely unstable, and she will frequently abandon her walker. However she did not like the confines of the secure unit, so she was moved down to second floor.     Patient has past h/o seizures.  Currently on gabapentin and carbamazepine, and followed by Dr Fabiola Banks [facility] - last seen in June 2023    Patient has low cognitive scores and has failed trial of living on her own, with recurrent falls and inability to seek help when she needs it.  Cognitive decline started a decade ago, now meets criteria for dementia dx, Alzheimer's type with amnesia as most prominent feature.     Past Medical History:   Diagnosis Date    Arthritis     osteoarthritis    Cognitive impairment 7/3/2012    COPD (chronic obstructive pulmonary disease) (H)     Dyspnea on exertion     Epilepsy (H)     minor seizures 2x daily    Hypertension     Numbness and tingling     bilateral numbness    Pernicious anemia     resolved with vitamin replacement    Poor short term memory 5/20/2011    Seizures (H)    Dementia, late onset Alzheimer's type    S/p left TSA 2012    SH:  Previously lived with her son, [place] [facility] that she lived in all her life.     She has 2 sons and 2 daughters (Kay and Jeanie), 3 of her children live locally        ROS:   BIMS 8-11/15   PHQ9 1/27   She has lost her " "hearing aids and does not want replacements.     Needs cuing for all ADLs, ambulatory with 4WW       Admission weight was 164 lbs     Wt Readings from Last 5 Encounters:   08/13/24 77.7 kg (171 lb 4.8 oz)   06/28/24 76.7 kg (169 lb)   06/12/24 76.6 kg (168 lb 12.8 oz)   04/24/24 74.8 kg (165 lb)   03/01/24 74.7 kg (164 lb 9.6 oz)      EXAM:  NAD  BP (!) 177/86   Pulse 78   Temp 97.2  F (36.2  C)   Resp 18   Ht 1.549 m (5' 1\")   Wt 77.7 kg (171 lb 4.8 oz)   SpO2 95%   BMI 32.37 kg/m     +[place]  Neck supple without adenopathy  Lungs with decreased BS, few crackles right base   Heart RRR s1, prom s2 with occ ectopy  Abd soft, NT, no distention or guarding, +BS  Neuro: limited history, repetitive  Psych: affect okay, pleasant       Lab Results   Component Value Date     (L) 06/12/2024    POTASSIUM 4.6 06/12/2024    CHLORIDE 98 06/12/2024    CO2 24 06/12/2024    ANIONGAP 11 06/12/2024     (H) 06/12/2024    BUN 6.0 (L) 06/12/2024    CR 0.58 06/12/2024    GFRESTIMATED 89 06/12/2024    FELICE 10.3 (H) 06/12/2024     Lab Results   Component Value Date    WBC 9.6 06/12/2024      HGB 14.4 06/12/2024       06/12/2024       06/12/2024       IMP/PLAN:   (E55.9) Vitamin D deficiency   (R29.6) Falls frequently, preceded admission 7 years ago   Comment: She has had courses of PHYSICAL THERAPY but does not retain information, abandons her walker and remains a high fall risk   H/o osteopenia and vitamin D deficiency, improved with tx    Plan: Vit D 50 mcg/day; dietary calcium for bone health.   Alendronate 70 mg/ week     (I10) Essential hypertension, benign  Comment: intermittent LE Edema  BP Readings from Last 3 Encounters:   08/13/24 (!) 177/86   06/28/24 (!) 146/62   06/12/24 122/69      Plan:  losartan 50 mg/day  Follow bps and adjust dose as needed   Some permissive HTN given ongoing fall risk     LE elevation, tubi- for compression, monitor for open areas    (G30.1,  F02.80) Late onset " Alzheimer's disease without behavioral disturbance  Comment: functional decline, significant STML with disorientation   Plan: Board and Care support for meds, meals, activity, safety             (G40.409) Seizure disorder (H)  Comment: longstanding, no recent sz    Seen by Dr Banks in June 2023, no changes made   Plan: continue carbamazepine 300 mg bid, with quarterly check of labs to include CMP, CBC, TSH and carbamazepine level   Agree with PharmD, would consider GDR of gabapentin to reduce fall risk.     (J44.9) Chronic obstructive pulmonary disease, unspecified COPD type (H)  Comment: no symptoms today   Plan: continue Spiriva daily, prn Duonebs    Advance directive: DNR/DNI per signed TACOS Swartz MD

## 2024-10-11 ENCOUNTER — ASSISTED LIVING VISIT (OUTPATIENT)
Dept: GERIATRICS | Facility: CLINIC | Age: 84
End: 2024-10-11
Payer: COMMERCIAL

## 2024-10-11 VITALS
RESPIRATION RATE: 18 BRPM | TEMPERATURE: 97 F | HEART RATE: 60 BPM | DIASTOLIC BLOOD PRESSURE: 71 MMHG | OXYGEN SATURATION: 96 % | BODY MASS INDEX: 32.45 KG/M2 | WEIGHT: 171.9 LBS | SYSTOLIC BLOOD PRESSURE: 153 MMHG | HEIGHT: 61 IN

## 2024-10-11 DIAGNOSIS — K64.4 EXTERNAL HEMORRHOIDS: Primary | ICD-10-CM

## 2024-10-11 DIAGNOSIS — F02.80 LATE ONSET ALZHEIMER'S DISEASE WITHOUT BEHAVIORAL DISTURBANCE (H): ICD-10-CM

## 2024-10-11 DIAGNOSIS — E55.9 VITAMIN D DEFICIENCY: ICD-10-CM

## 2024-10-11 DIAGNOSIS — I10 ESSENTIAL HYPERTENSION, BENIGN: ICD-10-CM

## 2024-10-11 DIAGNOSIS — G30.1 LATE ONSET ALZHEIMER'S DISEASE WITHOUT BEHAVIORAL DISTURBANCE (H): ICD-10-CM

## 2024-10-11 DIAGNOSIS — M15.0 PRIMARY OSTEOARTHRITIS INVOLVING MULTIPLE JOINTS: ICD-10-CM

## 2024-10-11 DIAGNOSIS — G40.909 SEIZURE DISORDER (H): ICD-10-CM

## 2024-10-11 DIAGNOSIS — J44.9 CHRONIC OBSTRUCTIVE PULMONARY DISEASE, UNSPECIFIED COPD TYPE (H): ICD-10-CM

## 2024-10-11 PROCEDURE — 99349 HOME/RES VST EST MOD MDM 40: CPT | Performed by: NURSE PRACTITIONER

## 2024-10-11 NOTE — PROGRESS NOTES
[facility] GERIATRICS  Chief Complaint   Patient presents with    Annual Comprehensive Exam Assisted Living     [facility] Medical Record Number:  [id number]  Place of Service where encounter took place:  [facility] ([facility]) [[id number]]    HPI:    Yoanna Phillips  is a 84 year old  (1940), who is being seen today for an annual comprehensive visit. HPI information obtained from: facility chart records, facility staff, patient report, [facility] chart review, and Care Everywhere [facility] chart review.       Past medical includes  Vitamin D deficiency   Osteoarthritis  Hypertension  Alzheimer's dementia  Arthritis  COPD  Epilepsy-  On gabapentin and carbamazepine, and followed by Dr Fabiola Banks [facility]   Anemia.   Cataracts  Hard of hearing    Today patient was seen in her room.  She denies chest pain, shortness of breath, dizziness, lightheadedness, and a poor appetite. She had a fall on 9/28.   She ambulates using a front-wheeled walker independently.  BIMS=10/15 (score 8 to 12 suggests moderately impaired) PHQ9=1/27.   Patient needs cuing for assistance with ADLs, uses a 4 wheeled walker.    Her appetite is good and consumes a regular diet.  Per nursing, skin is intact. Code status is DNR/DNI.     In reviewing point click care VS are stable.       ALLERGIES: Aspirin  PAST MEDICAL HISTORY:   Past Medical History:   Diagnosis Date    Arthritis     osteoarthritis    Cognitive impairment 7/3/2012    COPD (chronic obstructive pulmonary disease) (H)     Dyspnea on exertion     Epilepsy (H)     minor seizures 2x daily    Hypertension     Numbness and tingling     bilateral numbness    Pernicious anemia     resolved with vitamin replacement    Poor short term memory 5/20/2011    Seizures (H)       PAST SURGICAL HISTORY:  has a past surgical history that includes NONSPECIFIC PROCEDURE (1965); Craniotomy (1965); Cholecystectomy; Splenectomy; Arthroplasty shoulder (6/25/2012); appendectomy; Arthroplasty  hip (Right, 4/11/2016); Arthroplasty revision hip (Right, 5/14/2016); Arthroplasty hip (Left, 3/20/2017); and Closed reduction hip (Left, 5/22/2017).      Current Outpatient Medications:     acetaminophen (TYLENOL) 500 MG tablet, Take 1,000 mg by mouth every 6 hours as needed for mild pain., Disp: , Rfl:     alendronate (FOSAMAX) 70 MG tablet, Take 1 tablet (70 mg) by mouth every 7 days, Disp: , Rfl:     CARBAMAZEPINE ER PO, Take 300 mg by mouth 2 times daily, Disp: , Rfl:     cyanocobalamin (CYANOCOBALAMIN) 1000 mcg/mL injection, Inject 1,000 mcg into the muscle every 30 days., Disp: , Rfl:     FOLIC ACID PO, Take 1 mg by mouth daily, Disp: , Rfl:     gabapentin (NEURONTIN) 300 MG capsule, Take 300 mg by mouth 3 times daily., Disp: , Rfl:     LOSARTAN POTASSIUM PO, Take 50 mg by mouth daily, Disp: , Rfl:     nystatin (MYCOSTATIN) 369842 UNIT/GM external powder, Apply topically 2 times daily, Disp: , Rfl:     SPIRIVA HANDIHALER 18 MCG inhaled capsule, INHALE CONTENTS OF 1 CAPSULE VIA HANDIHALER ONCE DAILY, Disp: 30 capsule, Rfl: 11    vitamin D3 (CHOLECALCIFEROL) 50 mcg (2000 units) tablet, Take 1 tablet (50 mcg) by mouth daily, Disp: , Rfl:      MED REC REQUIRED  Post Medication Reconciliation Status:  Discharge medications reconciled and changed, see notes/orders      Case Management:  I have reviewed the facility/SNF care plan/MDS, including the falls risk, nutrition and pain screening. I also reviewed the current immunizations, and preventive care.. Future cancer screening is not clinically indicated secondary to age/goals of care. Patient's desire to return to the community is not present. Current Level of Care is appropriate.mhgeroimmunization: Annual Influenza per facility protocol    Advance Directive Discussion:    I reviewed the current advanced directives as reflected in EPIC, the POLST and the facility chart, and verified the congruency of orders . I did not contact the first party and  discuss the    "plan of care. I did review the advance directives with the resident.     Team Discussion:  I communicated with the appropriate disciplines involved with the Plan of Care: Nursing  .   Patient's goal is: pain control and comfort.  Information reviewed: Medications, vital signs, orders, and nursing notes.    ROS:  4 point ROS including Respiratory, CV, GI and , other than that noted in the HPI,  is negative    Vitals:  BP (!) 153/71   Pulse 60   Temp 97  F (36.1  C)   Resp 18   Ht 1.549 m (5' 1\")   Wt 78 kg (171 lb 14.4 oz)   SpO2 96%   BMI 32.48 kg/m   Body mass index is 32.48 kg/m .  Exam:  GENERAL APPEARANCE:  Alert, in no distress  RESP:  lungs clear to auscultation   CV:  rate-normal  PSYCH:  affect and mood normal     Lab/Diagnostic data:   Most Recent 3 CBC's:  Recent Labs   Lab Test 06/12/24  0746 12/13/23  0716 04/19/23  1012   WBC 9.6 9.8 7.9   HGB 14.4 13.3 13.4    98 100    305 271     Most Recent 3 BMP's:  Recent Labs   Lab Test 06/12/24  0746 02/21/24  0704 12/13/23  0716   * 138 137   POTASSIUM 4.6 4.2 4.6   CHLORIDE 98 103 104   CO2 24 23 23   BUN 6.0* 8.2 10.0   CR 0.58 0.64 0.64   ANIONGAP 11 12 10   FELICE 10.3* 10.7* 10.2   * 87 96           ASSESSMENT/PLAN       (K64.4) External hemorrhoids  Comment: in 12/2023, pt jackie examined pt and hemorrhoids were seen on exam with no evidence of ongoing bleeding     HGB has been stable  Plan:  Continue with plan of care no changes at this time, adjustment as needed        G40.909) Seizure disorder (H)    Comment: No recent seizures while taking carbamazepine.   Carbamazepine level 9.4 in 2/2024    [facility]     Plan:   Follow up with Dr. Berg   Follow CBC, renal and hepatic function, sodium, eye exam, carbamazepine level, thyroid labs in approximately February 2025 if not done by Dr. berg     (M15.0) Primary osteoarthritis involving multiple joints    Comment: Chronic, stable.  no pain " reported today uses a walker.    Plan: will work on GDR of gabapentin     (G30.1,  F02.80) Late onset Alzheimer's disease without behavioral disturbance (H)  Comment:  Condition is Chronic, progressive requiring 24-hour skilled nursing care.  Last BIMS was 10/15 indicated moderate cognitive impairment.    No behavioral issues or emotional distress. Expect further functional and cognitive decline.    Plan: Continue with plan of care no changes at this time, adjustment as needed        (J44.9) Chronic obstructive pulmonary disease, unspecified COPD type (H)  Comment: Chronic   taking Spiriva  Plan:   Continue with plan of care no changes at this time, adjustment as needed              (E55.9) Vitamin D deficiency   (R85.80) osteopenia, unspecified location  Comment: Chronic. Has history of falls.     Recent Vit D level up to 27 while taking Vit D 50 mcg/day; dietary calcium for bone health.     DEXA in 2015 showed osteopenia  Takes Vitamin D level and alendronate 70 mg by mouth every 7 days  Comment: Continue with plan of care no changes at this time, adjustment as needed        (I10) Essential hypertension, benign  Comment: stable while taking losartan  BMP WNL  Plan: Continue with plan of care no changes at this time, adjustment as needed          Electronically signed by:      TANIYA Mejia CNP

## 2024-10-12 RX ORDER — ACETAMINOPHEN 500 MG
1000 TABLET ORAL EVERY 6 HOURS PRN
COMMUNITY

## 2024-10-12 RX ORDER — CYANOCOBALAMIN 1000 UG/ML
1000 INJECTION, SOLUTION INTRAMUSCULAR; SUBCUTANEOUS
COMMUNITY

## 2024-10-12 RX ORDER — GABAPENTIN 300 MG/1
300 CAPSULE ORAL 3 TIMES DAILY
COMMUNITY

## 2024-10-14 ENCOUNTER — CLINICAL UPDATE (OUTPATIENT)
Dept: PHARMACY | Facility: CLINIC | Age: 84
End: 2024-10-14
Payer: COMMERCIAL

## 2024-10-14 DIAGNOSIS — G40.909 SEIZURE DISORDER (H): Primary | ICD-10-CM

## 2024-10-14 DIAGNOSIS — J44.9 CHRONIC OBSTRUCTIVE PULMONARY DISEASE, UNSPECIFIED COPD TYPE (H): ICD-10-CM

## 2024-10-14 DIAGNOSIS — F02.80 LATE ONSET ALZHEIMER'S DISEASE WITHOUT BEHAVIORAL DISTURBANCE (H): ICD-10-CM

## 2024-10-14 DIAGNOSIS — G30.1 LATE ONSET ALZHEIMER'S DISEASE WITHOUT BEHAVIORAL DISTURBANCE (H): ICD-10-CM

## 2024-10-14 DIAGNOSIS — E63.9 NUTRITIONAL DEFICIENCY: ICD-10-CM

## 2024-10-14 DIAGNOSIS — I10 ESSENTIAL HYPERTENSION, BENIGN: ICD-10-CM

## 2024-10-14 PROCEDURE — 99207 PR NO CHARGE LOS: CPT | Performed by: PHARMACIST

## 2024-10-14 NOTE — PROGRESS NOTES
This patient's medication list and chart were reviewed as part of the service provided by [facility] and [facility].    Assessment/Recommendations:    Per chart review, has followed with neurology, , regarding seizures.  Last seen in June 2023 with recommendation to follow-up in one year; has not yet followed up.  No changes made to meds at last neurology visit.    Due to continued carbamazepine use, continue with periodic/quarterly monitoring of CBC, renal and hepatic function, sodium, eye exam, carbamazepine level, thyroid labs.   Noted NP plan to work on potential reduction in gabapentin dose.  Reasonable to consider reduction if patient has been seizure free for >3-5 years, and due to potential for gabapentin to contribute to ataxia, falls, etc.  Reasonable to discuss with  if still following with her.  Could consider reduction in gabapentin to 300mg twice daily or 200mg three times daily and monitor.    Please also ensure vaccinations are up to date.  Appears she may be due for RSV as well as Shingrix - please verify.    Estimated Creatinine Clearance: 67.1 mL/min (based on SCr of 0.58 mg/dL).      Jeanie Bo, Pharm.D.,[facility]  Board Certified Geriatric Pharmacist  Medication Therapy Management Pharmacist  [phone]

## 2024-12-04 ENCOUNTER — LAB REQUISITION (OUTPATIENT)
Dept: LAB | Facility: CLINIC | Age: 84
End: 2024-12-04
Payer: COMMERCIAL

## 2024-12-04 ENCOUNTER — ASSISTED LIVING VISIT (OUTPATIENT)
Dept: GERIATRICS | Facility: CLINIC | Age: 84
End: 2024-12-04
Payer: COMMERCIAL

## 2024-12-04 VITALS
WEIGHT: 171 LBS | HEART RATE: 74 BPM | HEIGHT: 61 IN | OXYGEN SATURATION: 95 % | RESPIRATION RATE: 18 BRPM | BODY MASS INDEX: 32.28 KG/M2 | SYSTOLIC BLOOD PRESSURE: 136 MMHG | TEMPERATURE: 97.8 F | DIASTOLIC BLOOD PRESSURE: 88 MMHG

## 2024-12-04 DIAGNOSIS — I10 ESSENTIAL HYPERTENSION, BENIGN: ICD-10-CM

## 2024-12-04 DIAGNOSIS — E55.9 VITAMIN D DEFICIENCY: ICD-10-CM

## 2024-12-04 DIAGNOSIS — G30.1 LATE ONSET ALZHEIMER'S DISEASE WITHOUT BEHAVIORAL DISTURBANCE (H): ICD-10-CM

## 2024-12-04 DIAGNOSIS — G40.909 SEIZURE DISORDER (H): ICD-10-CM

## 2024-12-04 DIAGNOSIS — F02.80 LATE ONSET ALZHEIMER'S DISEASE WITHOUT BEHAVIORAL DISTURBANCE (H): ICD-10-CM

## 2024-12-04 DIAGNOSIS — D64.9 ANEMIA, UNSPECIFIED: ICD-10-CM

## 2024-12-04 DIAGNOSIS — M25.562 ACUTE PAIN OF LEFT KNEE: Primary | ICD-10-CM

## 2024-12-04 DIAGNOSIS — R29.6 FALLS FREQUENTLY: ICD-10-CM

## 2024-12-04 PROCEDURE — 99349 HOME/RES VST EST MOD MDM 40: CPT | Performed by: INTERNAL MEDICINE

## 2024-12-05 LAB
BASOPHILS # BLD AUTO: 0.1 10E3/UL (ref 0–0.2)
BASOPHILS NFR BLD AUTO: 1 %
EOSINOPHIL # BLD AUTO: 0.3 10E3/UL (ref 0–0.7)
EOSINOPHIL NFR BLD AUTO: 3 %
ERYTHROCYTE [DISTWIDTH] IN BLOOD BY AUTOMATED COUNT: 13.2 % (ref 10–15)
HCT VFR BLD AUTO: 41 % (ref 35–47)
HGB BLD-MCNC: 13.9 G/DL (ref 11.7–15.7)
IMM GRANULOCYTES # BLD: 0 10E3/UL
IMM GRANULOCYTES NFR BLD: 0 %
LYMPHOCYTES # BLD AUTO: 3.7 10E3/UL (ref 0.8–5.3)
LYMPHOCYTES NFR BLD AUTO: 43 %
MCH RBC QN AUTO: 33.3 PG (ref 26.5–33)
MCHC RBC AUTO-ENTMCNC: 33.9 G/DL (ref 31.5–36.5)
MCV RBC AUTO: 98 FL (ref 78–100)
MONOCYTES # BLD AUTO: 0.7 10E3/UL (ref 0–1.3)
MONOCYTES NFR BLD AUTO: 8 %
NEUTROPHILS # BLD AUTO: 4 10E3/UL (ref 1.6–8.3)
NEUTROPHILS NFR BLD AUTO: 46 %
NRBC # BLD AUTO: 0 10E3/UL
NRBC BLD AUTO-RTO: 0 /100
PLATELET # BLD AUTO: 322 10E3/UL (ref 150–450)
RBC # BLD AUTO: 4.18 10E6/UL (ref 3.8–5.2)
WBC # BLD AUTO: 8.8 10E3/UL (ref 4–11)

## 2024-12-05 PROCEDURE — P9604 ONE-WAY ALLOW PRORATED TRIP: HCPCS | Mod: ORL | Performed by: INTERNAL MEDICINE

## 2024-12-05 PROCEDURE — 85025 COMPLETE CBC W/AUTO DIFF WBC: CPT | Mod: ORL | Performed by: INTERNAL MEDICINE

## 2024-12-05 PROCEDURE — 36415 COLL VENOUS BLD VENIPUNCTURE: CPT | Mod: ORL | Performed by: INTERNAL MEDICINE

## 2024-12-23 ENCOUNTER — PATIENT OUTREACH (OUTPATIENT)
Dept: GERIATRIC MEDICINE | Facility: CLINIC | Age: 84
End: 2024-12-23
Payer: COMMERCIAL

## 2024-12-23 NOTE — PROGRESS NOTES
Yoanna Phillips is a 84 year old female seen December 4, 2024 at [facility] where she has resided for 7 and a half years (admit to [facility] 6/2017) seen to follow up progressive dementia and falls   Pt is seen her apartment up to chair, reports her left knee hurts    Doesn't remember any trauma, but went out with her family yesterday, and then had Physical Therapy this morning.   Difficult to walk secondary to the knee pain.   States her hip is okay      By chart review, a year ago pt was moved down to the third floor Memory Care unit because of unsafe behaviors and recurrent falls.   Gait is ataxic and extremely unstable, and she will frequently abandon her walker. However she did not like the confines of the secure unit, so she was moved down to second floor where she has done fairly well.     Patient has past h/o seizures.  Currently on gabapentin and carbamazepine, and followed by Dr Fabiola Banks [facility] - last seen in June 2023    Patient has low cognitive scores and has failed trial of living on her own, with recurrent falls and inability to seek help when she needs it.  Cognitive decline started a decade ago, now meets criteria for dementia dx, Alzheimer's type with amnesia as most prominent feature.   Nursing staff reports pt continues to have frequent falls, despite close watching.   Abandons her walker.     Pt had cataract surgery in June 2024    Past Medical History:   Diagnosis Date    Arthritis     osteoarthritis    Cognitive impairment 7/3/2012    COPD (chronic obstructive pulmonary disease) (H)     Dyspnea on exertion     Epilepsy (H)     minor seizures 2x daily    Hypertension     Numbness and tingling     bilateral numbness    Pernicious anemia     resolved with vitamin replacement    Poor short term memory 5/20/2011    Seizures (H)    Dementia, late onset Alzheimer's type    S/p left TSA 2012    SH:  Previously lived with her son, house [place] that she lived  "in all her life.     She has 2 sons and 2 daughters (Kay and Jeanie), 3 of her children live locally        ROS:   BIMS 8-11/15   PHQ9 1/27   She has lost her hearing aids and does not want replacements.     Needs cuing for all ADLs, ambulatory with 4WW       Admission weight was 164 lbs     Wt Readings from Last 5 Encounters:   12/04/24 77.6 kg (171 lb)   10/11/24 78 kg (171 lb 14.4 oz)   08/13/24 77.7 kg (171 lb 4.8 oz)   06/28/24 76.7 kg (169 lb)   06/12/24 76.6 kg (168 lb 12.8 oz)      EXAM:  NAD  /88   Pulse 74   Temp 97.8  F (36.6  C)   Resp 18   Ht 1.549 m (5' 1\")   Wt 77.6 kg (171 lb)   SpO2 95%   BMI 32.31 kg/m     +[place]  Neck supple without adenopathy  Lungs with decreased BS, few crackles right base   Heart RRR s1, prom s2 with occ ectopy  Abd soft, NT, no distention or guarding, +BS  Neuro: limited history, repetitive  Psych: affect okay, pleasant    Labs reviewed, unremarkable in June       IMP/PLAN:   (M25.562) Acute pain of left knee   Comment: interferes with mobility   Plan: check x-ray   Scheduled acetaminophen bid and PRN   Diclofenac gel locally   Ambulation with walker assist      (E55.9) Vitamin D deficiency   (R29.6) Falls frequently, preceded admission 7 years ago   Comment: She has had courses of PHYSICAL THERAPY but does not retain information, abandons her walker and remains a high fall risk   H/o osteopenia and vitamin D deficiency, improved with tx    Plan: Vit D 50 mcg/day; dietary calcium for bone health.   Alendronate 70 mg/ week     (I10) Essential hypertension, benign  Comment: intermittent LE Edema  BP Readings from Last 3 Encounters:   12/04/24 136/88   10/11/24 (!) 153/71   08/13/24 (!) 177/86      Plan:  losartan 50 mg/day  Follow bps and adjust dose as needed   Some permissive HTN given ongoing fall risk     LE elevation, tubi- for compression, monitor for open areas    (G30.1,  F02.80) Late onset Alzheimer's disease without behavioral disturbance  Comment: " functional decline, significant STML with disorientation   Plan: Board and Care support for meds, meals, activity, safety             (G40.909) Seizure disorder (H)  Comment: longstanding, no recent sz    Seen by Dr Banks in June 2023, no changes made   Plan: continue carbamazepine 300 mg bid, with quarterly check of labs to include CMP, CBC, TSH and carbamazepine level   Also on gabapentin 300 mg tid >>>per PharmD October note, would decrease to 300 mg bid and follow   Should have Neurology follow up    (J44.9) Chronic obstructive pulmonary disease, unspecified COPD type (H)  Comment: not symptomatic    Plan: continue Spiriva daily, prn Duonebs    Advance directive: DNR/DNI per signed TACOS Swartz MD

## 2024-12-31 DIAGNOSIS — M85.80 OSTEOPENIA, UNSPECIFIED LOCATION: ICD-10-CM

## 2025-01-02 ENCOUNTER — TELEPHONE (OUTPATIENT)
Dept: GERIATRICS | Facility: CLINIC | Age: 85
End: 2025-01-02
Payer: COMMERCIAL

## 2025-01-02 RX ORDER — ALENDRONATE SODIUM 70 MG/1
TABLET ORAL
Qty: 4 TABLET | Refills: 11 | Status: SHIPPED | OUTPATIENT
Start: 2025-01-02

## 2025-01-02 NOTE — TELEPHONE ENCOUNTER
Trial ace wrap to LLE. Ensure no changes to ROM, no increased pain, redness. Update for any of the former.     Bertram Hermosillo, CNP

## 2025-01-02 NOTE — TELEPHONE ENCOUNTER
"[facility] Triage Call    Provider: TANIYA Patiño CNP  Facility: [facility] Facility Type:  AL    Caller: [place]  Call Back Number: [phone]    Allergies:    Allergies   Allergen Reactions    Aspirin Unknown        SBAR:     S-(situation): Pt has 3+ pitting edema to LLE.     B-(background): No hx of HF. Pt does have hx of intermittent LE edema. No diuretics ordered.    A-(assessment): Pt had a fall last night without injury. When nursing assessed this morning, they found 3+ pitting edema to LLE. No heat, redness or pain reported. Pt wears tubi-. Vitals: 128/81, 75, 95%RA, 98.3. Weight at baseline. Lungs clear.    R-(recommendations): compression?       Telephone encounter sent to:  SARA Moon    Please send response/orders to \"Geriatrics Nurse Pool\"    Elis Echols RN      "

## 2025-01-06 NOTE — PROGRESS NOTES
[facility] Care Coordination Contact  [facility] Institutional Assessment     Institutional Assessment for Health Risk Assessment with Yoanna Phillips completed on December 23, 2024at [facility]    Type of residence:: Nursing home ([facility])  Current living arrangement:: I live in a nursing home ([facility])     Assessment completed with:: Patient, Care Team Member, Children    Mental/Behavioral Health   Depression Screening:           Mental health DX:: No        Falls Assessment:   Fallen 2 or more times in the past year?: (!) Yes (Multiple falls in the past 12 months but no injuries)   Any fall with injury in the past year?: No    ADL/IADL Dependencies:   Dependent ADLs:: Ambulation-walker, Bathing, Dressing, Eating, Grooming, Incontinence, Positioning, Transfers, Wheelchair-independent, Toileting  Dependent IADLs:: Cleaning, Cooking, Laundry, Shopping, Meal Preparation, Medication Management, Money Management, Transportation, Incontinence      Care Plan & Recommendations: Yoanna is an 84 year old female living in LTC at [facility] Yoanna is alert and oriented and is able to accurately participate in assessment. Assessment completed via chart review, member, staff interviews and Care Coordinator observation. Yoanna denies any care related concerns at [facility] that staff treat her well and that she is happy in her current setting. Chart review includes Frequent falls of 13 falls but no injuries PMD oa Alzheimers disease, Regular Diet, Regular Consistency and thin liquids. Immunizations reviewed and she is due for RNV and Shingles vaccines but is up to date in all other vaccines  Hearing, vision and dental exams have been completed within the past year. Yoanna denies any pain but has orders for Gabapentin, Diclofenac Gel and Tylenol. POLST on file indicating DNR/DNI/Selective Treatments. Discussed options/opportunities for transitions.Planning to remain in  LTC.     See Institutional Care Plan for detailed assessment information.    Obtained a copy of the facility care plan and MDS from facility electronic records. Requested of care home social worker to put this care coordinator on care conference attendee list.    Placed the Health Plan facility face sheet in the member's facility chart.    Follow-Up Plan: Member informed of future contact, plan to f/u with member with a 6 month assessment, attend 1 care conference annually, and will follow any hospitalizations or transitions. Care Coordinator contact information shared with member/family and facility, and encouraged to call this care coordinator with any questions or concerns at any time.     [place] care continuum providers: Please see Snapshot and Care Management Flowsheets for Specific details of care plan.    This CC note routed to PCP, Babs Reynolds.

## 2025-01-07 ENCOUNTER — PATIENT OUTREACH (OUTPATIENT)
Dept: GERIATRIC MEDICINE | Facility: CLINIC | Age: 85
End: 2025-01-07
Payer: COMMERCIAL

## 2025-01-07 NOTE — LETTER
January 7, 2025       [facility]  [address]  [facility] [address]      Dear John,    At [facility], we re dedicated to improving your health and wellness. Enclosed is the Support Plan developed with you on 12/23/2024. Please review the Support Plan carefully.    As a reminder, during your visit we talked about:   Ways to manage your physical and mental health   Using health care to maintain and improve your health    Your preventive care needs      Remember to contact your care coordinator if you:   Are hospitalized or plan to be hospitalized    Have a fall     Have a change in your physical or mental health   Need help finding support or services    If you have questions or don t agree with your Support Plan, call me at [phone]. You can also call me if your needs change. TTY users call the [place] [employer] at 683 or 1-114.180.5236 (darksk-wi-kvshve relay service).    Sincerely,       Eleonora Burton RN, PHN  [phone]  [email or web address]                [id number] accepted     (06/2024)                [address], [place] [address]  [phone]  fax [phone]  [facility]

## 2025-01-07 NOTE — PROGRESS NOTES
[facility] Care Coordination Contact    Received after visit chart from care coordinator.  Completed following tasks: Mailed [facility] POC Letter to member/rep with Support Plan & Signature Page    Radha Centeno  Care Management Specialist  [facility]  [phone]

## 2025-02-06 ENCOUNTER — ASSISTED LIVING VISIT (OUTPATIENT)
Dept: GERIATRICS | Facility: CLINIC | Age: 85
End: 2025-02-06
Payer: COMMERCIAL

## 2025-02-06 VITALS
HEIGHT: 61 IN | DIASTOLIC BLOOD PRESSURE: 78 MMHG | BODY MASS INDEX: 32.13 KG/M2 | HEART RATE: 63 BPM | OXYGEN SATURATION: 96 % | RESPIRATION RATE: 19 BRPM | WEIGHT: 170.2 LBS | TEMPERATURE: 98.1 F | SYSTOLIC BLOOD PRESSURE: 151 MMHG

## 2025-02-06 DIAGNOSIS — I10 ESSENTIAL HYPERTENSION, BENIGN: ICD-10-CM

## 2025-02-06 DIAGNOSIS — J44.9 CHRONIC OBSTRUCTIVE PULMONARY DISEASE, UNSPECIFIED COPD TYPE (H): ICD-10-CM

## 2025-02-06 DIAGNOSIS — M15.0 PRIMARY OSTEOARTHRITIS INVOLVING MULTIPLE JOINTS: ICD-10-CM

## 2025-02-06 DIAGNOSIS — E55.9 VITAMIN D DEFICIENCY: ICD-10-CM

## 2025-02-06 DIAGNOSIS — M85.80 OSTEOPENIA, UNSPECIFIED LOCATION: ICD-10-CM

## 2025-02-06 DIAGNOSIS — G30.1 LATE ONSET ALZHEIMER'S DISEASE WITHOUT BEHAVIORAL DISTURBANCE (H): ICD-10-CM

## 2025-02-06 DIAGNOSIS — G40.909 SEIZURE DISORDER (H): Primary | ICD-10-CM

## 2025-02-06 DIAGNOSIS — F02.80 LATE ONSET ALZHEIMER'S DISEASE WITHOUT BEHAVIORAL DISTURBANCE (H): ICD-10-CM

## 2025-02-06 PROCEDURE — 99349 HOME/RES VST EST MOD MDM 40: CPT | Performed by: NURSE PRACTITIONER

## 2025-02-06 NOTE — PROGRESS NOTES
[facility] GERIATRICS  Chief Complaint   Patient presents with    half-way Regulatory     [facility] Medical Record Number:  [id number]  Place of Service where encounter took place:  [facility] [[id number]]    HPI:    Yoanna Phillips  is 84 year old (1940), who is being seen today for a federally mandated E/M visit. T    Past medical includes  Vitamin D deficiency   Osteoarthritis  Hypertension  Alzheimer's dementia  Arthritis  COPD  Epilepsy-  On gabapentin and carbamazepine, and followed by [facility]   Anemia.   Cataracts  Hard of hearing    In June 2024 had cataract surgery     Today patient was seen in her room.  she has been taking cough medicine prn, duoneb and has nasal congestion.  Her lungs are clear.  She is also complaining of loose stools  She denies chest pain, shortness of breath, dizziness, lightheadedness, and a poor appetite.   BIMS=12/15 (score 8 to 12 suggests moderately impaired) PHQ9=2/27.   Patient needs cuing for assistance with ADLs, uses a 4 wheeled walker.    Her appetite is good and consumes a regular diet.  Per nursing, skin is intact. Code status is DNR/DNI.     In reviewing point click care VS are stable. Weight is stable  She follows with [facility]         ALLERGIES:Aspirin  PAST MEDICAL HISTORY:   Past Medical History:   Diagnosis Date    Arthritis     osteoarthritis    Cognitive impairment 7/3/2012    COPD (chronic obstructive pulmonary disease) (H)     Dyspnea on exertion     Epilepsy (H)     minor seizures 2x daily    Hypertension     Numbness and tingling     bilateral numbness    Pernicious anemia     resolved with vitamin replacement    Poor short term memory 5/20/2011    Seizures (H)      PAST SURGICAL HISTORY:   has a past surgical history that includes NONSPECIFIC PROCEDURE (1965); Craniotomy (1965); Cholecystectomy; Splenectomy; Arthroplasty shoulder (6/25/2012); appendectomy; Arthroplasty hip (Right, 4/11/2016); Arthroplasty  revision hip (Right, 5/14/2016); Arthroplasty hip (Left, 3/20/2017); and Closed reduction hip (Left, 5/22/2017).  FAMILY HISTORY: family history includes Arthritis in her mother; Heart Disease in her mother; Hypertension in her mother; Neurologic Disorder in her mother.  SOCIAL HISTORY:  reports that she quit smoking about 62 years ago. Her smoking use included cigarettes. She started smoking about 72 years ago. She has a 5 pack-year smoking history. She has never used smokeless tobacco. She reports current alcohol use. She reports that she does not use drugs.    MEDICATIONS:  MED REC REQUIRED  Post Medication Reconciliation Status:            Review of your medicines            Accurate as of February 6, 2025  8:10 AM. If you have any questions, ask your nurse or doctor.                CONTINUE these medicines which have NOT CHANGED        Dose / Directions   acetaminophen 500 MG tablet  Commonly known as: TYLENOL      Dose: 1,000 mg  Take 1,000 mg by mouth every 6 hours as needed for mild pain.  Refills: 0     alendronate 70 MG tablet  Commonly known as: FOSAMAX  Used for: Osteopenia, unspecified location      TAKE 1 TABLET BY MOUTH ONCE WEEKLY *ORIGINAL CONTAINER*  Quantity: 4 tablet  Refills: 11     CARBAMAZEPINE ER PO      Dose: 300 mg  Take 300 mg by mouth 2 times daily  Refills: 0     cyanocobalamin 1000 mcg/mL injection  Commonly known as: CYANOCOBALAMIN      Dose: 1,000 mcg  Inject 1,000 mcg into the muscle every 30 days.  Refills: 0     FOLIC ACID PO      Dose: 1 mg  Take 1 mg by mouth daily  Refills: 0     gabapentin 300 MG capsule  Commonly known as: NEURONTIN      Dose: 300 mg  Take 300 mg by mouth 3 times daily.  Refills: 0     LOSARTAN POTASSIUM PO      Dose: 50 mg  Take 50 mg by mouth daily  Refills: 0     nystatin 410680 UNIT/GM external powder  Commonly known as: MYCOSTATIN      Apply topically 2 times daily  Refills: 0     Spiriva HandiHaler 18 MCG inhaled capsule  Used for: Chronic obstructive  "pulmonary disease, unspecified COPD type (H)  Generic drug: tiotropium      INHALE CONTENTS OF 1 CAPSULE VIA HANDIHALER ONCE DAILY  Quantity: 30 capsule  Refills: 11     vitamin D3 50 mcg (2000 units) tablet  Commonly known as: CHOLECALCIFEROL  Used for: Vitamin D deficiency      Dose: 1 tablet  Take 1 tablet (50 mcg) by mouth daily  Refills: 0               Case Management:  I have reviewed the care plan and MDS and do agree with the plan. Patient's desire to return to the community is not present. Information reviewed:  Medications, vital signs, orders, and nursing notes.    ROS:  4 point ROS including Respiratory, CV, GI and , other than that noted in the HPI,  is negative    Vitals:  BP (!) 151/78   Pulse 63   Temp 98.1  F (36.7  C)   Resp 19   Ht 1.549 m (5' 1\")   Wt 77.2 kg (170 lb 3.2 oz)   SpO2 96%   BMI 32.16 kg/m    Body mass index is 32.16 kg/m .  Exam:  GENERAL APPEARANCE:  Alert, in no distress, morbidly obese  RESP:  respiratory effort and palpation of chest normal, lungs clear to auscultation , nasal congestion  CV:  Palpation and auscultation of heart done , rate-normal  PSYCH:  insight and judgement impaired, affect and mood normal    Lab/Diagnostic data:   Most Recent 3 CBC's:  Recent Labs   Lab Test 12/05/24  0830 06/12/24  0746 12/13/23  0716   WBC 8.8 9.6 9.8   HGB 13.9 14.4 13.3   MCV 98 100 98    356 305     Most Recent 3 BMP's:  Recent Labs   Lab Test 06/12/24  0746 02/21/24  0704 12/13/23  0716   * 138 137   POTASSIUM 4.6 4.2 4.6   CHLORIDE 98 103 104   CO2 24 23 23   BUN 6.0* 8.2 10.0   CR 0.58 0.64 0.64   ANIONGAP 11 12 10   FELICE 10.3* 10.7* 10.2   * 87 96     Most Recent 2 LFT's:  Recent Labs   Lab Test 12/13/23  0716 02/21/22  1328   AST 16 14   ALT 9 <9   ALKPHOS 112 119   BILITOTAL <0.2 0.4     Most Recent TSH and T4:  Recent Labs   Lab Test 02/21/24  0704   TSH 1.23     Most Recent Hemoglobin A1c:  Recent Labs   Lab Test 07/10/18  0700   A1C 5.4 "       ASSESSMENT/PLAN       (K64.4) External hemorrhoids  Comment: in 12/2023, pt jackie examined pt and hemorrhoids were seen on exam with no evidence of ongoing bleeding     HGB has been stable  Taking Senna S daily,  Plan:  Continue with plan of care no changes at this time, adjustment as needed        G40.909) Seizure disorder (H)  (primary encounter diagnosis)  Comment: No recent seizures while taking carbamazepine.   Carbamazepine level 7.9 in 4/2023.  Had follow up with Dr. Berg in 6/2023 with no new orders     Plan:   Follow up with Dr. Berg on 6/28  Follow CBC, renal and hepatic function, sodium, eye exam, carbamazepine level, thyroid labs in 4 months if not done soon by Dr. berg     (M15.0) Primary osteoarthritis involving multiple joints    Comment: Chronic, stable.  no pain reported today uses a walker.    Plan: Continue with plan of care no changes at this time, adjustment as needed     (G30.1,  F02.80) Late onset Alzheimer's disease without behavioral disturbance (H)  Comment:  Condition is Chronic, progressive requiring 24-hour skilled nursing care.  Last BIMS was 10/15 indicated moderate cognitive impairment.    No behavioral issues or emotional distress. Expect further functional and cognitive decline.    Plan: Continue with plan of care no changes at this time, adjustment as needed        (J44.9) Chronic obstructive pulmonary disease, unspecified COPD type (H)  Comment: Chronic   taking Spiriva  Plan:   Discontinue guaifenesin 600 mg twice daily and change to PRN           (E55.9) Vitamin D deficiency   (R85.80) osteopenia, unspecified location  Comment: Chronic. Has history of falls.     Recent Vit D level up to 27 while taking Vit D 50 mcg/day; dietary calcium for bone health.     DEXA in 2015 showed osteopenia  Takes Vitamin D level and alendronate 70 mg by mouth every 7 days  Comment: Continue with plan of care no changes at this time, adjustment as needed       (I10) Essential hypertension,  benign  Comment: stable while taking losartan  BMP WNL  Plan: Continue with plan of care no changes at this time, adjustment as needed           Electronically signed by:    ***

## 2025-02-10 RX ORDER — AMOXICILLIN 250 MG
1 CAPSULE ORAL EVERY OTHER DAY
COMMUNITY

## 2025-02-10 RX ORDER — ACETAMINOPHEN 325 MG/1
650 TABLET ORAL 2 TIMES DAILY
COMMUNITY

## 2025-02-11 ENCOUNTER — LAB REQUISITION (OUTPATIENT)
Dept: LAB | Facility: CLINIC | Age: 85
End: 2025-02-11
Payer: COMMERCIAL

## 2025-02-11 DIAGNOSIS — E03.9 HYPOTHYROIDISM, UNSPECIFIED: ICD-10-CM

## 2025-02-11 DIAGNOSIS — Z51.81 ENCOUNTER FOR THERAPEUTIC DRUG LEVEL MONITORING: ICD-10-CM

## 2025-02-11 DIAGNOSIS — I10 ESSENTIAL (PRIMARY) HYPERTENSION: ICD-10-CM

## 2025-02-11 PROBLEM — J44.9 CHRONIC OBSTRUCTIVE PULMONARY DISEASE, UNSPECIFIED COPD TYPE (H): Status: RESOLVED | Noted: 2019-04-22 | Resolved: 2025-02-11

## 2025-02-12 LAB
ALBUMIN SERPL BCG-MCNC: 3.7 G/DL (ref 3.5–5.2)
ALP SERPL-CCNC: 75 U/L (ref 40–150)
ALT SERPL W P-5'-P-CCNC: 9 U/L (ref 0–50)
ANION GAP SERPL CALCULATED.3IONS-SCNC: 21 MMOL/L (ref 7–15)
AST SERPL W P-5'-P-CCNC: 22 U/L (ref 0–45)
BILIRUB SERPL-MCNC: 0.2 MG/DL
BUN SERPL-MCNC: 12.5 MG/DL (ref 8–23)
CALCIUM SERPL-MCNC: 10.3 MG/DL (ref 8.8–10.4)
CARBAMAZEPINE SERPL-MCNC: 8.4 UG/ML (ref 4–12)
CHLORIDE SERPL-SCNC: 101 MMOL/L (ref 98–107)
CREAT SERPL-MCNC: 0.54 MG/DL (ref 0.51–0.95)
EGFRCR SERPLBLD CKD-EPI 2021: 90 ML/MIN/1.73M2
GLUCOSE SERPL-MCNC: 77 MG/DL (ref 70–99)
HCO3 SERPL-SCNC: 12 MMOL/L (ref 22–29)
POTASSIUM SERPL-SCNC: 4.5 MMOL/L (ref 3.4–5.3)
PROT SERPL-MCNC: 7 G/DL (ref 6.4–8.3)
SODIUM SERPL-SCNC: 134 MMOL/L (ref 135–145)
TSH SERPL DL<=0.005 MIU/L-ACNC: 1.34 UIU/ML (ref 0.3–4.2)

## 2025-02-12 PROCEDURE — 80053 COMPREHEN METABOLIC PANEL: CPT | Mod: ORL | Performed by: NURSE PRACTITIONER

## 2025-02-12 PROCEDURE — 80156 ASSAY CARBAMAZEPINE TOTAL: CPT | Mod: ORL | Performed by: NURSE PRACTITIONER

## 2025-02-12 PROCEDURE — 84443 ASSAY THYROID STIM HORMONE: CPT | Mod: ORL | Performed by: NURSE PRACTITIONER

## 2025-02-12 PROCEDURE — 36415 COLL VENOUS BLD VENIPUNCTURE: CPT | Mod: ORL | Performed by: NURSE PRACTITIONER

## 2025-02-12 PROCEDURE — P9604 ONE-WAY ALLOW PRORATED TRIP: HCPCS | Mod: ORL | Performed by: NURSE PRACTITIONER

## 2025-03-23 ENCOUNTER — HEALTH MAINTENANCE LETTER (OUTPATIENT)
Age: 85
End: 2025-03-23

## 2025-04-21 ENCOUNTER — ASSISTED LIVING VISIT (OUTPATIENT)
Dept: GERIATRICS | Facility: CLINIC | Age: 85
End: 2025-04-21
Payer: COMMERCIAL

## 2025-04-21 VITALS
HEART RATE: 74 BPM | RESPIRATION RATE: 18 BRPM | OXYGEN SATURATION: 93 % | HEIGHT: 61 IN | DIASTOLIC BLOOD PRESSURE: 73 MMHG | TEMPERATURE: 97.7 F | SYSTOLIC BLOOD PRESSURE: 142 MMHG | BODY MASS INDEX: 31.89 KG/M2 | WEIGHT: 168.9 LBS

## 2025-04-21 DIAGNOSIS — I10 ESSENTIAL HYPERTENSION, BENIGN: ICD-10-CM

## 2025-04-21 DIAGNOSIS — R29.6 FALLS FREQUENTLY: ICD-10-CM

## 2025-04-21 DIAGNOSIS — G40.909 SEIZURE DISORDER (H): Primary | ICD-10-CM

## 2025-04-21 DIAGNOSIS — G30.1 LATE ONSET ALZHEIMER'S DISEASE WITHOUT BEHAVIORAL DISTURBANCE (H): ICD-10-CM

## 2025-04-21 DIAGNOSIS — F02.80 LATE ONSET ALZHEIMER'S DISEASE WITHOUT BEHAVIORAL DISTURBANCE (H): ICD-10-CM

## 2025-04-21 DIAGNOSIS — J44.9 CHRONIC OBSTRUCTIVE PULMONARY DISEASE, UNSPECIFIED COPD TYPE (H): ICD-10-CM

## 2025-04-21 PROCEDURE — 99349 HOME/RES VST EST MOD MDM 40: CPT | Performed by: INTERNAL MEDICINE

## 2025-05-08 NOTE — PROGRESS NOTES
"Yoanna Phillips is a 84 year old female seen April 21, 2025 at [facility] where she has resided for 8 years (admit to [facility] 6/2017) seen to follow up progressive dementia and falls   Pt is seen on the unit up to chair.   She is feeling well, smiles and laughs   Denies pain or injury.  When asked about a fall reported by nursing staff, occurring yesterday, pt does not recall.   But states \"I always fall.\"       By chart review, a year ago pt was moved down to the third floor Memory Care unit because of unsafe behaviors and recurrent falls.   Gait is ataxic and extremely unstable, and she will frequently abandon her walker. However she did not like the confines of the secure unit, so she was moved down to second floor where she has done fairly well.     Patient has past h/o seizures.  Currently on gabapentin and carbamazepine, and followed by [facility] - last seen in June 2023    Patient has low cognitive scores and has failed trial of living on her own, with recurrent falls and inability to seek help when she needs it.  Cognitive decline started a decade ago, now meets criteria for dementia dx, Alzheimer's type with amnesia as most prominent feature.   Nursing staff reports pt continues to have frequent falls, despite close watching.   Abandons her walker.     Pt had cataract surgery in June 2024    Past Medical History:   Diagnosis Date    Arthritis     osteoarthritis    Cognitive impairment 7/3/2012    COPD (chronic obstructive pulmonary disease) (H)     Dyspnea on exertion     Epilepsy (H)     minor seizures 2x daily    Hypertension     Numbness and tingling     bilateral numbness    Pernicious anemia     resolved with vitamin replacement    Poor short term memory 5/20/2011    Seizures (H)    Dementia, late onset Alzheimer's type    S/p left [facility]SA 2012    SH:  [place] [facility] her life.     She has 2 sons and 2 " "daughters (Kay and Jeanie), 3 of her children live locally        ROS:   BIMS 8-11/15   PHQ9 1/27   She has lost her hearing aids and does not want replacements.     Needs cuing for all ADLs, ambulatory with 4WW       Admission weight was 164 lbs     Wt Readings from Last 5 Encounters:   04/21/25 76.6 kg (168 lb 14.4 oz)   02/06/25 77.2 kg (170 lb 3.2 oz)   12/04/24 77.6 kg (171 lb)   10/11/24 78 kg (171 lb 14.4 oz)   08/13/24 77.7 kg (171 lb 4.8 oz)      EXAM:  NAD  BP (!) 142/73   Pulse 74   Temp 97.7  F (36.5  C)   Resp 18   Ht 1.549 m (5' 1\")   Wt 76.6 kg (168 lb 14.4 oz)   SpO2 93%   BMI 31.91 kg/m     +[place]  Neck supple without adenopathy  Lungs with decreased BS, few crackles right base   Heart RRR s1, prom s2 with occ ectopy  Abd soft, NT, no distention or guarding, +BS  Neuro: limited history, STML and some paucity of thought  Psych: affect okay, pleasant    Lab Results   Component Value Date     (L) 02/12/2025    POTASSIUM 4.5 02/12/2025    CHLORIDE 101 02/12/2025    CO2 12 (L) 02/12/2025    ANIONGAP 21 (H) 02/12/2025    GLC 77 02/12/2025    BUN 12.5 02/12/2025    CR 0.54 02/12/2025    GFRESTIMATED 90 02/12/2025    FELICE 10.3 02/12/2025     Lab Results   Component Value Date    AST 22 02/12/2025      ALBUMIN 3.7 02/12/2025      ALKPHOS 75 02/12/2025     Lab Results   Component Value Date    WBC 8.8 12/05/2024      HGB 13.9 12/05/2024      MCV 98 12/05/2024       12/05/2024     TSH   Date Value Ref Range Status   02/12/2025 1.34 0.30 - 4.20 uIU/mL Final   02/21/2022 1.04 0.30 - 5.00 uIU/mL Final   07/10/2018 1.21 0.30 - 5.00 uIU/mL Final          IMP/PLAN:   (R29.6) Falls frequently, preceded admission   Comment: She has had courses of PHYSICAL THERAPY but does not retain information, abandons her walker and remains a high fall risk   H/o osteopenia and vitamin D deficiency, improved with tx    Plan: Vit D 50 mcg/day; dietary calcium for bone health.   Alendronate 70 mg/ week     (I10) " Essential hypertension, benign  Comment: intermittent LE Edema  BP Readings from Last 3 Encounters:   04/21/25 (!) 142/73   02/06/25 (!) 151/78   12/04/24 136/88      Plan:  losartan 50 mg/day  Follow bps and adjust dose as needed   Some permissive HTN given ongoing fall risk     LE elevation, tubi- for compression, monitor for open areas    (G30.1,  F02.80) Late onset Alzheimer's disease without behavioral disturbance  Comment: functional decline, significant STML with disorientation   Plan: Board and Care support for meds, meals, activity, safety             (G40.909) Seizure disorder (H)  Comment: longstanding, no recent sz    Carbamazapine level 8.4  (4-12)   Plan: continue carbamazepine 300 mg bid, with quarterly check of labs to include CMP, CBC, TSH and carbamazepine level   Also on gabapentin 300 mg tid >>>per PharmD October note, would decrease to 300 mg bid and follow   Should have Neurology follow up, family will need to accompany    (J44.9) Chronic obstructive pulmonary disease, unspecified COPD type (H)  Comment: not symptomatic    Plan: continue Spiriva daily    Advance directive: DNR/DNI per signed TACOS Swartz MD

## 2025-05-20 ENCOUNTER — LAB REQUISITION (OUTPATIENT)
Dept: LAB | Facility: CLINIC | Age: 85
End: 2025-05-20
Payer: COMMERCIAL

## 2025-05-20 DIAGNOSIS — I10 ESSENTIAL (PRIMARY) HYPERTENSION: ICD-10-CM

## 2025-05-20 DIAGNOSIS — Z51.81 ENCOUNTER FOR THERAPEUTIC DRUG LEVEL MONITORING: ICD-10-CM

## 2025-05-20 DIAGNOSIS — E11.9 TYPE 2 DIABETES MELLITUS WITHOUT COMPLICATIONS (H): ICD-10-CM

## 2025-05-21 ENCOUNTER — RESULTS FOLLOW-UP (OUTPATIENT)
Dept: GERIATRICS | Facility: CLINIC | Age: 85
End: 2025-05-21

## 2025-05-21 LAB
ANION GAP SERPL CALCULATED.3IONS-SCNC: 12 MMOL/L (ref 7–15)
BUN SERPL-MCNC: 10.4 MG/DL (ref 8–23)
CALCIUM SERPL-MCNC: 10.8 MG/DL (ref 8.8–10.4)
CARBAMAZEPINE SERPL-MCNC: 9.4 UG/ML (ref 4–12)
CHLORIDE SERPL-SCNC: 93 MMOL/L (ref 98–107)
CREAT SERPL-MCNC: 0.56 MG/DL (ref 0.51–0.95)
EGFRCR SERPLBLD CKD-EPI 2021: 89 ML/MIN/1.73M2
ERYTHROCYTE [DISTWIDTH] IN BLOOD BY AUTOMATED COUNT: 13.2 % (ref 10–15)
EST. AVERAGE GLUCOSE BLD GHB EST-MCNC: 111 MG/DL
GLUCOSE SERPL-MCNC: 84 MG/DL (ref 70–99)
HBA1C MFR BLD: 5.5 %
HCO3 SERPL-SCNC: 23 MMOL/L (ref 22–29)
HCT VFR BLD AUTO: 39.7 % (ref 35–47)
HGB BLD-MCNC: 12.8 G/DL (ref 11.7–15.7)
MCH RBC QN AUTO: 32.1 PG (ref 26.5–33)
MCHC RBC AUTO-ENTMCNC: 32.2 G/DL (ref 31.5–36.5)
MCV RBC AUTO: 100 FL (ref 78–100)
PLATELET # BLD AUTO: 267 10E3/UL (ref 150–450)
POTASSIUM SERPL-SCNC: 5.2 MMOL/L (ref 3.4–5.3)
RBC # BLD AUTO: 3.99 10E6/UL (ref 3.8–5.2)
SODIUM SERPL-SCNC: 128 MMOL/L (ref 135–145)
VIT D+METAB SERPL-MCNC: 39 NG/ML (ref 20–50)
WBC # BLD AUTO: 6.9 10E3/UL (ref 4–11)

## 2025-05-21 PROCEDURE — P9604 ONE-WAY ALLOW PRORATED TRIP: HCPCS | Mod: ORL | Performed by: NURSE PRACTITIONER

## 2025-05-21 PROCEDURE — 36415 COLL VENOUS BLD VENIPUNCTURE: CPT | Mod: ORL | Performed by: NURSE PRACTITIONER

## 2025-05-21 PROCEDURE — 80048 BASIC METABOLIC PNL TOTAL CA: CPT | Mod: ORL | Performed by: NURSE PRACTITIONER

## 2025-05-21 PROCEDURE — 83036 HEMOGLOBIN GLYCOSYLATED A1C: CPT | Mod: ORL | Performed by: NURSE PRACTITIONER

## 2025-05-21 PROCEDURE — 82306 VITAMIN D 25 HYDROXY: CPT | Mod: ORL | Performed by: NURSE PRACTITIONER

## 2025-05-21 PROCEDURE — 85027 COMPLETE CBC AUTOMATED: CPT | Mod: ORL | Performed by: NURSE PRACTITIONER

## 2025-05-21 PROCEDURE — 80156 ASSAY CARBAMAZEPINE TOTAL: CPT | Mod: ORL | Performed by: NURSE PRACTITIONER

## 2025-05-21 NOTE — RESULT ENCOUNTER NOTE
Hyponatremia slightly worse than baseline. I lean toward SIADH s/s carbamazepine but no labs on file to confirm this. Start 1800cc FR for now and liberalize based on goals of care when PNP back. Recheck BMP 1 week.

## 2025-05-27 ENCOUNTER — LAB REQUISITION (OUTPATIENT)
Dept: LAB | Facility: CLINIC | Age: 85
End: 2025-05-27
Payer: COMMERCIAL

## 2025-05-27 ENCOUNTER — ASSISTED LIVING VISIT (OUTPATIENT)
Dept: GERIATRICS | Facility: CLINIC | Age: 85
End: 2025-05-27
Payer: COMMERCIAL

## 2025-05-27 VITALS
BODY MASS INDEX: 32.02 KG/M2 | HEART RATE: 67 BPM | HEIGHT: 61 IN | DIASTOLIC BLOOD PRESSURE: 76 MMHG | OXYGEN SATURATION: 96 % | RESPIRATION RATE: 18 BRPM | SYSTOLIC BLOOD PRESSURE: 124 MMHG | TEMPERATURE: 97.8 F | WEIGHT: 169.6 LBS

## 2025-05-27 DIAGNOSIS — R53.81 PHYSICAL DECONDITIONING: ICD-10-CM

## 2025-05-27 DIAGNOSIS — R29.6 FALLS FREQUENTLY: Primary | ICD-10-CM

## 2025-05-27 DIAGNOSIS — E87.1 HYPONATREMIA: ICD-10-CM

## 2025-05-27 DIAGNOSIS — G30.1 LATE ONSET ALZHEIMER'S DISEASE WITHOUT BEHAVIORAL DISTURBANCE (H): ICD-10-CM

## 2025-05-27 DIAGNOSIS — I10 ESSENTIAL (PRIMARY) HYPERTENSION: ICD-10-CM

## 2025-05-27 DIAGNOSIS — M62.81 GENERALIZED MUSCLE WEAKNESS: ICD-10-CM

## 2025-05-27 DIAGNOSIS — R41.89 COGNITIVE IMPAIRMENT: ICD-10-CM

## 2025-05-27 DIAGNOSIS — I10 ESSENTIAL HYPERTENSION, BENIGN: ICD-10-CM

## 2025-05-27 DIAGNOSIS — F02.80 LATE ONSET ALZHEIMER'S DISEASE WITHOUT BEHAVIORAL DISTURBANCE (H): ICD-10-CM

## 2025-05-27 PROCEDURE — 99349 HOME/RES VST EST MOD MDM 40: CPT

## 2025-05-27 NOTE — PROGRESS NOTES
"[facility] GERIATRICS    Chief Complaint   Patient presents with    RECHECK     HPI:  Yoanna Phillips is a 84 year old  (1940), who is being seen today for an episodic care visit at: [facility] ([facility]) [[id number]].   Today's concern is: Multiple fall per staff in recent weeks and functional decline.    History obtained from: facility chart records, facility staff, patient report, [facility] chart review, and Care Everywhere [facility] chart review       HPI: Today,  is seen in her room while sitting on a chair . She had no injuries from recent falls and denies any pain .  She uses a walker at baseline which she tends to forget and ambulate without a walker which leads to her fall.  Staff at assisted living planning to transfer her to memory care unit with more supervision which hopefully helps with reduction of fall.  Last BIMS 8/12 suggesting progressively cognitive decline which is moderate.  She denies pain, CP, SOB, dizziness, bowel and bladder issue today.  Staff report no additional acute concerns today.      Allergies, and PMH/PSH reviewed in [facility] today.  REVIEW OF SYSTEMS:  4 point ROS including Respiratory, CV, GI and , other than that noted in the HPI,  is negative    Objective:   /76   Pulse 67   Temp 97.8  F (36.6  C)   Resp 18   Ht 1.549 m (5' 1\")   Wt 76.9 kg (169 lb 9.6 oz)   SpO2 96%   BMI 32.05 kg/m    GENERAL APPEARANCE:  Alert, in no distress  RESP:  respiratory effort and palpation of chest normal, lungs clear to auscultation , no respiratory distress  CV:  regular rate and rhythm, no murmur, rub, or gallop, no edema, lower extremity wrapped likely due to edema  ABDOMEN:  normal bowel sounds, soft, nontender, no hepatosplenomegaly or other masses, no guarding or rebound, bowel sounds normal  :    No concern  M/S:   Ambulating with a walker and standby assist  SKIN:  Inspection of skin and subcutaneous tissue baseline  NEURO:   Examination of sensation by " touch normal  PSYCH:  insight and judgement impaired, memory impaired     Most Recent 3 CBC's:  Recent Labs   Lab Test 05/21/25  0430 12/05/24  0830 06/12/24  0746   WBC 6.9 8.8 9.6   HGB 12.8 13.9 14.4    98 100    322 356     Most Recent 3 BMP's:  Recent Labs   Lab Test 05/28/25  0645 05/21/25  0430 02/12/25  0840   * 128* 134*   POTASSIUM 4.8 5.2 4.5   CHLORIDE 98 93* 101   CO2 26 23 12*   BUN 8.1 10.4 12.5   CR 0.61 0.56 0.54   ANIONGAP 10 12 21*   FELICE 10.2 10.8* 10.3   GLC 94 84 77       Assessment/Plan:  (R29.6) Falls frequently   (R53.81) Physical deconditioning  (M62.81) Generalized muscle weakness  Comment: Acute on chronic recent fall with no injury likely due to the cognitive decline and forgetting to use walker  Plan:   - Encourage walker use  - Monitor for changes    (R41.89) Cognitive impairment  (G30.1,  F02.80) Late onset Alzheimer's disease without behavioral disturbance (H)  Comment: Chronic stable continues to have functional decline will be moving to memory care with some more .  Progressive decline in cognition likely leading to more decline in function  Plan:   - Board and care support with medication and personal care and activity .she will be moving to memory care with more help    (E87.1) Hyponatremia  Comment: Acute on chronic recent hyponatremia worsening as low as 128 recheck back to baseline at 134 likely due to  hypervolemia as new  for compression on likely helping with fluid reabsorption  Plan:   - Periodic BMP    (I10) Essential hypertension, benign  Comment: Acute on chronic SBP controlled ranging 130-135  Plan:   - Continue losartan 50mg daily   - Monitor blood pressure per protocol    MED REC REQUIRED{Post Medication Reconciliation Status: discharge medications reconciled and changed, per note/orders      Orders:  No new order today     Electronically signed by: TANIYA Blair CNP       The above has been created using voice recognition software. Please  be aware that this may unintentionally produce inaccuracies and/or nonsensical sentences.

## 2025-05-28 LAB
ANION GAP SERPL CALCULATED.3IONS-SCNC: 10 MMOL/L (ref 7–15)
BUN SERPL-MCNC: 8.1 MG/DL (ref 8–23)
CALCIUM SERPL-MCNC: 10.2 MG/DL (ref 8.8–10.4)
CHLORIDE SERPL-SCNC: 98 MMOL/L (ref 98–107)
CREAT SERPL-MCNC: 0.61 MG/DL (ref 0.51–0.95)
EGFRCR SERPLBLD CKD-EPI 2021: 87 ML/MIN/1.73M2
GLUCOSE SERPL-MCNC: 94 MG/DL (ref 70–99)
HCO3 SERPL-SCNC: 26 MMOL/L (ref 22–29)
POTASSIUM SERPL-SCNC: 4.8 MMOL/L (ref 3.4–5.3)
SODIUM SERPL-SCNC: 134 MMOL/L (ref 135–145)

## 2025-05-28 PROCEDURE — P9604 ONE-WAY ALLOW PRORATED TRIP: HCPCS | Mod: ORL | Performed by: NURSE PRACTITIONER

## 2025-05-28 PROCEDURE — 36415 COLL VENOUS BLD VENIPUNCTURE: CPT | Mod: ORL | Performed by: NURSE PRACTITIONER

## 2025-05-28 PROCEDURE — 80048 BASIC METABOLIC PNL TOTAL CA: CPT | Mod: ORL | Performed by: NURSE PRACTITIONER

## 2025-05-29 ENCOUNTER — RESULTS FOLLOW-UP (OUTPATIENT)
Dept: GERIATRICS | Facility: CLINIC | Age: 85
End: 2025-05-29

## 2025-05-30 ENCOUNTER — ASSISTED LIVING VISIT (OUTPATIENT)
Dept: GERIATRICS | Facility: CLINIC | Age: 85
End: 2025-05-30
Payer: COMMERCIAL

## 2025-05-30 VITALS
WEIGHT: 167.9 LBS | SYSTOLIC BLOOD PRESSURE: 147 MMHG | RESPIRATION RATE: 18 BRPM | TEMPERATURE: 98.4 F | HEIGHT: 61 IN | HEART RATE: 78 BPM | BODY MASS INDEX: 31.7 KG/M2 | DIASTOLIC BLOOD PRESSURE: 87 MMHG | OXYGEN SATURATION: 95 %

## 2025-05-30 DIAGNOSIS — R06.00 DYSPNEA, UNSPECIFIED TYPE: ICD-10-CM

## 2025-05-30 DIAGNOSIS — R68.89 SUSPECTED LEGIONELLA PNEUMONIA: Primary | ICD-10-CM

## 2025-05-30 DIAGNOSIS — R05.1 ACUTE COUGH: ICD-10-CM

## 2025-05-30 PROCEDURE — 99349 HOME/RES VST EST MOD MDM 40: CPT

## 2025-05-30 NOTE — PROGRESS NOTES
"[facility] GERIATRICS    Chief Complaint   Patient presents with    Nursing Home Acute     HPI:  Yoanna Phillips is a 84 year old  (1940), who is being seen today for an episodic care visit at: [facility] ([facility]) [[id number]].  Today's concern is: Nasal congestion and cough rapid COVID test negative.      History obtained from: facility chart records, facility staff, patient report, [facility] chart review, and Care Everywhere [facility] chart review       HPI: Today  is seen in her room while sitting on a chair.  She is sitting upright in a chair .upon assessment lung sound diminished lung sound at the base and expiratory audible wheezing noted.  She has been utilizing standing house ordered of Mucinex.  Writer ordered chest x-ray and DuoNeb today.  Suspecting pneumonia-CXR negative. No additional acute issue reported or noted.    Allergies, and PMH/PSH reviewed in [facility] today.  REVIEW OF SYSTEMS:  4 point ROS including Respiratory, CV, GI and , other than that noted in the HPI,  is negative    Objective:   BP (!) 147/87   Pulse 78   Temp 98.4  F (36.9  C)   Resp 18   Ht 1.549 m (5' 1\")   Wt 76.2 kg (167 lb 14.4 oz)   SpO2 95%   BMI 31.72 kg/m    GENERAL APPEARANCE:  Alert, in no distress  RESP:  diminished breath sounds at bilateral bases and i with some ntermittent, cough was clear, the patient sputum, expiratory wheezes  CV:  Regular rate and rhythm no murmur lower extremity covered  ABDOMEN:  normal bowel sounds, soft, nontender, no hepatosplenomegaly or other masses, no guarding or rebound, bowel sounds normal  :    No concern  M/S:   Gait and station normal using walker   Ambulating using a walker  SKIN:  Inspection of skin and subcutaneous tissue baseline  NEURO:   Examination of sensation by touch normal  PSYCH: Insight and judgment impaired memory impaired    Most Recent 3 CBC's:  Recent Labs   Lab Test 05/21/25  0430 12/05/24  0830 06/12/24  0746   WBC 6.9 8.8 9.6   HGB 12.8 " 13.9 14.4    98 100    322 356     Most Recent 3 BMP's:  Recent Labs   Lab Test 05/28/25  0645 05/21/25  0430 02/12/25  0840   * 128* 134*   POTASSIUM 4.8 5.2 4.5   CHLORIDE 98 93* 101   CO2 26 23 12*   BUN 8.1 10.4 12.5   CR 0.61 0.56 0.54   ANIONGAP 10 12 21*   FELICE 10.2 10.8* 10.3   GLC 94 84 77       Assessment/Plan:  (R68.89) Suspected Legionella pneumonia    (R05.1) Acute cough  (R06.00Dyspnea, unspecified type )  Comment: Acute cough with diminished lung sounds at the base and expiratory wheezing.  CXR obtained which was negative for any acute history of dyspnea finding.  Chronic history of dyspnea uses inhaler at baseline .Rapid COVID test also negative.  DuoNeb ordered to help with symptom patient taking Mucinex with no relief.  Plan:   - Start DuoNeb 3 times daily x 7-day then changed to as needed discontinue once symptoms resolved  - Continue Mucinex until symptoms resolve  - Isolation per protocol  - Monitor for worsening signs symptom    MED REC REQUIRED{Post Medication Reconciliation Status: discharge medications reconciled and changed, per note/orders      Orders:  Obtain chest x-ray  Start DuoNeb, x7 days TID  then changed to as needed TID X7 days and discontinue when resolved    Electronically signed by: TANIYA Blair CNP

## 2025-06-09 ENCOUNTER — ASSISTED LIVING VISIT (OUTPATIENT)
Dept: GERIATRICS | Facility: CLINIC | Age: 85
End: 2025-06-09
Payer: COMMERCIAL

## 2025-06-09 VITALS
HEART RATE: 73 BPM | BODY MASS INDEX: 31.55 KG/M2 | OXYGEN SATURATION: 92 % | DIASTOLIC BLOOD PRESSURE: 83 MMHG | TEMPERATURE: 97.9 F | SYSTOLIC BLOOD PRESSURE: 170 MMHG | WEIGHT: 167 LBS | RESPIRATION RATE: 16 BRPM

## 2025-06-09 RX ORDER — AMLODIPINE BESYLATE 2.5 MG/1
2.5 TABLET ORAL AT BEDTIME
COMMUNITY
Start: 2025-06-09

## 2025-06-09 NOTE — PROGRESS NOTES
[facility] GERIATRICS    Chief Complaint   Patient presents with    RECHECK     HPI:  Yoanna Phillips is a 84 year old  (1940), who is being seen today for an episodic care visit at: [facility] ([facility]) [[id number]]. Today's concern is: ***    Allergies, and PMH/PSH reviewed in [facility] today.  REVIEW OF SYSTEMS:  {oakzox26:042030}    Objective:   BP (!) 170/83   Pulse 73   Temp 97.9  F (36.6  C)   Resp 16   Wt 75.8 kg (167 lb)   SpO2 92%   BMI 31.55 kg/m    {Nursing home physical exam :153134}    {fgslab:996527}    Assessment/Plan:  {S DX2:911758}    MED REC REQUIRED{TIP  Click the link below to document or use med rec list, use list to pull in response :623906}  Post Medication Reconciliation Status: {MED REC LIST:825731}      Orders:  Increase losartan to 75mg daily   Add amlodapin 2.5mg at HS    Electronically signed by:

## 2025-06-15 ENCOUNTER — HEALTH MAINTENANCE LETTER (OUTPATIENT)
Age: 85
End: 2025-06-15

## 2025-06-16 ENCOUNTER — TELEPHONE (OUTPATIENT)
Dept: GERIATRICS | Facility: CLINIC | Age: 85
End: 2025-06-16
Payer: COMMERCIAL

## 2025-06-16 ENCOUNTER — APPOINTMENT (OUTPATIENT)
Dept: CT IMAGING | Facility: CLINIC | Age: 85
DRG: 690 | End: 2025-06-16
Attending: EMERGENCY MEDICINE
Payer: COMMERCIAL

## 2025-06-16 ENCOUNTER — HOSPITAL ENCOUNTER (INPATIENT)
Facility: CLINIC | Age: 85
LOS: 2 days | Discharge: NURSING, REHAB OR LONG-TERM CARE | DRG: 690 | End: 2025-06-19
Attending: EMERGENCY MEDICINE | Admitting: INTERNAL MEDICINE
Payer: COMMERCIAL

## 2025-06-16 ENCOUNTER — APPOINTMENT (OUTPATIENT)
Dept: GENERAL RADIOLOGY | Facility: CLINIC | Age: 85
DRG: 690 | End: 2025-06-16
Attending: EMERGENCY MEDICINE
Payer: COMMERCIAL

## 2025-06-16 DIAGNOSIS — N30.00 ACUTE CYSTITIS WITHOUT HEMATURIA: ICD-10-CM

## 2025-06-16 DIAGNOSIS — M25.561 ACUTE PAIN OF RIGHT KNEE: ICD-10-CM
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
## 2025-06-16 LAB
ALBUMIN UR-MCNC: NEGATIVE MG/DL
ANION GAP SERPL CALCULATED.3IONS-SCNC: 9 MMOL/L (ref 7–15)
APPEARANCE UR: ABNORMAL
BACTERIA #/AREA URNS HPF: ABNORMAL /HPF
BASOPHILS # BLD AUTO: 0.1 10E3/UL (ref 0–0.2)
BASOPHILS NFR BLD AUTO: 1 %
BILIRUB UR QL STRIP: NEGATIVE
BUN SERPL-MCNC: 13 MG/DL (ref 8–23)
CALCIUM SERPL-MCNC: 10 MG/DL (ref 8.8–10.4)
CHLORIDE SERPL-SCNC: 104 MMOL/L (ref 98–107)
COLOR UR AUTO: ABNORMAL
CREAT SERPL-MCNC: 0.67 MG/DL (ref 0.51–0.95)
EGFRCR SERPLBLD CKD-EPI 2021: 85 ML/MIN/1.73M2
EOSINOPHIL # BLD AUTO: 0.3 10E3/UL (ref 0–0.7)
EOSINOPHIL NFR BLD AUTO: 3 %
ERYTHROCYTE [DISTWIDTH] IN BLOOD BY AUTOMATED COUNT: 13.2 % (ref 10–15)
GLUCOSE SERPL-MCNC: 101 MG/DL (ref 70–99)
GLUCOSE UR STRIP-MCNC: NEGATIVE MG/DL
HCO3 SERPL-SCNC: 23 MMOL/L (ref 22–29)
HCT VFR BLD AUTO: 36.8 % (ref 35–47)
HGB BLD-MCNC: 12.3 G/DL (ref 11.7–15.7)
HGB UR QL STRIP: NEGATIVE
HOLD SPECIMEN: NORMAL
IMM GRANULOCYTES # BLD: 0 10E3/UL
IMM GRANULOCYTES NFR BLD: 0 %
KETONES UR STRIP-MCNC: ABNORMAL MG/DL
LEUKOCYTE ESTERASE UR QL STRIP: NEGATIVE
LYMPHOCYTES # BLD AUTO: 3.9 10E3/UL (ref 0.8–5.3)
LYMPHOCYTES NFR BLD AUTO: 41 %
MCH RBC QN AUTO: 33.3 PG (ref 26.5–33)
MCHC RBC AUTO-ENTMCNC: 33.4 G/DL (ref 31.5–36.5)
MCV RBC AUTO: 100 FL (ref 78–100)
MONOCYTES # BLD AUTO: 0.9 10E3/UL (ref 0–1.3)
MONOCYTES NFR BLD AUTO: 9 %
NEUTROPHILS # BLD AUTO: 4.2 10E3/UL (ref 1.6–8.3)
NEUTROPHILS NFR BLD AUTO: 46 %
NITRATE UR QL: POSITIVE
NRBC # BLD AUTO: 0 10E3/UL
NRBC BLD AUTO-RTO: 0 /100
PH UR STRIP: 6.5 [PH] (ref 5–7)
PLATELET # BLD AUTO: 336 10E3/UL (ref 150–450)
POTASSIUM SERPL-SCNC: 4.4 MMOL/L (ref 3.4–5.3)
RBC # BLD AUTO: 3.69 10E6/UL (ref 3.8–5.2)
RBC URINE: 6 /HPF
SODIUM SERPL-SCNC: 136 MMOL/L (ref 135–145)
SP GR UR STRIP: 1.02 (ref 1–1.03)
UROBILINOGEN UR STRIP-MCNC: NORMAL MG/DL
WBC # BLD AUTO: 9.3 10E3/UL (ref 4–11)
WBC URINE: 28 /HPF

## 2025-06-16 PROCEDURE — 80048 BASIC METABOLIC PNL TOTAL CA: CPT | Performed by: EMERGENCY MEDICINE

## 2025-06-16 PROCEDURE — 81001 URINALYSIS AUTO W/SCOPE: CPT | Performed by: EMERGENCY MEDICINE

## 2025-06-16 PROCEDURE — 87186 SC STD MICRODIL/AGAR DIL: CPT | Performed by: EMERGENCY MEDICINE

## 2025-06-16 PROCEDURE — 99285 EMERGENCY DEPT VISIT HI MDM: CPT | Mod: 25

## 2025-06-16 PROCEDURE — 36415 COLL VENOUS BLD VENIPUNCTURE: CPT | Performed by: EMERGENCY MEDICINE

## 2025-06-16 PROCEDURE — 70450 CT HEAD/BRAIN W/O DYE: CPT

## 2025-06-16 PROCEDURE — 73562 X-RAY EXAM OF KNEE 3: CPT | Mod: RT

## 2025-06-16 PROCEDURE — 85004 AUTOMATED DIFF WBC COUNT: CPT | Performed by: EMERGENCY MEDICINE

## 2025-06-16 RX ORDER — CEFTRIAXONE 1 G/1
1 INJECTION, POWDER, FOR SOLUTION INTRAMUSCULAR; INTRAVENOUS ONCE
Status: COMPLETED | OUTPATIENT
Start: 2025-06-16 | End: 2025-06-17

## 2025-06-16 ASSESSMENT — ACTIVITIES OF DAILY LIVING (ADL)
ADLS_ACUITY_SCORE: 51

## 2025-06-16 ASSESSMENT — COLUMBIA-SUICIDE SEVERITY RATING SCALE - C-SSRS
2. HAVE YOU ACTUALLY HAD ANY THOUGHTS OF KILLING YOURSELF IN THE PAST MONTH?: NO
6. HAVE YOU EVER DONE ANYTHING, STARTED TO DO ANYTHING, OR PREPARED TO DO ANYTHING TO END YOUR LIFE?: NO
1. IN THE PAST MONTH, HAVE YOU WISHED YOU WERE DEAD OR WISHED YOU COULD GO TO SLEEP AND NOT WAKE UP?: NO

## 2025-06-17 PROBLEM — M25.561 ACUTE PAIN OF RIGHT KNEE: Status: ACTIVE | Noted: 2025-06-17
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
## 2025-06-17 PROBLEM — N30.00 ACUTE CYSTITIS WITHOUT HEMATURIA: Status: ACTIVE | Noted: 2025-06-17

## 2025-06-17 LAB — CARBAMAZEPINE SERPL-MCNC: 10 UG/ML (ref 4–12)

## 2025-06-17 PROCEDURE — 250N000013 HC RX MED GY IP 250 OP 250 PS 637: Performed by: INTERNAL MEDICINE

## 2025-06-17 PROCEDURE — 96366 THER/PROPH/DIAG IV INF ADDON: CPT

## 2025-06-17 PROCEDURE — 99222 1ST HOSP IP/OBS MODERATE 55: CPT | Performed by: INTERNAL MEDICINE

## 2025-06-17 PROCEDURE — 250N000011 HC RX IP 250 OP 636: Performed by: EMERGENCY MEDICINE

## 2025-06-17 PROCEDURE — G0378 HOSPITAL OBSERVATION PER HR: HCPCS

## 2025-06-17 PROCEDURE — 36415 COLL VENOUS BLD VENIPUNCTURE: CPT | Performed by: INTERNAL MEDICINE

## 2025-06-17 PROCEDURE — 120N000001 HC R&B MED SURG/OB

## 2025-06-17 PROCEDURE — 80156 ASSAY CARBAMAZEPINE TOTAL: CPT | Performed by: INTERNAL MEDICINE

## 2025-06-17 PROCEDURE — 96365 THER/PROPH/DIAG IV INF INIT: CPT

## 2025-06-17 RX ORDER — IPRATROPIUM BROMIDE AND ALBUTEROL SULFATE 2.5; .5 MG/3ML; MG/3ML
1 SOLUTION RESPIRATORY (INHALATION) 3 TIMES DAILY
Status: DISCONTINUED | OUTPATIENT
Start: 2025-06-17 | End: 2025-06-19

## 2025-06-17 RX ORDER — PROCHLORPERAZINE MALEATE 5 MG/1
5 TABLET ORAL EVERY 6 HOURS PRN
Status: DISCONTINUED | OUTPATIENT
Start: 2025-06-17 | End: 2025-06-19 | Stop reason: HOSPADM

## 2025-06-17 RX ORDER — ONDANSETRON 4 MG/1
4 TABLET, ORALLY DISINTEGRATING ORAL EVERY 6 HOURS PRN
Status: DISCONTINUED | OUTPATIENT
Start: 2025-06-17 | End: 2025-06-19 | Stop reason: HOSPADM

## 2025-06-17 RX ORDER — CARBAMAZEPINE 100 MG/1
300 CAPSULE, EXTENDED RELEASE ORAL 2 TIMES DAILY
Status: DISCONTINUED | OUTPATIENT
Start: 2025-06-17 | End: 2025-06-19 | Stop reason: HOSPADM

## 2025-06-17 RX ORDER — AMOXICILLIN 250 MG
1 CAPSULE ORAL 2 TIMES DAILY PRN
Status: DISCONTINUED | OUTPATIENT
Start: 2025-06-17 | End: 2025-06-19 | Stop reason: HOSPADM

## 2025-06-17 RX ORDER — IPRATROPIUM BROMIDE AND ALBUTEROL SULFATE 2.5; .5 MG/3ML; MG/3ML
1 SOLUTION RESPIRATORY (INHALATION) EVERY 4 HOURS PRN
Status: DISCONTINUED | OUTPATIENT
Start: 2025-06-17 | End: 2025-06-19 | Stop reason: HOSPADM

## 2025-06-17 RX ORDER — ONDANSETRON 2 MG/ML
4 INJECTION INTRAMUSCULAR; INTRAVENOUS EVERY 6 HOURS PRN
Status: DISCONTINUED | OUTPATIENT
Start: 2025-06-17 | End: 2025-06-19 | Stop reason: HOSPADM

## 2025-06-17 RX ORDER — GABAPENTIN 300 MG/1
300 CAPSULE ORAL 3 TIMES DAILY
Status: DISCONTINUED | OUTPATIENT
Start: 2025-06-17 | End: 2025-06-19 | Stop reason: HOSPADM

## 2025-06-17 RX ORDER — CYANOCOBALAMIN 1000 UG/ML
1000 INJECTION, SOLUTION INTRAMUSCULAR; SUBCUTANEOUS
Status: DISCONTINUED | OUTPATIENT
Start: 2025-06-17 | End: 2025-06-19 | Stop reason: HOSPADM

## 2025-06-17 RX ORDER — ACETAMINOPHEN 650 MG/1
650 SUPPOSITORY RECTAL EVERY 4 HOURS PRN
Status: DISCONTINUED | OUTPATIENT
Start: 2025-06-17 | End: 2025-06-19 | Stop reason: HOSPADM

## 2025-06-17 RX ORDER — CEFTRIAXONE 1 G/1
1 INJECTION, POWDER, FOR SOLUTION INTRAMUSCULAR; INTRAVENOUS EVERY 24 HOURS
Status: DISCONTINUED | OUTPATIENT
Start: 2025-06-18 | End: 2025-06-19 | Stop reason: HOSPADM

## 2025-06-17 RX ORDER — ALENDRONATE SODIUM 70 MG/1
70 TABLET ORAL
Status: DISCONTINUED | OUTPATIENT
Start: 2025-06-17 | End: 2025-06-19 | Stop reason: HOSPADM

## 2025-06-17 RX ORDER — IPRATROPIUM BROMIDE AND ALBUTEROL SULFATE 2.5; .5 MG/3ML; MG/3ML
1 SOLUTION RESPIRATORY (INHALATION) 3 TIMES DAILY
COMMUNITY

## 2025-06-17 RX ORDER — AMOXICILLIN 250 MG
1 CAPSULE ORAL 2 TIMES DAILY
Status: DISCONTINUED | OUTPATIENT
Start: 2025-06-17 | End: 2025-06-19 | Stop reason: HOSPADM

## 2025-06-17 RX ORDER — CHOLECALCIFEROL (VITAMIN D3) 50 MCG
50 TABLET ORAL DAILY
Status: DISCONTINUED | OUTPATIENT
Start: 2025-06-17 | End: 2025-06-19 | Stop reason: HOSPADM

## 2025-06-17 RX ORDER — ACETAMINOPHEN 325 MG/1
650 TABLET ORAL EVERY 4 HOURS PRN
Status: DISCONTINUED | OUTPATIENT
Start: 2025-06-17 | End: 2025-06-19 | Stop reason: HOSPADM

## 2025-06-17 RX ORDER — AMLODIPINE BESYLATE 2.5 MG/1
2.5 TABLET ORAL AT BEDTIME
Status: DISCONTINUED | OUTPATIENT
Start: 2025-06-17 | End: 2025-06-19 | Stop reason: HOSPADM

## 2025-06-17 RX ORDER — OLANZAPINE 10 MG/2ML
5 INJECTION, POWDER, FOR SOLUTION INTRAMUSCULAR 2 TIMES DAILY PRN
Status: DISCONTINUED | OUTPATIENT
Start: 2025-06-17 | End: 2025-06-19 | Stop reason: HOSPADM

## 2025-06-17 RX ORDER — FOLIC ACID 1 MG/1
1 TABLET ORAL DAILY
Status: DISCONTINUED | OUTPATIENT
Start: 2025-06-17 | End: 2025-06-19 | Stop reason: HOSPADM

## 2025-06-17 RX ORDER — AMOXICILLIN 250 MG
2 CAPSULE ORAL 2 TIMES DAILY PRN
Status: DISCONTINUED | OUTPATIENT
Start: 2025-06-17 | End: 2025-06-19 | Stop reason: HOSPADM

## 2025-06-17 RX ORDER — IPRATROPIUM BROMIDE AND ALBUTEROL SULFATE 2.5; .5 MG/3ML; MG/3ML
1 SOLUTION RESPIRATORY (INHALATION) EVERY 4 HOURS PRN
COMMUNITY

## 2025-06-17 RX ADMIN — GABAPENTIN 300 MG: 300 CAPSULE ORAL at 21:31

## 2025-06-17 RX ADMIN — FOLIC ACID 1 MG: 1 TABLET ORAL at 16:42

## 2025-06-17 RX ADMIN — DICLOFENAC 2 G: 10 GEL TOPICAL at 21:35

## 2025-06-17 RX ADMIN — AMLODIPINE BESYLATE 2.5 MG: 2.5 TABLET ORAL at 21:31

## 2025-06-17 RX ADMIN — Medication 50 MCG: at 16:42

## 2025-06-17 RX ADMIN — SENNOSIDES AND DOCUSATE SODIUM 1 TABLET: 50; 8.6 TABLET ORAL at 16:42

## 2025-06-17 RX ADMIN — GABAPENTIN 300 MG: 300 CAPSULE ORAL at 16:45

## 2025-06-17 RX ADMIN — SENNOSIDES AND DOCUSATE SODIUM 1 TABLET: 50; 8.6 TABLET ORAL at 21:31

## 2025-06-17 RX ADMIN — CEFTRIAXONE SODIUM 1 G: 1 INJECTION, POWDER, FOR SOLUTION INTRAMUSCULAR; INTRAVENOUS at 00:53

## 2025-06-17 RX ADMIN — CARBAMAZEPINE 300 MG: 100 CAPSULE, EXTENDED RELEASE ORAL at 21:31

## 2025-06-17 RX ADMIN — ACETAMINOPHEN 650 MG: 325 TABLET, FILM COATED ORAL at 16:45

## 2025-06-17 ASSESSMENT — ACTIVITIES OF DAILY LIVING (ADL)
ADLS_ACUITY_SCORE: 51
ADLS_ACUITY_SCORE: 56
ADLS_ACUITY_SCORE: 51
ADLS_ACUITY_SCORE: 51
ADLS_ACUITY_SCORE: 56
ADLS_ACUITY_SCORE: 51
ADLS_ACUITY_SCORE: 56
DEPENDENT_IADLS:: CLEANING;COOKING;LAUNDRY;SHOPPING;MEAL PREPARATION;MEDICATION MANAGEMENT;MONEY MANAGEMENT;TRANSPORTATION;INCONTINENCE
ADLS_ACUITY_SCORE: 56
ADLS_ACUITY_SCORE: 51
ADLS_ACUITY_SCORE: 56
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
## 2025-06-17 NOTE — ED NOTES
Bed: ED22  Expected date:   Expected time:   Means of arrival:   Comments:  HEMS 429 84F mult falls, knee pain. ETA 1957

## 2025-06-17 NOTE — TELEPHONE ENCOUNTER
Staff called this evening with another fall and patient complaining of pain all over the place. Given her multiple falls in recent weeks and advanced dementia unable to get a clear history or assess injury provider recommending to send patient to ED for evaluation.    TANIYA Blair CNP

## 2025-06-17 NOTE — PHARMACY-ADMISSION MEDICATION HISTORY
Pharmacist Admission Medication History    Admission medication history is complete. The information provided in this note is only as accurate as the sources available at the time of the update.    Information Source(s): Facility (U/NH/) medication list/MAR, CareEverywhere/SureScripts, and facility ([facility]) staff via phone and N/A    Pertinent Information:   -carbamazepine: on facility med list as ER tab but in Surescripts as ER capsule. Facility staff confirm they have capsules  -Duoneb: 4 orders on med list with start date of 6/16 and none charted on as given. Facility staff confirm only 2 orders are active (TID and 24h PRN). The TID order has not started yet as med has not been received per facility staff. They note there was a PRN order prior to these new orders.    Changes made to PTA medication list:  Added: Duoneb x2 (see note above)  Deleted: None  Changed: alendronate (added day of week), amlodipine (added hold parameter), carbamazepine (PO --> ER cap, see note above), vitamin B12 (updated frequency), diclofenac (QID --> BID to left knee), folic acid (PO --> 1 mg tab), losartan (added hold parameter), senna/docusate (every other day --> MWF), Spiriva (added note = 2 puffs)    Allergies reviewed with patient and updates made in EHR: no    Medication History Completed By: Jennifer Khanna RPH 6/17/2025 10:44 AM    PTA Med List   Medication Sig Note Last Dose/Taking    acetaminophen (TYLENOL) 325 MG tablet Take 650 mg by mouth 2 times daily.  6/16/2025 Morning    alendronate (FOSAMAX) 70 MG tablet TAKE 1 TABLET BY MOUTH ONCE WEEKLY *ORIGINAL CONTAINER* (Patient taking differently: Take 70 mg by mouth every 7 days. Thursdays)  6/12/2025    amLODIPine (NORVASC) 2.5 MG tablet Take 2.5 mg by mouth at bedtime. Hold for SBP <110  6/15/2025    carBAMazepine (CARBATROL) 300 MG 12 hr capsule Take 300 mg by mouth 2 times daily. 6/17/2025: On facility med list as tab but confirmed filling capsule 6/16/2025  Morning    cyanocobalamin (CYANOCOBALAMIN) 1000 mcg/mL injection Inject 1,000 mcg into the muscle. On the 25th of every month  5/25/2025    diclofenac (VOLTAREN) 1 % topical gel Apply 2 g topically 2 times daily. To left knee  6/16/2025 Morning    folic acid (FOLVITE) 1 MG tablet Take 1 mg by mouth daily.  6/16/2025 Morning    gabapentin (NEURONTIN) 300 MG capsule Take 300 mg by mouth 3 times daily.  6/16/2025 at  2:26 PM    ipratropium - albuterol 0.5 mg/2.5 mg, 3mg,/3 mL (DUONEB) 0.5-2.5 (3) MG/3ML neb solution Take 1 vial by nebulization 3 times daily. 6/17/2025: Not started, ordered 6/16 Taking    ipratropium - albuterol 0.5 mg/2.5 mg, 3mg,/3 mL (DUONEB) 0.5-2.5 (3) MG/3ML neb solution Take 1 vial by nebulization as needed for shortness of breath or wheezing. Every 24 hours PRN 6/17/2025: New order 6/16, facility staff states there was another PRN order prior to this Taking As Needed    LOSARTAN POTASSIUM PO Take 75 mg by mouth daily. Hold for SBP <110  6/16/2025 Morning    senna-docusate (SENOKOT-S/PERICOLACE) 8.6-50 MG tablet Take 1 tablet by mouth. Every Monday, Wednesday, Friday 6/16/2025 Morning    SPIRIVA HANDIHALER 18 MCG inhaled capsule INHALE CONTENTS OF 1 CAPSULE VIA HANDIHALER ONCE DAILY 6/17/2025: 2 puffs 6/16/2025 Morning    vitamin D3 (CHOLECALCIFEROL) 50 mcg (2000 units) tablet Take 1 tablet (50 mcg) by mouth daily  6/16/2025 Morning

## 2025-06-17 NOTE — ED NOTES
[facility]  ED Nurse Handoff Report    ED Chief complaint: Fall and Knee Pain      ED Diagnosis:   Final diagnoses:   Fall, initial encounter   Acute cystitis without hematuria   Acute pain of right knee       Code Status: DNR / DNI    Allergies:   Allergies   Allergen Reactions    Aspirin Unknown       Patient Story: Patient BIBA from [facility] for eval of multiple falls. Per EMS report, pt fell two days ago, and hit her left side. Pt then fell yesterday and hit her butt and her back. Ems reports that patient was evaluated after these falls by their internal doctor and was cleared. Today the patient fell and hit her right knee. Their MD wanted her evaluated here due to multiple falls. Per EMS, pt has never hit her head during any of the falls, does not take blood thinners, and denies any dizziness prior to the falls. Pt is at her baseline mentation (Aox2-3). Pt only endorses right leg pain with movement. VSS, hypertensive.      Focused Assessment:  Pt alert, baseline orientation, cooperative with cares and no attempts to self transfer. Found to have UTI. See results below.     Treatments and/or interventions provided: Labs, xray, monitoring, medicaiton  Labs Ordered and Resulted from Time of ED Arrival to Time of ED Departure   BASIC METABOLIC PANEL - Abnormal       Result Value    Sodium 136      Potassium 4.4      Chloride 104      Carbon Dioxide (CO2) 23      Anion Gap 9      Urea Nitrogen 13.0      Creatinine 0.67      GFR Estimate 85      Calcium 10.0      Glucose 101 (*)    ROUTINE UA WITH MICROSCOPIC REFLEX TO CULTURE - Abnormal    Color Urine Light Yellow      Appearance Urine Slightly Cloudy (*)     Glucose Urine Negative      Bilirubin Urine Negative      Ketones Urine Trace (*)     Specific Gravity Urine 1.022      Blood Urine Negative      pH Urine 6.5      Protein Albumin Urine Negative      Urobilinogen Urine Normal      Nitrite Urine Positive (*)     Leukocyte Esterase Urine  Negative      Bacteria Urine Few (*)     RBC Urine 6 (*)     WBC Urine 28 (*)    CBC WITH PLATELETS AND DIFFERENTIAL - Abnormal    WBC Count 9.3      RBC Count 3.69 (*)     Hemoglobin 12.3      Hematocrit 36.8            MCH 33.3 (*)     MCHC 33.4      RDW 13.2      Platelet Count 336      % Neutrophils 46      % Lymphocytes 41      % Monocytes 9      % Eosinophils 3      % Basophils 1      % Immature Granulocytes 0      NRBCs per 100 WBC 0      Absolute Neutrophils 4.2      Absolute Lymphocytes 3.9      Absolute Monocytes 0.9      Absolute Eosinophils 0.3      Absolute Basophils 0.1      Absolute Immature Granulocytes 0.0      Absolute NRBCs 0.0     URINE CULTURE     CT Head w/o Contrast   Final Result   IMPRESSION:   1.  No CT evidence for acute intracranial process.   2.  Brain atrophy and presumed chronic microvascular ischemic changes as above.         XR Knee Right 3 Views   Final Result   IMPRESSION: No acute displaced fracture or subluxation. Mild medial and patellofemoral compartment osteoarthritis. Chondrocalcinosis. No sizable knee joint effusion. Vascular calcifications.           Patient's response to treatments and/or interventions: Tolerating interventions well    To be done/followed up on inpatient unit:  Continue plan of care    Does this patient have any cognitive concerns?: Baseline dementia    Activity level - Baseline/Home:  Unknown  Activity Level - Current:   Unknown    Patient's Preferred language: English   Needed?: No    Isolation: None  Infection: Not Applicable  Sepsis treatment initiated: No  Patient tested for COVID 19 prior to admission: NO  Bariatric?: No    Vital Signs:   Vitals:    06/16/25 2030 06/16/25 2230 06/16/25 2300 06/16/25 2330   BP: (!) 168/81 (!) 178/80 (!) 148/90 (!) 143/71   Pulse: 65  61 62   Resp:       Temp:       TempSrc:       SpO2: 97%  97% 94%       Cardiac Rhythm:     Was the PSS-3 completed:   Yes  What interventions are required if any?                Family Comments: Not present  OBS brochure/video discussed/provided to patient/family: yes              Name of person given brochure if not patient: n/a              Relationship to patient: n/a    For the majority of the shift this patient's behavior was Green.   Behavioral interventions performed were rounding.    ED NURSE PHONE NUMBER: *[phone]

## 2025-06-17 NOTE — CARE PLAN
DATE & SHIFT: 6/17/25 - admitted to floor today around 1500  PRIMARY Concern: UTI, frequent falls  SAFETY RISK Concerns (fall risk, behaviors, etc.): fall risk      Aggression Tool Color: green  Isolation/Type: none  Tests/Procedures for NEXT shift: none  Consults? (Pending/following, signed-off?) PT eval  Where is patient from? (Home, TCU, etc.): memory care  Other Important info for NEXT shift: up frequently, setting off bed alram  Anticipated DC date & active delays: TBD pending clinical course  _____________________________________________________________________________  SUMMARY NOTE:   Orientation/Cognitive: A/O x 1-2  Observation Goals (Met/ Not Met): inpt  Mobility Level/Assist Equipment: A1 GB/W  Antibiotics & Plan (IV/po, length of tx left): IV rocephin   Pain Management: Tylenol for R knee pain   Complete Pain Reassessment: Y/N Y Due next: next shift  Tele/VS/O2: vitally stable on RA ex HTN  ABNL Lab/BG: UC positive   Diet: regular   Bowel/Bladder: cont. Of b/b   Skin Concerns: scattered bruising   Drains/Devices: PIV sl   Patient Stated Goal for Today: none stated

## 2025-06-17 NOTE — ED TRIAGE NOTES
Patient BIBA from [facility] care for eval of multiple falls. Per EMS report, pt fell two days ago, and hit her left side. Pt then fell yesterday and hit her butt and her back. Ems reports that patient was evaluated after these falls by their internal doctor and was cleared. Today the patient fell and hit her right knee. Their MD wanted her evaluated here due to multiple falls. Per EMS, pt has never hit her head during any of the falls, does not take blood thinners, and denies any dizziness prior to the falls. Pt is at her baseline mentation (Aox2-3). Pt only endorses right leg pain with movement. VSS, hypertensive.      Triage Assessment (Adult)       Row Name 06/16/25 2011          Triage Assessment    Airway WDL WDL        Respiratory WDL    Respiratory WDL WDL        Peripheral/Neurovascular WDL    Peripheral Neurovascular WDL WDL        Cognitive/Neuro/Behavioral WDL    Cognitive/Neuro/Behavioral WDL X;level of consciousness     Level of Consciousness intermittent confusion  baseline for the patient        Pupils (CN II)    Pupil PERRLA yes     Pupil Size Left 2 mm     Pupil Size Right 2 mm        [place] Coma Scale    Best Eye Response 4-->(E4) spontaneous     Best Motor Response 6-->(M6) obeys commands     Best Verbal Response 5-->(V5) oriented     Neal Coma Scale Score 15

## 2025-06-17 NOTE — PROGRESS NOTES
History reviewed.  See excellent H&P from this morning.      I tried calling family but phone went on voicemail.  No collateral history  As per patient she lives at her house with her kids.  As per EMR, she was transferred to memory care.      Denies any specific complaints and just wants to be discharged.    Sluggish speech with some dysarthria , unclear if new or old  CT head-negative    Will need to talk to family again for collateral history.  If new finding, consider getting an MRI    Continue treatment of UTI

## 2025-06-17 NOTE — CONSULTS
Care Management Initial Consult    General Information  Assessment completed with: Care Team Member,    Type of CM/SW Visit: Initial Assessment    Primary Care Provider verified and updated as needed: Yes   Readmission within the last 30 days: no previous admission in last 30 days      Reason for Consult: discharge planning  Advance Care Planning: Advance Care Planning Reviewed: present on chart          Communication Assessment  Patient's communication style: spoken language (English or Bilingual)             Cognitive  Cognitive/Neuro/Behavioral: .WDL except, level of consciousness  Level of Consciousness: intermittent confusion (baseline for the patient)     Orientation: disoriented to, time             Living Environment:   People in home: facility resident     Current living Arrangements: assisted living  Name of Facility: [facility]   Able to return to prior arrangements: yes       Family/Social Support:  Care provided by: self, other (see comments) (facility staff)  Provides care for: no one, unable/limited ability to care for self  Marital Status:   Support system: Children, Facility resident(s)/Staff          Description of Support System: Supportive, Involved    Support Assessment: Adequate family and caregiver support    Current Resources:   Patient receiving home care services: No        Community Resources: None  Equipment currently used at home: grab bar, tub/shower, grab bar, toilet, shower chair, raised toilet seat, walker, rolling  Supplies currently used at home: Incontinence Supplies, Gloves, Wipes, Chux    Employment/Financial:  Employment Status: retired        Financial Concerns:     Referral to Financial Worker: No       Does the patient's insurance plan have a 3 day qualifying hospital stay waiver?  No    Lifestyle & Psychosocial Needs:  Social Drivers of Health     Food Insecurity: Not on file   Depression: Not at risk (1/29/2024)    PHQ-2     PHQ-2 Score: 0   Housing  "Stability: Not on file   Tobacco Use: Medium Risk (6/13/2025)    Patient History     Smoking Tobacco Use: Former     Smokeless Tobacco Use: Never     Passive Exposure: Not on file   Financial Resource Strain: Not on file   Alcohol Use: Not on file   Transportation Needs: Not on file   Physical Activity: Not on file   Interpersonal Safety: Not on file   Stress: Not on file   Social Connections: Not on file   Health Literacy: Not on file       Functional Status:  Prior to admission patient needed assistance:   Dependent ADLs:: Ambulation-walker, Bathing, Dressing, Eating, Grooming, Incontinence, Positioning, Transfers, Wheelchair-independent, Toileting  Dependent IADLs:: Cleaning, Cooking, Laundry, Shopping, Meal Preparation, Medication Management, Money Management, Transportation, Incontinence  Assesssment of Functional Status: Not at  functional baseline    Mental Health Status:          Chemical Dependency Status:                Values/Beliefs:  Spiritual, Cultural Beliefs, Confucianist Practices, Values that affect care: no               Discussed  Partnership in Safe Discharge Planning  document with patient/family: No    Additional Information:  CM team called [facility], phone [phone] and Spoke with RN who provided the following information:     In what kind of facility does pt reside?  Facility Type: [facility] (assisted living) - \"home discharge\"    Name of building/unit: Memory Care   Any steps to get in (#)? none                2.   Best number to reach facility nursing? Phone [phone]  Fax:  [phone]         Evening/weekend number? same     3.  What is the preferred return time for the resident?  anytime  Can patient return on weekend? Call first       Do you know how they typically transport? wheelchair    4.   Does facility manage medications?   Yes If yes, contracted pharmacy? Name:  [facility]         If new Rx meds at discharge, fill at hospital pharmacy or contracted pharmacy?   " "contracted pharmacy     5.   What is pt's baseline cognition and mobility?  A&ox2-3. Pt needs assist of 1 for all ADLs at baseline        What level of cognition/mobility is required for safe return? baseline     6.  Is resident enrolled in any \"services?\"  yes  If so, what services:  meals, physical assist ADLs, meds, housekeeping      (ask about: meals, physical assist with ADLs, med management, housekeeping, and if they have built in or wearable call       buttons)    7.  Are \"skilled home health\" or \"on-site outpatient therapy services\" available there? Yes name/agency, if        known:     8.   Is the resident seen by PCP: on-site   Name:     9.   Does pt have any personal DME (describe)? Yes, rolling walker, grab bars,        Next Steps: Follow for discharge [facility]  Inpatient Care Management      "

## 2025-06-17 NOTE — ED NOTES
Patient screened high risk for falls. Yellow fall wrist band and socks applied. Bed alarm in use and plugged into call light. Bed locked and in lowest position. Call light within arms reach. Pt educated on importance of use of call light. Toileting offered, pt declined.

## 2025-06-18 ENCOUNTER — PATIENT OUTREACH (OUTPATIENT)
Dept: GERIATRIC MEDICINE | Facility: CLINIC | Age: 85
End: 2025-06-18
Payer: COMMERCIAL

## 2025-06-18 ENCOUNTER — APPOINTMENT (OUTPATIENT)
Dept: PHYSICAL THERAPY | Facility: CLINIC | Age: 85
DRG: 690 | End: 2025-06-18
Attending: INTERNAL MEDICINE
Payer: COMMERCIAL

## 2025-06-18 LAB
ANION GAP SERPL CALCULATED.3IONS-SCNC: 11 MMOL/L (ref 7–15)
BACTERIA UR CULT: ABNORMAL
BACTERIA UR CULT: ABNORMAL
BUN SERPL-MCNC: 14.1 MG/DL (ref 8–23)
CALCIUM SERPL-MCNC: 9.9 MG/DL (ref 8.8–10.4)
CHLORIDE SERPL-SCNC: 102 MMOL/L (ref 98–107)
CREAT SERPL-MCNC: 0.74 MG/DL (ref 0.51–0.95)
EGFRCR SERPLBLD CKD-EPI 2021: 78 ML/MIN/1.73M2
ERYTHROCYTE [DISTWIDTH] IN BLOOD BY AUTOMATED COUNT: 13 % (ref 10–15)
GLUCOSE SERPL-MCNC: 91 MG/DL (ref 70–99)
HCO3 SERPL-SCNC: 23 MMOL/L (ref 22–29)
HCT VFR BLD AUTO: 40.5 % (ref 35–47)
HGB BLD-MCNC: 13.6 G/DL (ref 11.7–15.7)
MCH RBC QN AUTO: 33 PG (ref 26.5–33)
MCHC RBC AUTO-ENTMCNC: 33.6 G/DL (ref 31.5–36.5)
MCV RBC AUTO: 98 FL (ref 78–100)
PLATELET # BLD AUTO: 302 10E3/UL (ref 150–450)
POTASSIUM SERPL-SCNC: 5 MMOL/L (ref 3.4–5.3)
RBC # BLD AUTO: 4.12 10E6/UL (ref 3.8–5.2)
SODIUM SERPL-SCNC: 136 MMOL/L (ref 135–145)
WBC # BLD AUTO: 9.6 10E3/UL (ref 4–11)

## 2025-06-18 PROCEDURE — 250N000011 HC RX IP 250 OP 636: Performed by: INTERNAL MEDICINE

## 2025-06-18 PROCEDURE — 85027 COMPLETE CBC AUTOMATED: CPT | Performed by: INTERNAL MEDICINE

## 2025-06-18 PROCEDURE — 250N000009 HC RX 250: Performed by: INTERNAL MEDICINE

## 2025-06-18 PROCEDURE — 97161 PT EVAL LOW COMPLEX 20 MIN: CPT | Mod: GP

## 2025-06-18 PROCEDURE — 99232 SBSQ HOSP IP/OBS MODERATE 35: CPT | Performed by: INTERNAL MEDICINE

## 2025-06-18 PROCEDURE — 36415 COLL VENOUS BLD VENIPUNCTURE: CPT | Performed by: INTERNAL MEDICINE

## 2025-06-18 PROCEDURE — 94640 AIRWAY INHALATION TREATMENT: CPT | Mod: 76

## 2025-06-18 PROCEDURE — 250N000013 HC RX MED GY IP 250 OP 250 PS 637: Performed by: INTERNAL MEDICINE

## 2025-06-18 PROCEDURE — 94640 AIRWAY INHALATION TREATMENT: CPT

## 2025-06-18 PROCEDURE — 999N000157 HC STATISTIC RCP TIME EA 10 MIN

## 2025-06-18 PROCEDURE — 120N000001 HC R&B MED SURG/OB

## 2025-06-18 PROCEDURE — 80048 BASIC METABOLIC PNL TOTAL CA: CPT | Performed by: INTERNAL MEDICINE

## 2025-06-18 PROCEDURE — 97530 THERAPEUTIC ACTIVITIES: CPT | Mod: GP

## 2025-06-18 RX ADMIN — Medication 50 MCG: at 08:22

## 2025-06-18 RX ADMIN — UMECLIDINIUM 1 PUFF: 62.5 AEROSOL, POWDER ORAL at 08:30

## 2025-06-18 RX ADMIN — FOLIC ACID 1 MG: 1 TABLET ORAL at 08:21

## 2025-06-18 RX ADMIN — ACETAMINOPHEN 650 MG: 325 TABLET, FILM COATED ORAL at 08:22

## 2025-06-18 RX ADMIN — LOSARTAN POTASSIUM 75 MG: 50 TABLET, FILM COATED ORAL at 08:22

## 2025-06-18 RX ADMIN — CARBAMAZEPINE 300 MG: 100 CAPSULE, EXTENDED RELEASE ORAL at 21:33

## 2025-06-18 RX ADMIN — GABAPENTIN 300 MG: 300 CAPSULE ORAL at 21:33

## 2025-06-18 RX ADMIN — IPRATROPIUM BROMIDE AND ALBUTEROL SULFATE 3 ML: .5; 3 SOLUTION RESPIRATORY (INHALATION) at 12:44

## 2025-06-18 RX ADMIN — GABAPENTIN 300 MG: 300 CAPSULE ORAL at 08:23

## 2025-06-18 RX ADMIN — CARBAMAZEPINE 300 MG: 100 CAPSULE, EXTENDED RELEASE ORAL at 08:21

## 2025-06-18 RX ADMIN — AMLODIPINE BESYLATE 2.5 MG: 2.5 TABLET ORAL at 21:33

## 2025-06-18 RX ADMIN — DICLOFENAC 2 G: 10 GEL TOPICAL at 08:30

## 2025-06-18 RX ADMIN — IPRATROPIUM BROMIDE AND ALBUTEROL SULFATE 3 ML: .5; 3 SOLUTION RESPIRATORY (INHALATION) at 20:52

## 2025-06-18 RX ADMIN — GABAPENTIN 300 MG: 300 CAPSULE ORAL at 15:42

## 2025-06-18 RX ADMIN — CEFTRIAXONE SODIUM 1 G: 1 INJECTION, POWDER, FOR SOLUTION INTRAMUSCULAR; INTRAVENOUS at 00:07

## 2025-06-18 RX ADMIN — SENNOSIDES AND DOCUSATE SODIUM 1 TABLET: 50; 8.6 TABLET ORAL at 21:33

## 2025-06-18 RX ADMIN — SENNOSIDES AND DOCUSATE SODIUM 1 TABLET: 50; 8.6 TABLET ORAL at 08:23

## 2025-06-18 RX ADMIN — DICLOFENAC 2 G: 10 GEL TOPICAL at 21:34

## 2025-06-18 ASSESSMENT — ACTIVITIES OF DAILY LIVING (ADL)
ADLS_ACUITY_SCORE: 70
ADLS_ACUITY_SCORE: 56
ADLS_ACUITY_SCORE: 70
ADLS_ACUITY_SCORE: 70
ADLS_ACUITY_SCORE: 56
ADLS_ACUITY_SCORE: 56
ADLS_ACUITY_SCORE: 70
ADLS_ACUITY_SCORE: 56
ADLS_ACUITY_SCORE: 70
ADLS_ACUITY_SCORE: 56
ADLS_ACUITY_SCORE: 70
ADLS_ACUITY_SCORE: 56
ADLS_ACUITY_SCORE: 70
ADLS_ACUITY_SCORE: 56

## 2025-06-18 NOTE — PLAN OF CARE
Goal Outcome Evaluation:         DATE & SHIFT: 6/18/25 [id number]  PRIMARY Concern: UTI, frequent falls  SAFETY RISK Concerns (fall risk, behaviors, etc.): fall risk      Aggression Tool Color: green  Isolation/Type: none  Tests/Procedures for NEXT shift: none  Consults? (Pending/following, signed-off?) PT eval  Where is patient from? (Home, TCU, etc.): memory care  Other Important info for NEXT shift: up frequently, setting off bed alram  Anticipated DC date & active delays: TBD pending clinical course  ____________________________________  SUMMARY NOTE:  Orientation/Cognitive: A/O x 1  Observation Goals (Met/ Not Met): inpt  Mobility Level/Assist Equipment: A1 GB/W  Antibiotics & Plan (IV/po, length of tx left): IV rocephin   Pain Management: denied has tylenol available   Tele/VS/O2: vitally stable on RA ex HTN  ABNL Lab/BG: UC positive , on IV antbx  Diet: regular   Bowel/Bladder: cont. Of b/b   Skin Concerns: scattered bruising   Drains/Devices: PIV sl   Consults: PT  Patient Stated Goal for Today: none stated    Other: speech slurred. Forgetful, and setting off alarm. slight edema to ankles otherwise CMS intact. Distended abdomen. BS avtive. Lungs clear. CTM.

## 2025-06-18 NOTE — PROGRESS NOTES
TRANSITIONS OF CARE (DANAY) LOG    DANAY tasks should be completed by the CC within one (1) business day of notification of each transition. Follow up contact with member is required after return to their usual care setting.  Note:  If CC finds out about the transitions fifteen (15) days or more after the member has returned to their usual care setting, no DANAY log is needed. However, the CC should check in with the member to discuss the transition process, any changes needed to the care plan and document it in a case note.     Member Name:  Yoanna Phillips [facility] Member ID[facility] [id number]   Product: [facility] Care Coordinator Contact:  Eleonora Burton RN, PHN Agency/County/Care System: [facility]   Transition Communication Actions from Care Management Contact   Transition #1   Notification Date: 06/18/25 Transition Date:   06/17/25 Transition From: Nursing Home, [facility]     Is this the member s usual care setting?               yes Transition To: Hospital, [facility]   Transition Type:  Unplanned    Documentation from conversation with the member/responsible party, provider, discharging and receiving facility:   Date: 06/18/25: Received notification of admission to hospital with dx of UTI, Fall, Knee Pain  CC contacted Hospital /discharge planner,     George Abreu, RN, BSN  RN Care Coordinator  [facility]    via the [facility] Hand-In Process, with community care plan included.  CC reached out to adult daughter Olinda regarding transition and left a message requesting a return call.  Reviewed and update support plan as needed.  Notified community service providers and placed services None on hold as needed.  Transition log initiated.   PCP, Eleonora Leach, notified of hospitalization via EMR.    Eleonora GONZALEZN/PHN/WCC/[facility]  Long Term Care/ Care Coordinator  [address]    [address]  [place] [id number]  [email or web address]   [email or web address]     Cell: [phone]  Office: [phone]  Fax: [phone]       Transition #2   Notification Date: 06/19/25 Transition Date:   06/19/25 Transition From: [facility]     Is this the member s usual care setting?               no Transition To: Nursing Home, [facility]   Transition Type:  Planned    Documentation from conversation with the member/responsible party, provider, discharging and receiving facility:   Date: 06/19/25: Received notification of transition to home.  CC contacted member, this care coordinator, and adult daughter Kay Phillips and reviewed discharge summary.  Member has a follow-up appointment with PCP in 7 days: Yes: will be followed by facility on-site care team.   Member has had a change in condition: No  Home visit needed: No  Support Plan reviewed and updated.  The following home based services None were resumed.  New referrals placed: No  Transition log completed.   PCP, Eleonora Leach, notified of transition back to home via EMR.    Eleonora Burton BSN/PHN/WCC/[facility]  Long Term Care/ Care Coordinator  [address]   [address]  [place] [id number]  [email or web address]   [email or web address]     Cell: [phone]  Office: [phone]  Fax: [phone]         *RETURN TO USUAL CARE SETTING: *Complete tasks below when the member is discharging TO their usual care setting within one (1) business day of notification..      For situations where the Care Coordinator is notified of the discharge prior to the date of discharge, the Care Coordinator must follow up with the member or designated representative to confirm that discharge actually occurred and discuss required DANAY tasks as outlined in the DANAY Instructions.  (This includes situations where it may be a  new  usual care setting for the member. (i.e., a community member who decides upon  permanent nursing home placement following hospitalization and rehab).    Discuss with Member/Responsible Party:    Check  Yes  - if the member, family member and/or SNF/facility staff manages the following:    If  No  provide explanation in the comments section.          Date completed: 06/19/25 Communicated with member or their designated representative about the following:  care transition process; about changes to the member s health status; plan of care updates; education about transitions and how to prevent unplanned transitions/readmissions    Four Pillars for Optimal Transition:    Check  Yes  - if the member, family member and/or SNF/facility staff manages the following:    If  No  provide explanation in the comments section.          [x]  Yes     []  No Does the member have a follow-up appointment scheduled with primary care or specialist? (Mental health hospitalizations--the appt. should be w/in 7 days)              For mental health hospitalizations:  []  Yes     []  No     Does the member have a follow-up appointment scheduled with a mental health practitioner within 7 days of discharge?  [x]  Yes     []  No     Has a medication review been completed with member? If no, refer to PCP, home care nurse, MTM, pharmacist  [x]  Yes     []  No     Can the member manage their medications or is there a system in place to manage medications (e.g. home care set-up)?         [x]  Yes     []  No     Can the member verbalize warning signs and symptoms to watch for and how to respond?  [x]  Yes     []  No     Does the member have a copy of and understand their discharge instructions?  If no, assist to obtain copy of discharge instructions, review discharge instructions, and assist to contact PCP to discuss questions about their recent hospitalization.  [x]  Yes     []  No     Does the member have adequate food, housing and transportation?  If no, add goal and discuss additional supports available to the member                                                                                                                                                                                  [x]  Yes     []  No     Is the member safe in their home?  If no, document needs and support provided                                                                                                                                                                          []  Yes     [x]  No     Are there any concerns of vulnerability, abuse, or neglect?  If yes, document concerns and actions taken by Care Coordinator as a mandated                                                                                                                                                                              [x]  Yes     []  No     Does the member use a Personal Health Care Record?  Check  Yes  if visit summary, discharge summary, and/or healthcare summary are being used as a PHR.                                                                                                                                                                                  [x]  Yes     []  No     Have you reviewed the discharge summary with the member? If  No  provide explanation in comments.  [x]  Yes     []  No     Have you updated the member s care plan/support plan? Add new diagnosis, medications, treatments, goals & interventions, as applicable. If No, provide explanation in comments.    Comments:       Will be seen in NH by provider from [facility] in next 7 days     Notes from conversation with the member/responsible party, provider, discharging and receiving facility (as applicable): No concerns identified at this time. Facility able to meet members needs.     Eleonora Burton BSN/PHN/WCC/[facility]  Long Term Care/ Care Coordinator  [address]   [address]  [place] [id number]  [email or web address]   [email or web address]     Cell:  [phone]  Office: [phone]  Fax: [phone]

## 2025-06-18 NOTE — PROGRESS NOTES
Care Management Follow Up    Length of Stay (days): 1    Expected Discharge Date: 06/19/2025     Concerns to be Addressed: discharge planning     Patient plan of care discussed at interdisciplinary rounds: Yes    Anticipated Discharge Disposition: Assisted Living              Anticipated Discharge Services: None  Anticipated Discharge DME: None    Patient/family educated on Medicare website which has current facility and service quality ratings: no  Education Provided on the Discharge Plan:    Patient/Family in Agreement with the Plan: yes    Referrals Placed by CM/SW:    Private pay costs discussed: Not applicable    Discussed  Partnership in Safe Discharge Planning  document with patient/family: No     Handoff Completed: No, handoff not indicated or clinically appropriate    Additional Information:  Writer called and spoke to [facility]annette [facility]. Updated on discharge tomorrow. She would like to know a time. Writer will call her back once time is known.    Next Steps: follow for discharge planning    Anni Barrow RN

## 2025-06-18 NOTE — PLAN OF CARE
Goal Outcome Evaluation:  DATE & TIME: 06/17/2025, [id number]    Cognitive Concerns/ Orientation : disoriented to time and situation   BEHAVIOR & AGGRESSION TOOL COLOR: calm   ABNL VS/O2: VSS, room air  MOBILITY: assist of one walker and GB  PAIN MANAGMENT: denied  DIET: regular  BOWEL/BLADDER: per BRP  ABNL LAB/BG: positive UTI on IV ABX  DRAIN/DEVICES: PIV SL  SKIN: Scattered bruises  TESTS/PROCEDURES: Lab AM  D/C DATE: pending medically ready  OTHER IMPORTANT INFO: continue to monitor.

## 2025-06-18 NOTE — PLAN OF CARE
Goal Outcome Evaluation:         DATE & SHIFT: 6/18/25 7214  PRIMARY Concern: UTI, frequent falls  SAFETY RISK Concerns (fall risk, behaviors, etc.): fall risk      Aggression Tool Color: green  Isolation/Type: none  Tests/Procedures for NEXT shift: none  Consults? (Pending/following, signed-off?) PT eval  Where is patient from? (Home, TCU, etc.): memory care  Other Important info for NEXT shift: up frequently, setting off bed alram during night, improved on day shift  Anticipated DC date & active delays: 6/19  ____________________________________  SUMMARY NOTE:  Orientation/Cognitive: A/O x 1  Observation Goals (Met/ Not Met): inpt  Mobility Level/Assist Equipment: A1 GB/W  Antibiotics & Plan (IV/po, length of tx left): IV rocephin   Pain Management: denied has tylenol available   Tele/VS/O2: vitally stable on RA ex HTN  ABNL Lab/BG: UC positive , on IV antbx  Diet: regular   Bowel/Bladder: cont. Of b/b   Skin Concerns: scattered bruising   Drains/Devices: PIV sl   Consults: PT  Patient Stated Goal for Today: none stated

## 2025-06-18 NOTE — PROGRESS NOTES
06/18/25 0854   Appointment Info   Signing Clinician's Name / Credentials (PT) Katelyn Mcarthur DPT   Living Environment   People in Home facility resident   Current Living Arrangements assisted living  (Memory Care)   Home Accessibility no concerns   Transportation Anticipated agency   Living Environment Comments Pt lives at [facility]   Self-Care   Usual Activity Tolerance moderate   Current Activity Tolerance moderate   Regular Exercise No   Equipment Currently Used at Home grab bar, tub/shower;grab bar, toilet;shower chair;raised toilet seat;walker, rolling   Fall history within last six months yes   Activity/Exercise/Self-Care Comment Staff at [facility] provides assist w/ bathing, dressing, amb, medications, toileting.   General Information   Onset of Illness/Injury or Date of Surgery 06/16/25   Referring Physician Antonio Conklin MD   Patient/Family Therapy Goals Statement (PT) Return home   Pertinent History of Current Problem (include personal factors and/or comorbidities that impact the POC) Yoanna Phillips is a 84 year old female admitted on 6/16/2025. She presents with falls   Existing Precautions/Restrictions fall   Cognition   Affect/Mental Status (Cognition) confused   Orientation Status (Cognition) oriented to;person   Follows Commands (Cognition) follows one-step commands   Pain Assessment   Patient Currently in Pain No   Integumentary/Edema   Integumentary/Edema Comments Scattered bruising   Posture    Posture Forward head position;Protracted shoulders   Range of Motion (ROM)   Range of Motion ROM is WFL   Strength (Manual Muscle Testing)   Strength (Manual Muscle Testing) Able to perform R SLR;Able to perform L SLR;Deficits observed during functional mobility   Bed Mobility   Comment, (Bed Mobility) Supine to sit SBA   Transfers   Comment, (Transfers) Sit to stand SBA w/ FWW   Gait/Stairs (Locomotion)   Comment, (Gait/Stairs) Pt amb w/ FWW and CGA   Balance   Balance Comments Good  seated and fair standing   Sensory Examination   Sensory Perception patient reports no sensory changes   Clinical Impression   Criteria for Skilled Therapeutic Intervention Yes, treatment indicated   PT Diagnosis (PT) Impaired functional mobility and gait   Influenced by the following impairments Pain, weakness, decreased activity tolerance, impaired balance   Functional limitations due to impairments Limited functional mobility requiring AD and assist   Clinical Presentation (PT Evaluation Complexity) stable   Clinical Presentation Rationale Based on PMH, current status, and social support   Clinical Decision Making (Complexity) low complexity   Planned Therapy Interventions (PT) balance training;bed mobility training;gait training;stair training;transfer training;progressive activity/exercise;strengthening   Risk & Benefits of therapy have been explained evaluation/treatment results reviewed;care plan/treatment goals reviewed;risks/benefits reviewed;current/potential barriers reviewed;participants voiced agreement with care plan;participants included;patient   PT Total Evaluation Time   PT Eval, Low Complexity Minutes (33371) 10   Physical Therapy Goals   PT Frequency Daily   PT Predicted Duration/Target Date for Goal Attainment 06/25/25   PT Goals Bed Mobility;Transfers;Gait   PT: Bed Mobility Independent;Supine to/from sit   PT: Transfers Modified independent;Sit to/from stand;Assistive device   PT: Gait Modified independent;Assistive device;50 feet   Interventions   Interventions Quick Adds Therapeutic Activity   Therapeutic Activity   Therapeutic Activities: dynamic activities to improve functional performance Minutes (77523) 24   Symptoms Noted During/After Treatment None   Treatment Detail/Skilled Intervention Pt in bed upon therapist arrival, agreeable to PT. Pt pleasantly confused throughout session, time reorienting. Pt sitting up, able to shift hips forward. Pt sat up to take pills w/ RN. Pt amb 600' w/  FWW and CGA-SBA. Pt demos fair speed and stability. Few standing rest breaks w/ FWW and SBA. Pt sat in recliner after amb, shifted hips back. Chair alarm on, all needs w/in reach, RN updated.   PT Discharge Planning   PT Plan Progress transfers, amb w/ walker   PT Discharge Recommendation (DC Rec) home with assist;home with home care physical therapy   PT Rationale for DC Rec Pt is below baseline, currently SBA w/ walker. Anticipate if memory care can provide this level of assist, pt can return. Pt would benefit from home PT to progress strength and functional mobility.   PT Brief overview of current status SBA w/ FWW. Goals of therapy will be to address safe mobility and make recs for d/c to next level of care. Pt and RN will continue to follow all falls risk precautions as documented by RN staff while hospitalized   PT Total Distance Amb During Session (feet) 600   Physical Therapy Time and Intention   Timed Code Treatment Minutes 24   Total Session Time (sum of timed and untimed services) 34

## 2025-06-18 NOTE — PROGRESS NOTES
"[facility]    Medicine Progress Note - Hospitalist Service    Date of Admission:  6/16/2025    Assessment & Plan   Yoanna Phillips is a 84 year old female admitted on 6/16/2025. She presents with falls     Gram-negative bacilli UTI  R knee pain  Falls  Lives in memory care unit. Hx ataxic gait per NH notes. Reportedly with falls in last 2 weeks, day of presentation w/ fall and c/o knee pain. Today reportedly with 2 falls. No noted head trauma. In ED hypertensive 160-180s systolic. Other VSS, afebrile.  WBC 9.3. UA w/ 28 WBC, few bacteria.   *CT head w/o acute process.  *XR R knee w/o e/o fracture.   - Continue ceftriaxone 1g IV q24  - urine culture pending  - PT review appreciated     Alzheimer's dementia  Possible infectious encephalopathy vs baseline  Lives in memory care (recently transferred).   - prn zyprexa for agitation      HTN  [Needs rec- losartan 75 mg daily, amlodipine 2.5 mg daily]  - resume meds      Seizure disorder  - resume carbamazepine 300 mg BID   - resume gabapentin      COPD  - ct spiriva        Diet: Regular Diet Adult    DVT Prophylaxis: Pneumatic Compression Devices  Bae Catheter: Not present  Lines: None     Cardiac Monitoring: None  Code Status: No CPR- Do NOT Intubate      Clinically Significant Risk Factors Present on Admission                   # Hypertension: Noted on problem list     # Dementia: noted on problem list       # Obesity: Estimated body mass index is 31.55 kg/m  as calculated from the following:    Height as of this encounter: 1.549 m (5' 1\").    Weight as of this encounter: 75.8 kg (167 lb).              Social Drivers of Health    Tobacco Use: Medium Risk (6/13/2025)    Patient History     Smoking Tobacco Use: Former     Smokeless Tobacco Use: Never          Disposition Plan     Medically Ready for Discharge: Anticipated Tomorrow       Roland Golden MD  Hospitalist Service  [facility]  Securely message with [employer] " "(more info)  Text page via [facility] Paging/Directory   _\"This dictation was performed with voice recognition software and may contain errors,  omissions and inadvertent word substitution.\"     _____________________________________________________________________    Interval History   Feels much better compared to yesterday.  Discussed with patient's son who feels she is close to baseline.    Physical Exam   /49 (BP Location: Left arm, Patient Position: Sitting)   Pulse 79   Temp 98.2  F (36.8  C) (Oral)   Resp 16   Ht 1.549 m (5' 1\")   Wt 75.8 kg (167 lb)   SpO2 98%   BMI 31.55 kg/m    Gen- pleasant  Neck- supple  CVS- I+II+ no m/r/g  RS- CTAB  Abdo- soft, no tenderness. No g/r/r   Ext- no edema     Medical Decision Making       40 MINUTES SPENT BY ME on the date of service doing chart review, history, exam, documentation & further activities per the note.      Data   ------------------------- PAST 24 HR DATA REVIEWED -----------------------------------------------    I have personally reviewed the following data over the past 24 hrs:    9.6  \   13.6   / 302     136 102 14.1 /  91   5.0 23 0.74 \       Imaging results reviewed over the past 24 hrs:   No results found for this or any previous visit (from the past 24 hours).  "

## 2025-06-19 VITALS
BODY MASS INDEX: 31.53 KG/M2 | WEIGHT: 167 LBS | DIASTOLIC BLOOD PRESSURE: 65 MMHG | HEART RATE: 67 BPM | OXYGEN SATURATION: 95 % | HEIGHT: 61 IN | SYSTOLIC BLOOD PRESSURE: 130 MMHG | TEMPERATURE: 97.9 F | RESPIRATION RATE: 15 BRPM

## 2025-06-19 PROCEDURE — 999N000157 HC STATISTIC RCP TIME EA 10 MIN

## 2025-06-19 PROCEDURE — 250N000011 HC RX IP 250 OP 636: Performed by: INTERNAL MEDICINE

## 2025-06-19 PROCEDURE — 250N000013 HC RX MED GY IP 250 OP 250 PS 637: Performed by: INTERNAL MEDICINE

## 2025-06-19 PROCEDURE — 999N000156 HC STATISTIC RCP CONSULT EA 30 MIN

## 2025-06-19 PROCEDURE — 99239 HOSP IP/OBS DSCHRG MGMT >30: CPT | Performed by: INTERNAL MEDICINE

## 2025-06-19 PROCEDURE — 94640 AIRWAY INHALATION TREATMENT: CPT | Mod: 76

## 2025-06-19 PROCEDURE — 250N000009 HC RX 250: Performed by: INTERNAL MEDICINE

## 2025-06-19 RX ORDER — IPRATROPIUM BROMIDE AND ALBUTEROL SULFATE 2.5; .5 MG/3ML; MG/3ML
1 SOLUTION RESPIRATORY (INHALATION) EVERY 4 HOURS PRN
Status: DISCONTINUED | OUTPATIENT
Start: 2025-06-19 | End: 2025-06-19

## 2025-06-19 RX ORDER — CEFPODOXIME PROXETIL 200 MG/1
200 TABLET, FILM COATED ORAL 2 TIMES DAILY
DISCHARGE
Start: 2025-06-19 | End: 2025-06-19

## 2025-06-19 RX ORDER — CEFPODOXIME PROXETIL 200 MG/1
200 TABLET, FILM COATED ORAL 2 TIMES DAILY
Qty: 8 TABLET | Refills: 0 | Status: SHIPPED | OUTPATIENT
Start: 2025-06-19 | End: 2025-06-23

## 2025-06-19 RX ADMIN — Medication 50 MCG: at 08:29

## 2025-06-19 RX ADMIN — SENNOSIDES AND DOCUSATE SODIUM 1 TABLET: 50; 8.6 TABLET ORAL at 08:29

## 2025-06-19 RX ADMIN — UMECLIDINIUM 1 PUFF: 62.5 AEROSOL, POWDER ORAL at 08:29

## 2025-06-19 RX ADMIN — FOLIC ACID 1 MG: 1 TABLET ORAL at 08:29

## 2025-06-19 RX ADMIN — IPRATROPIUM BROMIDE AND ALBUTEROL SULFATE 3 ML: .5; 3 SOLUTION RESPIRATORY (INHALATION) at 08:35

## 2025-06-19 RX ADMIN — GABAPENTIN 300 MG: 300 CAPSULE ORAL at 08:29

## 2025-06-19 RX ADMIN — CEFTRIAXONE SODIUM 1 G: 1 INJECTION, POWDER, FOR SOLUTION INTRAMUSCULAR; INTRAVENOUS at 01:00

## 2025-06-19 RX ADMIN — LOSARTAN POTASSIUM 75 MG: 50 TABLET, FILM COATED ORAL at 08:29

## 2025-06-19 RX ADMIN — DICLOFENAC 2 G: 10 GEL TOPICAL at 08:29

## 2025-06-19 RX ADMIN — CARBAMAZEPINE 300 MG: 100 CAPSULE, EXTENDED RELEASE ORAL at 08:29

## 2025-06-19 ASSESSMENT — ACTIVITIES OF DAILY LIVING (ADL)
ADLS_ACUITY_SCORE: 70

## 2025-06-19 NOTE — DISCHARGE INSTRUCTIONS
Rehab Discharge Recommendations  Timeline Concerns to be Addressed    06/17 0900  discharge planning   Patient/Family Anticipates Transition to    06/17 0900  assisted living   Patient/Family Anticipated Services at Transition    06/17 0900  none   Transportation Concerns    06/17 0900  none   Transportation Anticipated (Last 1 values)    06/18 0854  agency   Offered/Gave Vendor List    06/17 0900  no   PT Discharge Recommendation (DC Rec)    06/18 0854  home with assist;home with home care physical therapy   PT Rationale for DC Rec    06/18 0854  Pt is below baseline, currently SBA w/ walker. Anticipate if memory care can provide this level of assist, pt can return. Pt would benefit from home PT to progress strength and functional mobility.   PT Brief overview of current status    06/18 0854  SBA w/ FWW. Goals of therapy will be to address safe mobility and make recs for d/c to next level of care. Pt and RN will continue to follow all falls risk precautions as documented by RN staff while hospitalized

## 2025-06-19 NOTE — DISCHARGE SUMMARY
"[facility]  Hospitalist Discharge Summary      Date of Admission:  6/16/2025  Date of Discharge:  6/19/2025  Discharging Provider: Roland Golden MD  Discharge Service: Hospitalist Service    Discharge Diagnoses     E. coli UTI  R knee pain  Falls  Alzheimer's dementia  Possible infectious encephalopathy vs baseline    HTN    Seizure disorder    COPD      Clinically Significant Risk Factors     # Obesity: Estimated body mass index is 31.55 kg/m  as calculated from the following:    Height as of this encounter: 1.549 m (5' 1\").    Weight as of this encounter: 75.8 kg (167 lb).       Follow-ups Needed After Discharge   Follow-up Appointments       Follow Up and recommended labs and tests      Follow up with Nursing home physician.                Unresulted Labs Ordered in the Past 30 Days of this Admission       Date and Time Order Name Status Description    6/16/2025 11:10 PM Urine Culture Preliminary             Discharge Disposition   Discharged to long-term care facility  Condition at discharge: Stable    Hospital Course   Yoanna Phillips is a 84 year old female admitted on 6/16/2025. She presents with falls     Gram-negative bacilli UTI  R knee pain  Falls  Lives in memory care unit. Hx ataxic gait per NH notes. Reportedly with falls in last 2 weeks, day of presentation w/ fall and c/o knee pain. Today reportedly with 2 falls. No noted head trauma. In ED hypertensive 160-180s systolic. Other VSS, afebrile.  WBC 9.3. UA w/ 28 WBC, few bacteria.   *CT head w/o acute process.  *XR R knee w/o e/o fracture.   - Continued ceftriaxone IV as inpatient and changed to p.o. weekly and on discharge    Alzheimer's dementia  Possible infectious encephalopathy vs baseline  Lives in memory care (recently transferred).   - prn zyprexa for agitation      HTN  - resumed meds      Seizure disorder  - resumed carbamazepine 300 mg BID   - resumed gabapentin      COPD  - ct spiriva    Consultations This " Hospital Stay   PHYSICAL THERAPY ADULT IP CONSULT  CARE MANAGEMENT / SOCIAL WORK IP CONSULT  CARE MANAGEMENT / SOCIAL WORK IP CONSULT  PHYSICAL THERAPY ADULT IP CONSULT  OCCUPATIONAL THERAPY ADULT IP CONSULT    Code Status   No CPR- Do NOT Intubate    Time Spent on this Encounter   I, Roland Golden MD, personally saw the patient today and spent greater than 30 minutes discharging this patient.       Roland Golden MD  [facility]TEND[facility]ORT STAY  [address] [facility] [address] [address]  Phone: [phone]  ______________________________________________________________________    Physical Exam   Vital Signs: Temp: 97.9  F (36.6  C) Temp src: Oral BP: 130/65 Pulse: 67   Resp: 15 SpO2: 95 % O2 Device: None (Room air)    Weight: 167 lbs 0 oz  Gen- pleasant  Neck- supple  CVS- I+II+ no m/r/g  RS- CTAB  Abdo- soft, no tenderness. No g/r/r   Ext- no edema        Primary Care Physician   Eleonora Leach    Discharge Orders      Home Care Referral      General info for SNF    Length of Stay Estimate: Long Term Care  Condition at Discharge: Stable  Level of care:skilled   Rehabilitation Potential: Fair  Admission H&P remains valid and up-to-date: Yes  Recent Chemotherapy: N/A  Use Nursing Home Standing Orders: Yes     Mantoux instructions    Give two-step Mantoux (PPD) Per Facility Policy Yes     Follow Up and recommended labs and tests    Follow up with Nursing home physician.     Reason for your hospital stay    Yoanna Phillips is a 84 year old female admitted on 6/16/2025. She presents with falls     Gram-negative bacilli UTI  R knee pain  Falls     Glucose monitor nursing POCT    Before meals and at bedtime     Activity - Up with nursing assistance     Physical Therapy Adult Consult    Evaluate and treat as clinically indicated.    Reason:  for PT     Occupational Therapy Adult Consult    Evaluate and treat as clinically indicated.    Reason:  for OT     Fall precautions      Pneumatic Compression Device     Bilateral calf. Remove 30 mins BID.     Diet    Follow this diet upon discharge: Current Diet:Orders Placed This Encounter      Regular Diet Adult       Significant Results and Procedures   Results for orders placed or performed during the hospital encounter of 06/16/25   XR Knee Right 3 Views    Narrative    EXAM: XR KNEE RIGHT 3 VIEWS  LOCATION: [facility]  DATE: 6/16/2025    INDICATION: fall, pain  COMPARISON: None.      Impression    IMPRESSION: No acute displaced fracture or subluxation. Mild medial and patellofemoral compartment osteoarthritis. Chondrocalcinosis. No sizable knee joint effusion. Vascular calcifications.    CT Head w/o Contrast    Narrative    EXAM: CT HEAD W/O CONTRAST  LOCATION: [facility]  DATE: 6/16/2025    INDICATION: increased falls  COMPARISON: September 6, 2020  TECHNIQUE: Routine CT Head without IV contrast. Multiplanar reformats. Dose reduction techniques were used.    FINDINGS:  INTRACRANIAL CONTENTS: No intracranial hemorrhage, extraaxial collection, or mass effect.  No CT evidence of acute infarct. Small focus of infarction are present in the bilateral frontal lobes anteriorly. Mild presumed chronic small vessel ischemic   changes. Moderate generalized volume loss. No hydrocephalus. Persistent cavum septum lucidum at vergae.    VISUALIZED ORBITS/SINUSES/MASTOIDS: Prior bilateral cataract surgery. Visualized portions of the orbits are otherwise unremarkable. No paranasal sinus mucosal disease. No middle ear or mastoid effusion.    BONES/SOFT TISSUES: No acute abnormality.      Impression    IMPRESSION:  1.  No CT evidence for acute intracranial process.  2.  Brain atrophy and presumed chronic microvascular ischemic changes as above.         Discharge Medications      Review of your medicines        START taking        Dose / Directions   cefpodoxime 200 MG tablet  Commonly known as:  VANTIN  Indication: Urinary Tract Infection      Dose: 200 mg  Take 1 tablet (200 mg) by mouth 2 times daily for 4 days.  Quantity: 8 tablet  Refills: 0            CHANGE how you take these medications        Dose / Directions   alendronate 70 MG tablet  Commonly known as: FOSAMAX  Indication: Osteopenia  This may have changed: See the new instructions.  Used for: Osteopenia, unspecified location      TAKE 1 TABLET BY MOUTH ONCE WEEKLY *ORIGINAL CONTAINER*  Quantity: 4 tablet  Refills: 11            CONTINUE these medicines which have NOT CHANGED        Dose / Directions   acetaminophen 325 MG tablet  Commonly known as: TYLENOL  Indication: Pain      Dose: 650 mg  Take 650 mg by mouth 2 times daily.  Refills: 0     amLODIPine 2.5 MG tablet  Commonly known as: NORVASC  Indication: High Blood Pressure      Dose: 2.5 mg  Take 2.5 mg by mouth at bedtime. Hold for SBP <110  Refills: 0     carBAMazepine 300 MG 12 hr capsule  Commonly known as: CARBATROL  Indication: Generalized Seizure Disorder      Dose: 300 mg  Take 300 mg by mouth 2 times daily.  Refills: 0     cyanocobalamin 1000 mcg/mL injection  Commonly known as: CYANOCOBALAMIN  Indication: Inadequate Vitamin B12      Dose: 1,000 mcg  Inject 1,000 mcg into the muscle. On the 25th of every month  Refills: 0     diclofenac 1 % topical gel  Commonly known as: VOLTAREN  Indication: Joint Damage causing Pain and Loss of Function      Dose: 2 g  Apply 2 g topically 2 times daily. To left knee  Refills: 0     folic acid 1 MG tablet  Commonly known as: FOLVITE  Indication: Anemia From Inadequate Folic Acid      Dose: 1 mg  Take 1 mg by mouth daily.  Refills: 0     gabapentin 300 MG capsule  Commonly known as: NEURONTIN  Indication: Generalized Anxiety Disorder      Dose: 300 mg  Take 300 mg by mouth 3 times daily.  Refills: 0     * ipratropium - albuterol 0.5 mg/2.5 mg (3mg)/3 mL 0.5-2.5 (3) MG/3ML neb solution  Commonly known as: DUONEB  Indication: Chronic Obstructive  Lung Disease      Dose: 1 vial  Take 1 vial by nebulization 3 times daily.  Refills: 0     * ipratropium - albuterol 0.5 mg/2.5 mg (3mg)/3 mL 0.5-2.5 (3) MG/3ML neb solution  Commonly known as: DUONEB  Indication: Chronic Bronchitis      Dose: 1 vial  Take 1 vial by nebulization as needed for shortness of breath or wheezing. Every 24 hours PRN  Refills: 0     LOSARTAN POTASSIUM PO  Indication: High Blood Pressure      Dose: 75 mg  Take 75 mg by mouth daily. Hold for SBP <110  Refills: 0     senna-docusate 8.6-50 MG tablet  Commonly known as: SENOKOT-S/PERICOLACE  Indication: Constipation      Dose: 1 tablet  Take 1 tablet by mouth. Every Monday, Wednesday, Friday  Refills: 0     Spiriva HandiHaler 18 MCG inhaled capsule  Indication: Chronic Obstructive Lung Disease  Used for: Chronic obstructive pulmonary disease, unspecified COPD type (H)  Generic drug: tiotropium      INHALE CONTENTS OF 1 CAPSULE VIA HANDIHALER ONCE DAILY  Quantity: 30 capsule  Refills: 11     vitamin D3 50 mcg (2000 units) tablet  Commonly known as: CHOLECALCIFEROL  Indication: Vitamin D Deficiency  Used for: Vitamin D deficiency      Dose: 1 tablet  Take 1 tablet (50 mcg) by mouth daily  Refills: 0           * This list has 2 medication(s) that are the same as other medications prescribed for you. Read the directions carefully, and ask your doctor or other care provider to review them with you.                   Where to get your medicines        These medications were sent to [facility] [address]  [address] [address]      Phone: [phone]   cefpodoxime 200 MG tablet       Allergies   Allergies   Allergen Reactions    Aspirin Unknown

## 2025-06-19 NOTE — DISCHARGE SUMMARY
VSS on RA, A&Ox4, intermittent confusion or forgetful. IV removed for discharge purpose. Discharge instruction and discharge packet sent with pt. Stretcher ride set up for pt between 11:30am-12:15pm.

## 2025-06-19 NOTE — PLAN OF CARE
Physical Therapy Discharge Summary     Reason for therapy discharge:    Discharged to home.     Progress towards therapy goal(s). See goals on Care Plan in [facility] electronic health record for goal details.  Goals not met.  Barriers to achieving goals:   discharge from facility.     Therapy recommendation(s):    Continued therapy is recommended.  Rationale/Recommendations:  Patient would benefit from Home PT in order to increase strength, activity tolerance and independence with mobility.  Patient requires home PT at this time as attending PT in a clinic setting would be a considerable and significantly taxing effort, limiting their ability to participate in therapy session.

## 2025-06-19 NOTE — PROGRESS NOTES
Observation goals  PRIOR TO DISCHARGE        Comments: -diagnostic tests and consults completed and resulted: Met  -vital signs normal or at patient baseline: Met  -infection is improving: Met  -returns to baseline functional status: Met

## 2025-06-19 NOTE — PROGRESS NOTES
[facility]AT Note    Date:6/19/2025  Admission Diagnosis:Falls  Pulmonary History:COPD  Home Nebulizer/ MDI Use:  SPIRIVA HANDIHALER 18 MCG inhaled capsule     No No   Sig: INHALE CONTENTS OF 1 CAPSULE VIA HANDIHALER ONCE DAILY     Home Oxygen Use:None  Acuity Level (from RT Assessment flow sheet):5    Aerosol Therapy Initiated:Duo TID to PRN      Volume Expansion Therapy Initiated:IS      Current Oxygen Requirement:RA  Current SpO2:95%    Re-evaluation date:6/26/2025     See 'RT Assessments' flow sheet for patient assessment scoring and Acuity Level Details.

## 2025-06-19 NOTE — PROGRESS NOTES
DATE & SHIFT: 6/18 Overnight  PRIMARY Concern: UTI, frequent falls  SAFETY RISK Concerns (fall risk, behaviors, etc.): fall risk      Aggression Tool Color: green  Isolation/Type: none  Tests/Procedures for NEXT shift: none  Consults? (Pending/following, signed-off?)   Where is patient from? (Home, TCU, etc.): memory care  Other Important info for NEXT shift:  Anticipated DC date & active delays: Back to memory care facility today? Awaiting UC sensitivities   ____________________________________  SUMMARY NOTE:  Orientation/Cognitive: A/O x self  Observation Goals (Met/ Not Met): inpt  Mobility Level/Assist Equipment: A1 GB/W  Antibiotics & Plan (IV/po, length of tx left): IV rocephin   Pain Management: denied. Scheduled Voltaren for knees  Tele/VS/O2: vitally stable on RA  ABNL Lab/BG:   Diet: regular   Bowel/Bladder: cont. BM x1 overnight  Skin Concerns: scattered bruising   Drains/Devices: PIV sl   Consults:   Patient Stated Goal for Today: none stated    Other: baseline speech slurred

## 2025-06-19 NOTE — PROGRESS NOTES
Care Management Discharge Note    Discharge Date: 06/19/2025       Discharge Disposition: Assisted Living    Discharge Services: None    Discharge DME: None    Discharge Transportation: agency    Private pay costs discussed: Not applicable    Does the patient's insurance plan have a 3 day qualifying hospital stay waiver?  Yes     Which insurance plan 3 day waiver is available? Alternative insurance waiver    Will the waiver be used for post-acute placement? No    PAS Confirmation Code:    Patient/family educated on Medicare website which has current facility and service quality ratings: no    Education Provided on the Discharge Plan:    Persons Notified of Discharge Plans: Bedside RN, Hospitalist, Pt's daughter and [facility] facility.  Patient/Family in Agreement with the Plan: yes    Handoff Referral Completed: No, handoff not indicated or clinically appropriate    Additional Information:  Writer updated that the Pt is able to return today to [facility]. Writer called the facility if they would be able to take her back today and Writer spoke with GENNARO Spann. She stated that they would be able to take her back. Writer to fax over discharge paperwork to [phone].   Writer LM for the Pt's daughter that the Pt would be able to discharge today and a ride was set up for her to return.   Stretcher ride set up for [phone] today.  PCS done    Please send packet with Pt as well.         George Abreu, RN, BSN  RN Care Coordinator  [facility]   Contact via [employer]

## 2025-06-20 ENCOUNTER — RESULTS FOLLOW-UP (OUTPATIENT)
Dept: GERIATRICS | Facility: CLINIC | Age: 85
End: 2025-06-20

## 2025-06-20 LAB
BACTERIA UR CULT: ABNORMAL
BACTERIA UR CULT: ABNORMAL

## 2025-06-23 ENCOUNTER — ASSISTED LIVING VISIT (OUTPATIENT)
Dept: GERIATRICS | Facility: CLINIC | Age: 85
End: 2025-06-23
Payer: COMMERCIAL

## 2025-06-23 VITALS
HEART RATE: 80 BPM | RESPIRATION RATE: 18 BRPM | DIASTOLIC BLOOD PRESSURE: 82 MMHG | TEMPERATURE: 97.6 F | BODY MASS INDEX: 31.55 KG/M2 | WEIGHT: 167 LBS | OXYGEN SATURATION: 97 % | SYSTOLIC BLOOD PRESSURE: 136 MMHG

## 2025-06-23 DIAGNOSIS — G30.1 LATE ONSET ALZHEIMER'S DISEASE WITHOUT BEHAVIORAL DISTURBANCE (H): ICD-10-CM

## 2025-06-23 DIAGNOSIS — M85.80 OSTEOPENIA, UNSPECIFIED LOCATION: ICD-10-CM

## 2025-06-23 DIAGNOSIS — F02.80 LATE ONSET ALZHEIMER'S DISEASE WITHOUT BEHAVIORAL DISTURBANCE (H): ICD-10-CM

## 2025-06-23 DIAGNOSIS — I10 ESSENTIAL HYPERTENSION, BENIGN: ICD-10-CM

## 2025-06-23 DIAGNOSIS — N30.00 ACUTE CYSTITIS WITHOUT HEMATURIA: Primary | ICD-10-CM

## 2025-06-23 DIAGNOSIS — N30.00 ACUTE CYSTITIS WITHOUT HEMATURIA: ICD-10-CM

## 2025-06-23 DIAGNOSIS — J44.9 CHRONIC OBSTRUCTIVE PULMONARY DISEASE, UNSPECIFIED COPD TYPE (H): ICD-10-CM

## 2025-06-23 DIAGNOSIS — R41.89 COGNITIVE IMPAIRMENT: ICD-10-CM

## 2025-06-23 DIAGNOSIS — M62.81 GENERALIZED MUSCLE WEAKNESS: ICD-10-CM

## 2025-06-23 DIAGNOSIS — R29.6 FALLS FREQUENTLY: ICD-10-CM

## 2025-06-23 DIAGNOSIS — G40.909 SEIZURE DISORDER (H): ICD-10-CM

## 2025-06-23 DIAGNOSIS — R53.81 PHYSICAL DECONDITIONING: ICD-10-CM

## 2025-06-23 RX ORDER — CEFPODOXIME PROXETIL 200 MG/1
200 TABLET, FILM COATED ORAL 2 TIMES DAILY
COMMUNITY
Start: 2025-06-20 | End: 2025-06-23

## 2025-06-23 RX ORDER — ALENDRONATE SODIUM 70 MG/1
70 TABLET ORAL
COMMUNITY
Start: 2025-06-23

## 2025-06-23 NOTE — PROGRESS NOTES
[facility] GERIATRICS    PRIMARY CARE PROVIDER AND CLINIC:  TANIYA Blair CNP, [address] / [place] [address]  Chief Complaint   Patient presents with    Hospital F/U      [place] Medical Record Number:  [id number]  Place of Service where encounter took place:  [facility] ([facility]) [[id number]]    Yoanna Phillips  is a 85 year old  (1940), re-admitted to the above facility from  [facility]. Hospital stay 6/16/2025 - 6/19/2025.  PMH: COPD, dyspnea cognitive impairment, poor short-term memory, seizure, dementia late onset Alzheimer's type, hypertension S/P left TSA 2012. who was admitted to the hospital after a fall with UTI.    Hospital course  CT head with no acute process  X-ray right knee with no fracture  IV ABX ceftriaxone  Urine culture with E. coli UTI    History obtained from: facility chart records, facility staff, patient report, [facility] chart review, and Care Everywhere [facility] chart review     Today, Ms. Phillips is seen in her [facility] apartment.  She is ambulating in the room using a walker sometimes and then making an attempt to fix her closet.  Writer explained she will need to ask for help to do this kind of works to reduce the possibility of another fall.  Patient seems to understand but continues to adjust her close and walk around in the room without a walker.  Staff alerted they are monitoring her at this rate she will have another fall very soon.  She denies pain, CP, SOB, dizziness, headache, bowel and bladder issue.  Staff report no acute concerns today she continues to utilize nebulizer.    CODE STATUS/ADVANCE DIRECTIVES DISCUSSION:  DNR DNR / DNI  ALLERGIES:   Allergies   Allergen Reactions    Aspirin Unknown      PAST MEDICAL HISTORY:   Past Medical History:   Diagnosis Date    Arthritis     osteoarthritis    Cognitive impairment 7/3/2012    COPD (chronic obstructive pulmonary disease) (H)     Dyspnea on exertion     Epilepsy (H)     minor  seizures 2x daily    Hypertension     Numbness and tingling     bilateral numbness    Pernicious anemia     resolved with vitamin replacement    Poor short term memory 5/20/2011    Seizures (H)       PAST SURGICAL HISTORY:   has a past surgical history that includes NONSPECIFIC PROCEDURE (1965); Craniotomy (1965); Cholecystectomy; Splenectomy; Arthroplasty shoulder (6/25/2012); appendectomy; Arthroplasty hip (Right, 4/11/2016); Arthroplasty revision hip (Right, 5/14/2016); Arthroplasty hip (Left, 3/20/2017); and Closed reduction hip (Left, 5/22/2017).  FAMILY HISTORY: family history includes Arthritis in her mother; Heart Disease in her mother; Hypertension in her mother; Neurologic Disorder in her mother.  SOCIAL HISTORY:   reports that she quit smoking about 63 years ago. Her smoking use included cigarettes. She started smoking about 73 years ago. She has a 5 pack-year smoking history. She has never used smokeless tobacco. She reports current alcohol use. She reports that she does not use drugs.  Patient's living condition: lives in an assisted living facility    Post Discharge Medication Reconciliation Status:   MED REC REQUIRED  Post Medication Reconciliation Status: discharge medications reconciled and changed, per note/orders       Current Outpatient Medications   Medication Sig Dispense Refill    acetaminophen (TYLENOL) 325 MG tablet Take 650 mg by mouth 2 times daily.      alendronate (FOSAMAX) 70 MG tablet TAKE 1 TABLET BY MOUTH ONCE WEEKLY *ORIGINAL CONTAINER* (Patient taking differently: Take 70 mg by mouth every 7 days. Thursdays) 4 tablet 11    amLODIPine (NORVASC) 2.5 MG tablet Take 2.5 mg by mouth at bedtime. Hold for SBP <110      carBAMazepine (CARBATROL) 300 MG 12 hr capsule Take 300 mg by mouth 2 times daily.      cefpodoxime (VANTIN) 200 MG tablet Take 1 tablet (200 mg) by mouth 2 times daily for 4 days. 8 tablet 0    cyanocobalamin (CYANOCOBALAMIN) 1000 mcg/mL injection Inject 1,000 mcg into the  muscle. On the 25th of every month      diclofenac (VOLTAREN) 1 % topical gel Apply 2 g topically 2 times daily. To left knee      folic acid (FOLVITE) 1 MG tablet Take 1 mg by mouth daily.      gabapentin (NEURONTIN) 300 MG capsule Take 300 mg by mouth 3 times daily.      ipratropium - albuterol 0.5 mg/2.5 mg, 3mg,/3 mL (DUONEB) 0.5-2.5 (3) MG/3ML neb solution Take 1 vial by nebulization 3 times daily.      ipratropium - albuterol 0.5 mg/2.5 mg, 3mg,/3 mL (DUONEB) 0.5-2.5 (3) MG/3ML neb solution Take 1 vial by nebulization as needed for shortness of breath or wheezing. Every 24 hours PRN      LOSARTAN POTASSIUM PO Take 75 mg by mouth daily. Hold for SBP <110      senna-docusate (SENOKOT-S/PERICOLACE) 8.6-50 MG tablet Take 1 tablet by mouth. Every Monday, Wednesday, Friday      SPIRIVA HANDIHALER 18 MCG inhaled capsule INHALE CONTENTS OF 1 CAPSULE VIA HANDIHALER ONCE DAILY 30 capsule 11    vitamin D3 (CHOLECALCIFEROL) 50 mcg (2000 units) tablet Take 1 tablet (50 mcg) by mouth daily       No current facility-administered medications for this visit.       ROS:  Unobtainable secondary to cognitive impairment.     Vitals:  /82   Pulse 80   Temp 97.6  F (36.4  C)   Resp 18   Wt 75.8 kg (167 lb)   SpO2 97%   BMI 31.55 kg/m    Exam:  GENERAL APPEARANCE:  Alert  RESP:  respiratory effort and palpation of chest normal, lungs clear to auscultation , expiratory wheezes  CV:  regular rate and rhythm, no murmur, rub, or gallop, compression stocking on  ABDOMEN:  normal bowel sounds, soft, nontender, no hepatosplenomegaly or other masses, no guarding or rebound, bowel sounds normal  :    Can be incontinent  M/S:   Gait and station normal  Using walker  SKIN:  Inspection of skin and subcutaneous tissue baseline  NEURO:   Examination of sensation by touch normal  PSYCH:  insight and judgement impaired, memory impaired     Lab/Diagnostic data:  Most Recent 3 CBC's:  Recent Labs   Lab Test 06/18/25 0624 06/16/25 2017  05/21/25  0430   WBC 9.6 9.3 6.9   HGB 13.6 12.3 12.8   MCV 98 100 100    336 267     Most Recent 3 BMP's:  Recent Labs   Lab Test 06/18/25  0624 06/16/25 2017 05/28/25  0645    136 134*   POTASSIUM 5.0 4.4 4.8   CHLORIDE 102 104 98   CO2 23 23 26   BUN 14.1 13.0 8.1   CR 0.74 0.67 0.61   ANIONGAP 11 9 10   FELICE 9.9 10.0 10.2   GLC 91 101* 94       ASSESSMENT/PLAN:  (N30.00)Acute cystitis without hematuria   Acute Gram-negative bacilli UTI   Oral Abx-Cefpodoxime last day tomorrow 6/23/25    (R29.6) Falls frequently   (M62.81) Generalized muscle weakness  (R53.81) Physical deconditioning  Comment: Acute on chronic recent multiple falls prior to this ED visit with no injury. UTI likely contributed to her falls-  Plan:   - Continue physical therapy  - Encourage walker use  - Monitor for changes    (G30.1,  F02.80) Late onset Alzheimer's disease without behavioral disturbance (H)  (R41.89) Cognitive impairment  Comment: Chronic stable continues to have functional decline she has moved to memory care to help with more ADL and personal care due to recent cognitive decline.  Plan:   - Board and care support with medication and personal care    (G40.909) Seizure disorder (H)  Comment: Chronic stable no recent seizures   Plan:   - Continue Carbamazepine 300mg BID  - Continue Gabapentin  -Quarterly labs CMP, CBC, TSH, and carbamazepine level     (I10) Essential hypertension, benign  Comment: Acute on chronic SBP controlled ranging  ranging 111-136 with one reading of 169.Will consider adjusting medication based on BP.   Plan:   - Continue losartan  to 75mg daily   - Continue amlodipine 2.5 mg at at bedtime  - Monitor blood pressure per protocol    Orders:  No new order today follow up plan of care with no changes     Electronically signed by:TANIYA Blair CNP

## 2025-06-24 ENCOUNTER — PATIENT OUTREACH (OUTPATIENT)
Dept: GERIATRIC MEDICINE | Facility: CLINIC | Age: 85
End: 2025-06-24
Payer: COMMERCIAL

## 2025-06-24 NOTE — PROGRESS NOTES
[facility] Care Coordination Contact  [facility] Six-Month Assessment    6 month assessment completed on 06/23/25 with Yoanna.    ER visits: No  Hospitalizations: Yes -  [facility]  TCU stays: No  Significant health status changes: Medically Stable  Falls/Injuries: Yes: Multiple falls in past 6 months. No injuries noted in chart review.   ADL/IADL changes: No    Reviewed Institutional Assessment and updated as needed.     Will see member in 6 months for an annual health risk assessment.   Encouraged member to call CC with any questions or concerns in the meantime.     Eleonora Burton BSN/PHN/WCC/CMC [facility]  Long Term Care/ Care Coordinator  [address]   [address]  [place] [address]  [email or web address]   [email or web address]     Cell: [phone]  Office: [phone]  Fax: [phone]

## 2025-07-28 VITALS
WEIGHT: 172.8 LBS | HEIGHT: 61 IN | HEART RATE: 74 BPM | TEMPERATURE: 97.6 F | SYSTOLIC BLOOD PRESSURE: 148 MMHG | RESPIRATION RATE: 18 BRPM | OXYGEN SATURATION: 95 % | BODY MASS INDEX: 32.62 KG/M2 | DIASTOLIC BLOOD PRESSURE: 70 MMHG

## 2025-07-29 ENCOUNTER — ASSISTED LIVING VISIT (OUTPATIENT)
Dept: GERIATRICS | Facility: CLINIC | Age: 85
End: 2025-07-29
Payer: COMMERCIAL

## 2025-07-29 ENCOUNTER — LAB REQUISITION (OUTPATIENT)
Dept: LAB | Facility: CLINIC | Age: 85
End: 2025-07-29
Payer: COMMERCIAL

## 2025-07-29 DIAGNOSIS — Z74.1 REQUIRES ASSISTANCE WITH ACTIVITIES OF DAILY LIVING (ADL): ICD-10-CM

## 2025-07-29 DIAGNOSIS — Z91.81 PERSONAL HISTORY OF FALL: ICD-10-CM

## 2025-07-29 DIAGNOSIS — R63.5 WEIGHT GAIN: ICD-10-CM

## 2025-07-29 DIAGNOSIS — R53.81 PHYSICAL DECONDITIONING: Primary | ICD-10-CM

## 2025-07-29 DIAGNOSIS — F02.80 LATE ONSET ALZHEIMER'S DISEASE WITHOUT BEHAVIORAL DISTURBANCE (H): ICD-10-CM

## 2025-07-29 DIAGNOSIS — D64.9 ANEMIA, UNSPECIFIED: ICD-10-CM

## 2025-07-29 DIAGNOSIS — R53.1 GENERALIZED WEAKNESS: ICD-10-CM

## 2025-07-29 DIAGNOSIS — I50.9 HEART FAILURE, UNSPECIFIED (H): ICD-10-CM

## 2025-07-29 DIAGNOSIS — I10 ESSENTIAL (PRIMARY) HYPERTENSION: ICD-10-CM

## 2025-07-29 DIAGNOSIS — R54 FRAILTY: ICD-10-CM

## 2025-07-29 DIAGNOSIS — G30.1 LATE ONSET ALZHEIMER'S DISEASE WITHOUT BEHAVIORAL DISTURBANCE (H): ICD-10-CM

## 2025-07-29 PROCEDURE — 99349 HOME/RES VST EST MOD MDM 40: CPT | Performed by: NURSE PRACTITIONER

## 2025-07-29 NOTE — PROGRESS NOTES
[facility]   2025     Name: Yoanna Phillips   : 1940     Background:  Patient having falls    Orders:  CMP, CBC, BNP.  Diagnosis hypertension  2 view chest x-ray.  Diagnosis weight gain    Electronically signed by     TANIYA Mejia CNP on 2025 at 2:09 PM

## 2025-07-30 LAB
ALBUMIN SERPL BCG-MCNC: 3.7 G/DL (ref 3.5–5.2)
ALP SERPL-CCNC: 71 U/L (ref 40–150)
ALT SERPL W P-5'-P-CCNC: <5 U/L (ref 0–50)
ANION GAP SERPL CALCULATED.3IONS-SCNC: 10 MMOL/L (ref 7–15)
AST SERPL W P-5'-P-CCNC: 23 U/L (ref 0–45)
BILIRUB SERPL-MCNC: 0.3 MG/DL
BUN SERPL-MCNC: 10.5 MG/DL (ref 8–23)
CALCIUM SERPL-MCNC: 10.3 MG/DL (ref 8.8–10.4)
CHLORIDE SERPL-SCNC: 100 MMOL/L (ref 98–107)
CREAT SERPL-MCNC: 0.58 MG/DL (ref 0.51–0.95)
EGFRCR SERPLBLD CKD-EPI 2021: 88 ML/MIN/1.73M2
ERYTHROCYTE [DISTWIDTH] IN BLOOD BY AUTOMATED COUNT: 13.4 % (ref 10–15)
GLUCOSE SERPL-MCNC: 91 MG/DL (ref 70–99)
HCO3 SERPL-SCNC: 22 MMOL/L (ref 22–29)
HCT VFR BLD AUTO: 38.5 % (ref 35–47)
HGB BLD-MCNC: 12.6 G/DL (ref 11.7–15.7)
MCH RBC QN AUTO: 33.5 PG (ref 26.5–33)
MCHC RBC AUTO-ENTMCNC: 32.7 G/DL (ref 31.5–36.5)
MCV RBC AUTO: 102 FL (ref 78–100)
NT-PROBNP SERPL-MCNC: 81 PG/ML (ref 0–624)
PLATELET # BLD AUTO: 348 10E3/UL (ref 150–450)
POTASSIUM SERPL-SCNC: 4.1 MMOL/L (ref 3.4–5.3)
PROT SERPL-MCNC: 6.7 G/DL (ref 6.4–8.3)
RBC # BLD AUTO: 3.76 10E6/UL (ref 3.8–5.2)
SODIUM SERPL-SCNC: 132 MMOL/L (ref 135–145)
WBC # BLD AUTO: 9 10E3/UL (ref 4–11)

## 2025-07-30 PROCEDURE — 36415 COLL VENOUS BLD VENIPUNCTURE: CPT | Mod: ORL | Performed by: NURSE PRACTITIONER

## 2025-07-30 PROCEDURE — P9604 ONE-WAY ALLOW PRORATED TRIP: HCPCS | Mod: ORL | Performed by: NURSE PRACTITIONER

## 2025-07-30 PROCEDURE — 83880 ASSAY OF NATRIURETIC PEPTIDE: CPT | Mod: ORL | Performed by: NURSE PRACTITIONER

## 2025-07-30 PROCEDURE — 85027 COMPLETE CBC AUTOMATED: CPT | Mod: ORL | Performed by: NURSE PRACTITIONER

## 2025-07-30 PROCEDURE — 80053 COMPREHEN METABOLIC PANEL: CPT | Mod: ORL | Performed by: NURSE PRACTITIONER

## 2025-07-31 ENCOUNTER — RESULTS FOLLOW-UP (OUTPATIENT)
Dept: GERIATRICS | Facility: CLINIC | Age: 85
End: 2025-07-31
Payer: COMMERCIAL

## 2025-07-31 ENCOUNTER — DOCUMENTATION ONLY (OUTPATIENT)
Dept: GERIATRICS | Facility: CLINIC | Age: 85
End: 2025-07-31
Payer: COMMERCIAL

## 2025-07-31 ENCOUNTER — PATIENT OUTREACH (OUTPATIENT)
Dept: GERIATRIC MEDICINE | Facility: CLINIC | Age: 85
End: 2025-07-31
Payer: COMMERCIAL

## 2025-07-31 NOTE — PROGRESS NOTES
[facility] Care Coordination Contact  CC attended care conference for member on 04/29/25  at [facility].   Present at care conference member, this care coordinator, adult daughter (Kay Phillips), NH  (Hernan Varela), NH RN (Maria Ines Ontiveros), and NH Dietician (Sofi Jalloh).  OT Report: NA  Cognitive testing results: 9/15 Bims score  PT Report:  NA   Nursing Report: Resident care conference and medications were reviewed and care plan reviewed for any changes. will continue with current  care plan and update it as needed.Code status reviewed   Dietician Report: Continue encouraging good fluid and meal intakes. Monitor for dysphagia s/s, GI issues, and weight changes. Encourage foods/beverages per res preferences for quality of life. CP updated. RD following at low nutrition risk.  Monitoring/Evaluation: RD will continue to monitor nutritional status including weight trends, meal intakes, labs, GI status, feeding ability and chewing/swallowing ability to provide further interventions as warranted.  Will complete the following assessment as scheduled or earlier if indicated.  Risk level: Low  Social Work Report: Cognition/BIMS: Resident completed the BIMS cognitive assessment on 3/10/2025 with a score of 9/15 which indicates moderately impaired cognition. Resident was unable to answer the correct year, month (stated August), or day of the week. Resident d/x with Late Onset Alzheimer s, Dementia.  Mood/PHQ9: Resident also completed a PHQ9 assessment on 3/10/2025 with a score of 0/27 indicating no signs of depressed mood this review. Resident does not express any thoughts/ideations of suicide. Resident has diagnosis of dysthymic disorder.  Psychosocial well-being: Resident does well psychosocially. Resident states she likes to organize in her room but does visit with others in the dining area. Resident has a hoarding disorder that requires close monitoring of her room being overwhelmed with  items. Her past occupation was as a  and loves to have books around her.  Support system: Resident is primarily supported by her daughter, Jeanie.She reported has good relationships with her grandchildren.  Discharge planning: There are no plans for discharge d/t resident being appropriate for placement on secure memory care unit.  Trauma Informed Care/Vulnerabilities: Trauma and Vulnerabilities assessment completed at admission, annually, with a SCOS, and addressed reviewed in residents POC.  Advance directives: POLST and POA on file  Comments: Resident is forgetful and at times will claim she rarely sees her children which is not fact.   CC Report:  Not invited to care conference. Chart review completed to obtain care conference notes     Eleonora Burton BSN/PHN/WCC/CMC [facility]  Long Term Care/ Care Coordinator  [address]   [address]  [place] [address]  [email or web address]   [email or web address]     Cell: [phone]  Office: [phone]  Fax: [phone]

## 2025-08-19 ENCOUNTER — NURSING HOME VISIT (OUTPATIENT)
Dept: GERIATRICS | Facility: CLINIC | Age: 85
End: 2025-08-19
Payer: COMMERCIAL

## 2025-08-19 VITALS
BODY MASS INDEX: 31.08 KG/M2 | DIASTOLIC BLOOD PRESSURE: 81 MMHG | RESPIRATION RATE: 16 BRPM | WEIGHT: 168.9 LBS | SYSTOLIC BLOOD PRESSURE: 168 MMHG | HEART RATE: 60 BPM | TEMPERATURE: 97.5 F | OXYGEN SATURATION: 95 % | HEIGHT: 62 IN

## 2025-08-19 DIAGNOSIS — F02.80 LATE ONSET ALZHEIMER'S DISEASE WITHOUT BEHAVIORAL DISTURBANCE (H): ICD-10-CM

## 2025-08-19 DIAGNOSIS — M25.561 ACUTE PAIN OF RIGHT KNEE: Primary | ICD-10-CM

## 2025-08-19 DIAGNOSIS — G40.909 SEIZURE DISORDER (H): ICD-10-CM

## 2025-08-19 DIAGNOSIS — G30.1 LATE ONSET ALZHEIMER'S DISEASE WITHOUT BEHAVIORAL DISTURBANCE (H): ICD-10-CM

## 2025-08-19 DIAGNOSIS — J44.9 CHRONIC OBSTRUCTIVE PULMONARY DISEASE, UNSPECIFIED COPD TYPE (H): ICD-10-CM

## 2025-08-19 DIAGNOSIS — I10 ESSENTIAL HYPERTENSION, BENIGN: ICD-10-CM

## 2025-08-19 PROCEDURE — 99309 SBSQ NF CARE MODERATE MDM 30: CPT | Performed by: INTERNAL MEDICINE

## 2025-08-29 DIAGNOSIS — K59.00 CONSTIPATION: Primary | ICD-10-CM

## 2025-08-29 DIAGNOSIS — M19.90 DJD (DEGENERATIVE JOINT DISEASE): Primary | ICD-10-CM

## 2025-08-29 DIAGNOSIS — E55.9 VITAMIN D DEFICIENCY: ICD-10-CM

## 2025-08-29 DIAGNOSIS — Z78.9 TAKES DIETARY SUPPLEMENTS: ICD-10-CM

## 2025-08-29 DIAGNOSIS — I10 ESSENTIAL HYPERTENSION, BENIGN: ICD-10-CM

## 2025-08-29 DIAGNOSIS — R52 PAIN: ICD-10-CM

## 2025-08-30 RX ORDER — ACETAMINOPHEN 325 MG/1
TABLET ORAL
Qty: 112 TABLET | Refills: 12 | Status: SHIPPED | OUTPATIENT
Start: 2025-08-30

## 2025-08-30 RX ORDER — GABAPENTIN 300 MG/1
300 CAPSULE ORAL 3 TIMES DAILY
Qty: 90 CAPSULE | Refills: 12 | Status: SHIPPED | OUTPATIENT
Start: 2025-08-30

## 2025-08-30 RX ORDER — CHOLECALCIFEROL (VITAMIN D3) 50 MCG
1 TABLET ORAL DAILY
Qty: 30 TABLET | Refills: 12 | Status: SHIPPED | OUTPATIENT
Start: 2025-08-30

## 2025-08-30 RX ORDER — LOSARTAN POTASSIUM 25 MG/1
TABLET ORAL
Qty: 90 TABLET | Refills: 12 | Status: SHIPPED | OUTPATIENT
Start: 2025-08-30

## 2025-08-30 RX ORDER — AMLODIPINE BESYLATE 2.5 MG/1
TABLET ORAL
Qty: 30 TABLET | Refills: 12 | Status: SHIPPED | OUTPATIENT
Start: 2025-08-30

## 2025-08-30 RX ORDER — FOLIC ACID 1 MG/1
1000 TABLET ORAL DAILY
Qty: 30 TABLET | Refills: 12 | Status: SHIPPED | OUTPATIENT
Start: 2025-08-30

## 2025-08-30 RX ORDER — DOCUSATE SODIUM 50 MG AND SENNOSIDES 8.6 MG 8.6; 5 MG/1; MG/1
TABLET, FILM COATED ORAL
Qty: 12 TABLET | Refills: 12 | Status: SHIPPED | OUTPATIENT
Start: 2025-08-30

## (undated) DEVICE — IMM PILLOW ABDUCT HIP MED

## (undated) DEVICE — LINEN TOWEL PACK X5 5464

## (undated) DEVICE — GLOVE PROTEXIS POWDER FREE 8.5 ORTHOPEDIC 2D73ET85

## (undated) DEVICE — IMM PILLOW ABDUCT HIP MED 31143061

## (undated) DEVICE — SU VICRYL 0 CP-1 27" J467H

## (undated) DEVICE — GLOVE PROTEXIS POWDER FREE 7.5 ORTHOPEDIC 2D73ET75

## (undated) DEVICE — DRSG KERLIX FLUFFS X5

## (undated) DEVICE — MANIFOLD NEPTUNE 4 PORT 700-20

## (undated) DEVICE — PREP SKIN SCRUB TRAY 4461A

## (undated) DEVICE — SOL NACL 0.9% IRRIG 1000ML BOTTLE 07138-09

## (undated) DEVICE — BONE CEMENT MIXING KIT MAX CAPACITY OPTIVAC 41800

## (undated) DEVICE — SPONGE BALL KERLIX ROUND XL W/O STRING LATEX 4935

## (undated) DEVICE — DRSG XEROFORM 5X9" 8884431605

## (undated) DEVICE — BLADE SAW SAGITTAL STRK 25X90X1.37MM 4H SYS 6 6125-137-090

## (undated) DEVICE — SU VICRYL 2-0 CP-1 27" UND J266H

## (undated) DEVICE — PACK TOTAL HIP W/U DRAPE SOP15HUFSC

## (undated) DEVICE — DRSG ABDOMINAL 07 1/2X8" 7197D

## (undated) DEVICE — GLOVE PROTEXIS W/NEU-THERA 7.5  2D73TE75

## (undated) DEVICE — ESU GROUND PAD UNIVERSAL W/O CORD

## (undated) DEVICE — CATH TRAY FOLEY SURESTEP 16FR WDRAIN BAG STLK LATEX A300316A

## (undated) DEVICE — GLOVE PROTEXIS W/NEU-THERA 8.5  2D73TE85

## (undated) RX ORDER — HYDROMORPHONE HYDROCHLORIDE 1 MG/ML
INJECTION, SOLUTION INTRAMUSCULAR; INTRAVENOUS; SUBCUTANEOUS
Status: DISPENSED
Start: 2017-03-20

## (undated) RX ORDER — FENTANYL CITRATE 50 UG/ML
INJECTION, SOLUTION INTRAMUSCULAR; INTRAVENOUS
Status: DISPENSED
Start: 2017-03-20

## (undated) RX ORDER — LIDOCAINE HYDROCHLORIDE 20 MG/ML
INJECTION, SOLUTION EPIDURAL; INFILTRATION; INTRACAUDAL; PERINEURAL
Status: DISPENSED
Start: 2017-03-20

## (undated) RX ORDER — CEFAZOLIN SODIUM 2 G/100ML
INJECTION, SOLUTION INTRAVENOUS
Status: DISPENSED
Start: 2017-03-20

## (undated) RX ORDER — FENTANYL CITRATE 50 UG/ML
INJECTION, SOLUTION INTRAMUSCULAR; INTRAVENOUS
Status: DISPENSED
Start: 2017-05-22

## (undated) RX ORDER — GLYCOPYRROLATE 0.2 MG/ML
INJECTION, SOLUTION INTRAMUSCULAR; INTRAVENOUS
Status: DISPENSED
Start: 2017-03-20

## (undated) RX ORDER — ONDANSETRON 2 MG/ML
INJECTION INTRAMUSCULAR; INTRAVENOUS
Status: DISPENSED
Start: 2017-03-20

## (undated) RX ORDER — ACETAMINOPHEN 500 MG
TABLET ORAL
Status: DISPENSED
Start: 2017-03-20

## (undated) RX ORDER — PROPOFOL 10 MG/ML
INJECTION, EMULSION INTRAVENOUS
Status: DISPENSED
Start: 2017-03-20